# Patient Record
Sex: MALE | Race: OTHER | NOT HISPANIC OR LATINO | URBAN - METROPOLITAN AREA
[De-identification: names, ages, dates, MRNs, and addresses within clinical notes are randomized per-mention and may not be internally consistent; named-entity substitution may affect disease eponyms.]

---

## 2018-01-20 ENCOUNTER — INPATIENT (INPATIENT)
Facility: HOSPITAL | Age: 83
LOS: 7 days | Discharge: HOME HEALTH | End: 2018-01-28
Attending: STUDENT IN AN ORGANIZED HEALTH CARE EDUCATION/TRAINING PROGRAM | Admitting: STUDENT IN AN ORGANIZED HEALTH CARE EDUCATION/TRAINING PROGRAM
Payer: MEDICAID

## 2018-01-20 VITALS
TEMPERATURE: 98 F | SYSTOLIC BLOOD PRESSURE: 129 MMHG | RESPIRATION RATE: 16 BRPM | HEART RATE: 84 BPM | DIASTOLIC BLOOD PRESSURE: 65 MMHG | OXYGEN SATURATION: 99 %

## 2018-01-20 NOTE — ED ADULT TRIAGE NOTE - CHIEF COMPLAINT QUOTE
Pt walk in on a wheelchair. daughter reports Pt arrived from [facility],and sent to [facility] on 1/1/2018 and D/C 1/9/2018 for AMS and Weakness and Pain on Right shoulder. per daughter claimed no improvement. prior departure from [place] Pt is AOX3, and able to do his ADL. Daughter wanted further eval. daughter reports constipation Blood in the Urine  since last night on aspirin with Dysuria Pt walk in on a wheelchair. daughter reports Pt arrived from [facility],and sent to [facility] on 1/1/2018 and D/C 1/9/2018 for AMS and Weakness and Pain on Right shoulder. per daughter claimed no improvement. prior departure from [place] Pt is AOX3, and able to do his ADL. Daughter wanted further eval. daughter reports constipation Blood in the Urine  since last night on aspirin with Dysuria denies CP SOB palpitation Dizziness

## 2018-01-21 DIAGNOSIS — I62.00 NONTRAUMATIC SUBDURAL HEMORRHAGE, UNSPECIFIED: ICD-10-CM

## 2018-01-21 DIAGNOSIS — S06.5X0D TRAUMATIC SUBDURAL HEMORRHAGE WITHOUT LOSS OF CONSCIOUSNESS, SUBSEQUENT ENCOUNTER: ICD-10-CM

## 2018-01-21 LAB
ALBUMIN SERPL ELPH-MCNC: 3.7 G/DL — SIGNIFICANT CHANGE UP (ref 3.3–5)
ALP SERPL-CCNC: 54 U/L — SIGNIFICANT CHANGE UP (ref 40–120)
ALT FLD-CCNC: 29 U/L — SIGNIFICANT CHANGE UP (ref 4–41)
APPEARANCE UR: CLEAR — SIGNIFICANT CHANGE UP
APTT BLD: 26.5 SEC — LOW (ref 27.5–37.4)
AST SERPL-CCNC: 35 U/L — SIGNIFICANT CHANGE UP (ref 4–40)
BASE EXCESS BLDV CALC-SCNC: 3.2 MMOL/L — SIGNIFICANT CHANGE UP
BASOPHILS # BLD AUTO: 0.04 K/UL — SIGNIFICANT CHANGE UP (ref 0–0.2)
BASOPHILS NFR BLD AUTO: 0.7 % — SIGNIFICANT CHANGE UP (ref 0–2)
BILIRUB SERPL-MCNC: 0.4 MG/DL — SIGNIFICANT CHANGE UP (ref 0.2–1.2)
BILIRUB UR-MCNC: NEGATIVE — SIGNIFICANT CHANGE UP
BLD GP AB SCN SERPL QL: NEGATIVE — SIGNIFICANT CHANGE UP
BLOOD GAS VENOUS - CREATININE: 1.34 MG/DL — HIGH (ref 0.5–1.3)
BLOOD UR QL VISUAL: NEGATIVE — SIGNIFICANT CHANGE UP
BUN SERPL-MCNC: 13 MG/DL — SIGNIFICANT CHANGE UP (ref 7–23)
BUN SERPL-MCNC: 19 MG/DL — SIGNIFICANT CHANGE UP (ref 7–23)
CALCIUM SERPL-MCNC: 9.1 MG/DL — SIGNIFICANT CHANGE UP (ref 8.4–10.5)
CALCIUM SERPL-MCNC: 9.3 MG/DL — SIGNIFICANT CHANGE UP (ref 8.4–10.5)
CHLORIDE BLDV-SCNC: 106 MMOL/L — SIGNIFICANT CHANGE UP (ref 96–108)
CHLORIDE SERPL-SCNC: 101 MMOL/L — SIGNIFICANT CHANGE UP (ref 98–107)
CHLORIDE SERPL-SCNC: 103 MMOL/L — SIGNIFICANT CHANGE UP (ref 98–107)
CO2 SERPL-SCNC: 25 MMOL/L — SIGNIFICANT CHANGE UP (ref 22–31)
CO2 SERPL-SCNC: 25 MMOL/L — SIGNIFICANT CHANGE UP (ref 22–31)
COLOR SPEC: YELLOW — SIGNIFICANT CHANGE UP
CREAT SERPL-MCNC: 1.01 MG/DL — SIGNIFICANT CHANGE UP (ref 0.5–1.3)
CREAT SERPL-MCNC: 1.42 MG/DL — HIGH (ref 0.5–1.3)
EOSINOPHIL # BLD AUTO: 0.08 K/UL — SIGNIFICANT CHANGE UP (ref 0–0.5)
EOSINOPHIL NFR BLD AUTO: 1.4 % — SIGNIFICANT CHANGE UP (ref 0–6)
GAS PNL BLDV: 134 MMOL/L — LOW (ref 136–146)
GLUCOSE BLDV-MCNC: 97 — SIGNIFICANT CHANGE UP (ref 70–99)
GLUCOSE SERPL-MCNC: 100 MG/DL — HIGH (ref 70–99)
GLUCOSE SERPL-MCNC: 126 MG/DL — HIGH (ref 70–99)
GLUCOSE UR-MCNC: NEGATIVE — SIGNIFICANT CHANGE UP
HBA1C BLD-MCNC: 5.5 % — SIGNIFICANT CHANGE UP (ref 4–5.6)
HCO3 BLDV-SCNC: 26 MMOL/L — SIGNIFICANT CHANGE UP (ref 20–27)
HCT VFR BLD CALC: 35.2 % — LOW (ref 39–50)
HCT VFR BLDV CALC: 37 % — LOW (ref 39–51)
HGB BLD-MCNC: 11.5 G/DL — LOW (ref 13–17)
HGB BLDV-MCNC: 12 G/DL — LOW (ref 13–17)
HYALINE CASTS # UR AUTO: SIGNIFICANT CHANGE UP (ref 0–?)
IMM GRANULOCYTES # BLD AUTO: 0.02 # — SIGNIFICANT CHANGE UP
IMM GRANULOCYTES NFR BLD AUTO: 0.4 % — SIGNIFICANT CHANGE UP (ref 0–1.5)
INR BLD: 0.93 — SIGNIFICANT CHANGE UP (ref 0.88–1.17)
KETONES UR-MCNC: NEGATIVE — SIGNIFICANT CHANGE UP
LACTATE BLDV-MCNC: 2 MMOL/L — SIGNIFICANT CHANGE UP (ref 0.5–2)
LEUKOCYTE ESTERASE UR-ACNC: NEGATIVE — SIGNIFICANT CHANGE UP
LYMPHOCYTES # BLD AUTO: 2.2 K/UL — SIGNIFICANT CHANGE UP (ref 1–3.3)
LYMPHOCYTES # BLD AUTO: 39.5 % — SIGNIFICANT CHANGE UP (ref 13–44)
MAGNESIUM SERPL-MCNC: 2.1 MG/DL — SIGNIFICANT CHANGE UP (ref 1.6–2.6)
MCHC RBC-ENTMCNC: 28.5 PG — SIGNIFICANT CHANGE UP (ref 27–34)
MCHC RBC-ENTMCNC: 32.7 % — SIGNIFICANT CHANGE UP (ref 32–36)
MCV RBC AUTO: 87.3 FL — SIGNIFICANT CHANGE UP (ref 80–100)
MONOCYTES # BLD AUTO: 0.91 K/UL — HIGH (ref 0–0.9)
MONOCYTES NFR BLD AUTO: 16.3 % — HIGH (ref 2–14)
MUCOUS THREADS # UR AUTO: SIGNIFICANT CHANGE UP
NEUTROPHILS # BLD AUTO: 2.32 K/UL — SIGNIFICANT CHANGE UP (ref 1.8–7.4)
NEUTROPHILS NFR BLD AUTO: 41.7 % — LOW (ref 43–77)
NITRITE UR-MCNC: NEGATIVE — SIGNIFICANT CHANGE UP
NRBC # FLD: 0 — SIGNIFICANT CHANGE UP
PCO2 BLDV: 48 MMHG — SIGNIFICANT CHANGE UP (ref 41–51)
PH BLDV: 7.39 PH — SIGNIFICANT CHANGE UP (ref 7.32–7.43)
PH UR: 6 — SIGNIFICANT CHANGE UP (ref 4.6–8)
PHOSPHATE SERPL-MCNC: 3.1 MG/DL — SIGNIFICANT CHANGE UP (ref 2.5–4.5)
PLATELET # BLD AUTO: 179 K/UL — SIGNIFICANT CHANGE UP (ref 150–400)
PMV BLD: 10.4 FL — SIGNIFICANT CHANGE UP (ref 7–13)
PO2 BLDV: 30 MMHG — LOW (ref 35–40)
POTASSIUM BLDV-SCNC: 4.4 MMOL/L — SIGNIFICANT CHANGE UP (ref 3.4–4.5)
POTASSIUM SERPL-MCNC: 4.1 MMOL/L — SIGNIFICANT CHANGE UP (ref 3.5–5.3)
POTASSIUM SERPL-MCNC: 4.9 MMOL/L — SIGNIFICANT CHANGE UP (ref 3.5–5.3)
POTASSIUM SERPL-SCNC: 4.1 MMOL/L — SIGNIFICANT CHANGE UP (ref 3.5–5.3)
POTASSIUM SERPL-SCNC: 4.9 MMOL/L — SIGNIFICANT CHANGE UP (ref 3.5–5.3)
PROT SERPL-MCNC: 7.7 G/DL — SIGNIFICANT CHANGE UP (ref 6–8.3)
PROT UR-MCNC: 20 MG/DL — SIGNIFICANT CHANGE UP
PROTHROM AB SERPL-ACNC: 10.7 SEC — SIGNIFICANT CHANGE UP (ref 9.8–13.1)
RBC # BLD: 4.03 M/UL — LOW (ref 4.2–5.8)
RBC # FLD: 12.4 % — SIGNIFICANT CHANGE UP (ref 10.3–14.5)
RBC CASTS # UR COMP ASSIST: SIGNIFICANT CHANGE UP (ref 0–?)
RH IG SCN BLD-IMP: POSITIVE — SIGNIFICANT CHANGE UP
SAO2 % BLDV: 48.2 % — LOW (ref 60–85)
SODIUM SERPL-SCNC: 139 MMOL/L — SIGNIFICANT CHANGE UP (ref 135–145)
SODIUM SERPL-SCNC: 140 MMOL/L — SIGNIFICANT CHANGE UP (ref 135–145)
SP GR SPEC: 1.02 — SIGNIFICANT CHANGE UP (ref 1–1.04)
SQUAMOUS # UR AUTO: SIGNIFICANT CHANGE UP
UROBILINOGEN FLD QL: 1 MG/DL — SIGNIFICANT CHANGE UP
WBC # BLD: 5.57 K/UL — SIGNIFICANT CHANGE UP (ref 3.8–10.5)
WBC # FLD AUTO: 5.57 K/UL — SIGNIFICANT CHANGE UP (ref 3.8–10.5)
WBC UR QL: SIGNIFICANT CHANGE UP (ref 0–?)

## 2018-01-21 PROCEDURE — 70450 CT HEAD/BRAIN W/O DYE: CPT | Mod: 26

## 2018-01-21 PROCEDURE — 99222 1ST HOSP IP/OBS MODERATE 55: CPT

## 2018-01-21 PROCEDURE — 71045 X-RAY EXAM CHEST 1 VIEW: CPT | Mod: 26

## 2018-01-21 PROCEDURE — 93306 TTE W/DOPPLER COMPLETE: CPT | Mod: 26

## 2018-01-21 RX ORDER — ATORVASTATIN CALCIUM 80 MG/1
20 TABLET, FILM COATED ORAL AT BEDTIME
Qty: 0 | Refills: 0 | Status: DISCONTINUED | OUTPATIENT
Start: 2018-01-21 | End: 2018-01-27

## 2018-01-21 RX ORDER — TAMSULOSIN HYDROCHLORIDE 0.4 MG/1
0.4 CAPSULE ORAL AT BEDTIME
Qty: 0 | Refills: 0 | Status: DISCONTINUED | OUTPATIENT
Start: 2018-01-21 | End: 2018-01-27

## 2018-01-21 RX ORDER — TAMSULOSIN HYDROCHLORIDE 0.4 MG/1
0.4 CAPSULE ORAL AT BEDTIME
Qty: 0 | Refills: 0 | Status: DISCONTINUED | OUTPATIENT
Start: 2018-01-21 | End: 2018-01-21

## 2018-01-21 RX ORDER — SODIUM CHLORIDE 9 MG/ML
1000 INJECTION INTRAMUSCULAR; INTRAVENOUS; SUBCUTANEOUS
Qty: 0 | Refills: 0 | Status: DISCONTINUED | OUTPATIENT
Start: 2018-01-21 | End: 2018-01-21

## 2018-01-21 RX ORDER — ATORVASTATIN CALCIUM 80 MG/1
20 TABLET, FILM COATED ORAL AT BEDTIME
Qty: 0 | Refills: 0 | Status: DISCONTINUED | OUTPATIENT
Start: 2018-01-21 | End: 2018-01-21

## 2018-01-21 RX ADMIN — TAMSULOSIN HYDROCHLORIDE 0.4 MILLIGRAM(S): 0.4 CAPSULE ORAL at 22:15

## 2018-01-21 RX ADMIN — ATORVASTATIN CALCIUM 20 MILLIGRAM(S): 80 TABLET, FILM COATED ORAL at 22:15

## 2018-01-21 NOTE — ED PROVIDER NOTE - PROGRESS NOTE DETAILS
b/l acute on chronic subdurals on ct, neurosurgery consulted, family informed. neurosurg consulted sicu, patient comfortable, awaiting dispo

## 2018-01-21 NOTE — H&P ADULT - HISTORY OF PRESENT ILLNESS
84 y/o m presents with ams. Patient came from [facility] at end of december, had unwitnessed fall in airport while there, had ams and had hospital stay while in [facility]. Came to [place], family noticed he was more altered, confused, weak, went to [place] and was admitted there for few days-they are unsure of his w/u but was started on meds for htn/agitation/hld/prostate. Over last few days, has become even weaker, to the point where he used to be able to do all of his adls and now requires assistance to even go to the bathroom, confusion is increasing. No fevers, ha, cp, sob, abd pain, vomiting, diarrhea, dysuria. Patient himself has no complaints

## 2018-01-21 NOTE — ED PROVIDER NOTE - NEUROLOGICAL, MLM
ao3, cn2-12 intact, strenght 5/5 in all extremities, sensation intact, unable to ambulate 2/2 weakness.

## 2018-01-21 NOTE — H&P ADULT - NSHPPHYSICALEXAM_GEN_ALL_CORE
· CONSTITUTIONAL: - - -  · Appearance: well appearing  · Development: well developed  · Distress: no apparent  · Manner: appropriate for situation  · Mentation: awake, alert, oriented to name/place (never knows year per family)  · Mood: appropriate  · Nourishment: well  · ENMT: Airway patent, Nasal mucosa clear. Mouth with normal mucosa. Throat has no vesicles, no oropharyngeal exudates and uvula is midline.  · EYES: Clear bilaterally, pupils equal, round and reactive to light.  · CARDIAC: Normal rate, regular rhythm.  Heart sounds S1, S2.  No murmurs, rubs or gallops.  · RESPIRATORY: Breath sounds clear and equal bilaterally.  · GASTROINTESTINAL: Abdomen soft, non-tender, no guarding.  · MUSCULOSKELETAL: Spine appears normal, range of motion is not limited, no muscle or joint tenderness  · NEUROLOGICAL: ao3, cn2-12 intact, strenght 5/5 in all extremities, sensation intact, unable to ambulate 2/2 weakness.  · SKIN: Skin normal color for race, warm, dry and intact. No evidence of rash.  · PSYCHIATRIC: Alert and oriented to person, place, time/situation. normal mood and affect. no apparent risk to self or others.

## 2018-01-21 NOTE — ED ADULT NURSE NOTE - ED STAT RN HANDOFF DETAILS
endorsed to rn arya. pt remains awake, alert and active. remains confused. no s/s of pain. nad noted. fall precautions in place, moved closer to nurses station. safety maintained endorsed to rn arya. pt remains awake, alert, active and confused. ambulatory with steady gait. pupils equal and round. no s/s of pain. nad noted. fall precautions in place, moved closer to nurses station. safety maintained

## 2018-01-21 NOTE — ED ADULT NURSE NOTE - CHIEF COMPLAINT QUOTE
Pt walk in on a wheelchair. daughter reports Pt arrived from [facility],and sent to [facility] on 1/1/2018 and D/C 1/9/2018 for AMS and Weakness and Pain on Right shoulder. per daughter claimed no improvement. prior departure from [place] Pt is AOX3, and able to do his ADL. Daughter wanted further eval. daughter reports constipation Blood in the Urine  since last night on aspirin with Dysuria denies CP SOB palpitation Dizziness

## 2018-01-21 NOTE — PATIENT PROFILE ADULT. - HEALTHCARE QUESTIONS, PROFILE
[place]  [address]  [phone]               Thank you for choosing us for your health care visit with [facility], [facility].   We are glad to serve you and happy to provide you with this summary of Take 1 tablet by mouth every 6 (six) hours as needed for Pain.    Commonly known as:  NORCO           MetFORMIN HCl  MG Tb24   TAKE 2 TABLETS (1000 MG) BY MOUTH TWO TIMES A DAY   Commonly known as:  GLUCOPHAGE-XR           * NAPROXEN  MG Tbec none

## 2018-01-21 NOTE — H&P ADULT - PMH
Benign prostatic hyperplasia with urinary frequency    HTN (hypertension)    Hyperlipidemia, unspecified hyperlipidemia type

## 2018-01-21 NOTE — ED ADULT NURSE NOTE - OBJECTIVE STATEMENT
pt a&o x 1 brought in by family for AMS since 1/1/18. as per family pt is originally a&o x3 and able to complete ADL but since returning from [facility] 12/30 he has been a*o x1 and unable to complete adls. was admitted 1/1-1/9 at [facility] and [place] with dx of early dementia. family wants further eval and wants urine tested. states they noticed bloody urine yesterday, on aspirin. no reports of fever. family states pt periodically holds head as if in pain. pt does not appear in pain, nad noted. family also reports left shoulder pain. pt noted to be able to move extremity. right forearm 20g placed. awaiting md hernandez

## 2018-01-21 NOTE — H&P ADULT - NSHPREVIEWOFSYSTEMS_GEN_ALL_CORE
· CONSTITUTIONAL: - - -  · Constitutional [+]: generalized weakness.  · NEUROLOGICAL: - - -  · Neurological [+]: confusion  · ROS STATEMENT: all other ROS negative except as per HPI

## 2018-01-21 NOTE — ED PROVIDER NOTE - MEDICAL DECISION MAKING DETAILS
84 y/o m presents with progressive weakness and confusion, prior hospital stay for same but continues to worsen, h/o fall end of december, nontoxic appearing, vss, neuro nonfocal, will check labs, ekg, ua, cxr, ct head, monitor, reass.

## 2018-01-21 NOTE — ED PROVIDER NOTE - OBJECTIVE STATEMENT
86 y/o m presents with ams. Patient came from [facility] at end of december, had unwitnessed fall in airport while there, had ams and had hospital stay while in [facility]. Came to [place], family noticed he was more altered, confused, weak, went to [place] and was admitted there for few days-they are unsure of his w/u but was started on meds for htn/agitation/hld/prostate. Over last few days, has become even weaker, to the point where he used to be able to do all of his adls and now requires assistance to even go to the bathroom, confusion is increasing. No fevers, ha, cp, sob, abd pain, vomiting, diarrhea, dysuria. Patient himself has no acute complaints. manolo valiente- 86 y/o m presents with ams. Patient came from [facility] at end of december, had unwitnessed fall in airport while there, had ams and had hospital stay while in [facility]. Came to [place], family noticed he was more altered, confused, weak, went to [place] and was admitted there for few days-they are unsure of his w/u but was started on meds for htn/agitation/hld/prostate. Over last few days, has become even weaker, to the point where he used to be able to do all of his adls and now requires assistance to even go to the bathroom, confusion is increasing. No fevers, ha, cp, sob, abd pain, vomiting, diarrhea, dysuria. Patient himself has no acute complaints.

## 2018-01-21 NOTE — ED PROVIDER NOTE - CRITICAL CARE INDICATION, MLM
patient was critically ill... Patient was critically ill with a high probability of imminent or life threatening deterioration. ams with b/l subdural hemorrhages requiring neurosurgery eval and disposition to sicu.

## 2018-01-21 NOTE — H&P ADULT - NSHPLABSRESULTS_GEN_ALL_CORE
Labs                        11.5   5.57  )-----------( 179      ( 2018 00:33 )             35.2     2018 00:33    139    |  101    |  19     ----------------------------<  100    4.9     |  25     |  1.42     Ca    9.3        2018 00:33    TPro  7.7    /  Alb  3.7    /  TBili  0.4    /  DBili  x      /  AST  35     /  ALT  29     /  AlkPhos  54     2018 00:33    LIVER FUNCTIONS - ( 2018 00:33 )  Alb: 3.7 g/dL / Pro: 7.7 g/dL / ALK PHOS: 54 u/L / ALT: 29 u/L / AST: 35 u/L / GGT: x           CAPILLARY BLOOD GLUCOSE    POCT Blood Glucose.: 135 mg/dL (2018 22:59)    Urinalysis Basic - ( 2018 00:40 )    Color: YELLOW / Appearance: CLEAR / S.020 / pH: 6.0  Gluc: NEGATIVE / Ketone: NEGATIVE  / Bili: NEGATIVE / Urobili: 1 mg/dL   Blood: NEGATIVE / Protein: 20 mg/dL / Nitrite: NEGATIVE   Leuk Esterase: NEGATIVE / RBC: 0-2 / WBC 0-2   Sq Epi: OCC / Non Sq Epi: x / Bacteria: x    Radiology    < from: CT Head No Cont (18 @ 01:27) >    MPRESSION:    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 Mild prominence of the   temporal horns, may be related to generalized volume loss, however early   mild hydrocephalus cannot be entirely excluded. No significant midline   shift.    Findings were discussed with Dr. Broderick by Dr. Mccann at approximately   1:46 AM dated 2018 with read back.    < end of copied text >

## 2018-01-22 ENCOUNTER — TRANSCRIPTION ENCOUNTER (OUTPATIENT)
Age: 83
End: 2018-01-22

## 2018-01-22 DIAGNOSIS — I62.00 NONTRAUMATIC SUBDURAL HEMORRHAGE, UNSPECIFIED: ICD-10-CM

## 2018-01-22 LAB
ALBUMIN SERPL ELPH-MCNC: 3.8 G/DL — SIGNIFICANT CHANGE UP (ref 3.3–5)
ALP SERPL-CCNC: 56 U/L — SIGNIFICANT CHANGE UP (ref 40–120)
ALT FLD-CCNC: 30 U/L — SIGNIFICANT CHANGE UP (ref 4–41)
APTT BLD: 25.7 SEC — LOW (ref 27.5–37.4)
AST SERPL-CCNC: 37 U/L — SIGNIFICANT CHANGE UP (ref 4–40)
BACTERIA UR CULT: SIGNIFICANT CHANGE UP
BASE EXCESS BLDA CALC-SCNC: 1.7 MMOL/L — SIGNIFICANT CHANGE UP
BASE EXCESS BLDV CALC-SCNC: 5.2 MMOL/L — SIGNIFICANT CHANGE UP
BASOPHILS # BLD AUTO: 0.02 K/UL — SIGNIFICANT CHANGE UP (ref 0–0.2)
BASOPHILS NFR BLD AUTO: 0.4 % — SIGNIFICANT CHANGE UP (ref 0–2)
BILIRUB SERPL-MCNC: 0.7 MG/DL — SIGNIFICANT CHANGE UP (ref 0.2–1.2)
BLD GP AB SCN SERPL QL: NEGATIVE — SIGNIFICANT CHANGE UP
BLOOD GAS VENOUS - CREATININE: 1.09 MG/DL — SIGNIFICANT CHANGE UP (ref 0.5–1.3)
BUN SERPL-MCNC: 15 MG/DL — SIGNIFICANT CHANGE UP (ref 7–23)
CA-I BLDA-SCNC: 1.16 MMOL/L — SIGNIFICANT CHANGE UP (ref 1.15–1.29)
CALCIUM SERPL-MCNC: 9.3 MG/DL — SIGNIFICANT CHANGE UP (ref 8.4–10.5)
CHLORIDE BLDV-SCNC: 108 MMOL/L — SIGNIFICANT CHANGE UP (ref 96–108)
CHLORIDE SERPL-SCNC: 104 MMOL/L — SIGNIFICANT CHANGE UP (ref 98–107)
CO2 SERPL-SCNC: 23 MMOL/L — SIGNIFICANT CHANGE UP (ref 22–31)
CREAT SERPL-MCNC: 1.22 MG/DL — SIGNIFICANT CHANGE UP (ref 0.5–1.3)
EOSINOPHIL # BLD AUTO: 0.05 K/UL — SIGNIFICANT CHANGE UP (ref 0–0.5)
EOSINOPHIL NFR BLD AUTO: 0.9 % — SIGNIFICANT CHANGE UP (ref 0–6)
GAS PNL BLDV: 132 MMOL/L — LOW (ref 136–146)
GLUCOSE BLDA-MCNC: 155 MG/DL — HIGH (ref 70–99)
GLUCOSE BLDV-MCNC: 142 — HIGH (ref 70–99)
GLUCOSE SERPL-MCNC: 133 MG/DL — HIGH (ref 70–99)
HCO3 BLDA-SCNC: 26 MMOL/L — SIGNIFICANT CHANGE UP (ref 22–26)
HCO3 BLDV-SCNC: 27 MMOL/L — SIGNIFICANT CHANGE UP (ref 20–27)
HCT VFR BLD CALC: 37.6 % — LOW (ref 39–50)
HCT VFR BLDA CALC: 34.3 % — LOW (ref 39–51)
HCT VFR BLDV CALC: 39 % — SIGNIFICANT CHANGE UP (ref 39–51)
HGB BLD-MCNC: 12.4 G/DL — LOW (ref 13–17)
HGB BLDA-MCNC: 11.1 G/DL — LOW (ref 13–17)
HGB BLDV-MCNC: 12.7 G/DL — LOW (ref 13–17)
IMM GRANULOCYTES # BLD AUTO: 0.03 # — SIGNIFICANT CHANGE UP
IMM GRANULOCYTES NFR BLD AUTO: 0.5 % — SIGNIFICANT CHANGE UP (ref 0–1.5)
INR BLD: 0.98 — SIGNIFICANT CHANGE UP (ref 0.88–1.17)
LACTATE BLDA-SCNC: 1.2 MMOL/L — SIGNIFICANT CHANGE UP (ref 0.5–2)
LACTATE BLDV-MCNC: 1.6 MMOL/L — SIGNIFICANT CHANGE UP (ref 0.5–2)
LYMPHOCYTES # BLD AUTO: 1.49 K/UL — SIGNIFICANT CHANGE UP (ref 1–3.3)
LYMPHOCYTES # BLD AUTO: 26.8 % — SIGNIFICANT CHANGE UP (ref 13–44)
MAGNESIUM SERPL-MCNC: 2.1 MG/DL — SIGNIFICANT CHANGE UP (ref 1.6–2.6)
MCHC RBC-ENTMCNC: 28.8 PG — SIGNIFICANT CHANGE UP (ref 27–34)
MCHC RBC-ENTMCNC: 33 % — SIGNIFICANT CHANGE UP (ref 32–36)
MCV RBC AUTO: 87.2 FL — SIGNIFICANT CHANGE UP (ref 80–100)
MONOCYTES # BLD AUTO: 0.75 K/UL — SIGNIFICANT CHANGE UP (ref 0–0.9)
MONOCYTES NFR BLD AUTO: 13.5 % — SIGNIFICANT CHANGE UP (ref 2–14)
NEUTROPHILS # BLD AUTO: 3.23 K/UL — SIGNIFICANT CHANGE UP (ref 1.8–7.4)
NEUTROPHILS NFR BLD AUTO: 57.9 % — SIGNIFICANT CHANGE UP (ref 43–77)
NRBC # FLD: 0 — SIGNIFICANT CHANGE UP
PCO2 BLDA: 39 MMHG — SIGNIFICANT CHANGE UP (ref 35–48)
PCO2 BLDV: 49 MMHG — SIGNIFICANT CHANGE UP (ref 41–51)
PH BLDA: 7.43 PH — SIGNIFICANT CHANGE UP (ref 7.35–7.45)
PH BLDV: 7.4 PH — SIGNIFICANT CHANGE UP (ref 7.32–7.43)
PHOSPHATE SERPL-MCNC: 3.4 MG/DL — SIGNIFICANT CHANGE UP (ref 2.5–4.5)
PLATELET # BLD AUTO: 173 K/UL — SIGNIFICANT CHANGE UP (ref 150–400)
PMV BLD: 10.1 FL — SIGNIFICANT CHANGE UP (ref 7–13)
PO2 BLDA: 99 MMHG — SIGNIFICANT CHANGE UP (ref 83–108)
PO2 BLDV: < 24 MMHG — LOW (ref 35–40)
POTASSIUM BLDA-SCNC: 3.9 MMOL/L — SIGNIFICANT CHANGE UP (ref 3.4–4.5)
POTASSIUM BLDV-SCNC: 4.3 MMOL/L — SIGNIFICANT CHANGE UP (ref 3.4–4.5)
POTASSIUM SERPL-MCNC: 4.8 MMOL/L — SIGNIFICANT CHANGE UP (ref 3.5–5.3)
POTASSIUM SERPL-SCNC: 4.8 MMOL/L — SIGNIFICANT CHANGE UP (ref 3.5–5.3)
PROT SERPL-MCNC: 8 G/DL — SIGNIFICANT CHANGE UP (ref 6–8.3)
PROTHROM AB SERPL-ACNC: 11.3 SEC — SIGNIFICANT CHANGE UP (ref 9.8–13.1)
RBC # BLD: 4.31 M/UL — SIGNIFICANT CHANGE UP (ref 4.2–5.8)
RBC # FLD: 12.6 % — SIGNIFICANT CHANGE UP (ref 10.3–14.5)
RH IG SCN BLD-IMP: POSITIVE — SIGNIFICANT CHANGE UP
SAO2 % BLDA: 97.5 % — SIGNIFICANT CHANGE UP (ref 95–99)
SAO2 % BLDV: 22.3 % — LOW (ref 60–85)
SODIUM BLDA-SCNC: 132 MMOL/L — LOW (ref 136–146)
SODIUM SERPL-SCNC: 143 MMOL/L — SIGNIFICANT CHANGE UP (ref 135–145)
SPECIMEN SOURCE: SIGNIFICANT CHANGE UP
WBC # BLD: 5.57 K/UL — SIGNIFICANT CHANGE UP (ref 3.8–10.5)
WBC # FLD AUTO: 5.57 K/UL — SIGNIFICANT CHANGE UP (ref 3.8–10.5)

## 2018-01-22 PROCEDURE — 70450 CT HEAD/BRAIN W/O DYE: CPT | Mod: 26

## 2018-01-22 PROCEDURE — 99223 1ST HOSP IP/OBS HIGH 75: CPT | Mod: GC

## 2018-01-22 PROCEDURE — 61154 BURR HOLE W/EVAC&/DRG HMTMA: CPT | Mod: 50

## 2018-01-22 PROCEDURE — 99233 SBSQ HOSP IP/OBS HIGH 50: CPT | Mod: GC

## 2018-01-22 PROCEDURE — 71045 X-RAY EXAM CHEST 1 VIEW: CPT | Mod: 26

## 2018-01-22 RX ORDER — SODIUM CHLORIDE 9 MG/ML
1000 INJECTION INTRAMUSCULAR; INTRAVENOUS; SUBCUTANEOUS
Qty: 0 | Refills: 0 | Status: DISCONTINUED | OUTPATIENT
Start: 2018-01-22 | End: 2018-01-22

## 2018-01-22 RX ORDER — DEXMEDETOMIDINE HYDROCHLORIDE IN 0.9% SODIUM CHLORIDE 4 UG/ML
66 INJECTION INTRAVENOUS ONCE
Qty: 0 | Refills: 0 | Status: DISCONTINUED | OUTPATIENT
Start: 2018-01-22 | End: 2018-01-22

## 2018-01-22 RX ORDER — DESMOPRESSIN ACETATE 0.1 MG/1
20 TABLET ORAL ONCE
Qty: 0 | Refills: 0 | Status: DISCONTINUED | OUTPATIENT
Start: 2018-01-22 | End: 2018-01-22

## 2018-01-22 RX ORDER — LEVETIRACETAM 250 MG/1
500 TABLET, FILM COATED ORAL
Qty: 0 | Refills: 0 | Status: DISCONTINUED | OUTPATIENT
Start: 2018-01-22 | End: 2018-01-22

## 2018-01-22 RX ORDER — DEXMEDETOMIDINE HYDROCHLORIDE IN 0.9% SODIUM CHLORIDE 4 UG/ML
0.2 INJECTION INTRAVENOUS
Qty: 200 | Refills: 0 | Status: DISCONTINUED | OUTPATIENT
Start: 2018-01-22 | End: 2018-01-22

## 2018-01-22 RX ORDER — CEFAZOLIN SODIUM 1 G
2000 VIAL (EA) INJECTION EVERY 8 HOURS
Qty: 0 | Refills: 0 | Status: COMPLETED | OUTPATIENT
Start: 2018-01-23 | End: 2018-01-23

## 2018-01-22 RX ORDER — LEVETIRACETAM 250 MG/1
750 TABLET, FILM COATED ORAL EVERY 12 HOURS
Qty: 0 | Refills: 0 | Status: DISCONTINUED | OUTPATIENT
Start: 2018-01-23 | End: 2018-01-24

## 2018-01-22 RX ORDER — DESMOPRESSIN ACETATE 0.1 MG/1
20 TABLET ORAL ONCE
Qty: 0 | Refills: 0 | Status: COMPLETED | OUTPATIENT
Start: 2018-01-22 | End: 2018-01-22

## 2018-01-22 RX ORDER — LEVETIRACETAM 250 MG/1
500 TABLET, FILM COATED ORAL EVERY 12 HOURS
Qty: 0 | Refills: 0 | Status: DISCONTINUED | OUTPATIENT
Start: 2018-01-22 | End: 2018-01-22

## 2018-01-22 RX ORDER — PANTOPRAZOLE SODIUM 20 MG/1
40 TABLET, DELAYED RELEASE ORAL DAILY
Qty: 0 | Refills: 0 | Status: DISCONTINUED | OUTPATIENT
Start: 2018-01-22 | End: 2018-01-24

## 2018-01-22 RX ORDER — HALOPERIDOL DECANOATE 100 MG/ML
5 INJECTION INTRAMUSCULAR ONCE
Qty: 0 | Refills: 0 | Status: DISCONTINUED | OUTPATIENT
Start: 2018-01-22 | End: 2018-01-22

## 2018-01-22 RX ORDER — SODIUM CHLORIDE 9 MG/ML
1000 INJECTION, SOLUTION INTRAVENOUS
Qty: 0 | Refills: 0 | Status: DISCONTINUED | OUTPATIENT
Start: 2018-01-22 | End: 2018-01-25

## 2018-01-22 RX ADMIN — LEVETIRACETAM 400 MILLIGRAM(S): 250 TABLET, FILM COATED ORAL at 17:09

## 2018-01-22 RX ADMIN — LEVETIRACETAM 400 MILLIGRAM(S): 250 TABLET, FILM COATED ORAL at 04:03

## 2018-01-22 RX ADMIN — PANTOPRAZOLE SODIUM 40 MILLIGRAM(S): 20 TABLET, DELAYED RELEASE ORAL at 12:00

## 2018-01-22 RX ADMIN — Medication 0.5 MILLIGRAM(S): at 05:05

## 2018-01-22 RX ADMIN — DEXMEDETOMIDINE HYDROCHLORIDE IN 0.9% SODIUM CHLORIDE 3.29 MICROGRAM(S)/KG/HR: 4 INJECTION INTRAVENOUS at 05:47

## 2018-01-22 RX ADMIN — DESMOPRESSIN ACETATE 220 MICROGRAM(S): 0.1 TABLET ORAL at 20:49

## 2018-01-22 NOTE — CONSULT NOTE ADULT - ATTENDING COMMENTS
Seen and examined.  Chart and note reviewed.  Case discussed with SICU team    Subdural hematoma, acute on chronic  a. Admit to SICU  b.  NVS Q 1  c.  Diet as tolerated  d.  Start IV NSS  e.  For OR juan carlos
Patient seen and examined at bedside with neurology resident and I agree with assessment and plan as outlined. Patient found to have bilateral subdural hematomas after a fall and presented with confusion, disorientation, poor gait and a seizure. His mental state is very limited now due to medications and sedation. He withdraws to pain and sensory stimuli and reflexes intact. CT head reviewed in detail and we will increase the keppra to 750mg BID for seizure prevention and await neurosurgical intervention to drain the bilateral subdurals. Will pursue EEG after intervention and continue SICU and medical management. Daughter at bedside and she understood the plan and all questions answered.

## 2018-01-22 NOTE — PROGRESS NOTE ADULT - SUBJECTIVE AND OBJECTIVE BOX
Subjective: Patient seen and examined. No new events except as noted.     SUBJECTIVE/ROS:    confused  Due to altered mental status, subjective information were not able to be obtained from the patient. History was obtained, to the extent possible, from review of the chart and collateral sources of information.     MEDICATIONS:  MEDICATIONS  (STANDING):  atorvastatin 20 milliGRAM(s) Oral at bedtime  levETIRAcetam  IVPB 500 milliGRAM(s) IV Intermittent every 12 hours  sodium chloride 0.9%. 1000 milliLiter(s) (75 mL/Hr) IV Continuous <Continuous>  tamsulosin 0.4 milliGRAM(s) Oral at bedtime      PHYSICAL EXAM:  T(C): 36.5 (01-22-18 @ 08:00), Max: 37.1 (01-21-18 @ 20:00)  HR: 61 (01-22-18 @ 09:00) (54 - 128)  BP: 114/66 (01-22-18 @ 09:00) (80/41 - 200/84)  RR: 11 (01-22-18 @ 09:00) (11 - 29)  SpO2: 100% (01-22-18 @ 09:00) (78% - 100%)  Wt(kg): --  I&O's Summary    21 Jan 2018 07:01  -  22 Jan 2018 07:00  --------------------------------------------------------  IN: 2121.5 mL / OUT: 1625 mL / NET: 496.5 mL    22 Jan 2018 07:01  -  22 Jan 2018 09:03  --------------------------------------------------------  IN: 75 mL / OUT: 0 mL / NET: 75 mL        JVP: Normal  Neck: supple  Lung: clear   CV: S1 S2 , Murmur:  Abd: soft  Ext: No edema  neuro: Awake   Psych: flat affect  Skin: normal       LABS/DATA:    CARDIAC MARKERS:                                12.4   5.57  )-----------( 173      ( 22 Jan 2018 03:50 )             37.6     01-22    143  |  104  |  15  ----------------------------<  133<H>  4.8   |  23  |  1.22    Ca    9.3      22 Jan 2018 03:50  Phos  3.4     01-22  Mg     2.1     01-22    TPro  8.0  /  Alb  3.8  /  TBili  0.7  /  DBili  x   /  AST  37  /  ALT  30  /  AlkPhos  56  01-22    proBNP:   Lipid Profile:   HgA1c: Hemoglobin A1C, Whole Blood: 5.5 % (01-21 @ 11:15)    TSH:     TELE: sinus, sinus yoselin, pvc	  EKG:  < from: Transthoracic Echocardiogram (01.21.18 @ 08:57) >  CONCLUSIONS:  1. Mitral annular calcification, otherwise normal mitral  valve. Minimal mitral regurgitation.  2. Calcified trileaflet aortic valve with normal opening.  Mild aortic regurgitation.  3. Normal left ventricular internal dimensions and wall  thicknesses.  4. Endocardium not well visualized; grossly normal left  ventricular systolic function.  5. The right ventricle is not well visualized; grossly  normal right ventricular systolic function.  6. Estimated right ventricular systolic pressure equals 44  mm Hg, assuming right atrial pressure equals 10 mm Hg,  consistent with mild pulmonary hypertension.  --------------------------------------------------------------    < end of copied text >

## 2018-01-22 NOTE — PROGRESS NOTE ADULT - ASSESSMENT
SDH / Pre Op   plan for drain  Based on current ACC/AHA guidelines, patient history and physical exam, the patient is considered to have elevated risk but stable for this time sensitive surgery   Echo shows normal LV function     s/p fall , rule out syncope  monitor on tele  will consider ILR vs 30 day event     HTN  cont to monitor  check orthostatic post op   BP stable now off meds

## 2018-01-22 NOTE — PROGRESS NOTE ADULT - SUBJECTIVE AND OBJECTIVE BOX
Patient became markedly agitated, climbed out of bed and was telling the nurse he has to leave. Pt then became unresponsive and voided with gasping respirations, possibly a seizure episode. Pt taken for urgent head CT, now back in unit  ICU Vital Signs Last 24 Hrs  T(C): 37 (22 Jan 2018 00:00), Max: 37.1 (21 Jan 2018 20:00)  T(F): 98.6 (22 Jan 2018 00:00), Max: 98.7 (21 Jan 2018 20:00)  HR: 70 (22 Jan 2018 02:00) (69 - 94)  BP: 139/71 (22 Jan 2018 01:00) (104/70 - 165/68)  BP(mean): 89 (22 Jan 2018 01:00) (67 - 110)  ABP: --  ABP(mean): --  RR: 16 (22 Jan 2018 02:00) (12 - 22)  SpO2: 100% (22 Jan 2018 02:00) (89% - 100%)    Arousable to vigorous tactile stimuli, appears postictal  Minimally verbal  PERRL  [place] with good strength    Noncontrast brain CT: Grossly stable size of bilateral mixed density subdural   hematomas, which demonstrate more heterogeneity since prior examination,   which may be related to mixing of acute/subacute and chronic blood products.   Remainder the examination is grossly stable

## 2018-01-22 NOTE — PROGRESS NOTE ADULT - SUBJECTIVE AND OBJECTIVE BOX
SICU AM PROGRESS NOTE   =====================================================      Overnight / Interval Events:     Pt was OOB walking with assistance . On Q2 neurochecks . tolerating didte , no active issues .         HPI: 85y Niuean male presenting with altered mental status.  No family at bedside.  As per ED, Apparently pt had a fall in [facility]  with AMS and associated hospital stay.  Upon arrival in [place], was noted by family to be more confused, weak, unable to perform ADLs.  At baseline pt lives alone in [facility], with Daughter when visiting [place], drives a car.  At this time pt is unable to ambulate to the bathroom without assistance, further worsening mental status for 1-2 weeks.  Speaking less.  Confused.   Recent admission to [place] for altered mental status, started on medication for HLD, HTN, agitation, prostate.  Unclear if head CT was performed during that admission.  Unable to obtain further history from patient 2/2 AMS.  Pt has no complaints at the bedside.    Surgery Information: None  Case Duration: 	EBL: 	IV Fluids: 	Blood Products: 	Urine Output:   Complications:     HISTORY  85y Male  HPI:86 y/o m presents with ams. Patient came from [facility] at end of december, had unwitnessed fall in airport while there, had ams and had hospital stay while in [facility]. Came to [place], family noticed he was more altered, confused, weak, went to [place] and was admitted there for few days-they are unsure of his w/u but was started on meds for htn/agitation/hld/prostate. Over last few days, has become even weaker, to the point where he used to be able to do all of his adls and now requires assistance to even go to the bathroom, confusion is increasing. No fevers, ha, cp, sob, abd pain, vomiting, diarrhea, dysuria. Patient himself has no complaints    Allergies:   PAST MEDICAL & SURGICAL HISTORY:  Hyperlipidemia, unspecified hyperlipidemia type  Benign prostatic hyperplasia with urinary frequency  HTN (hypertension)  No significant past surgical history    FAMILY HISTORY:      SOCIAL HISTORY:  ***    ADVANCE DIRECTIVES: Presumed Full Code  ***    REVIEW OF SYSTEMS:  Unable to participate 2/2 altered mental status    HOME MEDICATIONS:      CURRENT MEDICATIONS:   --------------------------------------------------------------------------------------      Cardiovascular Medications:  tamsulosin 0.4 milliGRAM(s) Oral at bedtime      Endocrine/Metabolic Medications:  atorvastatin 20 milliGRAM(s) Oral at bedtime        --------------------------------------------------------------------------------------    VITAL SIGNS, INS/OUTS (last 24 hours):  --------------------------------------------------------------------------------------  T(C): 37 (18 @ 00:00), Max: 37.1 (18 @ 20:00)  HR: 82 (18 @ 00:00) (69 - 94)  BP: 125/106 (18 @ 00:00) (104/70 - 167/82)  BP(mean): 110 (18 @ 00:00) (67 - 110)  ABP: --  ABP(mean): --  RR: 14 (18 @ 00:00) (14 - 22)  SpO2: 99% (18 @ 00:00) (89% - 100%)  Wt(kg): --  CVP(mm Hg): --  CI: --  CAPILLARY BLOOD GLUCOSE       N/A       @ 07:01  -  01-21 @ 07:00  --------------------------------------------------------  IN:    sodium chloride 0.9%: 150 mL  Total IN: 150 mL    OUT:    Voided: 200 mL  Total OUT: 200 mL    Total NET: -50 mL       @ 07: @ 00:34  --------------------------------------------------------  IN:    Oral Fluid: 910 mL    sodium chloride 0.9%: 900 mL  Total IN: 1810 mL    OUT:    Voided: 1500 mL  Total OUT: 1500 mL    Total NET: 310 mL        --------------------------------------------------------------------------------------    EXAM  NEUROLOGY  RASS:   	GCS:  13  Exam: Normal, NAD, alert, oriented x 2, no focal deficits. Follows some basic commands.  No pronator drift, strong hand , Moving all extremities spontaneously.    HEENT  Exam: Normocephalic, atraumatic.  EOMI     RESPIRATORY  Exam: Lungs clear to auscultation, Normal expansion/effort.        CARDIOVASCULAR  Exam: S1, S2.  Regular rate and rhythm.  No Peripheral edema    GI/NUTRITION  Exam: Abdomen soft, Non-tender, Non-distended.    Current Diet: DASH diet     VASCULAR  Exam: Extremities warm, pink, well-perfused.      MUSCULOSKELETAL  Exam: All extremities moving spontaneously without limitations.      SKIN:  Exam: Good skin turgor, no skin breakdown.      METABOLIC/FLUIDS/ELECTROLYTES      HEMATOLOGIC  [x] DVT Prophylaxis:   Transfusions:	[] PRBC	[] Platelets		[] FFP	[] Cryoprecipitate    INFECTIOUS DISEASE  Antimicrobials/Immunologic Medications:    Day #  	of    ***    Tubes/Lines/Drains    [x] Peripheral IV  [] Central Venous Line     	[] R	[] L	[] IJ	[] Fem	[] SC	Date Placed:   [] Arterial Line		[] R	[] L	[] Fem	[] Rad	[] Ax	Date Placed:   [] PICC:         	[] Midline		[] Mediport  [] Urinary Catheter		Date Placed:     LABS  --------------------------------------------------------------------------------------                    CBC ( 00:33)                              11.5<L>                       5.57    )--------------(  179        41.7<L>% Neuts, 39.5  % Lymphs, ANC: 2.32                                35.2<L>    BMP ( 11:15)             140     |  103     |  13    		Ca++ --      Ca 9.1                ---------------------------------( 126<H>		Mg 2.1                4.1     |  25      |  1.01  			Ph 3.1     BMP ( 00:33)             139     |  101     |  19    		Ca++ --      Ca 9.3                ---------------------------------( 100<H>		Mg --                 4.9     |  25      |  1.42<H>			Ph --        LFTs ( 00:33)      TPro 7.7 / Alb 3.7 / TBili 0.4 / DBili -- / AST 35 / ALT 29 / AlkPhos 54    Coags ( 11:15)  aPTT 26.5<L> / INR 0.93 / PT 10.7      ABG (:33)      /  /  /  /  / %     Lactate:   2.0    VBG (:33)     7.39 / 48 / 30<L> / 26 / 3.2 / 48.2<L>%    Urinalysis ( @ 00:40):     Color: YELLOW / Appearance: CLEAR / S.020 / pH: 6.0 / Gluc: NEGATIVE / Ketones: NEGATIVE / Bili: NEGATIVE / Urobili: 1 / Protein :20 / Nitrites: NEGATIVE / Leuk.Est: NEGATIVE / RBC: 0-2 / WBC: 0-2 / Sq Epi: OCC / Non Sq Epi:  / Bacteria            --------------------------------------------------------------------------------------    OTHER LABS    IMAGING RESULTS  Echo:   CT: < from: CT Head No Cont (18 @ 01:27) >    EXAM:  CT BRAIN        PROCEDURE DATE:  2018         INTERPRETATION:  CLINICAL INDICATION:  Progressive altered mental status.   History of fall.    TECHNIQUE : Axial CT scanning of the brain was obtained from the skull   base to the vertex without the administration of intravenous contrast.   Coronal and sagittal reformatted images were subsequently obtained.     COMPARISON: No available comparisons.    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 Mass effect results is effacement of the   cerebral convexity sulci. Slitlike third ventricle. Minimal prominence of   the bilateral temporal horns. No significant midline shift.    Paranasal sinuses and mastoid air cells are clear. Visualized osseous   structures are intact.    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 Mild prominence of the   temporal horns, may be related to generalized volume loss, however early   mild hydrocephalus cannot be entirely excluded. No significant midline   shift.    Findings were discussed with Dr. Broderick by Dr. Mccann at approximately   1:46 AM dated 2018 with read back.                LIVAN MCCANN M.D., RADIOLOGY RESIDENT  This document has been electronically signed.  DEENA GALLEGOS M.D., ATTENDING RADIOLOGIST  This document has been electronically signed. 2018  2:03AM                  < end of copied text >    Xray:   < from: Xray Chest 1 View AP- PORTABLE-Urgent (18 @ 02:37) >    ******PRELIMINARY REPORT******    ******PRELIMINARY REPORT******            EXAM:  XR CHEST PORTABLE URGENT 1V        PROCEDURE DATE:  2018     ******PRELIMINARY REPORT******    ******PRELIMINARY REPORT******            INTERPRETATION:  Clear Lungs            ******PRELIMINARY REPORT******    ******PRELIMINARY REPORT******          LIVAN MCCANN M.D., RADIOLOGY RESIDENT                        < end of copied text > SICU AM PROGRESS NOTE   =====================================================      Overnight / Interval Events:     Pt was OOB walking with assistance . On Q2 neurochecks . diet was advanced yesterday . Pt was sleeping comfortably , around 2 : 30 am pt woke up agitated and confused. Pt attempted to climb out of bed and pulled on his lines. He then voided and sliding at bedside - possibly a seizure acticity. CT head non con was done immediately which showed: as below       Noncontrast brain CT: Grossly stable size of bilateral mixed density subdural   hematomas, which demonstrate more heterogeneity since prior examination,   which may be related to mixing of acute/subacute and chronic blood products.   Remainder the examination is grossly stable   ------------------------------------------------------------------------------------------------------------------------------------          HPI: 85y Gu[place]ian male presenting with altered mental status.  No family at bedside.  As per ED, Apparently pt had a fall in [facility]  with AMS and associated hospital stay.  Upon arrival in [place], was noted by family to be more confused, weak, unable to perform ADLs.  At baseline pt lives alone in [facility], with Daughter when visiting [place], drives a car.  At this time pt is unable to ambulate to the bathroom without assistance, further worsening mental status for 1-2 weeks.  Speaking less.  Confused.   Recent admission to [place] for altered mental status, started on medication for HLD, HTN, agitation, prostate.  Unclear if head CT was performed during that admission.  Unable to obtain further history from patient 2/2 AMS.  Pt has no complaints at the bedside.    Surgery Information: None  Case Duration: 	EBL: 	IV Fluids: 	Blood Products: 	Urine Output:   Complications:     HISTORY  85y Male  HPI:84 y/o m presents with ams. Patient came from [facility] at end of december, had unwitnessed fall in airport while there, had ams and had hospital stay while in [facility]. Came to [place], family noticed he was more altered, confused, weak, went to [place] and was admitted there for few days-they are unsure of his w/u but was started on meds for htn/agitation/hld/prostate. Over last few days, has become even weaker, to the point where he used to be able to do all of his adls and now requires assistance to even go to the bathroom, confusion is increasing. No fevers, ha, cp, sob, abd pain, vomiting, diarrhea, dysuria. Patient himself has no complaints    Allergies:   PAST MEDICAL & SURGICAL HISTORY:  Hyperlipidemia, unspecified hyperlipidemia type  Benign prostatic hyperplasia with urinary frequency  HTN (hypertension)  No significant past surgical history    FAMILY HISTORY:      SOCIAL HISTORY:  ***    ADVANCE DIRECTIVES: Presumed Full Code  ***    REVIEW OF SYSTEMS:  Unable to participate 2/2 altered mental status    HOME MEDICATIONS:      CURRENT MEDICATIONS:   --------------------------------------------------------------------------------------  Neurologic Medications:  levETIRAcetam  IVPB 500 milliGRAM(s) IV Intermittent every 12 hours  LORazepam   Injectable 0.5 milliGRAM(s) IV Push once      Cardiovascular Medications:  tamsulosin 0.4 milliGRAM(s) Oral at bedtime    Gastrointestinal Medications:  sodium chloride 0.9%. 1000 milliLiter(s) IV Continuous <Continuous>      Endocrine/Metabolic Medications:  atorvastatin 20 milliGRAM(s) Oral at bedtime    Topical/Other Medications:  --------------------------------------------------------------------------------------    VITAL SIGNS, INS/OUTS (last 24 hours):  --------------------------------------------------------------------------------------  T(C): 37 (18 @ 00:00), Max: 37.1 (18 @ 20:00)  HR: 82 (18 @ 00:00) (69 - 94)  BP: 125/106 (18 @ 00:00) (104/70 - 167/82)  BP(mean): 110 (18 @ 00:00) (67 - 110)  ABP: --  ABP(mean): --  RR: 14 (18 @ 00:00) (14 - 22)  SpO2: 99% (18 @ 00:00) (89% - 100%)  Wt(kg): --  CVP(mm Hg): --  CI: --  CAPILLARY BLOOD GLUCOSE       N/A       @ 07:  -   @ 07:00  --------------------------------------------------------  IN:    sodium chloride 0.9%: 150 mL  Total IN: 150 mL    OUT:    Voided: 200 mL  Total OUT: 200 mL    Total NET: -50 mL       @ 07:  -   @ 00:34  --------------------------------------------------------  IN:    Oral Fluid: 910 mL    sodium chloride 0.9%: 900 mL  Total IN: 1810 mL    OUT:    Voided: 1500 mL  Total OUT: 1500 mL    Total NET: 310 mL        --------------------------------------------------------------------------------------    EXAM  NEUROLOGY  RASS:   	 GCS: 13   Exam: Normal, NAD, alert, oriented x 1, no focal deficits. lethargic( post ictal)     HEENT  Exam: Normocephalic, atraumatic.  EOMI     RESPIRATORY  Exam: Lungs clear to auscultation, Normal expansion/effort.        CARDIOVASCULAR  Exam: S1, S2.  Regular rate and rhythm.  No Peripheral edema    GI/NUTRITION  Exam: Abdomen soft, Non-tender, Non-distended.    Current Diet: NPO     VASCULAR  Exam: Extremities warm, pink, well-perfused.      MUSCULOSKELETAL  Exam: All extremities moving spontaneously without limitations.      SKIN:  Exam: Good skin turgor, no skin breakdown.      METABOLIC/FLUIDS/ELECTROLYTES      HEMATOLOGIC  [x] DVT Prophylaxis:   Transfusions:	[] PRBC	[] Platelets		[] FFP	[] Cryoprecipitate    INFECTIOUS DISEASE  Antimicrobials/Immunologic Medications:    Day #  	of    ***    Tubes/Lines/Drains    [x] Peripheral IV  [] Central Venous Line     	[] R	[] L	[] IJ	[] Fem	[] SC	Date Placed:   [] Arterial Line		[] R	[] L	[] Fem	[] Rad	[] Ax	Date Placed:   [] PICC:         	[] Midline		[] Mediport  [] Urinary Catheter		Date Placed:     LABS  --------------------------------------------------------------------------------------                    CBC ( @ 00:33)                              11.5<L>                       5.57    )--------------(  179        41.7<L>% Neuts, 39.5  % Lymphs, ANC: 2.32                                35.2<L>    BMP ( 11:15)             140     |  103     |  13    		Ca++ --      Ca 9.1                ---------------------------------( 126<H>		Mg 2.1                4.1     |  25      |  1.01  			Ph 3.1     BMP ( 00:33)             139     |  101     |  19    		Ca++ --      Ca 9.3                ---------------------------------( 100<H>		Mg --                 4.9     |  25      |  1.42<H>			Ph --        LFTs (:33)      TPro 7.7 / Alb 3.7 / TBili 0.4 / DBili -- / AST 35 / ALT 29 / AlkPhos 54    Coags ( 11:15)  aPTT 26.5<L> / INR 0.93 / PT 10.7      ABG (:33)      /  /  /  /  / %     Lactate:   2.0    VBG (:33)     7.39 / 48 / 30<L> / 26 / 3.2 / 48.2<L>%    Urinalysis ( @ 00:40):     Color: YELLOW / Appearance: CLEAR / S.020 / pH: 6.0 / Gluc: NEGATIVE / Ketones: NEGATIVE / Bili: NEGATIVE / Urobili: 1 / Protein :20 / Nitrites: NEGATIVE / Leuk.Est: NEGATIVE / RBC: 0-2 / WBC: 0-2 / Sq Epi: OCC / Non Sq Epi:  / Bacteria            --------------------------------------------------------------------------------------    OTHER LABS    IMAGING RESULTS  Echo:   CT: < from: CT Head No Cont (18 @ 01:27) >    EXAM:  CT BRAIN        PROCEDURE DATE:  2018         INTERPRETATION:  CLINICAL INDICATION:  Progressive altered mental status.   History of fall.    TECHNIQUE : Axial CT scanning of the brain was obtained from the skull   base to the vertex without the administration of intravenous contrast.   Coronal and sagittal reformatted images were subsequently obtained.     COMPARISON: No available comparisons.    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 Mass effect results is effacement of the   cerebral convexity sulci. Slitlike third ventricle. Minimal prominence of   the bilateral temporal horns. No significant midline shift.    Paranasal sinuses and mastoid air cells are clear. Visualized osseous   structures are intact.    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 Mild prominence of the   temporal horns, may be related to generalized volume loss, however early   mild hydrocephalus cannot be entirely excluded. No significant midline   shift.    Findings were discussed with Dr. Broderick by Dr. Mccann at approximately   1:46 AM dated 2018 with read back.                < end of copied text >    Xray:   < from: Xray Chest 1 View AP- PORTABLE-Urgent (18 @ 02:37) >    ******PRELIMINARY REPORT******    ******PRELIMINARY REPORT******            EXAM:  XR CHEST PORTABLE URGENT 1V        PROCEDURE DATE:  2018     ******PRELIMINARY REPORT******    ******PRELIMINARY REPORT******            INTERPRETATION:  Clear Lungs            ******PRELIMINARY REPORT******    ******PRELIMINARY REPORT******          LIVAN MCCANN M.D., RADIOLOGY RESIDENT                        < end of copied text >

## 2018-01-22 NOTE — PROGRESS NOTE ADULT - ASSESSMENT
ASSESSMENT:  85y Male with a hx of HTN, HLD, BPH, recent fall, altered mental status x 1 month, presenting with worsening altered mental status for 1-2 weeks found with acute on chronic bilateral subdural hemorrhage.    PLAN:    Neurologic: AAO x 1.  q2 hour neurochecks.  OR tentatively for monday for burrhole as per neurosurgery.  Respiratory: no active issues.  continue to monitor.  Cardiovascular: Cardiology consult Sunday Dr. Hale.  Check TTE.  Hold ASA.  Gastrointestinal/Nutrition: Tolerating regular diet .  NPO sunday night for OR monday.  Renal/Genitourinary: Monitor I/Os. NICK vs CKD. Trend Creatinine. Replete lytes PRN.  Hematologic:  SCDs.  Holding chemical DVT ppx in setting of acute on chronic SDH.  Infectious Disease: No active issues.  Tubes/Lines/Drains: PIV  Endocrine:  No active issues.  Monitor glucose on BMP.  Disposition: SICU    --------------------------------------------------------------------------------------    Critical Care Diagnoses: Acute on chronic subdural hemorrhage, altered mental status, severe protein calorie malnutrition. ASSESSMENT:  85y Male with a hx of HTN, HLD, BPH, recent fall, altered mental status x 1 month, presenting with worsening altered mental status for 1-2 weeks found with acute on chronic bilateral subdural hemorrhage.    PLAN:    Neurologic: AAO x 1.  q2 hour neurochecks.  started on Keppra for Seizure ppx. 0.5 ativan for agitation ( avoid Haldol as it lowers the seizure threshold)   Respiratory: no active issues.  continue to monitor.  Cardiovascular: Need cards clearance.  Check TTE.  Hold ASA.  Gastrointestinal/Nutrition: NPO for tentaive plan to OR today - f/u primary team   Renal/Genitourinary: Monitor I/Os. NICK vs CKD. Trend Creatinine. Replete lytes PRN.  Hematologic:  SCDs.  Holding chemical DVT ppx in setting of acute on chronic SDH.  Infectious Disease: No active issues.  Tubes/Lines/Drains: PIV  Endocrine:  No active issues.  Monitor glucose on BMP.  Disposition: SICU, likely OR today     --------------------------------------------------------------------------------------    Critical Care Diagnoses: Acute on chronic subdural hemorrhage, altered mental status, severe protein calorie malnutrition.

## 2018-01-23 DIAGNOSIS — Z98.890 OTHER SPECIFIED POSTPROCEDURAL STATES: ICD-10-CM

## 2018-01-23 LAB
ALBUMIN SERPL ELPH-MCNC: 3.3 G/DL — SIGNIFICANT CHANGE UP (ref 3.3–5)
ALP SERPL-CCNC: 46 U/L — SIGNIFICANT CHANGE UP (ref 40–120)
ALT FLD-CCNC: 24 U/L — SIGNIFICANT CHANGE UP (ref 4–41)
APTT BLD: 28.2 SEC — SIGNIFICANT CHANGE UP (ref 27.5–37.4)
AST SERPL-CCNC: 35 U/L — SIGNIFICANT CHANGE UP (ref 4–40)
BILIRUB SERPL-MCNC: 0.4 MG/DL — SIGNIFICANT CHANGE UP (ref 0.2–1.2)
BUN SERPL-MCNC: 16 MG/DL — SIGNIFICANT CHANGE UP (ref 7–23)
CALCIUM SERPL-MCNC: 8.3 MG/DL — LOW (ref 8.4–10.5)
CHLORIDE SERPL-SCNC: 110 MMOL/L — HIGH (ref 98–107)
CO2 SERPL-SCNC: 21 MMOL/L — LOW (ref 22–31)
CREAT SERPL-MCNC: 1.14 MG/DL — SIGNIFICANT CHANGE UP (ref 0.5–1.3)
GLUCOSE SERPL-MCNC: 158 MG/DL — HIGH (ref 70–99)
HCT VFR BLD CALC: 30.7 % — LOW (ref 39–50)
HGB BLD-MCNC: 10.5 G/DL — LOW (ref 13–17)
INR BLD: 1.04 — SIGNIFICANT CHANGE UP (ref 0.88–1.17)
MAGNESIUM SERPL-MCNC: 2.1 MG/DL — SIGNIFICANT CHANGE UP (ref 1.6–2.6)
MCHC RBC-ENTMCNC: 30.3 PG — SIGNIFICANT CHANGE UP (ref 27–34)
MCHC RBC-ENTMCNC: 34.2 % — SIGNIFICANT CHANGE UP (ref 32–36)
MCV RBC AUTO: 88.5 FL — SIGNIFICANT CHANGE UP (ref 80–100)
NRBC # FLD: 0 — SIGNIFICANT CHANGE UP
PHOSPHATE SERPL-MCNC: 3.4 MG/DL — SIGNIFICANT CHANGE UP (ref 2.5–4.5)
PLATELET # BLD AUTO: 148 K/UL — LOW (ref 150–400)
PMV BLD: 10.5 FL — SIGNIFICANT CHANGE UP (ref 7–13)
POTASSIUM SERPL-MCNC: 5 MMOL/L — SIGNIFICANT CHANGE UP (ref 3.5–5.3)
POTASSIUM SERPL-SCNC: 5 MMOL/L — SIGNIFICANT CHANGE UP (ref 3.5–5.3)
PROT SERPL-MCNC: 6.9 G/DL — SIGNIFICANT CHANGE UP (ref 6–8.3)
PROTHROM AB SERPL-ACNC: 11.6 SEC — SIGNIFICANT CHANGE UP (ref 9.8–13.1)
RBC # BLD: 3.47 M/UL — LOW (ref 4.2–5.8)
RBC # FLD: 12.4 % — SIGNIFICANT CHANGE UP (ref 10.3–14.5)
SODIUM SERPL-SCNC: 145 MMOL/L — SIGNIFICANT CHANGE UP (ref 135–145)
WBC # BLD: 5.29 K/UL — SIGNIFICANT CHANGE UP (ref 3.8–10.5)
WBC # FLD AUTO: 5.29 K/UL — SIGNIFICANT CHANGE UP (ref 3.8–10.5)

## 2018-01-23 PROCEDURE — 95819 EEG AWAKE AND ASLEEP: CPT | Mod: 26

## 2018-01-23 PROCEDURE — 70450 CT HEAD/BRAIN W/O DYE: CPT | Mod: 26

## 2018-01-23 PROCEDURE — 93010 ELECTROCARDIOGRAM REPORT: CPT

## 2018-01-23 PROCEDURE — 71045 X-RAY EXAM CHEST 1 VIEW: CPT | Mod: 26

## 2018-01-23 PROCEDURE — 99233 SBSQ HOSP IP/OBS HIGH 50: CPT | Mod: GC

## 2018-01-23 RX ORDER — HALOPERIDOL DECANOATE 100 MG/ML
5 INJECTION INTRAMUSCULAR ONCE
Qty: 0 | Refills: 0 | Status: DISCONTINUED | OUTPATIENT
Start: 2018-01-23 | End: 2018-01-23

## 2018-01-23 RX ORDER — HALOPERIDOL DECANOATE 100 MG/ML
5 INJECTION INTRAMUSCULAR ONCE
Qty: 0 | Refills: 0 | Status: COMPLETED | OUTPATIENT
Start: 2018-01-23 | End: 2018-01-23

## 2018-01-23 RX ORDER — DEXMEDETOMIDINE HYDROCHLORIDE IN 0.9% SODIUM CHLORIDE 4 UG/ML
0.1 INJECTION INTRAVENOUS
Qty: 200 | Refills: 0 | Status: DISCONTINUED | OUTPATIENT
Start: 2018-01-23 | End: 2018-01-24

## 2018-01-23 RX ADMIN — LEVETIRACETAM 400 MILLIGRAM(S): 250 TABLET, FILM COATED ORAL at 05:50

## 2018-01-23 RX ADMIN — SODIUM CHLORIDE 75 MILLILITER(S): 9 INJECTION, SOLUTION INTRAVENOUS at 08:09

## 2018-01-23 RX ADMIN — PANTOPRAZOLE SODIUM 40 MILLIGRAM(S): 20 TABLET, DELAYED RELEASE ORAL at 11:56

## 2018-01-23 RX ADMIN — DEXMEDETOMIDINE HYDROCHLORIDE IN 0.9% SODIUM CHLORIDE 1.65 MICROGRAM(S)/KG/HR: 4 INJECTION INTRAVENOUS at 08:08

## 2018-01-23 RX ADMIN — DEXMEDETOMIDINE HYDROCHLORIDE IN 0.9% SODIUM CHLORIDE 1.65 MICROGRAM(S)/KG/HR: 4 INJECTION INTRAVENOUS at 08:24

## 2018-01-23 RX ADMIN — LEVETIRACETAM 400 MILLIGRAM(S): 250 TABLET, FILM COATED ORAL at 18:23

## 2018-01-23 RX ADMIN — HALOPERIDOL DECANOATE 5 MILLIGRAM(S): 100 INJECTION INTRAMUSCULAR at 20:42

## 2018-01-23 RX ADMIN — DEXMEDETOMIDINE HYDROCHLORIDE IN 0.9% SODIUM CHLORIDE 1.65 MICROGRAM(S)/KG/HR: 4 INJECTION INTRAVENOUS at 05:50

## 2018-01-23 RX ADMIN — Medication 100 MILLIGRAM(S): at 15:06

## 2018-01-23 RX ADMIN — SODIUM CHLORIDE 75 MILLILITER(S): 9 INJECTION, SOLUTION INTRAVENOUS at 15:10

## 2018-01-23 RX ADMIN — Medication 100 MILLIGRAM(S): at 05:49

## 2018-01-23 RX ADMIN — SODIUM CHLORIDE 75 MILLILITER(S): 9 INJECTION, SOLUTION INTRAVENOUS at 05:50

## 2018-01-23 RX ADMIN — Medication 1 MILLIGRAM(S): at 02:00

## 2018-01-23 NOTE — PROGRESS NOTE ADULT - ASSESSMENT
ASSESSMENT:  85y Male with a hx of HTN, HLD, BPH, recent fall, altered mental status x 1 month, presenting with worsening altered mental status for 1-2 weeks found with acute on chronic bilateral subdural hemorrhage.    PLAN:    Neurologic: AAO x 1.  q2 hour neurochecks.  started on Keppra for Seizure ppx. 0.5 ativan for agitation ( avoid Haldol as it lowers the seizure threshold)   Respiratory: no active issues.  continue to monitor.  Cardiovascular: Need cards clearance.  Check TTE.  Hold ASA.  Gastrointestinal/Nutrition: NPO for tentaive plan to OR today - f/u primary team   Renal/Genitourinary: Monitor I/Os. NICK vs CKD. Trend Creatinine. Replete lytes PRN.  Hematologic:  SCDs.  Holding chemical DVT ppx in setting of acute on chronic SDH.  Infectious Disease: No active issues.  Tubes/Lines/Drains: PIV  Endocrine:  No active issues.  Monitor glucose on BMP.  Disposition: SICU, likely OR today     --------------------------------------------------------------------------------------    Critical Care Diagnoses: Acute on chronic subdural hemorrhage, altered mental status, severe protein calorie malnutrition. ASSESSMENT:  85y Male with a hx of HTN, HLD, BPH, recent fall, altered mental status x 1 month, presenting with worsening altered mental status for 1-2 weeks found with acute on chronic bilateral subdural hemorrhage.    PLAN:    Neurologic: AAO x 1.  q2 hour neurochecks.  On 750 mg of  Keppra for Seizure ppx. Precedex gtt for agitation . f/u CT head today .   Respiratory: no active issues.  continue to monitor.  Cardiovascular: Vitals stable . no active issues   Gastrointestinal/Nutrition: NPO/IVF   Renal/Genitourinary: Monitor I/Os. NICK vs CKD. Trend Creatinine. Replete lytes PRN.  Hematologic:  SCDs.  Holding chemical DVT ppx in setting of acute on chronic SDH.  Infectious Disease: Ancef X 3 doses - drain prophylaxis   Tubes/Lines/Drains: PIV , YASMINE drains X 2 ( maxi hole sites)   Endocrine:  No active issues.  Monitor glucose on BMP.  Disposition: SICU    --------------------------------------------------------------------------------------    Critical Care Diagnoses: Acute on chronic subdural hemorrhage, altered mental status, severe protein calorie malnutrition. ASSESSMENT:  85y Male with a hx of HTN, HLD, BPH, recent fall, altered mental status x 1 month, presenting with worsening altered mental status for 1-2 weeks found with acute on chronic bilateral subdural hemorrhage.    PLAN:    Neurologic: AAO x 1.  q2 hour neurochecks.  On 750 mg of  Keppra for Seizure ppx. Precedex gtt for agitation ( avoid Benzos )  . f/u CT head today .   Respiratory: no active issues.  continue to monitor.  Cardiovascular: Vitals stable . no active issues   Gastrointestinal/Nutrition: NPO/IVF   Renal/Genitourinary: Monitor I/Os. NICK vs CKD. Trend Creatinine. Replete lytes PRN.  Hematologic:  SCDs.  Holding chemical DVT ppx in setting of acute on chronic SDH.  Infectious Disease: Ancef X 3 doses - drain prophylaxis   Tubes/Lines/Drains: PIV , YASMINE drains X 2 ( maxi hole sites)   Endocrine:  No active issues.  Monitor glucose on BMP.  Disposition: SICU    --------------------------------------------------------------------------------------    Critical Care Diagnoses: Acute on chronic subdural hemorrhage, altered mental status, severe protein calorie malnutrition.

## 2018-01-23 NOTE — PROGRESS NOTE ADULT - SUBJECTIVE AND OBJECTIVE BOX
SICU AM PROGRESS NOTE   =====================================================      Overnight / Interval Events:     Pt had DDAVP before going to OR yesterday . s/p B/l [place] hole with Drains . Pt was extubated and transferred to SICU post op .   ------------------------------------------------------------------------------------------------------------------------------------          HPI: 85y [place] male presenting with altered mental status.  No family at bedside.  As per ED, Apparently pt had a fall in [facility]  with AMS and associated hospital stay.  Upon arrival in [place], was noted by family to be more confused, weak, unable to perform ADLs.  At baseline pt lives alone in [facility], with Daughter when visiting [place], drives a car.  At this time pt is unable to ambulate to the bathroom without assistance, further worsening mental status for 1-2 weeks.  Speaking less.  Confused.   Recent admission to [place] for altered mental status, started on medication for HLD, HTN, agitation, prostate.  Unclear if head CT was performed during that admission.  Unable to obtain further history from patient 2/2 AMS.  Pt has no complaints at the bedside.    Surgery Information: None  Case Duration: 	EBL: 	IV Fluids: 	Blood Products: 	Urine Output:   Complications:     HISTORY  85y Male  HPI:86 y/o m presents with ams. Patient came from [facility] at end of december, had unwitnessed fall in airport while there, had ams and had hospital stay while in [facility]. Came to [place], family noticed he was more altered, confused, weak, went to [place] and was admitted there for few days-they are unsure of his w/u but was started on meds for htn/agitation/hld/prostate. Over last few days, has become even weaker, to the point where he used to be able to do all of his adls and now requires assistance to even go to the bathroom, confusion is increasing. No fevers, ha, cp, sob, abd pain, vomiting, diarrhea, dysuria. Patient himself has no complaints    Allergies:   PAST MEDICAL & SURGICAL HISTORY:  Hyperlipidemia, unspecified hyperlipidemia type  Benign prostatic hyperplasia with urinary frequency  HTN (hypertension)  No significant past surgical history    FAMILY HISTORY:      SOCIAL HISTORY:  ***    ADVANCE DIRECTIVES: Presumed Full Code  ***    REVIEW OF SYSTEMS:  Unable to participate 2/2 altered mental status    HOME MEDICATIONS:      CURRENT MEDICATIONS:   --------------------------------------------------------------------------------------  Neurologic Medications:  levETIRAcetam  IVPB 500 milliGRAM(s) IV Intermittent every 12 hours  LORazepam   Injectable 0.5 milliGRAM(s) IV Push once      Cardiovascular Medications:  tamsulosin 0.4 milliGRAM(s) Oral at bedtime    Gastrointestinal Medications:  sodium chloride 0.9%. 1000 milliLiter(s) IV Continuous <Continuous>      Endocrine/Metabolic Medications:  atorvastatin 20 milliGRAM(s) Oral at bedtime    Topical/Other Medications:  --------------------------------------------------------------------------------------    VITAL SIGNS, INS/OUTS (last 24 hours):  --------------------------------------------------------------------------------------  T(C): 37 (18 @ 00:00), Max: 37.1 (18 @ 20:00)  HR: 82 (18 @ 00:00) (69 - 94)  BP: 125/106 (18 @ 00:00) (104/70 - 167/82)  BP(mean): 110 (18 @ 00:00) (67 - 110)  ABP: --  ABP(mean): --  RR: 14 (18 @ 00:00) (14 - 22)  SpO2: 99% (18 @ 00:00) (89% - 100%)  Wt(kg): --  CVP(mm Hg): --  CI: --  CAPILLARY BLOOD GLUCOSE       N/A       @ 07:  -   @ 07:00  --------------------------------------------------------  IN:    sodium chloride 0.9%: 150 mL  Total IN: 150 mL    OUT:    Voided: 200 mL  Total OUT: 200 mL    Total NET: -50 mL       07: 00:34  --------------------------------------------------------  IN:    Oral Fluid: 910 mL    sodium chloride 0.9%: 900 mL  Total IN: 1810 mL    OUT:    Voided: 1500 mL  Total OUT: 1500 mL    Total NET: 310 mL        --------------------------------------------------------------------------------------    EXAM  NEUROLOGY  RASS:   	 GCS: 13   Exam: Normal, NAD, alert, oriented x 1, no focal deficits. lethargic( post ictal)     HEENT  Exam: Normocephalic, atraumatic.  EOMI     RESPIRATORY  Exam: Lungs clear to auscultation, Normal expansion/effort.        CARDIOVASCULAR  Exam: S1, S2.  Regular rate and rhythm.  No Peripheral edema    GI/NUTRITION  Exam: Abdomen soft, Non-tender, Non-distended.    Current Diet: NPO     VASCULAR  Exam: Extremities warm, pink, well-perfused.      MUSCULOSKELETAL  Exam: All extremities moving spontaneously without limitations.      SKIN:  Exam: Good skin turgor, no skin breakdown.      METABOLIC/FLUIDS/ELECTROLYTES      HEMATOLOGIC  [x] DVT Prophylaxis:   Transfusions:	[] PRBC	[] Platelets		[] FFP	[] Cryoprecipitate    INFECTIOUS DISEASE  Antimicrobials/Immunologic Medications:    Day #  	of    ***    Tubes/Lines/Drains    [x] Peripheral IV  [] Central Venous Line     	[] R	[] L	[] IJ	[] Fem	[] SC	Date Placed:   [] Arterial Line		[] R	[] L	[] Fem	[] Rad	[] Ax	Date Placed:   [] PICC:         	[] Midline		[] Mediport  [] Urinary Catheter		Date Placed:     LABS  --------------------------------------------------------------------------------------                    CBC ( @ 00:33)                              11.5<L>                       5.57    )--------------(  179        41.7<L>% Neuts, 39.5  % Lymphs, ANC: 2.32                                35.2<L>    BMP ( @ 11:15)             140     |  103     |  13    		Ca++ --      Ca 9.1                ---------------------------------( 126<H>		Mg 2.1                4.1     |  25      |  1.01  			Ph 3.1     BMP ( @ 00:33)             139     |  101     |  19    		Ca++ --      Ca 9.3                ---------------------------------( 100<H>		Mg --                 4.9     |  25      |  1.42<H>			Ph --        LFTs ( 00:33)      TPro 7.7 / Alb 3.7 / TBili 0.4 / DBili -- / AST 35 / ALT 29 / AlkPhos 54    Coags ( @ 11:15)  aPTT 26.5<L> / INR 0.93 / PT 10.7      ABG (:33)      /  /  /  /  / %     Lactate:   2.0    VBG (:33)     7.39 / 48 / 30<L> / 26 / 3.2 / 48.2<L>%    Urinalysis ( @ 00:40):     Color: YELLOW / Appearance: CLEAR / S.020 / pH: 6.0 / Gluc: NEGATIVE / Ketones: NEGATIVE / Bili: NEGATIVE / Urobili: 1 / Protein :20 / Nitrites: NEGATIVE / Leuk.Est: NEGATIVE / RBC: 0-2 / WBC: 0-2 / Sq Epi: OCC / Non Sq Epi:  / Bacteria            --------------------------------------------------------------------------------------    OTHER LABS    IMAGING RESULTS  Echo:   CT: < from: CT Head No Cont (18 @ 01:27) >    EXAM:  CT BRAIN        PROCEDURE DATE:  2018         INTERPRETATION:  CLINICAL INDICATION:  Progressive altered mental status.   History of fall.    TECHNIQUE : Axial CT scanning of the brain was obtained from the skull   base to the vertex without the administration of intravenous contrast.   Coronal and sagittal reformatted images were subsequently obtained.     COMPARISON: No available comparisons.    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 Mass effect results is effacement of the   cerebral convexity sulci. Slitlike third ventricle. Minimal prominence of   the bilateral temporal horns. No significant midline shift.    Paranasal sinuses and mastoid air cells are clear. Visualized osseous   structures are intact.    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 Mild prominence of the   temporal horns, may be related to generalized volume loss, however early   mild hydrocephalus cannot be entirely excluded. No significant midline   shift.    Findings were discussed with Dr. Broderick by Dr. Mccann at approximately   1:46 AM dated 2018 with read back.    17    Noncontrast brain CT: Grossly stable size of bilateral mixed density subdural   hematomas, which demonstrate more heterogeneity since prior examination,   which may be related to mixing of acute/subacute and chronic blood products.   Remainder the examination is grossly stable       Xray:   < from: Xray Chest 1 View AP- PORTABLE-Urgent (18 @ 02:37) >    ******PRELIMINARY REPORT******    ******PRELIMINARY REPORT******            EXAM:  XR CHEST PORTABLE URGENT 1V        PROCEDURE DATE:  2018     ******PRELIMINARY REPORT******    ******PRELIMINARY REPORT******            INTERPRETATION:  Clear Lungs            ******PRELIMINARY REPORT******    ******PRELIMINARY REPORT******          LIVAN MCCANN M.D., RADIOLOGY RESIDENT                        < end of copied text > SICU AM PROGRESS NOTE   =====================================================      Overnight / Interval Events:     Pt had DDAVP before going to OR yesterday s/p B/l [place] hole with Drains . Pt was extubated and transferred to SICU post op . Pt had keppra dose increased to 750 BID.  pt was on constant observation . Overnight  Pt was acutely agitated , trying to hit the staff. Pt started on precedex and a push of 1 mg  ativan was given . YASMINE drains were intact. Pt currently on Enhanced supervision.    ------------------------------------------------------------------------------------------------------------------------------------          HPI: 85y Mont[place] male presenting with altered mental status.  No family at bedside.  As per ED, Apparently pt had a fall in [facility]  with AMS and associated hospital stay.  Upon [place] in [place], was noted by family to be more confused, weak, unable to perform ADLs.  At baseline pt lives alone in [facility], with Daughter when visiting [place], drives a car.  At this time pt is unable to ambulate to the bathroom without assistance, further worsening mental status for 1-2 weeks.  Speaking less.  Confused.   Recent admission to [place] for altered mental status, started on medication for HLD, HTN, agitation, prostate.  Unclear if head CT was performed during that admission.  Unable to obtain further history from patient 2/2 AMS.  Pt has no complaints at the bedside.    Surgery Information: None  Case Duration: 	EBL: 	IV Fluids: 	Blood Products: 	Urine Output:   Complications:     HISTORY  85y Male  HPI:86 y/o m presents with ams. Patient came from [facility] at end of december, had unwitnessed fall in airport while there, had ams and had hospital stay while in [facility]. Came to [place], family noticed he was more altered, confused, weak, went to [place] and was admitted there for few days-they are unsure of his w/u but was started on meds for htn/agitation/hld/prostate. Over last few days, has become even weaker, to the point where he used to be able to do all of his adls and now requires assistance to even go to the bathroom, confusion is increasing. No fevers, ha, cp, sob, abd pain, vomiting, diarrhea, dysuria. Patient himself has no complaints    Allergies:   PAST MEDICAL & SURGICAL HISTORY:  Hyperlipidemia, unspecified hyperlipidemia type  Benign prostatic hyperplasia with urinary frequency  HTN (hypertension)  No significant past surgical history      CURRENT MEDICATIONS:   --------------------------------------------------------------------------------------  Neurologic Medications:  dexmedetomidine Infusion 0.1 MICROgram(s)/kG/Hr IV Continuous <Continuous>  levETIRAcetam  IVPB 750 milliGRAM(s) IV Intermittent every 12 hours    Respiratory Medications:    Cardiovascular Medications:  tamsulosin 0.4 milliGRAM(s) Oral at bedtime    Gastrointestinal Medications:  dextrose 5% + sodium chloride 0.9%. 1000 milliLiter(s) IV Continuous <Continuous>  pantoprazole  Injectable 40 milliGRAM(s) IV Push daily    Genitourinary Medications:    Hematologic/Oncologic Medications:    Antimicrobials/Immunologic Medications:  ceFAZolin   IVPB 2000 milliGRAM(s) IV Intermittent every 8 hours    Endocrine/Metabolic Medications:  atorvastatin 20 milliGRAM(s) Oral at bedtime    Topical/Other Medications:    --------------------------------------------------------------------------------------    VITAL SIGNS, INS/OUTS (last 24 hours):  --------------------------------------------------------------------------------------  T(C): 36.4 (18 @ 00:00), Max: 36.6 (18 @ 20:00)  HR: 63 (18 @ 04:00) (54 - 130)  BP: 107/57 (18 @ 04:00) (80/41 - 174/67)  BP(mean): 66 (18 @ 04:00) (49 - 103)  ABP: --  ABP(mean): --  RR: 13 (18 @ 04:00) (10 - 24)  SpO2: 100% (18 @ 04:00) (83% - 100%)  Wt(kg): --  CVP(mm Hg): --  CI: --  CAPILLARY BLOOD GLUCOSE       N/A       @ 07:  -   @ 07:00  --------------------------------------------------------  IN:    dexmedetomidine Infusion: 11.5 mL    Oral Fluid: 910 mL    sodium chloride 0.9%: 900 mL    sodium chloride 0.9%: 300 mL  Total IN: 2121.5 mL    OUT:    Indwelling Catheter - Urethral: 125 mL    Voided: 1500 mL  Total OUT: 1625 mL    Total NET: 496.5 mL       @ 07:  -   @ 04:41  --------------------------------------------------------  IN:    dexmedetomidine Infusion: 4.8 mL    dextrose 5% + sodium chloride 0.9%.: 1200 mL    sodium chloride 0.9%: 225 mL  Total IN: 1429.8 mL    OUT:    Drain: 185 mL    Drain: 265 mL    Indwelling Catheter - Urethral: 1145 mL  Total OUT: 1595 mL    Total NET: -165.2 mL          --------------------------------------------------------------------------------------    EXAM  NEUROLOGY  RASS:   	 GCS: 13   Exam: Normal, NAD, alert, oriented x 1, no focal deficits.     HEENT  Exam: Normocephalic, atraumatic.  EOMI . YASMINE X 2 - in place - serosanguinous drainage     RESPIRATORY  Exam: Lungs clear to auscultation, Normal expansion/effort.        CARDIOVASCULAR  Exam: S1, S2.  Regular rate and rhythm.  No Peripheral edema    GI/NUTRITION  Exam: Abdomen soft, Non-tender, Non-distended.    Current Diet: NPO     VASCULAR  Exam: Extremities warm, pink, well-perfused.      MUSCULOSKELETAL  Exam: All extremities moving spontaneously without limitations.      SKIN:  Exam: Good skin turgor, no skin breakdown.      METABOLIC/FLUIDS/ELECTROLYTES      HEMATOLOGIC  [x] DVT Prophylaxis:   Transfusions:	[] PRBC	[] Platelets		[] FFP	[] Cryoprecipitate    INFECTIOUS DISEASE  Antimicrobials/Immunologic Medications:    Day #  	of    ***    Tubes/Lines/Drains    [x] Peripheral IV  [] Central Venous Line     	[] R	[] L	[] IJ	[] Fem	[] SC	Date Placed:   [] Arterial Line		[] R	[] L	[] Fem	[] Rad	[] Ax	Date Placed:   [] PICC:         	[] Midline		[] Mediport  [] Urinary Catheter		Date Placed:     LABS  --------------------------------------------------------------------------------------                    CBC ( 00:33)                              11.5<L>                       5.57    )--------------(  179        41.7<L>% Neuts, 39.5  % Lymphs, ANC: 2.32                                35.2<L>    BMP ( 11:15)             140     |  103     |  13    		Ca++ --      Ca 9.1                ---------------------------------( 126<H>		Mg 2.1                4.1     |  25      |  1.01  			Ph 3.1     BMP ( 00:33)             139     |  101     |  19    		Ca++ --      Ca 9.3                ---------------------------------( 100<H>		Mg --                 4.9     |  25      |  1.42<H>			Ph --        LFTs ( 00:33)      TPro 7.7 / Alb 3.7 / TBili 0.4 / DBili -- / AST 35 / ALT 29 / AlkPhos 54    Coags ( 11:15)  aPTT 26.5<L> / INR 0.93 / PT 10.7      ABG ( 00:33)      /  /  /  /  / %     Lactate:   2.0    VBG ( 00:33)     7.39 / 48 / 30<L> / 26 / 3.2 / 48.2<L>%    Urinalysis ( @ 00:40):     Color: YELLOW / Appearance: CLEAR / S.020 / pH: 6.0 / Gluc: NEGATIVE / Ketones: NEGATIVE / Bili: NEGATIVE / Urobili: 1 / Protein :20 / Nitrites: NEGATIVE / Leuk.Est: NEGATIVE / RBC: 0-2 / WBC: 0-2 / Sq Epi: OCC / Non Sq Epi:  / Bacteria            --------------------------------------------------------------------------------------    OTHER LABS    IMAGING RESULTS  Echo:   CT: < from: CT Head No Cont (18 @ 01:27) >    EXAM:  CT BRAIN        PROCEDURE DATE:  2018         INTERPRETATION:  CLINICAL INDICATION:  Progressive altered mental status.   History of fall.    TECHNIQUE : Axial CT scanning of the brain was obtained from the skull   base to the vertex without the administration of intravenous contrast.   Coronal and sagittal reformatted images were subsequently obtained.     COMPARISON: No available comparisons.    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 Mass effect results is effacement of the   cerebral convexity sulci. Slitlike third ventricle. Minimal prominence of   the bilateral temporal horns. No significant midline shift.    Paranasal sinuses and mastoid air cells are clear. Visualized osseous   structures are intact.    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 Mild prominence of the   temporal horns, may be related to generalized volume loss, however early   mild hydrocephalus cannot be entirely excluded. No significant midline   shift.    Findings were discussed with Dr. Broderick by Dr. Mccann at approximately   1:46 AM dated 2018 with read back.    17    Noncontrast brain CT: Grossly stable size of bilateral mixed density subdural   hematomas, which demonstrate more heterogeneity since prior examination,   which may be related to mixing of acute/subacute and chronic blood products.   Remainder the examination is grossly stable       Xray:   < from: Xray Chest 1 View AP- PORTABLE-Urgent (18 @ 02:37) >    ******PRELIMINARY REPORT******    ******PRELIMINARY REPORT******            EXAM:  XR CHEST PORTABLE URGENT 1V        PROCEDURE DATE:  2018     ******PRELIMINARY REPORT******    ******PRELIMINARY REPORT******            INTERPRETATION:  Clear Lungs            ******PRELIMINARY REPORT******    ******PRELIMINARY REPORT******          LIVAN MCCANN M.D., RADIOLOGY RESIDENT                        < end of copied text > SICU AM PROGRESS NOTE   =====================================================      Overnight / Interval Events:     Pt had DDAVP before going to OR yesterday s/p B/l [place] hole with Drains . Pt was extubated and transferred to SICU post op . Pt had keppra dose increased to 750 BID.  pt was on constant observation . Overnight  Pt was acutely agitated , trying to hit  and bite the staff. Pt started on precedex and a push of 1 mg  ativan was given . YASMINE drains were intact. Pt currently on Enhanced supervision.    ------------------------------------------------------------------------------------------------------------------------------------          HPI: 85y [place] male presenting with altered mental status.  No family at bedside.  As per ED, Apparently pt had a fall in [facility]  with AMS and associated hospital stay.  Upon arrival in [place], was noted by family to be more confused, weak, unable to perform ADLs.  At baseline pt lives alone in [facility], with Daughter when visiting [place], drives a car.  At this time pt is unable to ambulate to the bathroom without assistance, further worsening mental status for 1-2 weeks.  Speaking less.  Confused.   Recent admission to [place] for altered mental status, started on medication for HLD, HTN, agitation, prostate.  Unclear if head CT was performed during that admission.  Unable to obtain further history from patient 2/2 AMS.  Pt has no complaints at the bedside.    Surgery Information: None  Case Duration: 	EBL: 	IV Fluids: 	Blood Products: 	Urine Output:   Complications:     HISTORY  85y Male  HPI:84 y/o m presents with ams. Patient came from [facility] at end of december, had unw[place]ed fall [place] airport while there, had ams and had hospital stay while in [facility]. Came to [place], family noticed he was more altered, confused, weak, went to [place] and was admitted there for few days-they are unsure of his w/u but was started on meds for htn/agitation/hld/prostate. Over last few days, has become even weaker, to the point where he used to be able to do all of his adls and now requires assistance to even go to the bathroom, confusion is increasing. No fevers, ha, cp, sob, abd pain, vomiting, diarrhea, dysuria. Patient himself has no complaints    Allergies:   PAST MEDICAL & SURGICAL HISTORY:  Hyperlipidemia, unspecified hyperlipidemia type  Benign prostatic hyperplasia with urinary frequency  HTN (hypertension)  No significant past surgical history      CURRENT MEDICATIONS:   --------------------------------------------------------------------------------------  Neurologic Medications:  dexmedetomidine Infusion 0.1 MICROgram(s)/kG/Hr IV Continuous <Continuous>  levETIRAcetam  IVPB 750 milliGRAM(s) IV Intermittent every 12 hours    Respiratory Medications:    Cardiovascular Medications:  tamsulosin 0.4 milliGRAM(s) Oral at bedtime    Gastrointestinal Medications:  dextrose 5% + sodium chloride 0.9%. 1000 milliLiter(s) IV Continuous <Continuous>  pantoprazole  Injectable 40 milliGRAM(s) IV Push daily    Genitourinary Medications:    Hematologic/Oncologic Medications:    Antimicrobials/Immunologic Medications:  ceFAZolin   IVPB 2000 milliGRAM(s) IV Intermittent every 8 hours    Endocrine/Metabolic Medications:  atorvastatin 20 milliGRAM(s) Oral at bedtime    Topical/Other Medications:    --------------------------------------------------------------------------------------    VITAL SIGNS, INS/OUTS (last 24 hours):  --------------------------------------------------------------------------------------  T(C): 36.4 (18 @ 00:00), Max: 36.6 (18 @ 20:00)  HR: 63 (18 04:00) (54 - 130)  BP: 107/57 (18 @ 04:00) (80/41 - 174/67)  BP(mean): 66 (18 @ 04:00) (49 - 103)  ABP: --  ABP(mean): --  RR: 13 (18 @ 04:00) (10 - 24)  SpO2: 100% (18 @ 04:00) (83% - 100%)  Wt(kg): --  CVP(mm Hg): --  CI: --  CAPILLARY BLOOD GLUCOSE       N/A       @ 07:  -   @ 07:00  --------------------------------------------------------  IN:    dexmedetomidine Infusion: 11.5 mL    Oral Fluid: 910 mL    sodium chloride 0.9%: 900 mL    sodium chloride 0.9%: 300 mL  Total IN: 2121.5 mL    OUT:    Indwelling Catheter - Urethral: 125 mL    Voided: 1500 mL  Total OUT: 1625 mL    Total NET: 496.5 mL       @ 07: @ 04:41  --------------------------------------------------------  IN:    dexmedetomidine Infusion: 4.8 mL    dextrose 5% + sodium chloride 0.9%.: 1200 mL    sodium chloride 0.9%: 225 mL  Total IN: 1429.8 mL    OUT:    Drain: 185 mL    Drain: 265 mL    Indwelling Catheter - Urethral: 1145 mL  Total OUT: 1595 mL    Total NET: -165.2 mL          --------------------------------------------------------------------------------------    EXAM  NEUROLOGY  RASS:   	 GCS: 13   Exam: Normal, NAD, alert, oriented x 1, no focal deficits.     HEENT  Exam: Normocephalic, atraumatic.  EOMI . YASMINE X 2 - in place - serosanguinous drainage     RESPIRATORY  Exam: Lungs clear to auscultation, Normal expansion/effort.        CARDIOVASCULAR  Exam: S1, S2.  Regular rate and rhythm.  No Peripheral edema    GI/NUTRITION  Exam: Abdomen soft, Non-tender, Non-distended.    Current Diet: NPO     VASCULAR  Exam: Extremities warm, pink, well-perfused.      MUSCULOSKELETAL  Exam: All extremities moving spontaneously without limitations.      SKIN:  Exam: Good skin turgor, no skin breakdown.      METABOLIC/FLUIDS/ELECTROLYTES      HEMATOLOGIC  [x] DVT Prophylaxis:   Transfusions:	[] PRBC	[] Platelets		[] FFP	[] Cryoprecipitate    INFECTIOUS DISEASE  Antimicrobials/Immunologic Medications:    Day #  	of    ***    Tubes/Lines/Drains    [x] Peripheral IV  [] Central Venous Line     	[] R	[] L	[] IJ	[] Fem	[] SC	Date Placed:   [] Arterial Line		[] R	[] L	[] Fem	[] Rad	[] Ax	Date Placed:   [] PICC:         	[] Midline		[] Mediport  [] Urinary Catheter		Date Placed:     LABS  --------------------------------------------------------------------------------------                    CBC ( 00:33)                              11.5<L>                       5.57    )--------------(  179        41.7<L>% Neuts, 39.5  % Lymphs, ANC: 2.32                                35.2<L>    BMP ( 11:15)             140     |  103     |  13    		Ca++ --      Ca 9.1                ---------------------------------( 126<H>		Mg 2.1                4.1     |  25      |  1.01  			Ph 3.1     BMP ( 00:33)             139     |  101     |  19    		Ca++ --      Ca 9.3                ---------------------------------( 100<H>		Mg --                 4.9     |  25      |  1.42<H>			Ph --        LFTs ( 00:33)      TPro 7.7 / Alb 3.7 / TBili 0.4 / DBili -- / AST 35 / ALT 29 / AlkPhos 54    Coags ( 11:15)  aPTT 26.5<L> / INR 0.93 / PT 10.7      ABG ( 00:33)      /  /  /  /  / %     Lactate:   2.0    VBG ( 00:33)     7.39 / 48 / 30<L> / 26 / 3.2 / 48.2<L>%    Urinalysis ( @ 00:40):     Color: YELLOW / Appearance: CLEAR / S.020 / pH: 6.0 / Gluc: NEGATIVE / Ketones: NEGATIVE / Bili: NEGATIVE / Urobili: 1 / Protein :20 / Nitrites: NEGATIVE / Leuk.Est: NEGATIVE / RBC: 0-2 / WBC: 0-2 / Sq Epi: OCC / Non Sq Epi:  / Bacteria            --------------------------------------------------------------------------------------    OTHER LABS    IMAGING RESULTS  Echo:   CT: < from: CT Head No Cont (18 @ 01:27) >    EXAM:  CT BRAIN        PROCEDURE DATE:  2018         INTERPRETATION:  CLINICAL INDICATION:  Progressive altered mental status.   History of fall.    TECHNIQUE : Axial CT scanning of the brain was obtained from the skull   base to the vertex without the administration of intravenous contrast.   Coronal and sagittal reformatted images were subsequently obtained.     COMPARISON: No available comparisons.    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 Mass effect results is effacement of the   cerebral convexity sulci. Slitlike third ventricle. Minimal prominence of   the bilateral temporal horns. No significant midline shift.    Paranasal sinuses and mastoid air cells are clear. Visualized osseous   structures are intact.    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 Mild prominence of the   temporal horns, may be related to generalized volume loss, however early   mild hydrocephalus cannot be entirely excluded. No significant midline   shift.    Findings were discussed with Dr. Broderick by Dr. Mccann at approximately   1:46 AM dated 2018 with read back.    17    Noncontrast brain CT: Grossly stable size of bilateral mixed density subdural   hematomas, which demonstrate more heterogeneity since prior examination,   which may be related to mixing of acute/subacute and chronic blood products.   Remainder the examination is grossly stable       Xray:   < from: Xray Chest 1 View AP- PORTABLE-Urgent (18 @ 02:37) >    ******PRELIMINARY REPORT******    ******PRELIMINARY REPORT******            EXAM:  XR CHEST PORTABLE URGENT 1V        PROCEDURE DATE:  2018     ******PRELIMINARY REPORT******    ******PRELIMINARY REPORT******            INTERPRETATION:  Clear Lungs            ******PRELIMINARY REPORT******    ******PRELIMINARY REPORT******          LIVAN MCCANN M.D., RADIOLOGY RESIDENT                        < end of copied text >

## 2018-01-23 NOTE — PROGRESS NOTE ADULT - ASSESSMENT
85 year old male with bilateral subacute SDH s/p fall with recent decline in ambulatory ability admitted on Saturday with seizure last night with non-focal, although limited, neurologic examination.   Patient is POD 1 s/p evacuation of bilateral SDH. Is currently stable and exam is relatively consistent with yesterday's evaluation. No futher seizures reported.

## 2018-01-23 NOTE — PROGRESS NOTE ADULT - SUBJECTIVE AND OBJECTIVE BOX
Neurosurgery postop  Resting comfortably  Vital Signs Last 24 Hrs  T(C): 36.4 (23 Jan 2018 00:00), Max: 36.6 (22 Jan 2018 20:00)  T(F): 97.6 (23 Jan 2018 00:00), Max: 97.8 (22 Jan 2018 20:00)  HR: 73 (23 Jan 2018 00:00) (54 - 128)  BP: 129/65 (23 Jan 2018 00:00) (80/41 - 200/84)  BP(mean): 79 (23 Jan 2018 00:00) (49 - 113)  RR: 15 (23 Jan 2018 00:00) (10 - 29)  SpO2: 100% (23 Jan 2018 00:00) (78% - 100%)    AAO to self only  PEGGY RAMÍREZ strength 5/5    Dressings C/D/I    Drains: Left YASMINE 100cc             Right YASMINE 185cc    MEDICATIONS  (STANDING):  atorvastatin 20 milliGRAM(s) Oral at bedtime  ceFAZolin   IVPB 2000 milliGRAM(s) IV Intermittent every 8 hours  dextrose 5% + sodium chloride 0.9%. 1000 milliLiter(s) (75 mL/Hr) IV Continuous <Continuous>  levETIRAcetam  IVPB 750 milliGRAM(s) IV Intermittent every 12 hours  pantoprazole  Injectable 40 milliGRAM(s) IV Push daily  tamsulosin 0.4 milliGRAM(s) Oral at bedtime                          12.4   5.57  )-----------( 173      ( 22 Jan 2018 03:50 )             37.6   01-22    143  |  104  |  15  ----------------------------<  133<H>  4.8   |  23  |  1.22    Ca    9.3      22 Jan 2018 03:50  Phos  3.4     01-22  Mg     2.1     01-22    TPro  8.0  /  Alb  3.8  /  TBili  0.7  /  DBili  x   /  AST  37  /  ALT  30  /  AlkPhos  56  01-22

## 2018-01-23 NOTE — PROGRESS NOTE ADULT - SUBJECTIVE AND OBJECTIVE BOX
Neurology Progress Note  1/23/2018  Patient seen and examined. PCA at bedside states that patient has been sleeping most of the morning.   Is currently on precedex for agitation.     MEDICATIONS  (STANDING):  atorvastatin 20 milliGRAM(s) Oral at bedtime  ceFAZolin   IVPB 2000 milliGRAM(s) IV Intermittent every 8 hours  dexmedetomidine Infusion 0.1 MICROgram(s)/kG/Hr (1.645 mL/Hr) IV Continuous <Continuous>  dextrose 5% + sodium chloride 0.9%. 1000 milliLiter(s) (75 mL/Hr) IV Continuous <Continuous>  levETIRAcetam  IVPB 750 milliGRAM(s) IV Intermittent every 12 hours  pantoprazole  Injectable 40 milliGRAM(s) IV Push daily  tamsulosin 0.4 milliGRAM(s) Oral at bedtime    MEDICATIONS  (PRN):                                                    Allergies    No Known Allergies    Intolerances        Objective  Vital Signs Last 24 Hrs  T(C): 35.8 (23 Jan 2018 08:00), Max: 36.6 (22 Jan 2018 20:00)  T(F): 96.4 (23 Jan 2018 08:00), Max: 97.8 (22 Jan 2018 20:00)  HR: 59 (23 Jan 2018 10:00) (57 - 130)  BP: 130/67 (23 Jan 2018 10:00) (104/67 - 174/67)  BP(mean): 82 (23 Jan 2018 10:00) (61 - 93)  RR: 11 (23 Jan 2018 10:00) (10 - 23)  SpO2: 100% (23 Jan 2018 10:00) (98% - 100%)    General Exam   General appearance: No acute distress, well-nourished  Respiratory:    non-labored respirations               Neurological Exam  Mental Status:  eyes closed, minimally responds to sternal rub, grunts in response to questions    Cranial Nerves: fights eye-opening, no gaze-deviation no facial droop    Motor:   Tone:  increased tone in legs, also increased in left UE as compared to right UE              Strength: withdraws to painful stimuli less on left UE     Tremor:  none appreciated at rest or in action    Sensation: grimaces to painful stimuli    Deep Tendon Reflexes: 2+  Toes flexor bilaterally downgoing        Other Studies    01-23    145  |  110<H>  |  16  ----------------------------<  158<H>  5.0   |  21<L>  |  1.14    Ca    8.3<L>      23 Jan 2018 02:25  Phos  3.4     01-23  Mg     2.1     01-23    TPro  6.9  /  Alb  3.3  /  TBili  0.4  /  DBili  x   /  AST  35  /  ALT  24  /  AlkPhos  46  01-23                          10.5   5.29  )-----------( 148      ( 23 Jan 2018 02:25 )             30.7            Radiology    CTh:  < from: CT Head No Cont (01.23.18 @ 08:47) >  FINDINGS:  There has been interval bilateral parietal maxi holes. There are   bilateral subdural drains in place. There has been interval evacuation of   bilateral cerebral convexity subdural hematomas. Subdural fluid and air   are now seen with relief of bilateral cerebral sulcal effacement.  Cerebral sulci are now visible. The ventricles as slightly reexpanded.   Basilar cisterns are now patent with visualization of the suprasellar and   quadrigeminal plate cisterns. The third ventricle is reexpanded.    < end of copied text >      	  < from: CT Head No Cont (01.22.18 @ 02:55) >    IMPRESSION:    Large bilateral frontal, parietal, and temporal mixed density 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 The large subdural   collections are slightly larger in size, as more effacement of the   quadrigeminal plate cistern and suprasellar cistern is noted compared to   the prior study.    < end of copied text >

## 2018-01-23 NOTE — PROGRESS NOTE ADULT - SUBJECTIVE AND OBJECTIVE BOX
Subjective: Patient seen and examined. No new events except as noted.     SUBJECTIVE/ROS:  sedated due to agitation  Due to altered mental status, subjective information were not able to be obtained from the patient. History was obtained, to the extent possible, from review of the chart and collateral sources of information.        MEDICATIONS:  MEDICATIONS  (STANDING):  atorvastatin 20 milliGRAM(s) Oral at bedtime  ceFAZolin   IVPB 2000 milliGRAM(s) IV Intermittent every 8 hours  dexmedetomidine Infusion 0.1 MICROgram(s)/kG/Hr (1.645 mL/Hr) IV Continuous <Continuous>  dextrose 5% + sodium chloride 0.9%. 1000 milliLiter(s) (75 mL/Hr) IV Continuous <Continuous>  levETIRAcetam  IVPB 750 milliGRAM(s) IV Intermittent every 12 hours  pantoprazole  Injectable 40 milliGRAM(s) IV Push daily  tamsulosin 0.4 milliGRAM(s) Oral at bedtime      PHYSICAL EXAM:  T(C): 35.8 (01-23-18 @ 08:00), Max: 36.6 (01-22-18 @ 20:00)  HR: 59 (01-23-18 @ 10:00) (57 - 130)  BP: 130/67 (01-23-18 @ 10:00) (104/67 - 174/67)  RR: 11 (01-23-18 @ 10:00) (10 - 23)  SpO2: 100% (01-23-18 @ 10:00) (98% - 100%)  Wt(kg): --  I&O's Summary    22 Jan 2018 07:01  -  23 Jan 2018 07:00  --------------------------------------------------------  IN: 1591.2 mL / OUT: 1790 mL / NET: -198.8 mL    23 Jan 2018 07:01  -  23 Jan 2018 10:20  --------------------------------------------------------  IN: 79.9 mL / OUT: 90 mL / NET: -10.1 mL          JVP: Normal  Neck: supple  Lung: clear   CV: S1 S2 , Murmur:  Abd: soft  Ext: No edema  neuro: sedated   Psych: flat affect  Skin: normal        LABS/DATA:    CARDIAC MARKERS:                                10.5   5.29  )-----------( 148      ( 23 Jan 2018 02:25 )             30.7     01-23    145  |  110<H>  |  16  ----------------------------<  158<H>  5.0   |  21<L>  |  1.14    Ca    8.3<L>      23 Jan 2018 02:25  Phos  3.4     01-23  Mg     2.1     01-23    TPro  6.9  /  Alb  3.3  /  TBili  0.4  /  DBili  x   /  AST  35  /  ALT  24  /  AlkPhos  46  01-23    proBNP:   Lipid Profile:   HgA1c:   TSH:     TELE:  EKG:

## 2018-01-23 NOTE — PROGRESS NOTE ADULT - ASSESSMENT
SDH   s/p drain    s/p fall , rule out syncope  monitor on tele  will consider ILR vs 30 day event     HTN  cont to monitor  check orthostatic post op   BP stable now off meds

## 2018-01-24 LAB
ALBUMIN SERPL ELPH-MCNC: 3.5 G/DL — SIGNIFICANT CHANGE UP (ref 3.3–5)
ALP SERPL-CCNC: 54 U/L — SIGNIFICANT CHANGE UP (ref 40–120)
ALT FLD-CCNC: 20 U/L — SIGNIFICANT CHANGE UP (ref 4–41)
APTT BLD: 29 SEC — SIGNIFICANT CHANGE UP (ref 27.5–37.4)
AST SERPL-CCNC: 24 U/L — SIGNIFICANT CHANGE UP (ref 4–40)
BASE EXCESS BLDA CALC-SCNC: 0.3 MMOL/L — SIGNIFICANT CHANGE UP
BILIRUB SERPL-MCNC: 0.6 MG/DL — SIGNIFICANT CHANGE UP (ref 0.2–1.2)
BUN SERPL-MCNC: 11 MG/DL — SIGNIFICANT CHANGE UP (ref 7–23)
CALCIUM SERPL-MCNC: 8.7 MG/DL — SIGNIFICANT CHANGE UP (ref 8.4–10.5)
CHLORIDE BLDA-SCNC: 110 MMOL/L — HIGH (ref 96–108)
CHLORIDE SERPL-SCNC: 104 MMOL/L — SIGNIFICANT CHANGE UP (ref 98–107)
CO2 SERPL-SCNC: 24 MMOL/L — SIGNIFICANT CHANGE UP (ref 22–31)
CREAT SERPL-MCNC: 1 MG/DL — SIGNIFICANT CHANGE UP (ref 0.5–1.3)
GLUCOSE BLDA-MCNC: 131 MG/DL — HIGH (ref 70–99)
GLUCOSE SERPL-MCNC: 134 MG/DL — HIGH (ref 70–99)
HCO3 BLDA-SCNC: 25 MMOL/L — SIGNIFICANT CHANGE UP (ref 22–26)
HCT VFR BLD CALC: 35.6 % — LOW (ref 39–50)
HCT VFR BLDA CALC: 34.8 % — LOW (ref 39–51)
HGB BLD-MCNC: 11.5 G/DL — LOW (ref 13–17)
HGB BLDA-MCNC: 11.3 G/DL — LOW (ref 13–17)
INR BLD: 0.98 — SIGNIFICANT CHANGE UP (ref 0.88–1.17)
LACTATE BLDA-SCNC: 1.2 MMOL/L — SIGNIFICANT CHANGE UP (ref 0.5–2)
MAGNESIUM SERPL-MCNC: 2.1 MG/DL — SIGNIFICANT CHANGE UP (ref 1.6–2.6)
MCHC RBC-ENTMCNC: 28.3 PG — SIGNIFICANT CHANGE UP (ref 27–34)
MCHC RBC-ENTMCNC: 32.3 % — SIGNIFICANT CHANGE UP (ref 32–36)
MCV RBC AUTO: 87.5 FL — SIGNIFICANT CHANGE UP (ref 80–100)
NRBC # FLD: 0 — SIGNIFICANT CHANGE UP
PCO2 BLDA: 36 MMHG — SIGNIFICANT CHANGE UP (ref 35–48)
PH BLDA: 7.44 PH — SIGNIFICANT CHANGE UP (ref 7.35–7.45)
PHOSPHATE SERPL-MCNC: 3.2 MG/DL — SIGNIFICANT CHANGE UP (ref 2.5–4.5)
PLATELET # BLD AUTO: 139 K/UL — LOW (ref 150–400)
PMV BLD: 10.7 FL — SIGNIFICANT CHANGE UP (ref 7–13)
PO2 BLDA: 118 MMHG — HIGH (ref 83–108)
POTASSIUM BLDA-SCNC: 3.7 MMOL/L — SIGNIFICANT CHANGE UP (ref 3.4–4.5)
POTASSIUM SERPL-MCNC: 3.9 MMOL/L — SIGNIFICANT CHANGE UP (ref 3.5–5.3)
POTASSIUM SERPL-SCNC: 3.9 MMOL/L — SIGNIFICANT CHANGE UP (ref 3.5–5.3)
PROT SERPL-MCNC: 7.5 G/DL — SIGNIFICANT CHANGE UP (ref 6–8.3)
PROTHROM AB SERPL-ACNC: 10.9 SEC — SIGNIFICANT CHANGE UP (ref 9.8–13.1)
RBC # BLD: 4.07 M/UL — LOW (ref 4.2–5.8)
RBC # FLD: 12.2 % — SIGNIFICANT CHANGE UP (ref 10.3–14.5)
SAO2 % BLDA: 99.1 % — HIGH (ref 95–99)
SODIUM BLDA-SCNC: 138 MMOL/L — SIGNIFICANT CHANGE UP (ref 136–146)
SODIUM SERPL-SCNC: 140 MMOL/L — SIGNIFICANT CHANGE UP (ref 135–145)
WBC # BLD: 6.37 K/UL — SIGNIFICANT CHANGE UP (ref 3.8–10.5)
WBC # FLD AUTO: 6.37 K/UL — SIGNIFICANT CHANGE UP (ref 3.8–10.5)

## 2018-01-24 PROCEDURE — 99233 SBSQ HOSP IP/OBS HIGH 50: CPT | Mod: GC

## 2018-01-24 PROCEDURE — 71045 X-RAY EXAM CHEST 1 VIEW: CPT | Mod: 26

## 2018-01-24 PROCEDURE — 95951: CPT | Mod: 26

## 2018-01-24 RX ORDER — LEVETIRACETAM 250 MG/1
750 TABLET, FILM COATED ORAL
Qty: 0 | Refills: 0 | Status: DISCONTINUED | OUTPATIENT
Start: 2018-01-24 | End: 2018-01-28

## 2018-01-24 RX ORDER — PANTOPRAZOLE SODIUM 20 MG/1
40 TABLET, DELAYED RELEASE ORAL
Qty: 0 | Refills: 0 | Status: DISCONTINUED | OUTPATIENT
Start: 2018-01-24 | End: 2018-01-28

## 2018-01-24 RX ORDER — HALOPERIDOL DECANOATE 100 MG/ML
5 INJECTION INTRAMUSCULAR DAILY
Qty: 0 | Refills: 0 | Status: DISCONTINUED | OUTPATIENT
Start: 2018-01-24 | End: 2018-01-24

## 2018-01-24 RX ORDER — QUETIAPINE FUMARATE 200 MG/1
25 TABLET, FILM COATED ORAL DAILY
Qty: 0 | Refills: 0 | Status: DISCONTINUED | OUTPATIENT
Start: 2018-01-24 | End: 2018-01-26

## 2018-01-24 RX ADMIN — SODIUM CHLORIDE 75 MILLILITER(S): 9 INJECTION, SOLUTION INTRAVENOUS at 21:04

## 2018-01-24 RX ADMIN — LEVETIRACETAM 400 MILLIGRAM(S): 250 TABLET, FILM COATED ORAL at 05:04

## 2018-01-24 RX ADMIN — ATORVASTATIN CALCIUM 20 MILLIGRAM(S): 80 TABLET, FILM COATED ORAL at 21:08

## 2018-01-24 RX ADMIN — PANTOPRAZOLE SODIUM 40 MILLIGRAM(S): 20 TABLET, DELAYED RELEASE ORAL at 13:05

## 2018-01-24 RX ADMIN — TAMSULOSIN HYDROCHLORIDE 0.4 MILLIGRAM(S): 0.4 CAPSULE ORAL at 21:08

## 2018-01-24 RX ADMIN — QUETIAPINE FUMARATE 25 MILLIGRAM(S): 200 TABLET, FILM COATED ORAL at 21:03

## 2018-01-24 RX ADMIN — LEVETIRACETAM 750 MILLIGRAM(S): 250 TABLET, FILM COATED ORAL at 17:48

## 2018-01-24 RX ADMIN — PANTOPRAZOLE SODIUM 40 MILLIGRAM(S): 20 TABLET, DELAYED RELEASE ORAL at 21:03

## 2018-01-24 NOTE — SWALLOW BEDSIDE ASSESSMENT ADULT - PHARYNGEAL PHASE
Delayed pharyngeal swallow/Decreased laryngeal elevation Wet vocal quality post oral intake/Multiple swallows/Delayed pharyngeal swallow/Decreased laryngeal elevation

## 2018-01-24 NOTE — PROGRESS NOTE ADULT - SUBJECTIVE AND OBJECTIVE BOX
NEUROSURGERY    Patient is a 85y old  Male who presents with a chief complaint of altered mental status (21 Jan 2018 05:47)found to have acute on chronic subdurals.    HPI:  84 y/o m presents with ams. Patient came from [facility] at end of december, had unwitnessed fall in airport while there, had ams and had hospital stay while in [facility]. [place]ame to [place], family noticed he was more altered, confused, weak, went to [place] and was admitted there for few days-they are unsure of his w/u but was started on meds for htn/agitation/hld/prostate. Over last few days, has become even weaker, to the point where he used to be able to do all of his adls and now requires assistance to even go to the bathroom, confusion is increasing. No fevers, ha, cp, sob, abd pain, vomiting, diarrhea, dysuria. Patient himself has no complaints (21 Jan 2018 02:32)    ICU Vital Signs Last 24 Hrs  T(C): 36.2 (24 Jan 2018 00:00), Max: 36.4 (23 Jan 2018 04:00)  T(F): 97.2 (24 Jan 2018 00:00), Max: 97.6 (23 Jan 2018 04:00)  HR: 67 (24 Jan 2018 00:00) (57 - 130)  BP: 144/82 (24 Jan 2018 00:00) (104/67 - 174/67)  BP(mean): 93 (24 Jan 2018 00:00) (61 - 100)  ABP: --  ABP(mean): --  RR: 15 (24 Jan 2018 00:00) (11 - 23)  SpO2: 100% (24 Jan 2018 00:00) (89% - 100%)    Exam    Pt on 1:1 supervision and 4 point restraint  Currently being sedated for combative behavior  Lethargic but arousable  Struggling against restraints  Pupils = R, EOM intact  [place] with good strength 5/5 throughout.  Wound dressing dry and intact --JPs out put 110/80cc    Radiology    < from: CT Head No Cont (01.23.18 @ 08:47) >  IMPRESSION:  Interval bilateral subdural hematoma evacuation with subdural drains in   place and subdural air. Reexpansion of hemispheric sulci, the ventricular   system as described with visualization of basilar cisterns and decreased   central herniation.          < end of copied text >

## 2018-01-24 NOTE — PROGRESS NOTE ADULT - SUBJECTIVE AND OBJECTIVE BOX
Subjective: Patient seen and examined. No new events except as noted.     SUBJECTIVE/ROS:  Due to altered mental status, subjective information were not able to be obtained from the patient. History was obtained, to the extent possible, from review of the chart and collateral sources of information.        MEDICATIONS:  MEDICATIONS  (STANDING):  atorvastatin 20 milliGRAM(s) Oral at bedtime  dexmedetomidine Infusion 0.1 MICROgram(s)/kG/Hr (1.645 mL/Hr) IV Continuous <Continuous>  dextrose 5% + sodium chloride 0.9%. 1000 milliLiter(s) (75 mL/Hr) IV Continuous <Continuous>  levETIRAcetam  IVPB 750 milliGRAM(s) IV Intermittent every 12 hours  pantoprazole  Injectable 40 milliGRAM(s) IV Push daily  tamsulosin 0.4 milliGRAM(s) Oral at bedtime      PHYSICAL EXAM:  T(C): 36.6 (01-24-18 @ 08:00), Max: 36.6 (01-24-18 @ 08:00)  HR: 75 (01-24-18 @ 10:00) (59 - 116)  BP: 165/80 (01-24-18 @ 09:00) (107/45 - 165/80)  RR: 20 (01-24-18 @ 10:00) (12 - 20)  SpO2: 100% (01-24-18 @ 10:00) (89% - 100%)  Wt(kg): --  I&O's Summary    23 Jan 2018 07:01  -  24 Jan 2018 07:00  --------------------------------------------------------  IN: 2032.9 mL / OUT: 1830 mL / NET: 202.9 mL    24 Jan 2018 07:01  -  24 Jan 2018 11:01  --------------------------------------------------------  IN: 243.1 mL / OUT: 310 mL / NET: -66.9 mL          JVP: Normal  Neck: supple  Lung: clear   CV: S1 S2 , Murmur:  Abd: soft  Ext: No edema  neuro: Awake   Psych: flat affect  Skin: normal       LABS/DATA:    CARDIAC MARKERS:                                11.5   6.37  )-----------( 139      ( 24 Jan 2018 02:50 )             35.6     01-24    140  |  104  |  11  ----------------------------<  134<H>  3.9   |  24  |  1.00    Ca    8.7      24 Jan 2018 02:50  Phos  3.2     01-24  Mg     2.1     01-24    TPro  7.5  /  Alb  3.5  /  TBili  0.6  /  DBili  x   /  AST  24  /  ALT  20  /  AlkPhos  54  01-24    proBNP:   Lipid Profile:   HgA1c:   TSH:     TELE:  EKG:

## 2018-01-24 NOTE — SWALLOW BEDSIDE ASSESSMENT ADULT - COMMENTS
Pt was awake and cooperative for a clinical assessment of swallow function this PM with surgical drains in place in the parietal region. Family and PCA present at bedside. Per charting, pt is an 86 y/o male with PMHx significant for hyperlipidemia, BPH and HTN who presented with AMS x 1 month. Imaging completed upon admission revealed bilateral, acute/chronic SDH. Pt is currently s/p craniotomy with SDH evacuation with placement of bilateral maxi holes. CXR completed on 1/24/2018 revealed "clear lungs".

## 2018-01-24 NOTE — PROGRESS NOTE ADULT - ASSESSMENT
ASSESSMENT:  85y Male with a hx of HTN, HLD, BPH, recent fall, altered mental status x 1 month, presenting with worsening altered mental status for 1-2 weeks found with acute on chronic bilateral subdural hemorrhage.    PLAN:    Neurologic: AAO x 1.  q2 hour neurochecks.  On 750 mg of  Keppra for Seizure ppx. Precedex gtt for agitation ( avoid Benzos )  . f/u EEG study   Respiratory: no active issues.  continue to monitor.  Cardiovascular: Vitals stable . no active issues   Gastrointestinal/Nutrition: NPO/IVF   Renal/Genitourinary: Monitor I/Os. NICK vs CKD. Trend Creatinine. Replete lytes PRN.  Hematologic:  SCDs.  Holding chemical DVT ppx in setting of acute on chronic SDH.  Infectious Disease: Ancef X 3 doses - drain prophylaxis   Tubes/Lines/Drains: PIV , YASMINE drains X 2 ( maxi hole sites)   Endocrine:  No active issues.  Monitor glucose on BMP.  Disposition: SICU    --------------------------------------------------------------------------------------    Critical Care Diagnoses: Acute on chronic subdural hemorrhage, altered mental status, severe protein calorie malnutrition.

## 2018-01-24 NOTE — PROGRESS NOTE ADULT - ASSESSMENT
SDH   s/p drain    s/p fall , rule out syncope  monitor on tele  will consider ILR vs 30 day event post discharge     HTN  cont to monitor

## 2018-01-24 NOTE — SWALLOW BEDSIDE ASSESSMENT ADULT - SWALLOW EVAL: DIAGNOSIS
1-moderate oral dysphagia given puree and honey thick liquid marked by reduced oral grading from utensil with intermittent bite reflex upon spoon/cup noted resulting in reduced stripping ability and delayed bolus collection, transfer and transport. 2- moderate-severe oral dysphagia given nectar thick and thin liquid textures marked by the above stated profile with suspected posterior loss over the base of tongue noted. 3- moderate pharyngeal dysphagia given puree and honey thick liquid textures marked by delayed swallow trigger and reduced hyolaryngeal excursion without any overt s/s of penetration/aspiration noted. 4- moderate-severe pharyngeal dysphagia given nectar thick and thin liquid textures marked by delayed swallow trigger and reduced hyolaryngeal excursion resulting in observed utilization of multiple swallows as well as inconsistent changes to a 'wet' vocal quality post swallow, both suggestive of possible penetration/aspiration and/or pharyngeal stasis.

## 2018-01-24 NOTE — PROGRESS NOTE ADULT - SUBJECTIVE AND OBJECTIVE BOX
SICU AM PROGRESS NOTE   =====================================================      Overnight / Interval Events:     Pt had his follow up CT non con of head - which was stable . Pt was on precedex gtt around 5 : 30 pm - pt becomes acutely agitated - being combative - 5 of Haldol was pushed . four   point restarints ordered . Pt also having 24 hr EEG study .       ------------------------------------------------------------------------------------------------------------------------------------    HPI: 85y [place] male presenting with altered mental status.  No family at bedside.  As per ED, Apparently pt had a fall in [facility] 12/21 with AMS and associated hospital stay.  Upon arrival in [place], was noted by family to be more confused, weak, unable to perform ADLs.  At baseline pt lives alone in [facility], with Daughter when visiting [place], drives a car.  At this time pt is unable to ambulate to the bathroom without assistance, further worsening mental status for 1-2 weeks.  Speaking less.  Confused.   Recent admission to [place] for altered mental status, started on medication for HLD, HTN, agitation, prostate.  Unclear if head CT was performed during that admission.  Unable to obtain further history from patient 2/2 AMS.  Pt has no complaints at the bedside.    Surgery Information: None  Case Duration: 	EBL: 	IV Fluids: 	Blood Products: 	Urine Output:   Complications:     HISTORY  85y Male  HPI:84 y/o m presents with ams. Patient came from [facility] at end of december, had unwitnessed fall in airport while there, had ams and had hospital stay while in [facility]. Came to [place], family noticed he was more altered, confused, weak, went to [place] and was admitted there for few days-they are unsure of his w/u but was started on meds for htn/agitation/hld/prostate. Over last few days, has become even weaker, to the point where he used to be able to do all of his adls and now requires assistance to even go to the bathroom, confusion is increasing. No fevers, ha, cp, sob, abd pain, vomiting, diarrhea, dysuria. Patient himself has no complaints    Allergies:   PAST MEDICAL & SURGICAL HISTORY:  Hyperlipidemia, unspecified hyperlipidemia type  Benign prostatic hyperplasia with urinary frequency  HTN (hypertension)  No significant past surgical history      CURRENT MEDICATIONS:   --------------------------------------------------------------------------------------  Neurologic Medications:  dexmedetomidine Infusion 0.1 MICROgram(s)/kG/Hr IV Continuous <Continuous>  levETIRAcetam  IVPB 750 milliGRAM(s) IV Intermittent every 12 hours    Respiratory Medications:    Cardiovascular Medications:  tamsulosin 0.4 milliGRAM(s) Oral at bedtime    Gastrointestinal Medications:  dextrose 5% + sodium chloride 0.9%. 1000 milliLiter(s) IV Continuous <Continuous>  pantoprazole  Injectable 40 milliGRAM(s) IV Push daily    Genitourinary Medications:    Hematologic/Oncologic Medications:    Antimicrobials/Immunologic Medications:  ceFAZolin   IVPB 2000 milliGRAM(s) IV Intermittent every 8 hours    Endocrine/Metabolic Medications:  atorvastatin 20 milliGRAM(s) Oral at bedtime    Topical/Other Medications:    --------------------------------------------------------------------------------------    VITAL SIGNS, INS/OUTS (last 24 hours):  --------------------------------------------------------------------------------------    T(C): 36.2 (01-24-18 @ 00:00), Max: 36.4 (01-23-18 @ 04:00)  HR: 67 (01-24-18 @ 00:00) (57 - 116)  BP: 144/82 (01-24-18 @ 00:00) (104/67 - 150/67)  BP(mean): 93 (01-24-18 @ 00:00) (61 - 100)  ABP: --  ABP(mean): --  RR: 15 (01-24-18 @ 00:00) (11 - 21)  SpO2: 100% (01-24-18 @ 00:00) (89% - 100%)  Wt(kg): --  CVP(mm Hg): --  CI: --  CAPILLARY BLOOD GLUCOSE       N/A      01-22 @ 07:01 - 01-23 @ 07:00  --------------------------------------------------------  IN:    dexmedetomidine Infusion: 16.2 mL    dextrose 5% + sodium chloride 0.9%.: 1350 mL    sodium chloride 0.9%: 225 mL  Total IN: 1591.2 mL    OUT:    Drain: 290 mL    Drain: 225 mL    Indwelling Catheter - Urethral: 1275 mL  Total OUT: 1790 mL    Total NET: -198.8 mL      01-23 @ 07:01 - 01-24 @ 01:48  --------------------------------------------------------  IN:    dexmedetomidine Infusion: 39.2 mL    dextrose 5% + sodium chloride 0.9%.: 1275 mL    IV PiggyBack: 100 mL  Total IN: 1414.2 mL    OUT:    Drain: 110 mL    Drain: 80 mL    Indwelling Catheter - Urethral: 1145 mL  Total OUT: 1335 mL    Total NET: 79.2 mL              --------------------------------------------------------------------------------------    EXAM  NEUROLOGY  RASS:   	 GCS: 13   Exam: Normal, NAD, alert, oriented x 1, no focal deficits.     HEENT  Exam: Normocephalic, atraumatic.  EOMI . YASMINE X 2 - in place - serosanguinous drainage     RESPIRATORY  Exam: Lungs clear to auscultation, Normal expansion/effort.        CARDIOVASCULAR  Exam: S1, S2.  Regular rate and rhythm.  No Peripheral edema    GI/NUTRITION  Exam: Abdomen soft, Non-tender, Non-distended.    Current Diet: NPO     VASCULAR  Exam: Extremities warm, pink, well-perfused.      MUSCULOSKELETAL  Exam: All extremities moving spontaneously without limitations.      SKIN:  Exam: Good skin turgor, no skin breakdown.      METABOLIC/FLUIDS/ELECTROLYTES      HEMATOLOGIC  [x] DVT Prophylaxis:   Transfusions:	[] PRBC	[] Platelets		[] FFP	[] Cryoprecipitate    INFECTIOUS DISEASE  Antimicrobials/Immunologic Medications:    Day #  	of    ***    Tubes/Lines/Drains    [x] Peripheral IV  [] Central Venous Line     	[] R	[] L	[] IJ	[] Fem	[] SC	Date Placed:   [] Arterial Line		[] R	[] L	[] Fem	[] Rad	[] Ax	Date Placed:   [] PICC:         	[] Midline		[] Mediport  [] Urinary Catheter		Date Placed:     LABS  --------------------------------------------------------------------------------------      CBC (01-23 @ 02:25)                              10.5<L>                       5.29    )--------------(  148<L>     --    % Neuts, --    % Lymphs, ANC: --                                  30.7<L>    BMP (01-23 @ 02:25)             145     |  110<H>  |  16    		Ca++ --      Ca 8.3<L>             ---------------------------------( 158<H>		Mg 2.1                5.0     |  21<L>   |  1.14  			Ph 3.4       LFTs (01-23 @ 02:25)      TPro 6.9 / Alb 3.3 / TBili 0.4 / DBili -- / AST 35 / ALT 24 / AlkPhos 46    Coags (01-23 @ 02:25)  aPTT 28.2 / INR 1.04 / PT 11.6            -> URINE MIDSTREAM Culture (01-21 @ 01:00)     NG    NG  NG                    --------------------------------------------------------------------------------------    OTHER LABS    IMAGING RESULTS  Echo:   CT: < from: CT Head No Cont (01.21.18 @ 01:27) >    EXAM:  CT BRAIN        PROCEDURE DATE:  Jan 21 2018         INTERPRETATION:  CLINICAL INDICATION:  Progressive altered mental status.   History of fall.    TECHNIQUE : Axial CT scanning of the brain was obtained from the skull   base to the vertex without the administration of intravenous contrast.   Coronal and sagittal reformatted images were subsequently obtained.     COMPARISON: No available comparisons.    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 Mass effect results is effacement of the   cerebral convexity sulci. Slitlike third ventricle. Minimal prominence of   the bilateral temporal horns. No significant midline shift.    Paranasal sinuses and mastoid air cells are clear. Visualized osseous   structures are intact.    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 Mild prominence of the   temporal horns, may be related to generalized volume loss, however early   mild hydrocephalus cannot be entirely excluded. No significant midline   shift.    Findings were discussed with Dr. Broderick by Dr. Mccann at approximately   1:46 AM dated 1/21/2018 with read back.    01/23/17    Noncontrast brain CT: Grossly stable size of bilateral mixed density subdural   hematomas, which demonstrate more heterogeneity since prior examination,   which may be related to mixing of acute/subacute and chronic blood products.   Remainder the examination is grossly stable       Xray:   < from: Xray Chest 1 View AP- PORTABLE-Urgent (01.21.18 @ 02:37) >    ******PRELIMINARY REPORT******    ******PRELIMINARY REPORT******            EXAM:  XR CHEST PORTABLE URGENT 1V        PROCEDURE DATE:  Jan 21 2018     ******PRELIMINARY REPORT******    ******PRELIMINARY REPORT******            INTERPRETATION:  Clear Lungs            ******PRELIMINARY REPORT******    ******PRELIMINARY REPORT******          LIVAN MCCANN M.D., RADIOLOGY RESIDENT                        < end of copied text > SICU AM PROGRESS NOTE   =====================================================      Overnight / Interval Events:     Pt had his follow up CT non con of head - which was stable . Pt was on precedex gtt around 5 : 30 pm - pt becomes acutely agitated - was combative - 5 of Haldol was given . Four point restraints ordered . Pt also having 24 hr EEG study .       ------------------------------------------------------------------------------------------------------------------------------------    HPI: 85y [place] male presenting with altered mental status.  No family at bedside.  As per ED, Apparently pt had a fall in [facility] 12/21 with AMS and associated hospital stay.  Upon arrival in [place], was noted by family to be more confused, weak, unable to perform ADLs.  At baseline pt lives alone in [facility], with Daughter when visiting [place], drives a car.  At this time pt is unable to ambulate to the bathroom without assistance, further worsening mental status for 1-2 weeks.  Speaking less.  Confused.   Recent admission to [place] for altered mental status, started on medication for HLD, HTN, agitation, prostate.  Unclear if head CT was performed during that admission.  Unable to obtain further history from patient 2/2 AMS.  Pt has no complaints at the bedside.    Surgery Information: None  Case Duration: 	EBL: 	IV Fluids: 	Blood Products: 	Urine Output:   Complications:     HISTORY  85y Male  HPI:86 y/o m presents with ams. Patient came from [facility] at end of december, had unwitnessed fall in airport while there, had ams and had hospital stay while in [facility]. Came to [place], family noticed he was more altered, confused, weak, went to [place] and was admitted there for few days-they are unsure of his w/u but was started on meds for htn/agitation/hld/prostate. Over last few days, has become even weaker, to the point where he used to be able to do all of his adls and now requires assistance to even go to the bathroom, confusion is increasing. No fevers, ha, cp, sob, abd pain, vomiting, diarrhea, dysuria. Patient himself has no complaints    Allergies:   PAST MEDICAL & SURGICAL HISTORY:  Hyperlipidemia, unspecified hyperlipidemia type  Benign prostatic hyperplasia with urinary frequency  HTN (hypertension)  No significant past surgical history      CURRENT MEDICATIONS:   --------------------------------------------------------------------------------------  Neurologic Medications:  dexmedetomidine Infusion 0.1 MICROgram(s)/kG/Hr IV Continuous <Continuous>  levETIRAcetam  IVPB 750 milliGRAM(s) IV Intermittent every 12 hours    Respiratory Medications:    Cardiovascular Medications:  tamsulosin 0.4 milliGRAM(s) Oral at bedtime    Gastrointestinal Medications:  dextrose 5% + sodium chloride 0.9%. 1000 milliLiter(s) IV Continuous <Continuous>  pantoprazole  Injectable 40 milliGRAM(s) IV Push daily    Genitourinary Medications:    Hematologic/Oncologic Medications:    Antimicrobials/Immunologic Medications:  ceFAZolin   IVPB 2000 milliGRAM(s) IV Intermittent every 8 hours    Endocrine/Metabolic Medications:  atorvastatin 20 milliGRAM(s) Oral at bedtime    Topical/Other Medications:    --------------------------------------------------------------------------------------    VITAL SIGNS, INS/OUTS (last 24 hours):  --------------------------------------------------------------------------------------    T(C): 36.2 (01-24-18 @ 00:00), Max: 36.4 (01-23-18 @ 04:00)  HR: 67 (01-24-18 @ 00:00) (57 - 116)  BP: 144/82 (01-24-18 @ 00:00) (104/67 - 150/67)  BP(mean): 93 (01-24-18 @ 00:00) (61 - 100)  ABP: --  ABP(mean): --  RR: 15 (01-24-18 @ 00:00) (11 - 21)  SpO2: 100% (01-24-18 @ 00:00) (89% - 100%)  Wt(kg): --  CVP(mm Hg): --  CI: --  CAPILLARY BLOOD GLUCOSE       N/A      01-22 @ 07:01  -  01-23 @ 07:00  --------------------------------------------------------  IN:    dexmedetomidine Infusion: 16.2 mL    dextrose 5% + sodium chloride 0.9%.: 1350 mL    sodium chloride 0.9%: 225 mL  Total IN: 1591.2 mL    OUT:    Drain: 290 mL    Drain: 225 mL    Indwelling Catheter - Urethral: 1275 mL  Total OUT: 1790 mL    Total NET: -198.8 mL      01-23 @ 07:01 - 01-24 @ 01:48  --------------------------------------------------------  IN:    dexmedetomidine Infusion: 39.2 mL    dextrose 5% + sodium chloride 0.9%.: 1275 mL    IV PiggyBack: 100 mL  Total IN: 1414.2 mL    OUT:    Drain: 110 mL    Drain: 80 mL    Indwelling Catheter - Urethral: 1145 mL  Total OUT: 1335 mL    Total NET: 79.2 mL              --------------------------------------------------------------------------------------    EXAM  NEUROLOGY  RASS:   	 GCS: 13   Exam: Normal, NAD, alert, oriented x 1, no focal deficits.     HEENT  Exam: Normocephalic, atraumatic.  EOMI . YASMINE X 2 - in place - serosanguinous drainage     RESPIRATORY  Exam: Lungs clear to auscultation, Normal expansion/effort.        CARDIOVASCULAR  Exam: S1, S2.  Regular rate and rhythm.  No Peripheral edema    GI/NUTRITION  Exam: Abdomen soft, Non-tender, Non-distended.    Current Diet: NPO     VASCULAR  Exam: Extremities warm, pink, well-perfused.      MUSCULOSKELETAL  Exam: All extremities moving spontaneously without limitations.      SKIN:  Exam: Good skin turgor, no skin breakdown.      METABOLIC/FLUIDS/ELECTROLYTES      HEMATOLOGIC  [x] DVT Prophylaxis:   Transfusions:	[] PRBC	[] Platelets		[] FFP	[] Cryoprecipitate    INFECTIOUS DISEASE  Antimicrobials/Immunologic Medications:    Day #  	of    ***    Tubes/Lines/Drains    [x] Peripheral IV  [] Central Venous Line     	[] R	[] L	[] IJ	[] Fem	[] SC	Date Placed:   [] Arterial Line		[] R	[] L	[] Fem	[] Rad	[] Ax	Date Placed:   [] PICC:         	[] Midline		[] Mediport  [] Urinary Catheter		Date Placed:     LABS  --------------------------------------------------------------------------------------      CBC (01-23 @ 02:25)                              10.5<L>                       5.29    )--------------(  148<L>     --    % Neuts, --    % Lymphs, ANC: --                                  30.7<L>    BMP (01-23 @ 02:25)             145     |  110<H>  |  16    		Ca++ --      Ca 8.3<L>             ---------------------------------( 158<H>		Mg 2.1                5.0     |  21<L>   |  1.14  			Ph 3.4       LFTs (01-23 @ 02:25)      TPro 6.9 / Alb 3.3 / TBili 0.4 / DBili -- / AST 35 / ALT 24 / AlkPhos 46    Coags (01-23 @ 02:25)  aPTT 28.2 / INR 1.04 / PT 11.6            -> URINE MIDSTREAM Culture (01-21 @ 01:00)     NG    NG  NG                    --------------------------------------------------------------------------------------    OTHER LABS    IMAGING RESULTS  Echo:   CT: < from: CT Head No Cont (01.21.18 @ 01:27) >    EXAM:  CT BRAIN        PROCEDURE DATE:  Jan 21 2018         INTERPRETATION:  CLINICAL INDICATION:  Progressive altered mental status.   History of fall.    TECHNIQUE : Axial CT scanning of the brain was obtained from the skull   base to the vertex without the administration of intravenous contrast.   Coronal and sagittal reformatted images were subsequently obtained.     COMPARISON: No available comparisons.    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 Mass effect results is effacement of the   cerebral convexity sulci. Slitlike third ventricle. Minimal prominence of   the bilateral temporal horns. No significant midline shift.    Paranasal sinuses and mastoid air cells are clear. Visualized osseous   structures are intact.    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 Mild prominence of the   temporal horns, may be related to generalized volume loss, however early   mild hydrocephalus cannot be entirely excluded. No significant midline   shift.    Findings were discussed with Dr. Broderick by Dr. Mccann at approximately   1:46 AM dated 1/21/2018 with read back.    01/23/17    Noncontrast brain CT: Grossly stable size of bilateral mixed density subdural   hematomas, which demonstrate more heterogeneity since prior examination,   which may be related to mixing of acute/subacute and chronic blood products.   Remainder the examination is grossly stable       Xray:   < from: Xray Chest 1 View AP- PORTABLE-Urgent (01.21.18 @ 02:37) >    ******PRELIMINARY REPORT******    ******PRELIMINARY REPORT******            EXAM:  XR CHEST PORTABLE URGENT 1V        PROCEDURE DATE:  Jan 21 2018     ******PRELIMINARY REPORT******    ******PRELIMINARY REPORT******            INTERPRETATION:  Clear Lungs            ******PRELIMINARY REPORT******    ******PRELIMINARY REPORT******          LIVAN MCCANN M.D., RADIOLOGY RESIDENT                        < end of copied text >

## 2018-01-24 NOTE — SWALLOW BEDSIDE ASSESSMENT ADULT - ASR SWALLOW ASPIRATION MONITOR
change of breathing pattern/position upright (90Y)/gurgly voice/cough/fever/throat clearing/upper respiratory infection/oral hygiene/pneumonia

## 2018-01-24 NOTE — EEG REPORT - NS EEG TEXT BOX
[facility]  Report of Routine EEG with Video    [facility]: [address], [address] [place] [address], Phone: [phone]  [facility]: [address], [place] [address], Phone: [phone]  Office: [address], [place] [address], Phone: [phone]    Patient Name: WES ZAMORANO  Age: 85 y  : 1/10/1933  Patient ID: -, MRN #: -, Location: [facility]-  Referring Physician: PEARL BULL    EEG #: [id number]  Study Date: 2018		    Technical Information:					  On Instrument: [id number]  Placement and Labeling of Electrodes:  The EEG was performed utilizing 20 channels referential EEG connections (coronal over temporal over parasagittal montage) using all standard 10-20 electrode placements with EKG.  Recording was at a sampling rate of 256 samples per second per channel.  Time synchronized digital video recording was done simultaneously with EEG recording.  A low light infrared camera was used for low light recording.  Gonzales and seizure detection algorithms were utilized.    History:  THIS IS A PORTABLE EEG  86 YO MALE  SICU-7  NON-TRAUMATIC SUBDURAL HEMORRHAGE  HX-HTN, BENIGN PROSTATIC HYPERPLASMA  PT ON IV/PUMP  PT ON CARDIAC MONITOR  PT HAS DRAIN TUBE F4,C4,C3,P3  PT HAS TWO DRE HOLES  CONCERN FOR SEIZURES    -    Medication	  Keppra	  FLOMAX	  LIPITOR	  PROTONIX	  PRECEDEX	    Study Interpretation:    FINDINGS:  The background was continuous, delta and theta activity.  The PDR was seen sporadically, and  consisted of 6-7 Hz activity, with reduced amplitude over the right hemisphere.       Generalized slowing:   -Continuous Slowing, Generalized  -Background Slowing    Focal slowing: none was present.    Sleep Background:  Stage II sleep transients were not recorded.    Other Paroxysmal Activity:  -Asymmetry, Reduced amplitude, Right hemisphere vs Increased amplitude, Left hemisphere    Interictal Epileptiform Activity:   No epileptiform discharges were present.    Ictal Epileptiform Activity or Events:  No clinical events were recorded.  No seizures were recorded.    Activation Procedures:   Hyperventilation was not performed.    Photic stimulation was not performed.    Artifacts:  Intermittent myogenic and movement artifacts were noted.    ECG:  The heart rate on single channel ECG was predominantly between 60-80 BPM.    EEG Classification / Summary:  Abnormal EEG in the awake state.  -Continuous Slowing, Generalized  -Background Slowing  -Asymmetry, Reduced amplitude, Right hemisphere vs Increased amplitude, Left hemisphere    Clinical Impression:  This EEG provides evidence of a moderate diffuse encephalopathy.  The lower amplitude activity in the right hemisphere could be due to a fluid collection over the right hemisphere (or may be due to a breach artifact over the left hemisphere creating a relatively higher amplitude on the left).  There were no epileptiform abnormalities recorded.          Jose Lyman MD  Attending Physician, [facility] [place]
Study Date: 1/23/2018-1/24/2018		      History:  THIS IS A PORTABLE EEG  84 YO MALE  SICU-7  NON-TRAUMATIC SUBDURAL HEMORRHAGE  HX-HTN, BENIGN PROSTATIC HYPERPLASMA  PT ON IV/PUMP  PT ON CARDIAC MONITOR  PT HAS DRAIN TUBE F4,C4,C3,P3  PT HAS TWO DRE HOLES  CONCERN FOR SEIZURES    -    Medication	  Keppra	  FLOMAX	  LIPITOR	  PROTONIX	  PRECEDEX	    Study Interpretation:    FINDINGS:  The background was continuous, delta and theta activity.  The PDR was seen sporadically, and  consisted of 6-7 Hz activity.     Generalized slowing:   -Continuous Slowing, Generalized  -Background Slowing    Focal slowing: none was present.    Sleep Background:  Stage II sleep transients were not recorded.    Other Paroxysmal Activity:  None    Interictal Epileptiform Activity:   No epileptiform discharges were present.    Ictal Epileptiform Activity or Events:  No clinical events were recorded.  No seizures were recorded.    Activation Procedures:   Hyperventilation was not performed.    Photic stimulation was not performed.    Artifacts:  Intermittent myogenic and movement artifacts were noted.    ECG:  The heart rate on single channel ECG was predominantly between 60-80 BPM.    EEG Classification / Summary:  Abnormal EEG in the awake state.  -Continuous Slowing, Generalized  -Background Slowing    Clinical Impression:  This EEG provides evidence of a moderate diffuse encephalopathy.  There were no epileptiform abnormalities recorded.          Jose Lyman MD  Attending Physician, [facility]

## 2018-01-24 NOTE — SWALLOW BEDSIDE ASSESSMENT ADULT - SWALLOW EVAL: RECOMMENDED FEEDING/EATING TECHNIQUES
alternate food with liquid/maintain upright posture during/after eating for 30 mins/no straws/oral hygiene/allow for swallow between intakes/check mouth frequently for oral residue/pocketing/crush medication (when feasible)/position upright (90 degrees)/small sips/bites

## 2018-01-25 LAB
ALBUMIN SERPL ELPH-MCNC: 3.2 G/DL — LOW (ref 3.3–5)
ALP SERPL-CCNC: 51 U/L — SIGNIFICANT CHANGE UP (ref 40–120)
ALT FLD-CCNC: 18 U/L — SIGNIFICANT CHANGE UP (ref 4–41)
APTT BLD: 27.7 SEC — SIGNIFICANT CHANGE UP (ref 27.5–37.4)
AST SERPL-CCNC: 20 U/L — SIGNIFICANT CHANGE UP (ref 4–40)
BASE EXCESS BLDA CALC-SCNC: 1.9 MMOL/L — SIGNIFICANT CHANGE UP
BILIRUB SERPL-MCNC: 0.7 MG/DL — SIGNIFICANT CHANGE UP (ref 0.2–1.2)
BUN SERPL-MCNC: 11 MG/DL — SIGNIFICANT CHANGE UP (ref 7–23)
CA-I BLD-SCNC: 1.11 MMOL/L — SIGNIFICANT CHANGE UP (ref 1.03–1.23)
CALCIUM SERPL-MCNC: 8 MG/DL — LOW (ref 8.4–10.5)
CHLORIDE BLDA-SCNC: 109 MMOL/L — HIGH (ref 96–108)
CHLORIDE SERPL-SCNC: 104 MMOL/L — SIGNIFICANT CHANGE UP (ref 98–107)
CO2 SERPL-SCNC: 22 MMOL/L — SIGNIFICANT CHANGE UP (ref 22–31)
CREAT SERPL-MCNC: 1.09 MG/DL — SIGNIFICANT CHANGE UP (ref 0.5–1.3)
GLUCOSE BLDA-MCNC: 124 MG/DL — HIGH (ref 70–99)
GLUCOSE SERPL-MCNC: 116 MG/DL — HIGH (ref 70–99)
HCO3 BLDA-SCNC: 27 MMOL/L — HIGH (ref 22–26)
HCT VFR BLD CALC: 32 % — LOW (ref 39–50)
HCT VFR BLDA CALC: 31.9 % — LOW (ref 39–51)
HGB BLD-MCNC: 10.7 G/DL — LOW (ref 13–17)
HGB BLDA-MCNC: 10.3 G/DL — LOW (ref 13–17)
INR BLD: 1.09 — SIGNIFICANT CHANGE UP (ref 0.88–1.17)
LACTATE BLDA-SCNC: 1 MMOL/L — SIGNIFICANT CHANGE UP (ref 0.5–2)
MAGNESIUM SERPL-MCNC: 2 MG/DL — SIGNIFICANT CHANGE UP (ref 1.6–2.6)
MCHC RBC-ENTMCNC: 28.8 PG — SIGNIFICANT CHANGE UP (ref 27–34)
MCHC RBC-ENTMCNC: 33.4 % — SIGNIFICANT CHANGE UP (ref 32–36)
MCV RBC AUTO: 86.3 FL — SIGNIFICANT CHANGE UP (ref 80–100)
NRBC # FLD: 0 — SIGNIFICANT CHANGE UP
PCO2 BLDA: 32 MMHG — LOW (ref 35–48)
PH BLDA: 7.5 PH — HIGH (ref 7.35–7.45)
PHOSPHATE SERPL-MCNC: 2.6 MG/DL — SIGNIFICANT CHANGE UP (ref 2.5–4.5)
PLATELET # BLD AUTO: 138 K/UL — LOW (ref 150–400)
PMV BLD: 10.3 FL — SIGNIFICANT CHANGE UP (ref 7–13)
PO2 BLDA: 82 MMHG — LOW (ref 83–108)
POTASSIUM BLDA-SCNC: 3.4 MMOL/L — SIGNIFICANT CHANGE UP (ref 3.4–4.5)
POTASSIUM SERPL-MCNC: 3.5 MMOL/L — SIGNIFICANT CHANGE UP (ref 3.5–5.3)
POTASSIUM SERPL-SCNC: 3.5 MMOL/L — SIGNIFICANT CHANGE UP (ref 3.5–5.3)
PROT SERPL-MCNC: 6.8 G/DL — SIGNIFICANT CHANGE UP (ref 6–8.3)
PROTHROM AB SERPL-ACNC: 12.5 SEC — SIGNIFICANT CHANGE UP (ref 9.8–13.1)
RBC # BLD: 3.71 M/UL — LOW (ref 4.2–5.8)
RBC # FLD: 12 % — SIGNIFICANT CHANGE UP (ref 10.3–14.5)
SAO2 % BLDA: 97.2 % — SIGNIFICANT CHANGE UP (ref 95–99)
SODIUM BLDA-SCNC: 138 MMOL/L — SIGNIFICANT CHANGE UP (ref 136–146)
SODIUM SERPL-SCNC: 139 MMOL/L — SIGNIFICANT CHANGE UP (ref 135–145)
WBC # BLD: 7.66 K/UL — SIGNIFICANT CHANGE UP (ref 3.8–10.5)
WBC # FLD AUTO: 7.66 K/UL — SIGNIFICANT CHANGE UP (ref 3.8–10.5)

## 2018-01-25 PROCEDURE — 99232 SBSQ HOSP IP/OBS MODERATE 35: CPT | Mod: GC

## 2018-01-25 PROCEDURE — 74230 X-RAY XM SWLNG FUNCJ C+: CPT | Mod: 26

## 2018-01-25 PROCEDURE — 71045 X-RAY EXAM CHEST 1 VIEW: CPT | Mod: 26

## 2018-01-25 PROCEDURE — 70450 CT HEAD/BRAIN W/O DYE: CPT | Mod: 26

## 2018-01-25 RX ORDER — POTASSIUM PHOSPHATE, MONOBASIC POTASSIUM PHOSPHATE, DIBASIC 236; 224 MG/ML; MG/ML
15 INJECTION, SOLUTION INTRAVENOUS ONCE
Qty: 0 | Refills: 0 | Status: COMPLETED | OUTPATIENT
Start: 2018-01-25 | End: 2018-01-25

## 2018-01-25 RX ADMIN — PANTOPRAZOLE SODIUM 40 MILLIGRAM(S): 20 TABLET, DELAYED RELEASE ORAL at 06:07

## 2018-01-25 RX ADMIN — LEVETIRACETAM 750 MILLIGRAM(S): 250 TABLET, FILM COATED ORAL at 18:40

## 2018-01-25 RX ADMIN — TAMSULOSIN HYDROCHLORIDE 0.4 MILLIGRAM(S): 0.4 CAPSULE ORAL at 21:36

## 2018-01-25 RX ADMIN — POTASSIUM PHOSPHATE, MONOBASIC POTASSIUM PHOSPHATE, DIBASIC 62.5 MILLIMOLE(S): 236; 224 INJECTION, SOLUTION INTRAVENOUS at 06:07

## 2018-01-25 RX ADMIN — QUETIAPINE FUMARATE 25 MILLIGRAM(S): 200 TABLET, FILM COATED ORAL at 13:58

## 2018-01-25 RX ADMIN — LEVETIRACETAM 750 MILLIGRAM(S): 250 TABLET, FILM COATED ORAL at 06:07

## 2018-01-25 RX ADMIN — ATORVASTATIN CALCIUM 20 MILLIGRAM(S): 80 TABLET, FILM COATED ORAL at 21:36

## 2018-01-25 NOTE — SWALLOW VFSS/MBS ASSESSMENT ADULT - ORAL PHASE
Delayed oral transit time/Reduced anterior - posterior transport within functional limits Within functional limits

## 2018-01-25 NOTE — PROGRESS NOTE ADULT - ATTENDING COMMENTS
I examined this patient on AM SICU rounds with the multidisciplinary team.  All relevant studies reviewed.   Agree with data above.  I spoke with Neuro surgery this morning.  Subdural drains removed and follow up brain CT is unchanged.  Issues:    Delirium is now essentially resolved - seroquel good choice.  Altered level of consciousness much improved - answering complex questions.    Chronic kidney disease - stage 2-3, no significant deterioration  Essential hypertension - adequately controlled.   All in context of recent drainage of bilateral acute on  chronic SDH.  Ok for floor.
I examined this patient on AM SICU rounds with the multidisciplinary team.  All relevant studies reviewed.  Mental status is still the central issue.  On evaluation this morning (off Precedex for ~ 30 minutes) the patient appears calm, and although he clearly has a depressed level of consciousness, he does open eyes to command (sluggishly) and attempts to communicate though mumbles words.  This is a significant improvement from yesterday.  That said, he is volatile and overnight needed to be restrained when he developed aggressive delirium.      Delirium - particularly at night.  combination of recent SDH and drainage, elderly status, unfamiliar surroundings etc. (sundowning).  CKD stage 2 - creatinine actually decreased to 1.0.    Subdural hematomas (acute on chronic) s/p craniotomy and drainage.  waxing and waning mental status.    Hypertension (essential) being controlled.      We are evaluating his swallowing mechanism for adequate function.  Will likely advance diet.  Once his behavior is amenable we will transfer him to the floor.
I examined this patient on AM SICU rounds with the multidisciplinary team.  All relevant studies reviewed.   Main issue last 24 hours is a change in mental status with resultant significant depression of consciousness.  Agree with data above.  Issues:    GCS is 5 (motor) 3 (verbal) 1-2 (eye opening) :  9-10 total.  This is a change from overnight despite no apparent change in CT which continues to reveal bilateral large acute/chronic subdural hematomas.  Will reach out to NSG and determine the plan for SDH    Chronic kidney disease - stage 2 (GFR ~ 62).  Monitor electrolytes.  Possible seizure (doubt) in overnight.  Now on Kepra - will ask NSG if they want EEG.  Baseline essential hypertension - under reasonable control.      Needs SDH drained as per NSG.  Continue SICU monitoring.
Obtunded following Ativan administration  Needs CT STAT
I examined this patient on AM SICU rounds with the multidisciplinary team.  All relevant studies reviewed. Spoke with the Neurosurgery PA's.  Patient is POD #1  s/p bilateral "maxi hole" drainage of acute on chronic subdural hematomas.  Issues:    Altered level of consciousness:  although the patient became "combative" following decompressive surgery last night, he was lethargic on rounds.  His GCS was approximately 11 (at least 1 hour off of Precedex).  He is non focal but has not "woken up" on my exam as expected.  Repeat CT performed and will be commented on by NSG.  Acute post operative anemia - likely secondary to the evacuation of hematoma and dilution.  Will follow.    Chronic kidney disease stage 2 - Creatinine essentially stable overnight.  Electrolytes ok - mildly hypernatremic (145) but this is intended in the neurosurgery patient.    Essential hypertension - mostly diastolic.      Will continue SICU care and await improvement in sensorium.    Continue ancef with sub dural drains.  continue neuro checks.  check f/u CT.

## 2018-01-25 NOTE — SWALLOW VFSS/MBS ASSESSMENT ADULT - DIAGNOSTIC IMPRESSIONS
Patient presents with Mild OroPharyngeal Stage Dysphagia. The Oral Stage is characterized by adequate oral containment, slow/prolonged chewing for solid, slow bolus manipulation, slow tongue motion with slow anterior to posterior transfer of the bolus for solid consistency with piecemeal deglutition for solids, adequate to mildly delayed for anterior to posterior transfer of the bolus for puree consistency. There is adequate oral clearance post swallow.    The Pharyngeal Stage is characterized by mildly delayed initiation of the pharyngeal swallow (bolus head is at the laryngeal surface of epiglottis for thin liquids), adequate laryngeal elevation, mildly reduced tongue base retraction resulting in trace vallecular residue post primary swallow for puree/solids; and adequate pharyngeal constriction.   There is No Aspiration observed before, during or after the swallow for puree/solids/nectar thick liquids/thin liquids.

## 2018-01-25 NOTE — PROGRESS NOTE ADULT - SUBJECTIVE AND OBJECTIVE BOX
Subjective: Patient seen and examined. No new events except as noted.     SUBJECTIVE/ROS:  more awake and alert today       MEDICATIONS:  MEDICATIONS  (STANDING):  atorvastatin 20 milliGRAM(s) Oral at bedtime  dextrose 5% + sodium chloride 0.9%. 1000 milliLiter(s) (75 mL/Hr) IV Continuous <Continuous>  levETIRAcetam 750 milliGRAM(s) Oral two times a day  pantoprazole    Tablet 40 milliGRAM(s) Oral before breakfast  QUEtiapine 25 milliGRAM(s) Oral daily  tamsulosin 0.4 milliGRAM(s) Oral at bedtime      PHYSICAL EXAM:  T(C): 37.2 (01-25-18 @ 04:00), Max: 38.1 (01-24-18 @ 20:00)  HR: 82 (01-25-18 @ 06:00) (59 - 91)  BP: 121/51 (01-25-18 @ 06:00) (107/46 - 165/80)  RR: 23 (01-25-18 @ 06:00) (13 - 23)  SpO2: 100% (01-25-18 @ 06:00) (96% - 100%)  Wt(kg): --  I&O's Summary    24 Jan 2018 07:01  -  25 Jan 2018 07:00  --------------------------------------------------------  IN: 1993.1 mL / OUT: 1285 mL / NET: 708.1 mL        JVP: Normal  Neck: supple  Lung: clear   CV: S1 S2 , Murmur:  Abd: soft  Ext: No edema  neuro: Awake / alert  Psych: flat affect  Skin: normal       LABS/DATA:    CARDIAC MARKERS:                                10.7   7.66  )-----------( 138      ( 25 Jan 2018 03:45 )             32.0     01-25    139  |  104  |  11  ----------------------------<  116<H>  3.5   |  22  |  1.09    Ca    8.0<L>      25 Jan 2018 03:45  Phos  2.6     01-25  Mg     2.0     01-25    TPro  6.8  /  Alb  3.2<L>  /  TBili  0.7  /  DBili  x   /  AST  20  /  ALT  18  /  AlkPhos  51  01-25    proBNP:   Lipid Profile:   HgA1c:   TSH:     TELE:  EKG:

## 2018-01-25 NOTE — DIETITIAN INITIAL EVALUATION ADULT. - OTHER INFO
Pt seen for critical care LOS.  At this time, pt is s/p surgery for craniotomy, maxi holes, evac SDH.  Pt is currently disoriented.  Nutrition hx obtained from daughter.  Pt reported w/o food allergies, no difficulties chewing/swallowing PTA, denies modified diet PTA.  Pt w/recent [facility] admission - wt at that time was 147lbs, which is current wt.  Daughter states pt UBW  approximately 152-157 lbs.  As per daughter and PCA, pt ate 100% of breakfast this morning.  S/P bedside dysphagia evaluation yesterday -recommendation for MBS and in the interim, Dysphagia 1 diet w/honey consistency fluids.

## 2018-01-25 NOTE — SWALLOW VFSS/MBS ASSESSMENT ADULT - PHARYNGEAL PHASE COMMENTS
delayed initiation of the pharyngeal swallow, adequate laryngeal elevation, adequate tongue base retraction, adequate pharyngeal constriction adequate initiation of the pharyngeal swallow, adequate laryngeal elevation, mildly reduced tongue base retraction, adequate pharyngeal constriction adequate initiation of the pharyngeal swallow, adequate laryngeal elevation, reduced tongue base retraction, adequate pharyngeal constriction adequate initiation of the pharyngeal swallow, adequate laryngeal elevation, adequate tongue base retraction, adequate pharyngeal constriction

## 2018-01-25 NOTE — PROGRESS NOTE ADULT - SUBJECTIVE AND OBJECTIVE BOX
NEUROSURGERY    no acute events overnight    ICU Vital Signs Last 24 Hrs  T(C): 37.7 (25 Jan 2018 00:00), Max: 38.1 (24 Jan 2018 20:00)  T(F): 99.9 (25 Jan 2018 00:00), Max: 100.5 (24 Jan 2018 20:00)  HR: 88 (25 Jan 2018 01:00) (59 - 91)  BP: 110/72 (25 Jan 2018 01:00) (107/46 - 165/80)  BP(mean): 82 (25 Jan 2018 01:00) (57 - 103)  ABP: --  ABP(mean): --  RR: 18 (25 Jan 2018 01:00) (14 - 20)  SpO2: 99% (25 Jan 2018 01:00) (96% - 100%)      Exam    Pt on 1:1 supervision and 4 point restraint  awake, alert, oriented to name, birthday, and country  PERRL, EOMI, face symmetric  JOHNSTON strong, symmetric  Wound dressing dry and intact    Drains:  JPs put out 10 and 45.

## 2018-01-25 NOTE — SWALLOW VFSS/MBS ASSESSMENT ADULT - RECOMMENDED CONSISTENCY
1.) Mechanical Soft with Thin Liquids  2.) Aspiration Precautions  3.) Feeding/Swallowing Guidelines: Sit upright, small bites, chew mech soft solids well, single cup sips of thin liquids.   4.) Maintain Good Oral Hygiene Care

## 2018-01-25 NOTE — PROGRESS NOTE ADULT - SUBJECTIVE AND OBJECTIVE BOX
SICU AM PROGRESS NOTE   =====================================================  Overnight / Interval Events: last head CT stable from previous, will receive one more this AM. Received seroquel overnight for sundowning/agitation with good effect. Continues on keppra.   ------------------------------------------------------------------------------------------------------------------------------------    HPI: 85y [place] male presenting with altered mental status.  No family at bedside.  As per ED, Apparently pt had a fall in [facility] 12/21 with AMS and associated hospital stay.  Upon arrival in [place], was noted by family to be more confused, weak, unable to perform ADLs.  At baseline pt lives alone in [facility], with Daughter when visiting [place], drives a car.  At this time pt is unable to ambulate to the bathroom without assistance, further worsening mental status for 1-2 weeks.  Speaking less.  Confused.   Recent admission to [place] for altered mental status, started on medication for HLD, HTN, agitation, prostate.  Unclear if head CT was performed during that admission.  Unable to obtain further history from patient 2/2 AMS.  Pt has no complaints at the bedside.    Surgery Information: None  Case Duration: 	EBL: 	IV Fluids: 	Blood Products: 	Urine Output:   Complications:     HISTORY  85y Male  HPI:86 y/o m presents with ams. Patient came from [facility] at end of december, had unwitnessed fall in airport while there, had ams and had hospital stay while in [facility]. Came to [place], family noticed he was more altered, confused, weak, went to [place] and was admitted there for few days-they are unsure of his w/u but was started on meds for htn/agitation/hld/prostate. Over last few days, has become even weaker, to the point where he used to be able to do all of his adls and now requires assistance to even go to the bathroom, confusion is increasing. No fevers, ha, cp, sob, abd pain, vomiting, diarrhea, dysuria. Patient himself has no complaints    Allergies:   PAST MEDICAL & SURGICAL HISTORY:  Hyperlipidemia, unspecified hyperlipidemia type  Benign prostatic hyperplasia with urinary frequency  HTN (hypertension)  No significant past surgical history      CURRENT MEDICATIONS:   --------------------------------------------------------------------------------------  Neurologic Medications  levETIRAcetam 750 milliGRAM(s) Oral two times a day  QUEtiapine 25 milliGRAM(s) Oral daily    Respiratory Medications    Cardiovascular Medications  tamsulosin 0.4 milliGRAM(s) Oral at bedtime    Gastrointestinal Medications  dextrose 5% + sodium chloride 0.9%. 1000 milliLiter(s) IV Continuous <Continuous>  pantoprazole    Tablet 40 milliGRAM(s) Oral before breakfast    Genitourinary Medications    Hematologic/Oncologic Medications    Antimicrobial/Immunologic Medications    Endocrine/Metabolic Medications  atorvastatin 20 milliGRAM(s) Oral at bedtime    Topical/Other Medications  --------------------------------------------------------------------------------------    VITAL SIGNS, INS/OUTS (last 24 hours):  --------------------------------------------------------------------------------------  T(C): 37.7 (01-25-18 @ 00:00), Max: 38.1 (01-24-18 @ 20:00)  HR: 71 (01-25-18 @ 02:00) (59 - 91)  BP: 127/49 (01-25-18 @ 02:00) (107/46 - 165/80)  BP(mean): 69 (01-25-18 @ 02:00) (57 - 103)  ABP: --  ABP(mean): --  RR: 20 (01-25-18 @ 02:00) (14 - 20)  SpO2: 97% (01-25-18 @ 02:00) (96% - 100%)  Wt(kg): --  CVP(mm Hg): --  CI: --  CAPILLARY BLOOD GLUCOSE       N/A      01-23 @ 07:01  -  01-24 @ 07:00  --------------------------------------------------------  IN:    dexmedetomidine Infusion: 107.9 mL    dextrose 5% + sodium chloride 0.9%.: 1725 mL    IV PiggyBack: 200 mL  Total IN: 2032.9 mL    OUT:    Drain: 100 mL    Drain: 135 mL    Indwelling Catheter - Urethral: 1595 mL  Total OUT: 1830 mL    Total NET: 202.9 mL      01-24 @ 07:01 - 01-25 @ 03:14  --------------------------------------------------------  IN:    dexmedetomidine Infusion: 18.1 mL    dextrose 5% + sodium chloride 0.9%.: 1425 mL  Total IN: 1443.1 mL    OUT:    Drain: 10 mL    Drain: 45 mL    Indwelling Catheter - Urethral: 750 mL  Total OUT: 805 mL    Total NET: 638.1 mL  --------------------------------------------------------------------------------------    EXAM  NEUROLOGY  RASS:   	 GCS:    Exam: Normal, NAD, alert, oriented x 1, no focal deficits.     HEENT  Exam: Normocephalic, atraumatic.  EOMI . YASMINE X 2 - in place - serosanguinous drainage     RESPIRATORY  Exam: Lungs clear to auscultation, Normal expansion/effort.        CARDIOVASCULAR  Exam: S1, S2.  Regular rate and rhythm.  No Peripheral edema    GI/NUTRITION  Exam: Abdomen soft, Non-tender, Non-distended.    Current Diet: dysphagia diet    VASCULAR  Exam: Extremities warm, pink, well-perfused.      MUSCULOSKELETAL  Exam: All extremities moving spontaneously without limitations.      SKIN:  Exam: Good skin turgor, no skin breakdown.      METABOLIC/FLUIDS/ELECTROLYTES      HEMATOLOGIC  [x] DVT Prophylaxis:   Transfusions:	[] PRBC	[] Platelets		[] FFP	[] Cryoprecipitate    INFECTIOUS DISEASE  Antimicrobials/Immunologic Medications:    Tubes/Lines/Drains    [x] Peripheral IV  [] Central Venous Line     	[] R	[] L	[] IJ	[] Fem	[] SC	Date Placed:   CBC (01-24 @ 02:50)                              11.5<L>                         6.37    )----------------(  139<L>     --    % Neutrophils, --    % Lymphocytes, ANC: --                                  35.6<L>    BMP (01-24 @ 02:50)             140     |  104     |  11    		Ca++ --      Ca 8.7                ---------------------------------( 134<H>		Mg 2.1                3.9     |  24      |  1.00  			Ph 3.2       LFTs (01-24 @ 02:50)      TPro 7.5 / Alb 3.5 / TBili 0.6 / DBili -- / AST 24 / ALT 20 / AlkPhos 54    Coags (01-24 @ 02:50)  aPTT 29.0 / INR 0.98 / PT 10.9      ABG (01-24 @ 07:24)     7.44 / 36 / 118<H> / 25 / 0.3 / 99.1<H>%     Lactate: 1.2     [] Arterial Line		[] R	[] L	[] Fem	[] Rad	[] Ax	Date Placed:   [] PICC:         	[] Midline		[] Mediport  [] Urinary Catheter		Date Placed:     LABS  --------------------------------------------------------------------------------------             --------------------------------------------------------------------------------------    OTHER LABS    IMAGING RESULTS  Echo:   CT: < from: CT Head No Cont (01.21.18 @ 01:27) >    EXAM:  CT BRAIN        PROCEDURE DATE:  Jan 21 2018         INTERPRETATION:  CLINICAL INDICATION:  Progressive altered mental status.   History of fall.    TECHNIQUE : Axial CT scanning of the brain was obtained from the skull   base to the vertex without the administration of intravenous contrast.   Coronal and sagittal reformatted images were subsequently obtained.     COMPARISON: No available comparisons.    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 Mass effect results is effacement of the   cerebral convexity sulci. Slitlike third ventricle. Minimal prominence of   the bilateral temporal horns. No significant midline shift.    Paranasal sinuses and mastoid air cells are clear. Visualized osseous   structures are intact.    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 Mild prominence of the   temporal horns, may be related to generalized volume loss, however early   mild hydrocephalus cannot be entirely excluded. No significant midline   shift.    Findings were discussed with Dr. Broderick by Dr. Mccann at approximately   1:46 AM dated 1/21/2018 with read back.    01/23/17    Noncontrast brain CT: Grossly stable size of bilateral mixed density subdural   hematomas, which demonstrate more heterogeneity since prior examination,   which may be related to mixing of acute/subacute and chronic blood products.   Remainder the examination is grossly stable       Xray:   < from: Xray Chest 1 View AP- PORTABLE-Urgent (01.21.18 @ 02:37) >    ******PRELIMINARY REPORT******    ******PRELIMINARY REPORT******            EXAM:  XR CHEST PORTABLE URGENT 1V        PROCEDURE DATE:  Jan 21 2018     ******PRELIMINARY REPORT******    ******PRELIMINARY REPORT******            INTERPRETATION:  Clear Lungs            ******PRELIMINARY REPORT******    ******PRELIMINARY REPORT******          LIVAN MCCANN M.D., RADIOLOGY RESIDENT                        < end of copied text >

## 2018-01-25 NOTE — PROGRESS NOTE ADULT - ASSESSMENT
SDH   s/p drain    s/p fall , rule out syncope  monitor on tele  no evetns so far    HTN  cont to monitor

## 2018-01-25 NOTE — SWALLOW VFSS/MBS ASSESSMENT ADULT - COMMENTS
Patient is an 85y Male with a hx of HTN, HLD, BPH, recent fall, altered mental status x 1 month, presenting with worsening altered mental status for 1-2 weeks found with acute on chronic bilateral subdural hemorrhage.

## 2018-01-25 NOTE — PROGRESS NOTE ADULT - ASSESSMENT
ASSESSMENT:  85y Male with a hx of HTN, HLD, BPH, recent fall, altered mental status x 1 month, presenting with worsening altered mental status for 1-2 weeks found with acute on chronic bilateral subdural hemorrhage.    PLAN:    Neurologic: q2 hour neurochecks, on 750 mg of  Keppra for seizure ppx. Received seroquel overnight for agitation with good effect. No prn haldol necessary. Follow up EEG study.  Respiratory: no active issues.  continue to monitor.  Cardiovascular: no acute issues, continue to monitor.  Gastrointestinal/Nutrition: NPO/IVF   Renal/Genitourinary: monitor I/Os. NICK vs CKD. Trend Creatinine. Replete lytes PRN.  Hematologic:  SCDs.  Holding chemical DVT ppx in setting of acute on chronic SDH.  Infectious Disease: Ancef X 3 doses - drain prophylaxis   Tubes/Lines/Drains: PIV , YASMINE drains X 2 ( maxi hole sites)   Endocrine:  No active issues.  Monitor glucose on BMP.  Disposition: SICU    --------------------------------------------------------------------------------------    Critical Care Diagnoses: Acute on chronic subdural hemorrhage, altered mental status, severe protein calorie malnutrition.

## 2018-01-26 DIAGNOSIS — I10 ESSENTIAL (PRIMARY) HYPERTENSION: ICD-10-CM

## 2018-01-26 DIAGNOSIS — D64.9 ANEMIA, UNSPECIFIED: ICD-10-CM

## 2018-01-26 DIAGNOSIS — N40.1 BENIGN PROSTATIC HYPERPLASIA WITH LOWER URINARY TRACT SYMPTOMS: ICD-10-CM

## 2018-01-26 PROCEDURE — 99223 1ST HOSP IP/OBS HIGH 75: CPT

## 2018-01-26 RX ORDER — QUETIAPINE FUMARATE 200 MG/1
25 TABLET, FILM COATED ORAL ONCE
Qty: 0 | Refills: 0 | Status: COMPLETED | OUTPATIENT
Start: 2018-01-26 | End: 2018-01-26

## 2018-01-26 RX ORDER — QUETIAPINE FUMARATE 200 MG/1
50 TABLET, FILM COATED ORAL
Qty: 0 | Refills: 0 | Status: DISCONTINUED | OUTPATIENT
Start: 2018-01-26 | End: 2018-01-27

## 2018-01-26 RX ORDER — ENOXAPARIN SODIUM 100 MG/ML
40 INJECTION SUBCUTANEOUS AT BEDTIME
Qty: 0 | Refills: 0 | Status: DISCONTINUED | OUTPATIENT
Start: 2018-01-26 | End: 2018-01-28

## 2018-01-26 RX ADMIN — QUETIAPINE FUMARATE 50 MILLIGRAM(S): 200 TABLET, FILM COATED ORAL at 22:19

## 2018-01-26 RX ADMIN — ENOXAPARIN SODIUM 40 MILLIGRAM(S): 100 INJECTION SUBCUTANEOUS at 22:18

## 2018-01-26 RX ADMIN — TAMSULOSIN HYDROCHLORIDE 0.4 MILLIGRAM(S): 0.4 CAPSULE ORAL at 22:18

## 2018-01-26 RX ADMIN — LEVETIRACETAM 750 MILLIGRAM(S): 250 TABLET, FILM COATED ORAL at 18:32

## 2018-01-26 RX ADMIN — PANTOPRAZOLE SODIUM 40 MILLIGRAM(S): 20 TABLET, DELAYED RELEASE ORAL at 07:06

## 2018-01-26 RX ADMIN — LEVETIRACETAM 750 MILLIGRAM(S): 250 TABLET, FILM COATED ORAL at 07:06

## 2018-01-26 RX ADMIN — ATORVASTATIN CALCIUM 20 MILLIGRAM(S): 80 TABLET, FILM COATED ORAL at 22:19

## 2018-01-26 RX ADMIN — QUETIAPINE FUMARATE 25 MILLIGRAM(S): 200 TABLET, FILM COATED ORAL at 19:13

## 2018-01-26 RX ADMIN — QUETIAPINE FUMARATE 25 MILLIGRAM(S): 200 TABLET, FILM COATED ORAL at 11:45

## 2018-01-26 NOTE — CONSULT NOTE ADULT - PROBLEM SELECTOR RECOMMENDATION 2
Can hook up EEG after evacuation.   Increase keppra to 750 BID.  Ativan 1mg for seizure lasting longer than 2 minutes.
Not on any meds at this time.  Continue to monitor.

## 2018-01-26 NOTE — PROGRESS NOTE ADULT - SUBJECTIVE AND OBJECTIVE BOX
NEUROSURGERY    Patient is a 85y old  Male who presents with a chief complaint of altered mental status (21 Jan 2018 05:47)  S/P Bilateral Rainelle Holes for drainage acute/chronic SDHs    HPI:  84 y/o m presents with ams. Patient came from [facility] at end of december, had unwitnessed fall in airport while there, had ams and had hospital stay while in [facility]. Came to [place], family noticed he was more altered, confused, weak, went to [place] and was admitted there for few days-they are unsure of his w/u but was started on meds for htn/agitation/hld/prostate. Over last few days, has become even weaker, to the point where he used to be able to do all of his adls and now requires assistance to even go to the bathroom, confusion is increasing. No fevers, ha, cp, sob, abd pain, vomiting, diarrhea, dysuria. Patient himself has no complaints (21 Jan 2018 02:32)    ICU Vital Signs Last 24 Hrs  T(C): 37.6 (26 Jan 2018 00:55), Max: 38.2 (25 Jan 2018 08:00)  T(F): 99.7 (26 Jan 2018 00:55), Max: 100.7 (25 Jan 2018 08:00)  HR: 63 (26 Jan 2018 00:55) (63 - 107)  BP: 149/84 (26 Jan 2018 00:55) (116/55 - 149/84)  BP(mean): 75 (25 Jan 2018 10:00) (63 - 75)  ABP: --  ABP(mean): --  RR: 18 (26 Jan 2018 00:55) (13 - 99)  SpO2: 100% (26 Jan 2018 00:55) (92% - 100%)    Exam    Awake, alert, responsive, conversant  Less confused and combative  Pupils = R, EOM intact  FC+ on all 4 ext , JOHNSTON with good strength  surgical wounds dry and intact  Tolerating PO Diet, Voiding without difficulty

## 2018-01-26 NOTE — PROGRESS NOTE ADULT - SUBJECTIVE AND OBJECTIVE BOX
Subjective: Patient seen and examined. No new events except as noted.     SUBJECTIVE/ROS:  no new events       MEDICATIONS:  MEDICATIONS  (STANDING):  atorvastatin 20 milliGRAM(s) Oral at bedtime  enoxaparin Injectable 40 milliGRAM(s) SubCutaneous at bedtime  levETIRAcetam 750 milliGRAM(s) Oral two times a day  pantoprazole    Tablet 40 milliGRAM(s) Oral before breakfast  QUEtiapine 25 milliGRAM(s) Oral daily  tamsulosin 0.4 milliGRAM(s) Oral at bedtime      PHYSICAL EXAM:  T(C): 37.5 (01-26-18 @ 07:04), Max: 37.6 (01-26-18 @ 00:55)  HR: 83 (01-26-18 @ 07:04) (63 - 89)  BP: 151/81 (01-26-18 @ 07:04) (131/57 - 151/81)  RR: 16 (01-26-18 @ 07:04) (16 - 99)  SpO2: 100% (01-26-18 @ 07:04) (97% - 100%)  Wt(kg): --  I&O's Summary    25 Jan 2018 07:01  -  26 Jan 2018 07:00  --------------------------------------------------------  IN: 125 mL / OUT: 950 mL / NET: -825 mL    26 Jan 2018 07:01  -  26 Jan 2018 09:34  --------------------------------------------------------  IN: 0 mL / OUT: 250 mL / NET: -250 mL        JVP: Normal  Neck: supple  Lung: clear   CV: S1 S2 , Murmur:  Abd: soft  Ext: No edema  neuro: Awake / alert  Psych: flat affect  Skin: normal       LABS/DATA:    CARDIAC MARKERS:                                10.7   7.66  )-----------( 138      ( 25 Jan 2018 03:45 )             32.0     01-25    139  |  104  |  11  ----------------------------<  116<H>  3.5   |  22  |  1.09    Ca    8.0<L>      25 Jan 2018 03:45  Phos  2.6     01-25  Mg     2.0     01-25    TPro  6.8  /  Alb  3.2<L>  /  TBili  0.7  /  DBili  x   /  AST  20  /  ALT  18  /  AlkPhos  51  01-25    proBNP:   Lipid Profile:   HgA1c:   TSH:     TELE:  EKG:

## 2018-01-26 NOTE — CONSULT NOTE ADULT - SUBJECTIVE AND OBJECTIVE BOX
Neurology Consult    Name  WES ZAMORANO    85 year old male with HTN admitted with SDH with seizure episode last night. He originally came from [facility] where he suffered a fall at the airport. [place] [employer] [place] in [facility] and then released and came here. While he was here (starting around January 1st) he began acting oddly with confusion and agitation. There was one instance where a  witnessed him get out of the cab and began banging his head on the pavement. He was previously in an excellent state of health, independent and traveling on his own. Over the past week, his family noticed that he was having increasing trouble walking and decreasing independence with ADLs. Family also noticed a bloody bowel movement.   Last night, patient had an episode of GTC followed by post-ictal confusion. He was given keppra and precedex was maintained for agitation.   Daughter states that he is currently moving on his own and responds to being moved but does not readily open his eyes.                                                     PAST MEDICAL & SURGICAL HISTORY:  Hyperlipidemia, unspecified hyperlipidemia type  Benign prostatic hyperplasia with urinary frequency  HTN (hypertension)          MEDICATIONS  (STANDING):  atorvastatin 20 milliGRAM(s) Oral at bedtime  dextrose 5% + sodium chloride 0.9%. 1000 milliLiter(s) (75 mL/Hr) IV Continuous <Continuous>  levETIRAcetam  IVPB 500 milliGRAM(s) IV Intermittent every 12 hours  pantoprazole  Injectable 40 milliGRAM(s) IV Push daily  tamsulosin 0.4 milliGRAM(s) Oral at bedtime    MEDICATIONS  (PRN):      Allergies    No Known Allergies    Intolerances        Objective  Vital Signs Last 24 Hrs  T(C): 36.5 (22 Jan 2018 08:00), Max: 37.1 (21 Jan 2018 20:00)  T(F): 97.7 (22 Jan 2018 08:00), Max: 98.7 (21 Jan 2018 20:00)  HR: 73 (22 Jan 2018 11:00) (54 - 128)  BP: 129/71 (22 Jan 2018 11:00) (80/41 - 200/84)  BP(mean): 83 (22 Jan 2018 11:00) (49 - 113)  RR: 14 (22 Jan 2018 11:00) (11 - 29)  SpO2: 100% (22 Jan 2018 11:00) (78% - 100%)    General Exam   General appearance: No acute distress, well-nourished  Respiratory:    non-labored respirations               Neurological Exam  Mental Status:  eyes closed, minimally responds to sternal rub, grunts in response to questions    Cranial Nerves: fights eye-opening, no gaze-deviation no facial droop    Motor:   Tone:  increased tone in legs              Strength: moving all 4 extremities spontaneously     Tremor:  none appreciated at rest or in action    Sensation: grimaces to painful stimuli    Deep Tendon Reflexes: 2+  Toes flexor bilaterally downgoing        Other Studies                          12.4   5.57  )-----------( 173      ( 22 Jan 2018 03:50 )             37.6     01-22    143  |  104  |  15  ----------------------------<  133<H>  4.8   |  23  |  1.22    Ca    9.3      22 Jan 2018 03:50  Phos  3.4     01-22  Mg     2.1     01-22    TPro  8.0  /  Alb  3.8  /  TBili  0.7  /  DBili  x   /  AST  37  /  ALT  30  /  AlkPhos  56  01-22    LIVER FUNCTIONS - ( 22 Jan 2018 03:50 )  Alb: 3.8 g/dL / Pro: 8.0 g/dL / ALK PHOS: 56 u/L / ALT: 30 u/L / AST: 37 u/L / GGT: x             Radiology    CTh:  	  < from: CT Head No Cont (01.22.18 @ 02:55) >    IMPRESSION:    Large bilateral frontal, parietal, and temporal mixed density 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 The large subdural   collections are slightly larger in size, as more effacement of the   quadrigeminal plate cistern and suprasellar cistern is noted compared to   the prior study.    < end of copied text >
CHIEF COMPLAINT:Patient is a 85y old  Male who presents with a chief complaint of altered mental status (21 Jan 2018 05:47)      HISTORY OF PRESENT ILLNESS:  this is a 85 f with history as below admitted with ams found to have acute on chronic SDH planned for drainge  Due to altered mental status, subjective information were not able to be obtained from the patient. History was obtained, to the extent possible, from review of the chart and collateral sources of information. but he denies any chest pain or sob   of note pt had unwitnessed fall in airport last [place]       PAST MEDICAL & SURGICAL HISTORY:  Hyperlipidemia, unspecified hyperlipidemia type  Benign prostatic hyperplasia with urinary frequency  HTN (hypertension)  No significant past surgical history          MEDICATIONS:  tamsulosin 0.4 milliGRAM(s) Oral at bedtime            atorvastatin 20 milliGRAM(s) Oral at bedtime    sodium chloride 0.9%. 1000 milliLiter(s) IV Continuous <Continuous>      FAMILY HISTORY:      Non-contributory    SOCIAL HISTORY:    No tobacco, drugs or etoh    Allergies    No Known Allergies    Intolerances    	    REVIEW OF SYSTEMS:  as above  The rest of the 14 points ROS reviewed and except above they are unremarkable.        PHYSICAL EXAM:  T(C): 36.6 (01-21-18 @ 08:00), Max: 36.8 (01-20-18 @ 22:48)  HR: 86 (01-21-18 @ 08:00) (78 - 88)  BP: 153/77 (01-21-18 @ 06:00) (127/85 - 167/82)  RR: 17 (01-21-18 @ 08:00) (15 - 22)  SpO2: 99% (01-21-18 @ 08:00) (92% - 100%)  Wt(kg): --  I&O's Summary    20 Jan 2018 07:01  -  21 Jan 2018 07:00  --------------------------------------------------------  IN: 150 mL / OUT: 200 mL / NET: -50 mL    21 Jan 2018 07:01  -  21 Jan 2018 09:30  --------------------------------------------------------  IN: 150 mL / OUT: 350 mL / NET: -200 mL        JVP: Normal  Neck: supple  Lung: clear   CV: S1 S2 , Murmur:  Abd: soft  Ext: No edema  neuro: Awake / alert  Psych: flat affect  Skin: normal     LABS/DATA:    TELEMETRY: 	  sinus, fequent PVC  ECG:  	   	  CARDIAC MARKERS:                                  11.5   5.57  )-----------( 179      ( 21 Jan 2018 00:33 )             35.2     01-21    139  |  101  |  19  ----------------------------<  100<H>  4.9   |  25  |  1.42<H>    Ca    9.3      21 Jan 2018 00:33    TPro  7.7  /  Alb  3.7  /  TBili  0.4  /  DBili  x   /  AST  35  /  ALT  29  /  AlkPhos  54  01-21    proBNP:   Lipid Profile:   HgA1c:   TSH:
Patient is a 85y old  Male who presents with a chief complaint of altered mental status (21 Jan 2018 05:47)      HPI:  86 y/o m presents with ams. Patient came from [facility] at end of december, had unwitnessed fall in airport while there, had ams and had hospital stay while in [facility]. [place]ame to [place], family noticed he was more altered, confused, weak, went to [place] and was admitted there for few days-they are unsure of his w/u but was started on meds for htn/agitation/hld/prostate. Over last few days, has become even weaker, to the point where he used to be able to do all of his adls and now requires assistance to even go to the bathroom, confusion is increasing. No fevers, ha, cp, sob, abd pain, vomiting, diarrhea, dysuria. Patient himself has no complaints (21 Jan 2018 02:32) Patient had SDH drainage on 1/22, had prolonged stay in SICU for drain management.  Transferred to floor.  Currently patient offers no new complaints.  Dysphagia improved.  No F/C, N/V, CP, SOB, Cough, lightheadedness, dizziness, abdominal pain, diarrhea, dysuria.    Pain Symptoms if applicable:             	                         none	   mild         moderate         severe  Pain:	            0                0	    1-3	     4-6	         7-10  Location:	Surgical site  Modifying factors:	Worse with movement  Associated symptoms:	    Allergies    No Known Allergies    Intolerances        HOME MEDICATIONS: Reviewed    MEDICATIONS  (STANDING):  atorvastatin 20 milliGRAM(s) Oral at bedtime  enoxaparin Injectable 40 milliGRAM(s) SubCutaneous at bedtime  levETIRAcetam 750 milliGRAM(s) Oral two times a day  pantoprazole    Tablet 40 milliGRAM(s) Oral before breakfast  QUEtiapine 25 milliGRAM(s) Oral daily  tamsulosin 0.4 milliGRAM(s) Oral at bedtime    MEDICATIONS  (PRN):      PAST MEDICAL & SURGICAL HISTORY:  Hyperlipidemia, unspecified hyperlipidemia type  Benign prostatic hyperplasia with urinary frequency  HTN (hypertension)  No significant past surgical history      SOCIAL HISTORY:  No tobacco/alcohol use/abuse.    FAMILY HISTORY:      REVIEW OF SYSTEMS:    CONSTITUTIONAL: No fever, weight loss, or fatigue  EYES: No eye pain, visual disturbances, or discharge  ENMT:  No difficulty hearing, tinnitus, vertigo; No sinus or throat pain  NECK: No pain or stiffness  BREASTS: No pain, masses, or nipple discharge  RESPIRATORY: No cough, wheezing, chills or hemoptysis; No shortness of breath  CARDIOVASCULAR: No chest pain, palpitations, dizziness, or leg swelling  GASTROINTESTINAL: No abdominal or epigastric pain. No nausea, vomiting, or hematemesis; No diarrhea or constipation. No melena or hematochezia.  GENITOURINARY: No dysuria, frequency, hematuria, or incontinence  NEUROLOGICAL: No headaches, memory loss, loss of strength, numbness, or tremors. Mild dysphagia.  SKIN: No itching, burning, rashes, or lesions   LYMPH NODES: No enlarged glands  ENDOCRINE: No heat or cold intolerance; No hair loss  MUSCULOSKELETAL: No muscle or back pain  PSYCHIATRIC: No depression, anxiety, mood swings, or difficulty sleeping  HEME/LYMPH: No easy bruising, or bleeding gums  ALLERGY AND IMMUNOLOGIC: No hives or eczema      Vital Signs Last 24 Hrs  T(C): 36.4 (26 Jan 2018 10:21), Max: 37.6 (26 Jan 2018 00:55)  T(F): 97.6 (26 Jan 2018 10:21), Max: 99.7 (26 Jan 2018 00:55)  HR: 89 (26 Jan 2018 10:21) (63 - 89)  BP: 114/58 (26 Jan 2018 10:21) (114/58 - 151/81)  BP(mean): --  RR: 16 (26 Jan 2018 10:21) (16 - 99)  SpO2: 100% (26 Jan 2018 10:21) (100% - 100%)  CAPILLARY BLOOD GLUCOSE          PHYSICAL EXAM:    GENERAL: NAD  HEAD:  s/p [place] hole site C/D/I, Normocephalic  EYES: EOMI, PERRLA, conjunctiva and sclera clear  ENMT: Moist mucous membranes  NECK: Supple, No JVD  RESPIRATORY: Clear to auscultation bilaterally; No rales, rhonchi, wheezing, or rubs  CARDIOVASCULAR: Regular rate and rhythm; No murmurs, rubs, or gallops  GASTROINTESTINAL: Soft, Nontender, Nondistended; Bowel sounds present  BACK: No tenderness  GENITOURINARY: Not examined  EXTREMITIES:  2+ Peripheral Pulses, No clubbing, cyanosis, or edema  NERVOUS SYSTEM:  Alert & Oriented X3; Moving all 4 extremities; No gross sensory deficits  PSYCH: Calm  HEME/LYMPH: No lymphadenopathy noted  SKIN: No rashes or lesions; Incisions C/D/I    LABS:                        10.7   7.66  )-----------( 138      ( 25 Jan 2018 03:45 )             32.0     01-25    139  |  104  |  11  ----------------------------<  116<H>  3.5   |  22  |  1.09    Ca    8.0<L>      25 Jan 2018 03:45  Phos  2.6     01-25  Mg     2.0     01-25    TPro  6.8  /  Alb  3.2<L>  /  TBili  0.7  /  DBili  x   /  AST  20  /  ALT  18  /  AlkPhos  51  01-25    PT/INR - ( 25 Jan 2018 03:45 )   PT: 12.5 SEC;   INR: 1.09          PTT - ( 25 Jan 2018 03:45 )  PTT:27.7 SEC    CAPILLARY BLOOD GLUCOSE          RADIOLOGY & ADDITIONAL STUDIES:    Imaging:   < from: CT Head No Cont (01.25.18 @ 09:05) >  EXAM:  CT BRAIN        PROCEDURE DATE:  Jan 25 2018         INTERPRETATION:  CLINICAL INDICATION: Maxi holes for bilateral subdural   hematomas, follow-up.    TECHNIQUE: Axial brain CT was acquired without IV contrast. Sagittal   coronal reformats were obtained.    COMPARISON: Brain CT from 1/23/2018.    FINDINGS:     Limited by motion.    Redemonstration of bilateral subdural collections containing mixed age   blood products and air. There has been interval removal of drains   bilaterally. Thecollection on the right measures up to 1.1 cm, with   redistribution of air in the interim. The left-sided collection measures   up to 0.6 cm, with decreased air content. The degree of frontal sulcal   effacement is decreased compared to the prior exam.    There are bilateral frontal maxi holes and skin staples.    There is age-appropriate cerebral atrophy. No obvious acute intracranial   hemorrhage is appreciated. No evidence of acute hydrocephalus. The basal   cisterns are patent. No significant midline shift is noted. The   gray-white differentiation is preserved.    IMPRESSION:    Bilateral subdural collections with decrease/redistributed air content.   No evidence for increasing collection.              CARLENE LAN M.D., RADIOLOGY RESIDENT  This document has been electronically signed.  MAURICE STYLES M.D., ATTENDING RADIOLOGIST  This document has been electronically signed. Jan 25 2018  9:31AM    < end of copied text >    Personally Reviewed:  [ ] YES               EKG:   Personally Reviewed:  [ ] YES     < from: Xray Cinesophagram (01.25.18 @ 11:45) >  EXAM:  RAD CINESOPHAGRAM        PROCEDURE DATE:  Jan 25 2018         INTERPRETATION:  CLINICAL INFORMATION: Dysphagia.    TECHNIQUE: A cinesophagram was performed under fluoroscopy in conjunction   with speech pathology.    FLUOROSCOPY TIME: 1.9 minutes.      IMPRESSION:       There is no evidence of aspiration or penetration for any of the tested   consistencies.    For further information and recommendations, please refer to the speech   pathologist's final report, which is available for review in the   electronic medical record.              TERESA PASCUAL M.D., RADIOLOGY RESIDENT  This document has been electronically signed.  WES TAVARES M.D.; ATTENDING RADIOLOGIST  This document has been electronically signed. Jan 25 2018  2:52PM    < end of copied text >    Care Discussed with Consultant(s)/Other Providers:  Care Discussed with Primary Team.      [] Increased delirium risk  [] Delirium and other risks can be reduced by:          -early ambulation          -minimizing "tethers" - IV, oxygen, catheters, etc          -avoiding hypnotics and sedatives          -maintaining hydration/nutrition          -avoid anticholinergics - diphenhydramine, etc          -pain control          -supportive environment
SICU Consultation Note  =====================================================  HPI: 85y Chinese male presenting with altered mental status.  No family at bedside.  As per ED, Apparently pt had a fall in [facility] 12/21 with AMS and associated hospital stay.  Upon arrival in [place], was noted by family to be more confused, weak, unable to perform ADLs.  At baseline pt lives alone in [facility], with Daughter when visiting [place], drives a car.  At this time pt is unable to ambulate to the bathroom without assistance, further worsening mental status for 1-2 weeks.  Speaking less.  Confused.   Recent admission to [place] for altered mental status, started on medication for HLD, HTN, agitation, prostate.  Unclear if head CT was performed during that admission.  Unable to obtain further history from patient 2/2 AMS.  Pt has no complaints at the bedside.    Surgery Information: None  Case Duration: 	EBL: 	IV Fluids: 	Blood Products: 	Urine Output:   Complications:     HISTORY  85y Male  HPI:86 y/o m presents with ams. Patient came from [facility] at end of december, had unwitnessed fall in airport while there, had ams and had hospital stay while in [facility]. Came to [place], family noticed he was more altered, confused, weak, went to [place] and was admitted there for few days-they are unsure of his w/u but was started on meds for htn/agitation/hld/prostate. Over last few days, has become even weaker, to the point where he used to be able to do all of his adls and now requires assistance to even go to the bathroom, confusion is increasing. No fevers, ha, cp, sob, abd pain, vomiting, diarrhea, dysuria. Patient himself has no complaints    Allergies:   PAST MEDICAL & SURGICAL HISTORY:  Hyperlipidemia, unspecified hyperlipidemia type  Benign prostatic hyperplasia with urinary frequency  HTN (hypertension)  No significant past surgical history    FAMILY HISTORY:      SOCIAL HISTORY:  ***    ADVANCE DIRECTIVES: Presumed Full Code  ***    REVIEW OF SYSTEMS:  Unable to participate 2/2 altered mental status    HOME MEDICATIONS:      CURRENT MEDICATIONS:   --------------------------------------------------------------------------------------  Neurologic Medications    Respiratory Medications    Cardiovascular Medications    Gastrointestinal Medications    Genitourinary Medications    Hematologic/Oncologic Medications    Antimicrobial/Immunologic Medications    Endocrine/Metabolic Medications    Topical/Other Medications    --------------------------------------------------------------------------------------    VITAL SIGNS, INS/OUTS (last 24 hours):  --------------------------------------------------------------------------------------  T(C): 36.5 (01-21-18 @ 00:08), Max: 36.8 (01-20-18 @ 22:48)  HR: 78 (01-21-18 @ 00:44) (78 - 88)  BP: 162/80 (01-21-18 @ 00:44) (129/65 - 162/80)  BP(mean): --  ABP: --  ABP(mean): --  RR: 17 (01-21-18 @ 00:44) (16 - 19)  SpO2: 95% (01-21-18 @ 00:44) (95% - 100%)  Wt(kg): --  CVP(mm Hg): --  CI: --  CAPILLARY BLOOD GLUCOSE      POCT Blood Glucose.: 135 mg/dL (20 Jan 2018 22:59)   N/A      --------------------------------------------------------------------------------------    EXAM  NEUROLOGY  RASS:   	GCS:  14  Exam: Normal, NAD, alert, oriented x 3, no focal deficits. Follows some basic commands.  No pronator drift, strong hand , Moving all extremities spontaneously.    HEENT  Exam: Normocephalic, atraumatic.  EOMI     RESPIRATORY  Exam: Lungs clear to auscultation, Normal expansion/effort.    Mechanical Ventilation:     CARDIOVASCULAR  Exam: S1, S2.  Regular rate and rhythm.  No Peripheral edema    GI/NUTRITION  Exam: Abdomen soft, Non-tender, Non-distended.    Current Diet:  NPO ***    VASCULAR  Exam: Extremities warm, pink, well-perfused.      MUSCULOSKELETAL  Exam: All extremities moving spontaneously without limitations.      SKIN:  Exam: Good skin turgor, no skin breakdown.      METABOLIC/FLUIDS/ELECTROLYTES      HEMATOLOGIC  [x] DVT Prophylaxis:   Transfusions:	[] PRBC	[] Platelets		[] FFP	[] Cryoprecipitate    INFECTIOUS DISEASE  Antimicrobials/Immunologic Medications:    Day #  	of    ***    Tubes/Lines/Drains  ***  [x] Peripheral IV  [] Central Venous Line     	[] R	[] L	[] IJ	[] Fem	[] SC	Date Placed:   [] Arterial Line		[] R	[] L	[] Fem	[] Rad	[] Ax	Date Placed:   [] PICC:         	[] Midline		[] Mediport  [] Urinary Catheter		Date Placed:     LABS  --------------------------------------------------------------------------------------                                            11.5                  Neurophils% (auto):   41.7   (01-21 @ 00:33):    5.57 )-----------(179          Lymphocytes% (auto):  39.5                                          35.2                   Eosinphils% (auto):   1.4      Manual%: Neutrophils x    ; Lymphocytes x    ; Eosinophils x    ; Bands%: x    ; Blasts x          01-21    139  |  101  |  19  ----------------------------<  100<H>  4.9   |  25  |  1.42<H>    Ca    9.3      21 Jan 2018 00:33    TPro  7.7  /  Alb  3.7  /  TBili  0.4  /  DBili  x   /  AST  35  /  ALT  29  /  AlkPhos  54  01-21        VBG - ( 21 Jan 2018 00:33 )  pH: 7.39  /  pCO2: 48    /  pO2: 30    / HCO3: 26    / Base Excess: 3.2   /  SvO2: 48.2  / Lactate: 2.0      RECENT CULTURES:      --------------------------------------------------------------------------------------    OTHER LABS    IMAGING RESULTS  Echo:   CT: < from: CT Head No Cont (01.21.18 @ 01:27) >    EXAM:  CT BRAIN        PROCEDURE DATE:  Jan 21 2018         INTERPRETATION:  CLINICAL INDICATION:  Progressive altered mental status.   History of fall.    TECHNIQUE : Axial CT scanning of the brain was obtained from the skull   base to the vertex without the administration of intravenous contrast.   Coronal and sagittal reformatted images were subsequently obtained.     COMPARISON: No available comparisons.    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 Mass effect results is effacement of the   cerebral convexity sulci. Slitlike third ventricle. Minimal prominence of   the bilateral temporal horns. No significant midline shift.    Paranasal sinuses and mastoid air cells are clear. Visualized osseous   structures are intact.    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 Mild prominence of the   temporal horns, may be related to generalized volume loss, however early   mild hydrocephalus cannot be entirely excluded. No significant midline   shift.    Findings were discussed with Dr. Broderick by Dr. Mccann at approximately   1:46 AM dated 1/21/2018 with read back.                LIVAN MCCANN M.D., RADIOLOGY RESIDENT  This document has been electronically signed.  DEENA GALLEGOS M.D., ATTENDING RADIOLOGIST  This document has been electronically signed. Jan 21 2018  2:03AM                  < end of copied text >    Xray:   < from: Xray Chest 1 View AP- PORTABLE-Urgent (01.21.18 @ 02:37) >    ******PRELIMINARY REPORT******    ******PRELIMINARY REPORT******            EXAM:  XR CHEST PORTABLE URGENT 1V        PROCEDURE DATE:  Jan 21 2018     ******PRELIMINARY REPORT******    ******PRELIMINARY REPORT******            INTERPRETATION:  Clear Lungs            ******PRELIMINARY REPORT******    ******PRELIMINARY REPORT******          LIVAN MCCANN M.D., RADIOLOGY RESIDENT                        < end of copied text >      ASSESSMENT:  85y Male with a hx of HTN, HLD, BPH, recent fall, altered mental status x 1 month, presenting with worsening altered mental status for 1-2 weeks found with acute on chronic bilateral subdural hemorrhage.    PLAN:    Neurologic: AAO x 1.  q2 hour neurochecks.  OR tentatively for monday for burrhole as per neurosurgery.  Respiratory: no active issues.  continue to monitor.  Cardiovascular: Cardiology consult Sunday Dr. Hale.  Check TTE.  Hold ASA.  Gastrointestinal/Nutrition: Bedside swallow eval.  Diet if tolerated.  NPO sunday night for OR monday.  Renal/Genitourinary: Monitor I/Os. NICK vs CKD. Trend Creatinine. Replete lytes PRN.  Hematologic:  SCDs.  Holding chemical DVT ppx in setting of acute on chronic SDH.  Infectious Disease: No active issues.  Tubes/Lines/Drains: PIV  Endocrine:  No active issues.  Monitor glucose on BMP.  Disposition: SICU    --------------------------------------------------------------------------------------    Critical Care Diagnoses: Acute on chronic subdural hemorrhage, altered mental status, severe protein calorie malnutrition.

## 2018-01-26 NOTE — PHYSICAL THERAPY INITIAL EVALUATION ADULT - RANGE OF MOTION EXAMINATION, REHAB EVAL
except right shoulder flexion to ~70 degrees/bilateral upper extremity ROM was WFL (within functional limits)/bilateral lower extremity ROM was WFL (within functional limits)

## 2018-01-26 NOTE — PHYSICAL THERAPY INITIAL EVALUATION ADULT - ADDITIONAL COMMENTS
Pt. A+O x 2, social hx unclear, reports was previously independent without use of assistive devices.     Pt. was left supine in bed, NAD, call bell within reach, +bed alarm. BETH Laura made aware of pt. status and participation in PT.

## 2018-01-26 NOTE — PHYSICAL THERAPY INITIAL EVALUATION ADULT - PERTINENT HX OF CURRENT PROBLEM, REHAB EVAL
Pt. admitted for AMS and unsteady gait. Pt. had unwitnessed fall Pt. admitted for AMS and unsteady gait. [place] PMH of BPH, HTN, HLD. CT head revealed Bilateral Acute/chronic SDH. s/p bilateral maxi holes, evacuation of SDH on 1/22/18.

## 2018-01-26 NOTE — PHYSICAL THERAPY INITIAL EVALUATION ADULT - CRITERIA FOR SKILLED THERAPEUTIC INTERVENTIONS
rehab potential/functional limitations in following categories/predicted duration of therapy intervention/anticipated equipment needs at discharge/risk reduction/prevention/therapy frequency/anticipated discharge recommendation/impairments found

## 2018-01-26 NOTE — CONSULT NOTE ADULT - ASSESSMENT
85 year old male with bilateral subacute SDH s/p fall with recent decline in ambulatory ability admitted on Saturday with seizure last night with non-focal, although limited, neurologic examination.   Per primary team, patient is to go to OR later this week to address SDH.
85M with SDH s/p evacuation on 1/22 complicated by anemia, HTN, BPH.
SDH / Pre Op   plan for drain  Based on current ACC/AHA guidelines, patient history and physical exam, the patient is considered to have elevated risk but stable for this time sensitive surgery     s/p fall , rule out syncope  monitor on tele  ekg  echo  will consider ILR vs 30 day event     HTN  cont to monitor  check orthostatic post op   will consider treatment if otho vitals neg

## 2018-01-26 NOTE — PHYSICAL THERAPY INITIAL EVALUATION ADULT - GENERAL OBSERVATIONS, REHAB EVAL
Patient received supine in bed, NAD, staples bilateral skull c/d/i. Patient agreed to EVALUATION from Physical Therapist. Patient received supine in bed, NAD, staples bilateral head c/d/i. Patient agreed to EVALUATION from Physical Therapist.

## 2018-01-26 NOTE — PHYSICAL THERAPY INITIAL EVALUATION ADULT - MANUAL MUSCLE TESTING RESULTS, REHAB EVAL
grossly assessed due to/bilateral UE and LE at least 3+/5 as observed through performance of functional activities.

## 2018-01-26 NOTE — CONSULT NOTE ADULT - PROBLEM SELECTOR RECOMMENDATION 9
Per primary team and neurosurgery.   Minimize sedating medications unless necessary.
s/p evacuation on 1/22.  Pain control.  Dressing care as per Neuro Sx.  Lovenox for DVT prophylaxis.  Keppra for seizure prophylaxis.

## 2018-01-27 ENCOUNTER — TRANSCRIPTION ENCOUNTER (OUTPATIENT)
Age: 83
End: 2018-01-27

## 2018-01-27 DIAGNOSIS — R55 SYNCOPE AND COLLAPSE: ICD-10-CM

## 2018-01-27 LAB
APTT BLD: 28 SEC — SIGNIFICANT CHANGE UP (ref 27.5–37.4)
BASE EXCESS BLDV CALC-SCNC: 4.5 MMOL/L — SIGNIFICANT CHANGE UP
BLD GP AB SCN SERPL QL: NEGATIVE — SIGNIFICANT CHANGE UP
BUN SERPL-MCNC: 11 MG/DL — SIGNIFICANT CHANGE UP (ref 7–23)
CA-I SERPL-SCNC: 1.23 MMOL/L — SIGNIFICANT CHANGE UP (ref 1.15–1.29)
CALCIUM SERPL-MCNC: 8.8 MG/DL — SIGNIFICANT CHANGE UP (ref 8.4–10.5)
CHLORIDE SERPL-SCNC: 105 MMOL/L — SIGNIFICANT CHANGE UP (ref 98–107)
CK MB BLD-MCNC: 1 NG/ML — SIGNIFICANT CHANGE UP (ref 1–6.6)
CK SERPL-CCNC: 61 U/L — SIGNIFICANT CHANGE UP (ref 30–200)
CO2 SERPL-SCNC: 26 MMOL/L — SIGNIFICANT CHANGE UP (ref 22–31)
CREAT SERPL-MCNC: 1.28 MG/DL — SIGNIFICANT CHANGE UP (ref 0.5–1.3)
GAS PNL BLDV: 141 MMOL/L — SIGNIFICANT CHANGE UP (ref 136–146)
GLUCOSE BLDV-MCNC: 140 — HIGH (ref 70–99)
GLUCOSE SERPL-MCNC: 134 MG/DL — HIGH (ref 70–99)
HCO3 BLDV-SCNC: 27 MMOL/L — SIGNIFICANT CHANGE UP (ref 20–27)
HCT VFR BLD CALC: 31.7 % — LOW (ref 39–50)
HCT VFR BLDV CALC: 33.1 % — LOW (ref 39–51)
HGB BLD-MCNC: 10.5 G/DL — LOW (ref 13–17)
HGB BLDV-MCNC: 10.7 G/DL — LOW (ref 13–17)
INR BLD: 1.05 — SIGNIFICANT CHANGE UP (ref 0.88–1.17)
LACTATE BLDV-MCNC: 1.7 MMOL/L — SIGNIFICANT CHANGE UP (ref 0.5–2)
MAGNESIUM SERPL-MCNC: 1.9 MG/DL — SIGNIFICANT CHANGE UP (ref 1.6–2.6)
MCHC RBC-ENTMCNC: 28.7 PG — SIGNIFICANT CHANGE UP (ref 27–34)
MCHC RBC-ENTMCNC: 33.1 % — SIGNIFICANT CHANGE UP (ref 32–36)
MCV RBC AUTO: 86.6 FL — SIGNIFICANT CHANGE UP (ref 80–100)
NRBC # FLD: 0 — SIGNIFICANT CHANGE UP
PCO2 BLDV: 48 MMHG — SIGNIFICANT CHANGE UP (ref 41–51)
PH BLDV: 7.4 PH — SIGNIFICANT CHANGE UP (ref 7.32–7.43)
PHOSPHATE SERPL-MCNC: 2.5 MG/DL — SIGNIFICANT CHANGE UP (ref 2.5–4.5)
PLATELET # BLD AUTO: 160 K/UL — SIGNIFICANT CHANGE UP (ref 150–400)
PMV BLD: 10.3 FL — SIGNIFICANT CHANGE UP (ref 7–13)
PO2 BLDV: < 24 MMHG — LOW (ref 35–40)
POTASSIUM BLDV-SCNC: 3.4 MMOL/L — SIGNIFICANT CHANGE UP (ref 3.4–4.5)
POTASSIUM SERPL-MCNC: 3.7 MMOL/L — SIGNIFICANT CHANGE UP (ref 3.5–5.3)
POTASSIUM SERPL-SCNC: 3.7 MMOL/L — SIGNIFICANT CHANGE UP (ref 3.5–5.3)
PROTHROM AB SERPL-ACNC: 11.7 SEC — SIGNIFICANT CHANGE UP (ref 9.8–13.1)
RBC # BLD: 3.66 M/UL — LOW (ref 4.2–5.8)
RBC # FLD: 12.3 % — SIGNIFICANT CHANGE UP (ref 10.3–14.5)
RH IG SCN BLD-IMP: POSITIVE — SIGNIFICANT CHANGE UP
SAO2 % BLDV: 30.6 % — LOW (ref 60–85)
SODIUM SERPL-SCNC: 143 MMOL/L — SIGNIFICANT CHANGE UP (ref 135–145)
TROPONIN T SERPL-MCNC: < 0.06 NG/ML — SIGNIFICANT CHANGE UP (ref 0–0.06)
WBC # BLD: 7.24 K/UL — SIGNIFICANT CHANGE UP (ref 3.8–10.5)
WBC # FLD AUTO: 7.24 K/UL — SIGNIFICANT CHANGE UP (ref 3.8–10.5)

## 2018-01-27 PROCEDURE — 93010 ELECTROCARDIOGRAM REPORT: CPT

## 2018-01-27 PROCEDURE — 99233 SBSQ HOSP IP/OBS HIGH 50: CPT

## 2018-01-27 PROCEDURE — 70450 CT HEAD/BRAIN W/O DYE: CPT | Mod: 26

## 2018-01-27 RX ORDER — ATORVASTATIN CALCIUM 80 MG/1
1 TABLET, FILM COATED ORAL
Qty: 0 | Refills: 0 | COMMUNITY
Start: 2018-01-27

## 2018-01-27 RX ORDER — TAMSULOSIN HYDROCHLORIDE 0.4 MG/1
0.4 CAPSULE ORAL AT BEDTIME
Qty: 0 | Refills: 0 | Status: DISCONTINUED | OUTPATIENT
Start: 2018-01-27 | End: 2018-01-28

## 2018-01-27 RX ORDER — QUETIAPINE FUMARATE 200 MG/1
1 TABLET, FILM COATED ORAL
Qty: 0 | Refills: 0 | COMMUNITY
Start: 2018-01-27

## 2018-01-27 RX ORDER — LEVETIRACETAM 250 MG/1
1 TABLET, FILM COATED ORAL
Qty: 0 | Refills: 0 | COMMUNITY
Start: 2018-01-27

## 2018-01-27 RX ORDER — ATORVASTATIN CALCIUM 80 MG/1
20 TABLET, FILM COATED ORAL AT BEDTIME
Qty: 0 | Refills: 0 | Status: DISCONTINUED | OUTPATIENT
Start: 2018-01-27 | End: 2018-01-28

## 2018-01-27 RX ORDER — TAMSULOSIN HYDROCHLORIDE 0.4 MG/1
1 CAPSULE ORAL
Qty: 0 | Refills: 0 | COMMUNITY
Start: 2018-01-27

## 2018-01-27 RX ORDER — QUETIAPINE FUMARATE 200 MG/1
25 TABLET, FILM COATED ORAL
Qty: 0 | Refills: 0 | Status: DISCONTINUED | OUTPATIENT
Start: 2018-01-27 | End: 2018-01-28

## 2018-01-27 RX ADMIN — QUETIAPINE FUMARATE 50 MILLIGRAM(S): 200 TABLET, FILM COATED ORAL at 06:42

## 2018-01-27 RX ADMIN — ATORVASTATIN CALCIUM 20 MILLIGRAM(S): 80 TABLET, FILM COATED ORAL at 21:36

## 2018-01-27 RX ADMIN — LEVETIRACETAM 750 MILLIGRAM(S): 250 TABLET, FILM COATED ORAL at 18:43

## 2018-01-27 RX ADMIN — ENOXAPARIN SODIUM 40 MILLIGRAM(S): 100 INJECTION SUBCUTANEOUS at 21:36

## 2018-01-27 RX ADMIN — TAMSULOSIN HYDROCHLORIDE 0.4 MILLIGRAM(S): 0.4 CAPSULE ORAL at 21:36

## 2018-01-27 RX ADMIN — LEVETIRACETAM 750 MILLIGRAM(S): 250 TABLET, FILM COATED ORAL at 06:42

## 2018-01-27 RX ADMIN — QUETIAPINE FUMARATE 25 MILLIGRAM(S): 200 TABLET, FILM COATED ORAL at 21:36

## 2018-01-27 RX ADMIN — PANTOPRAZOLE SODIUM 40 MILLIGRAM(S): 20 TABLET, DELAYED RELEASE ORAL at 06:42

## 2018-01-27 NOTE — PROGRESS NOTE ADULT - SUBJECTIVE AND OBJECTIVE BOX
Patient had an acute change in mental status ( was lethargic requiring sternal rub) on 1/27/17, lasting approx. 1 minute,  no seizure activity, likely related to increase in seroquel dose.  Patient was evaluated by SICU team who agreed.  Labs were drawn, and  upon my arrival patient had returned to his baseline mental status CT head showed:  stable SD collecitions, no acute hemorrhage or stroke,  will f/u with offical read from neurorads and labs.

## 2018-01-27 NOTE — DISCHARGE NOTE ADULT - PLAN OF CARE
evacuation of SDH s/p b/l maxi holes, for evacuation of SDH control BP continue meds control urinary frequency arlin ppx continue keppra

## 2018-01-27 NOTE — PROGRESS NOTE ADULT - SUBJECTIVE AND OBJECTIVE BOX
Subjective: Patient seen and examined. No new events except as noted.     SUBJECTIVE/ROS:  Due to altered mental status, subjective information were not able to be obtained from the patient. History was obtained, to the extent possible, from review of the chart and collateral sources of information.        MEDICATIONS:  MEDICATIONS  (STANDING):  enoxaparin Injectable 40 milliGRAM(s) SubCutaneous at bedtime  levETIRAcetam 750 milliGRAM(s) Oral two times a day  pantoprazole    Tablet 40 milliGRAM(s) Oral before breakfast  QUEtiapine 50 milliGRAM(s) Oral two times a day      PHYSICAL EXAM:  T(C): 37.5 (01-27-18 @ 06:38), Max: 37.8 (01-26-18 @ 21:11)  HR: 76 (01-27-18 @ 06:38) (64 - 89)  BP: 131/71 (01-27-18 @ 06:38) (114/58 - 150/72)  RR: 16 (01-27-18 @ 06:38) (16 - 18)  SpO2: 100% (01-27-18 @ 06:38) (99% - 100%)  Wt(kg): --  I&O's Summary    26 Jan 2018 07:01  -  27 Jan 2018 07:00  --------------------------------------------------------  IN: 0 mL / OUT: 2020 mL / NET: -2020 mL        JVP: Normal  Neck: supple  Lung: clear   CV: S1 S2 , Murmur:  Abd: soft  Ext: No edema  neuro: Awake / alert  Psych: flat affect  Skin: normal       LABS/DATA:    CARDIAC MARKERS:                  proBNP:   Lipid Profile:   HgA1c:   TSH:     TELE:  EKG:

## 2018-01-27 NOTE — CHART NOTE - NSCHARTNOTEFT_GEN_A_CORE
SRR Note:    84 y/o M who presented with AMS. Pt had unwitnessed fall in airport in [facility] at end December for which he was hospitalized.  [place] came to [place] his family noticed he was increasingly altered, confused, weak. Pt went to [facility] and was admitted for few days, family unsure of pt's w/u but was started on meds for HTN, agitation, HLD, and prostate. Over last few days, pt became weaker, pt used to be able to do all of his ADLS but now requires assistance, and his confusion is increasing.  Patient diagnosed with SDH on CT and underwent maxi hole on 1/22. Pt had prolonged stay in SICU for drain management and was transferred to floor when drain removed and hemodynamically stable.     SRR called because patient was found unresponsive in chair, tried to wake up w/verbal and painful stimuli but took pt approx 1 minute to respond. Pt hypotensive on initial BP read but when immediately repeated [id number]. EKG performed and showed no significant changes, labs collected, and CT scan performed and showed stable bilateral subdural collections. Improved pneumocephalus. No new intracranial hemorrhage. Per primary team pt back to his baseline mental status and pt's VSS. Of note, team increased pt's dose Seroquel from 25 to 50 bid. Recommend decreasing dose back to 25mg bid and follow up Medicine/Cards re: syncope work up. Consider repeat EEG if concern seizure. Discussed with SICU Attending, pt will be followed by [facility], reconsult SICU as needed.

## 2018-01-27 NOTE — DISCHARGE NOTE ADULT - CARE PLAN
Principal Discharge DX:	Subdural hematoma  Goal:	evacuation of SDH  Assessment and plan of treatment:	s/p b/l maxi holes, for evacuation of SDH  Secondary Diagnosis:	HTN (hypertension)  Goal:	control BP  Assessment and plan of treatment:	continue meds  Secondary Diagnosis:	Hyperlipidemia, unspecified hyperlipidemia type  Assessment and plan of treatment:	continue meds  Secondary Diagnosis:	Benign prostatic hyperplasia with urinary frequency  Goal:	control urinary frequency  Assessment and plan of treatment:	continue meds  Secondary Diagnosis:	Seizure  Goal:	siezure ppx  Assessment and plan of treatment:	continue keppra

## 2018-01-27 NOTE — DISCHARGE NOTE ADULT - PROVIDER TOKENS
TOKEN:'[id number]:'[id number]:'[id number]' TOKEN:'[id number]:'[id number]:'[id number]'

## 2018-01-27 NOTE — PROGRESS NOTE ADULT - SUBJECTIVE AND OBJECTIVE BOX
Patient is a 85y old  Male who presents with a chief complaint of altered mental status (27 Jan 2018 10:19)      SUBJECTIVE / OVERNIGHT EVENTS:pt seenand examined by me,Daughter at bedside   reviewed events from this AM   SRR called because patient was found unresponsive in chair, tried to wake up w/verbal and painful stimuli but took pt approx 1 minute to respond. Pt hypotensive on initial BP read but when immediately repeated [id number]. EKG performed and showed no significant changes, labs collected, and CT scan performed and showed stable bilateral subdural collections. Improved pneumocephalus. No new intracranial hemorrhage. Per primary team pt back to his baseline mental status and pt's VSS. Of note, team increased pt's dose Seroquel from 25 to 50 bid.   At present, pt at [facility] as per daughter he had his lunch       MEDICATIONS  (STANDING):  atorvastatin 20 milliGRAM(s) Oral at bedtime  enoxaparin Injectable 40 milliGRAM(s) SubCutaneous at bedtime  levETIRAcetam 750 milliGRAM(s) Oral two times a day  pantoprazole    Tablet 40 milliGRAM(s) Oral before breakfast  QUEtiapine 25 milliGRAM(s) Oral two times a day  tamsulosin 0.4 milliGRAM(s) Oral at bedtime    MEDICATIONS  (PRN):      CAPILLARY BLOOD GLUCOSE      POCT Blood Glucose.: 112 mg/dL (27 Jan 2018 11:13)    I&O's Summary    26 Jan 2018 07:01  -  27 Jan 2018 07:00  --------------------------------------------------------  IN: 0 mL / OUT: 2020 mL / NET: -2020 mL    27 Jan 2018 07:01  -  27 Jan 2018 15:20  --------------------------------------------------------  IN: 0 mL / OUT: 600 mL / NET: -600 mL        PHYSICAL EXAM:  GENERAL: NAD, well-developed  HEAD:  Atraumatic, Normocephalic  EYES: EOMI, PERRLA, conjunctiva and sclera clear  NECK: Supple, No JVD  CHEST/LUNG: Clear to auscultation bilaterally; No wheeze  HEART: Regular rate and rhythm; No murmurs, rubs, or gallops  ABDOMEN: Soft, Nontender, Nondistended; Bowel sounds present  EXTREMITIES:  2+ Peripheral Pulses, No clubbing, cyanosis, or edema  PSYCH: AAOx1   NEUROLOGY: non-focal, moving all extremities , no focal deficits noted   SKIN: No rashes or lesions,staples present on scalp     LABS:                        10.5   7.24  )-----------( 160      ( 27 Jan 2018 11:35 )             31.7     01-27    143  |  105  |  11  ----------------------------<  134<H>  3.7   |  26  |  1.28    Ca    8.8      27 Jan 2018 11:35  Phos  2.5     01-27  Mg     1.9     01-27      PT/INR - ( 27 Jan 2018 11:35 )   PT: 11.7 SEC;   INR: 1.05          PTT - ( 27 Jan 2018 11:35 )  PTT:28.0 SEC  CARDIAC MARKERS ( 27 Jan 2018 11:35 )  x     / < 0.06 ng/mL / 61 u/L / 1.00 ng/mL / x              RADIOLOGY & ADDITIONAL TESTS:    Imaging Personally Reviewed:    Consultant(s) Notes Reviewed:      Care Discussed with Consultants/Other Providers:

## 2018-01-27 NOTE — PROGRESS NOTE ADULT - SUBJECTIVE AND OBJECTIVE BOX
Intermittently agitated  Patient's family has expressed concerns regarding the patient's safety upon discharge due to lack of available supervision at home  Vital Signs Last 24 Hrs  T(C): 37.8 (26 Jan 2018 21:11), Max: 37.8 (26 Jan 2018 21:11)  T(F): 100 (26 Jan 2018 21:11), Max: 100 (26 Jan 2018 21:11)  HR: 64 (26 Jan 2018 21:11) (64 - 89)  BP: 150/72 (26 Jan 2018 21:11) (114/58 - 151/81)  BP(mean): --  RR: 17 (26 Jan 2018 21:11) (16 - 18)  SpO2: 100% (26 Jan 2018 21:11) (99% - 100%)    AAO to self  PERRLA, EOMI  JOHNSTON strength 5/5  Gait unsteady    MEDICATIONS  (STANDING):  atorvastatin 20 milliGRAM(s) Oral at bedtime  enoxaparin Injectable 40 milliGRAM(s) SubCutaneous at bedtime  levETIRAcetam 750 milliGRAM(s) Oral two times a day  pantoprazole    Tablet 40 milliGRAM(s) Oral before breakfast  QUEtiapine 50 milliGRAM(s) Oral two times a day  tamsulosin 0.4 milliGRAM(s) Oral at bedtime

## 2018-01-27 NOTE — DISCHARGE NOTE ADULT - CARE PROVIDER_API CALL
Juancarlos Quarles), Neurology  [address] [place] [address]  Phone: [phone]  Fax: [phone]    Harshad Hale), Cardiovascular Disease; Internal Medicine  [address]  [place] [address]  Phone: [phone]  Fax: [phone]    Andrea Ewing (DO), Neurology  [address]  [place] [address]  Phone: [phone]  Fax: [phone] Harshad Hale), Cardiovascular Disease; Internal Medicine  [address]  [place] [address]  Phone: [phone]  Fax: [phone]    Andrea Ewing (), Neurology  [address]  [place] [address]  Phone: [phone]  Fax: [phone]    Alfredo Quarles), Neurosurgery  General  [address]  [place] [address]  Phone: [phone]  Fax: [phone]

## 2018-01-27 NOTE — DISCHARGE NOTE ADULT - PATIENT PORTAL LINK FT
“You can access the Follow[employer] Patient Portal, offered by [facility], by registering with the following website: [email or web address]”

## 2018-01-27 NOTE — DISCHARGE NOTE ADULT - HOSPITAL COURSE
86 y/o m presents with ams. Patient came from [facility] at end of december, had unwitnessed fall in airport while there, had ams and had hospital stay while in [facility]. [place]ame to [place], family noticed he was more altered, confused, weak, went to [place] and was admitted there for few days-they are unsure of his w/u but was started on meds for htn/agitation/hld/prostate. Over last few days, has become even weaker, to the point where he used to be able to do all of his adls and now requires assistance to even go to the bathroom, confusion is increasing. No fevers, ha, cp, sob, abd pain, vomiting, diarrhea, dysuria.  CT head on 1/21/18 shows a Bilateral frontoparietotemporal acute on subacute/chronic subdural hematomas, measuring up to 17 mm, resulting in effacement of the cerebral convexity sulci and a slitlike third ventricle. Mild prominence of the temporal horns, may be related to generalized volume loss, however early mild hydrocephalus cannot be entirely excluded. No significant midline shift.  Patient was brought to the OR on 1/23/18 for bilateral maxi holes and evacuation of SA/chronic SDH.  He tolerated procedure well, post op CT head showed Interval bilateral subdural hematoma evacuation with subdural drains in place and subdural air. Reexpansion of hemispheric sulci, the ventricular system as described with visualization of basilar cisterns and decreased central herniation.  Patient was placed on seroquel for post op agitation and was seen by neurology for possible seizure activity,  EEG was performed and showed no seizure activity,  He was kept on keppra for prophylaxis.  YASMINE drain was dc'd on 1/25/18 and post pull CT head showed Bilateral subdural collections with decrease/redistributed air content. No evidence for increasing collection.  Patient was transferred to floor for further observation and physical therapy evaluation in which PT recommended home PT, SW to arrange VNS services for PT.  Patient had an acute change in mental status on 1/27/17, no seizure activity, likely related to increase in seroquel dose.  Patient was evaluated by SICU team who agreed.  CT head showed: 86 y/o m presents with ams. Patient came from [facility] at end of december, had unwitnessed fall in airport while there, had ams and had hospital stay while in [facility]. [place]ame to [place], family noticed he was more altered, confused, weak, went to [place] and was admitted there for few days-they are unsure of his w/u but was started on meds for htn/agitation/hld/prostate. Over last few days, has become even weaker, to the point where he used to be able to do all of his adls and now requires assistance to even go to the bathroom, confusion is increasing. No fevers, ha, cp, sob, abd pain, vomiting, diarrhea, dysuria.  CT head on 1/21/18 shows a Bilateral frontoparietotemporal acute on subacute/chronic subdural hematomas, measuring up to 17 mm, resulting in effacement of the cerebral convexity sulci and a slitlike third ventricle. Mild prominence of the temporal horns, may be related to generalized volume loss, however early mild hydrocephalus cannot be entirely excluded. No significant midline shift.  Patient was brought to the OR on 1/23/18 for bilateral maxi holes and evacuation of SA/chronic SDH.  He tolerated procedure well, post op CT head showed Interval bilateral subdural hematoma evacuation with subdural drains in place and subdural air. Reexpansion of hemispheric sulci, the ventricular system as described with visualization of basilar cisterns and decreased central herniation.  Patient was placed on seroquel for post op agitation and was seen by neurology for possible seizure activity,  EEG was performed and showed no seizure activity,  He was kept on keppra for prophylaxis.  YASMINE drain was dc'd on 1/25/18 and post pull CT head showed Bilateral subdural collections with decrease/redistributed air content. No evidence for increasing collection.  Patient was transferred to floor for further observation and physical therapy evaluation in which PT recommended home PT, SW to arrange VNS services for PT.  Patient had an acute change in mental status on 1/27/17, no seizure activity, likely related to increase in seroquel dose.  Patient was evaluated by SICU team who agreed.  CT head showed stable SDH, no new hemorrhage or infarct.  Labs were stable, and patient was stable throughout the night.   Patient is stable for discharge home today.

## 2018-01-27 NOTE — DISCHARGE NOTE ADULT - CARE PROVIDERS DIRECT ADDRESSES
,DirectAddress_Unknown,DirectAddress_Unknown,[email or web address] ,DirectAddress_Unknown,[email or web address]

## 2018-01-27 NOTE — DISCHARGE NOTE ADULT - MEDICATION SUMMARY - MEDICATIONS TO TAKE
I will START or STAY ON the medications listed below when I get home from the hospital:    tamsulosin 0.4 mg oral capsule  -- 1 cap(s) by mouth once a day (at bedtime) MDD:1 tab  -- Indication: For BPH    levETIRAcetam 750 mg oral tablet  -- 1 tab(s) by mouth 2 times a day MDD:2 tabs  -- Indication: For Seizure ppx    atorvastatin 20 mg oral tablet  -- 1 tab(s) by mouth once a day (at bedtime) MDD:1 tab  -- Indication: For HLD    QUEtiapine 25 mg oral tablet  -- 1 tab(s) by mouth 2 times a day MDD:2 tabs  -- Indication: For Agitataion

## 2018-01-27 NOTE — DISCHARGE NOTE ADULT - SECONDARY DIAGNOSIS.
HTN (hypertension) Hyperlipidemia, unspecified hyperlipidemia type Benign prostatic hyperplasia with urinary frequency Seizure

## 2018-01-27 NOTE — DISCHARGE NOTE ADULT - ADDITIONAL INSTRUCTIONS
call Monday to schedule outpatient f/u with neurosurgery, urology, neurology and cardiology call Monday to schedule outpatient f/u with neurosurgery, urology clinic [phone], neurology and cardiology

## 2018-01-27 NOTE — PROGRESS NOTE ADULT - PROBLEM SELECTOR PLAN 2
:s/p evacuation on 1/22.  Pain control.  Dressing care as per Neuro Sx.  Lovenox for DVT prophylaxis.  Keppra for seizure prophylaxis.

## 2018-01-27 NOTE — DISCHARGE NOTE ADULT - NS AS ACTIVITY OBS
Bathing allowed/Walking-Indoors allowed/No Heavy lifting/straining/Do not make important decisions/Do not drive or operate machinery/Walking-Outdoors allowed/Showering allowed

## 2018-01-28 VITALS
TEMPERATURE: 99 F | OXYGEN SATURATION: 100 % | HEART RATE: 81 BPM | SYSTOLIC BLOOD PRESSURE: 141 MMHG | RESPIRATION RATE: 17 BRPM | DIASTOLIC BLOOD PRESSURE: 78 MMHG

## 2018-01-28 PROCEDURE — 99232 SBSQ HOSP IP/OBS MODERATE 35: CPT

## 2018-01-28 RX ORDER — QUETIAPINE FUMARATE 200 MG/1
1 TABLET, FILM COATED ORAL
Qty: 20 | Refills: 0 | OUTPATIENT
Start: 2018-01-28 | End: 2018-02-06

## 2018-01-28 RX ORDER — TAMSULOSIN HYDROCHLORIDE 0.4 MG/1
1 CAPSULE ORAL
Qty: 30 | Refills: 0 | OUTPATIENT
Start: 2018-01-28 | End: 2018-02-26

## 2018-01-28 RX ORDER — LEVETIRACETAM 250 MG/1
1 TABLET, FILM COATED ORAL
Qty: 60 | Refills: 0 | OUTPATIENT
Start: 2018-01-28 | End: 2018-02-26

## 2018-01-28 RX ORDER — ATORVASTATIN CALCIUM 80 MG/1
1 TABLET, FILM COATED ORAL
Qty: 30 | Refills: 0 | OUTPATIENT
Start: 2018-01-28 | End: 2018-02-26

## 2018-01-28 RX ADMIN — QUETIAPINE FUMARATE 25 MILLIGRAM(S): 200 TABLET, FILM COATED ORAL at 09:57

## 2018-01-28 RX ADMIN — PANTOPRAZOLE SODIUM 40 MILLIGRAM(S): 20 TABLET, DELAYED RELEASE ORAL at 06:18

## 2018-01-28 RX ADMIN — LEVETIRACETAM 750 MILLIGRAM(S): 250 TABLET, FILM COATED ORAL at 06:18

## 2018-01-28 NOTE — PROGRESS NOTE ADULT - ASSESSMENT
SDH   s/p drain    HTN  cont to monitor    Syncope  likely due to orthostatic hypotension in setting of seroquel  suggest to transfer to tele  check orthostatic vitals, if neg, then would consider ILR

## 2018-01-28 NOTE — PROGRESS NOTE ADULT - SUBJECTIVE AND OBJECTIVE BOX
Patient is a 85y old  Male who presents with a chief complaint of altered mental status (27 Jan 2018 10:19)      SUBJECTIVE / OVERNIGHT EVENTS:pt seenand examined by me,  Reviewed events from yesterdaySRR called because patient was found unresponsive in chair, tried to wake up w/verbal and painful stimuli but took pt approx 1 minute to respond. Pt hypotensive on initial BP read but when immediately repeated [id number]. EKG performed and showed no significant changes, labs collected, and CT scan performed and showed stable bilateral subdural collections. Improved pneumocephalus. No new intracranial hemorrhage. Per primary team pt back to his baseline mental status and pt's VSS. Of note, team increased pt's dose Seroquel from 25 to 50 bid.   At present, pt at [facility] as per daughter he had his lunch       MEDICATIONS  (STANDING):  atorvastatin 20 milliGRAM(s) Oral at bedtime  enoxaparin Injectable 40 milliGRAM(s) SubCutaneous at bedtime  levETIRAcetam 750 milliGRAM(s) Oral two times a day  pantoprazole    Tablet 40 milliGRAM(s) Oral before breakfast  QUEtiapine 25 milliGRAM(s) Oral two times a day  tamsulosin 0.4 milliGRAM(s) Oral at bedtime    MEDICATIONS  (PRN):      CAPILLARY BLOOD GLUCOSE      POCT Blood Glucose.: 112 mg/dL (27 Jan 2018 11:13)    I&O's Summary    26 Jan 2018 07:01  -  27 Jan 2018 07:00  --------------------------------------------------------  IN: 0 mL / OUT: 2020 mL / NET: -2020 mL    27 Jan 2018 07:01  -  27 Jan 2018 15:20  --------------------------------------------------------  IN: 0 mL / OUT: 600 mL / NET: -600 mL        PHYSICAL EXAM:  GENERAL: NAD, well-developed  HEAD:  Atraumatic, Normocephalic  EYES: EOMI, PERRLA, conjunctiva and sclera clear  NECK: Supple, No JVD  CHEST/LUNG: Clear to auscultation bilaterally; No wheeze  HEART: Regular rate and rhythm; No murmurs, rubs, or gallops  ABDOMEN: Soft, Nontender, Nondistended; Bowel sounds present  EXTREMITIES:  2+ Peripheral Pulses, No clubbing, cyanosis, or edema  PSYCH: AAOx1   NEUROLOGY: non-focal, moving all extremities , no focal deficits noted   SKIN: No rashes or lesions,staples present on scalp     LABS:                        10.5   7.24  )-----------( 160      ( 27 Jan 2018 11:35 )             31.7     01-27    143  |  105  |  11  ----------------------------<  134<H>  3.7   |  26  |  1.28    Ca    8.8      27 Jan 2018 11:35  Phos  2.5     01-27  Mg     1.9     01-27      PT/INR - ( 27 Jan 2018 11:35 )   PT: 11.7 SEC;   INR: 1.05          PTT - ( 27 Jan 2018 11:35 )  PTT:28.0 SEC  CARDIAC MARKERS ( 27 Jan 2018 11:35 )  x     / < 0.06 ng/mL / 61 u/L / 1.00 ng/mL / x              RADIOLOGY & ADDITIONAL TESTS:    Imaging Personally Reviewed:    Consultant(s) Notes Reviewed:      Care Discussed with Consultants/Other Providers:

## 2018-01-28 NOTE — PROGRESS NOTE ADULT - SUBJECTIVE AND OBJECTIVE BOX
Brief episode of unresponsiveness yesterday afternoon, patient rapidly returned to baseline, no further episodes  Vital Signs Last 24 Hrs  T(C): 36.8 (27 Jan 2018 21:45), Max: 37.5 (27 Jan 2018 06:38)  T(F): 98.2 (27 Jan 2018 21:45), Max: 99.5 (27 Jan 2018 06:38)  HR: 96 (27 Jan 2018 21:45) (69 - 96)  BP: 138/80 (27 Jan 2018 21:45) (93/49 - 143/76)  BP(mean): --  RR: 18 (27 Jan 2018 21:45) (16 - 18)  SpO2: 97% (27 Jan 2018 21:45) (97% - 100%)    AAO to self  PEGGY RAMÍREZ strength 5/5    MEDICATIONS  (STANDING):  atorvastatin 20 milliGRAM(s) Oral at bedtime  enoxaparin Injectable 40 milliGRAM(s) SubCutaneous at bedtime  levETIRAcetam 750 milliGRAM(s) Oral two times a day  pantoprazole    Tablet 40 milliGRAM(s) Oral before breakfast  QUEtiapine 25 milliGRAM(s) Oral two times a day  tamsulosin 0.4 milliGRAM(s) Oral at bedtime                          10.5   7.24  )-----------( 160      ( 27 Jan 2018 11:35 )             31.7   01-27    143  |  105  |  11  ----------------------------<  134<H>  3.7   |  26  |  1.28    Ca    8.8      27 Jan 2018 11:35  Phos  2.5     01-27  Mg     1.9     01-27    Brain CT: Stable subdural collections, decreased pneumocephalus, no new hemorrhage

## 2018-01-28 NOTE — PROGRESS NOTE ADULT - PROVIDER SPECIALTY LIST ADULT
Anesthesia
Cardiology
Hospitalist
Hospitalist
Neurology
Neurosurgery
SICU
Neurosurgery

## 2018-01-28 NOTE — PROGRESS NOTE ADULT - SUBJECTIVE AND OBJECTIVE BOX
Subjective: Patient seen and examined. No new events except as noted.     SUBJECTIVE/ROS:  had syncope yesterday while sitting on chair       MEDICATIONS:  MEDICATIONS  (STANDING):  atorvastatin 20 milliGRAM(s) Oral at bedtime  enoxaparin Injectable 40 milliGRAM(s) SubCutaneous at bedtime  levETIRAcetam 750 milliGRAM(s) Oral two times a day  pantoprazole    Tablet 40 milliGRAM(s) Oral before breakfast  QUEtiapine 25 milliGRAM(s) Oral two times a day  tamsulosin 0.4 milliGRAM(s) Oral at bedtime      PHYSICAL EXAM:  T(C): 37.2 (01-28-18 @ 06:00), Max: 37.2 (01-28-18 @ 06:00)  HR: 81 (01-28-18 @ 06:00) (81 - 96)  BP: 141/78 (01-28-18 @ 06:00) (113/52 - 141/78)  RR: 17 (01-28-18 @ 06:00) (17 - 18)  SpO2: 100% (01-28-18 @ 06:00) (97% - 100%)  Wt(kg): --  I&O's Summary    27 Jan 2018 07:01  -  28 Jan 2018 07:00  --------------------------------------------------------  IN: 0 mL / OUT: 825 mL / NET: -825 mL    28 Jan 2018 07:01  -  28 Jan 2018 12:16  --------------------------------------------------------  IN: 0 mL / OUT: 290 mL / NET: -290 mL        JVP: Normal  Neck: supple  Lung: clear   CV: S1 S2 , Murmur:  Abd: soft  Ext: No edema  neuro: Awake / alert  Psych: flat affect  Skin: normal       LABS/DATA:    CARDIAC MARKERS:  CARDIAC MARKERS ( 27 Jan 2018 11:35 )  x     / < 0.06 ng/mL / 61 u/L / 1.00 ng/mL / x                                    10.5   7.24  )-----------( 160      ( 27 Jan 2018 11:35 )             31.7     01-27    143  |  105  |  11  ----------------------------<  134<H>  3.7   |  26  |  1.28    Ca    8.8      27 Jan 2018 11:35  Phos  2.5     01-27  Mg     1.9     01-27      proBNP:   Lipid Profile:   HgA1c:   TSH:     TELE:  EKG:

## 2018-01-28 NOTE — PROGRESS NOTE ADULT - PROBLEM SELECTOR PROBLEM 1
S/P craniotomy
Subdural hematoma
Subdural hematoma
Subdural hematoma without coma, without loss of consciousness, subsequent encounter
Vasovagal syncope
Vasovagal syncope

## 2018-02-05 PROBLEM — Z00.00 ENCOUNTER FOR PREVENTIVE HEALTH EXAMINATION: Status: ACTIVE | Noted: 2018-02-05

## 2018-02-06 ENCOUNTER — APPOINTMENT (OUTPATIENT)
Dept: NEUROSURGERY | Facility: CLINIC | Age: 83
End: 2018-02-06
Payer: MEDICAID

## 2018-02-06 PROCEDURE — 99024 POSTOP FOLLOW-UP VISIT: CPT

## 2018-02-07 RX ORDER — TAMSULOSIN HYDROCHLORIDE 0.4 MG/1
0.4 CAPSULE ORAL
Refills: 0 | Status: ACTIVE | COMMUNITY

## 2018-02-15 ENCOUNTER — APPOINTMENT (OUTPATIENT)
Dept: NEUROLOGY | Facility: CLINIC | Age: 83
End: 2018-02-15
Payer: MEDICAID

## 2018-02-15 VITALS
SYSTOLIC BLOOD PRESSURE: 125 MMHG | WEIGHT: 146 LBS | HEIGHT: 71 IN | DIASTOLIC BLOOD PRESSURE: 75 MMHG | BODY MASS INDEX: 20.44 KG/M2 | HEART RATE: 84 BPM

## 2018-02-15 PROCEDURE — 99214 OFFICE O/P EST MOD 30 MIN: CPT

## 2018-03-12 ENCOUNTER — OTHER (OUTPATIENT)
Age: 83
End: 2018-03-12

## 2018-03-12 ENCOUNTER — FORM ENCOUNTER (OUTPATIENT)
Age: 83
End: 2018-03-12

## 2018-03-13 ENCOUNTER — OUTPATIENT (OUTPATIENT)
Dept: OUTPATIENT SERVICES | Facility: HOSPITAL | Age: 83
LOS: 1 days | End: 2018-03-13
Payer: MEDICAID

## 2018-03-13 ENCOUNTER — APPOINTMENT (OUTPATIENT)
Dept: CT IMAGING | Facility: CLINIC | Age: 83
End: 2018-03-13
Payer: MEDICAID

## 2018-03-13 ENCOUNTER — APPOINTMENT (OUTPATIENT)
Dept: NEUROSURGERY | Facility: CLINIC | Age: 83
End: 2018-03-13
Payer: MEDICAID

## 2018-03-13 VITALS
DIASTOLIC BLOOD PRESSURE: 70 MMHG | SYSTOLIC BLOOD PRESSURE: 119 MMHG | BODY MASS INDEX: 6.86 KG/M2 | WEIGHT: 49 LBS | HEIGHT: 71 IN | HEART RATE: 105 BPM

## 2018-03-13 DIAGNOSIS — I62.00 NONTRAUMATIC SUBDURAL HEMORRHAGE, UNSPECIFIED: ICD-10-CM

## 2018-03-13 DIAGNOSIS — Z00.8 ENCOUNTER FOR OTHER GENERAL EXAMINATION: ICD-10-CM

## 2018-03-13 PROCEDURE — 99024 POSTOP FOLLOW-UP VISIT: CPT

## 2018-03-13 PROCEDURE — 70450 CT HEAD/BRAIN W/O DYE: CPT | Mod: 26

## 2018-03-13 PROCEDURE — 70450 CT HEAD/BRAIN W/O DYE: CPT

## 2018-03-14 PROBLEM — I62.00 SUBDURAL HEMORRHAGE: Status: ACTIVE | Noted: 2018-02-06

## 2018-03-19 ENCOUNTER — APPOINTMENT (OUTPATIENT)
Dept: NEUROLOGY | Facility: CLINIC | Age: 83
End: 2018-03-19
Payer: MEDICAID

## 2018-03-19 VITALS
SYSTOLIC BLOOD PRESSURE: 170 MMHG | HEART RATE: 73 BPM | DIASTOLIC BLOOD PRESSURE: 90 MMHG | BODY MASS INDEX: 20.86 KG/M2 | HEIGHT: 71 IN | WEIGHT: 149 LBS

## 2018-03-19 PROCEDURE — 95819 EEG AWAKE AND ASLEEP: CPT

## 2018-03-19 PROCEDURE — 99214 OFFICE O/P EST MOD 30 MIN: CPT

## 2018-03-21 ENCOUNTER — OTHER (OUTPATIENT)
Age: 83
End: 2018-03-21

## 2018-03-30 ENCOUNTER — OTHER (OUTPATIENT)
Age: 83
End: 2018-03-30

## 2018-04-16 ENCOUNTER — FORM ENCOUNTER (OUTPATIENT)
Age: 83
End: 2018-04-16

## 2018-04-17 ENCOUNTER — APPOINTMENT (OUTPATIENT)
Dept: NEUROSURGERY | Facility: CLINIC | Age: 83
End: 2018-04-17
Payer: MEDICARE

## 2018-04-17 ENCOUNTER — APPOINTMENT (OUTPATIENT)
Dept: CT IMAGING | Facility: CLINIC | Age: 83
End: 2018-04-17
Payer: MEDICAID

## 2018-04-17 ENCOUNTER — OUTPATIENT (OUTPATIENT)
Dept: OUTPATIENT SERVICES | Facility: HOSPITAL | Age: 83
LOS: 1 days | End: 2018-04-17
Payer: MEDICARE

## 2018-04-17 VITALS
HEART RATE: 81 BPM | DIASTOLIC BLOOD PRESSURE: 76 MMHG | BODY MASS INDEX: 20.86 KG/M2 | HEIGHT: 71 IN | SYSTOLIC BLOOD PRESSURE: 138 MMHG | WEIGHT: 149 LBS

## 2018-04-17 DIAGNOSIS — Z98.890 OTHER SPECIFIED POSTPROCEDURAL STATES: ICD-10-CM

## 2018-04-17 DIAGNOSIS — I62.00 NONTRAUMATIC SUBDURAL HEMORRHAGE, UNSPECIFIED: ICD-10-CM

## 2018-04-17 PROCEDURE — 99024 POSTOP FOLLOW-UP VISIT: CPT

## 2018-04-17 PROCEDURE — 70450 CT HEAD/BRAIN W/O DYE: CPT

## 2018-04-17 PROCEDURE — 70450 CT HEAD/BRAIN W/O DYE: CPT | Mod: 26

## 2018-08-07 ENCOUNTER — APPOINTMENT (OUTPATIENT)
Dept: CT IMAGING | Facility: CLINIC | Age: 83
End: 2018-08-07

## 2018-08-20 ENCOUNTER — FORM ENCOUNTER (OUTPATIENT)
Age: 83
End: 2018-08-20

## 2018-08-21 ENCOUNTER — APPOINTMENT (OUTPATIENT)
Dept: CT IMAGING | Facility: CLINIC | Age: 83
End: 2018-08-21
Payer: MEDICARE

## 2018-08-21 ENCOUNTER — OTHER (OUTPATIENT)
Age: 83
End: 2018-08-21

## 2018-08-21 ENCOUNTER — OUTPATIENT (OUTPATIENT)
Dept: OUTPATIENT SERVICES | Facility: HOSPITAL | Age: 83
LOS: 1 days | End: 2018-08-21
Payer: MEDICARE

## 2018-08-21 ENCOUNTER — APPOINTMENT (OUTPATIENT)
Dept: NEUROSURGERY | Facility: CLINIC | Age: 83
End: 2018-08-21
Payer: MEDICARE

## 2018-08-21 VITALS
SYSTOLIC BLOOD PRESSURE: 152 MMHG | BODY MASS INDEX: 22.96 KG/M2 | WEIGHT: 164 LBS | HEIGHT: 71 IN | DIASTOLIC BLOOD PRESSURE: 78 MMHG | HEART RATE: 84 BPM

## 2018-08-21 DIAGNOSIS — Z00.8 ENCOUNTER FOR OTHER GENERAL EXAMINATION: ICD-10-CM

## 2018-08-21 PROCEDURE — 70450 CT HEAD/BRAIN W/O DYE: CPT | Mod: 26

## 2018-08-21 PROCEDURE — 70450 CT HEAD/BRAIN W/O DYE: CPT

## 2018-08-21 PROCEDURE — 99214 OFFICE O/P EST MOD 30 MIN: CPT

## 2018-08-28 ENCOUNTER — APPOINTMENT (OUTPATIENT)
Dept: NEUROLOGY | Facility: CLINIC | Age: 83
End: 2018-08-28
Payer: MEDICARE

## 2018-08-28 ENCOUNTER — OTHER (OUTPATIENT)
Age: 83
End: 2018-08-28

## 2018-08-28 VITALS — HEART RATE: 60 BPM | DIASTOLIC BLOOD PRESSURE: 76 MMHG | SYSTOLIC BLOOD PRESSURE: 145 MMHG

## 2018-08-28 DIAGNOSIS — I48.91 UNSPECIFIED ATRIAL FIBRILLATION: ICD-10-CM

## 2018-08-28 DIAGNOSIS — I10 ESSENTIAL (PRIMARY) HYPERTENSION: ICD-10-CM

## 2018-08-28 PROCEDURE — 99215 OFFICE O/P EST HI 40 MIN: CPT

## 2018-08-28 RX ORDER — LEVETIRACETAM 750 MG/1
750 TABLET, FILM COATED ORAL
Refills: 0 | Status: DISCONTINUED | COMMUNITY
End: 2018-08-28

## 2018-08-28 RX ORDER — DEXAMETHASONE 2 MG/1
2 TABLET ORAL
Qty: 14 | Refills: 0 | Status: DISCONTINUED | COMMUNITY
Start: 2018-03-14 | End: 2018-08-28

## 2018-08-28 RX ORDER — DEXAMETHASONE 2 MG/1
2 TABLET ORAL
Qty: 7 | Refills: 0 | Status: DISCONTINUED | COMMUNITY
Start: 2018-03-14 | End: 2018-08-28

## 2018-09-04 ENCOUNTER — APPOINTMENT (OUTPATIENT)
Dept: NEUROLOGY | Facility: CLINIC | Age: 83
End: 2018-09-04
Payer: COMMERCIAL

## 2018-09-04 PROCEDURE — 93880 EXTRACRANIAL BILAT STUDY: CPT

## 2018-09-04 PROCEDURE — 93892 TCD EMBOLI DETECT W/O INJ: CPT

## 2018-09-04 PROCEDURE — 93886 INTRACRANIAL COMPLETE STUDY: CPT

## 2018-10-26 ENCOUNTER — FORM ENCOUNTER (OUTPATIENT)
Age: 83
End: 2018-10-26

## 2018-10-27 ENCOUNTER — APPOINTMENT (OUTPATIENT)
Dept: MRI IMAGING | Facility: CLINIC | Age: 83
End: 2018-10-27
Payer: COMMERCIAL

## 2018-10-27 ENCOUNTER — OUTPATIENT (OUTPATIENT)
Dept: OUTPATIENT SERVICES | Facility: HOSPITAL | Age: 83
LOS: 1 days | End: 2018-10-27
Payer: MEDICARE

## 2018-10-27 DIAGNOSIS — R41.89 OTHER SYMPTOMS AND SIGNS INVOLVING COGNITIVE FUNCTIONS AND AWARENESS: ICD-10-CM

## 2018-10-27 PROCEDURE — 70551 MRI BRAIN STEM W/O DYE: CPT | Mod: 26

## 2018-10-27 PROCEDURE — 70551 MRI BRAIN STEM W/O DYE: CPT

## 2018-10-28 ENCOUNTER — INPATIENT (INPATIENT)
Facility: HOSPITAL | Age: 83
LOS: 3 days | Discharge: HOME HEALTH | End: 2018-11-01
Attending: INTERNAL MEDICINE | Admitting: INTERNAL MEDICINE
Payer: MEDICARE

## 2018-10-28 VITALS
DIASTOLIC BLOOD PRESSURE: 87 MMHG | OXYGEN SATURATION: 97 % | HEART RATE: 96 BPM | SYSTOLIC BLOOD PRESSURE: 149 MMHG | RESPIRATION RATE: 16 BRPM | TEMPERATURE: 98 F

## 2018-10-28 DIAGNOSIS — R41.82 ALTERED MENTAL STATUS, UNSPECIFIED: ICD-10-CM

## 2018-10-28 DIAGNOSIS — R41.0 DISORIENTATION, UNSPECIFIED: ICD-10-CM

## 2018-10-28 DIAGNOSIS — I95.1 ORTHOSTATIC HYPOTENSION: ICD-10-CM

## 2018-10-28 DIAGNOSIS — R41.89 OTHER SYMPTOMS AND SIGNS INVOLVING COGNITIVE FUNCTIONS AND AWARENESS: ICD-10-CM

## 2018-10-28 DIAGNOSIS — E78.5 HYPERLIPIDEMIA, UNSPECIFIED: ICD-10-CM

## 2018-10-28 DIAGNOSIS — Z29.9 ENCOUNTER FOR PROPHYLACTIC MEASURES, UNSPECIFIED: ICD-10-CM

## 2018-10-28 DIAGNOSIS — Z71.89 OTHER SPECIFIED COUNSELING: ICD-10-CM

## 2018-10-28 DIAGNOSIS — Z98.890 OTHER SPECIFIED POSTPROCEDURAL STATES: Chronic | ICD-10-CM

## 2018-10-28 DIAGNOSIS — N40.1 BENIGN PROSTATIC HYPERPLASIA WITH LOWER URINARY TRACT SYMPTOMS: ICD-10-CM

## 2018-10-28 LAB
ALBUMIN SERPL ELPH-MCNC: 4.2 G/DL — SIGNIFICANT CHANGE UP (ref 3.3–5)
ALP SERPL-CCNC: 47 U/L — SIGNIFICANT CHANGE UP (ref 40–120)
ALT FLD-CCNC: 30 U/L — SIGNIFICANT CHANGE UP (ref 4–41)
APPEARANCE UR: CLEAR — SIGNIFICANT CHANGE UP
AST SERPL-CCNC: 57 U/L — HIGH (ref 4–40)
BASE EXCESS BLDV CALC-SCNC: 2.5 MMOL/L — SIGNIFICANT CHANGE UP
BASOPHILS # BLD AUTO: 0.03 K/UL — SIGNIFICANT CHANGE UP (ref 0–0.2)
BASOPHILS NFR BLD AUTO: 0.5 % — SIGNIFICANT CHANGE UP (ref 0–2)
BILIRUB SERPL-MCNC: 0.9 MG/DL — SIGNIFICANT CHANGE UP (ref 0.2–1.2)
BILIRUB UR-MCNC: NEGATIVE — SIGNIFICANT CHANGE UP
BLOOD GAS VENOUS - CREATININE: 1.14 MG/DL — SIGNIFICANT CHANGE UP (ref 0.5–1.3)
BLOOD UR QL VISUAL: NEGATIVE — SIGNIFICANT CHANGE UP
BUN SERPL-MCNC: 12 MG/DL — SIGNIFICANT CHANGE UP (ref 7–23)
CALCIUM SERPL-MCNC: 9.3 MG/DL — SIGNIFICANT CHANGE UP (ref 8.4–10.5)
CHLORIDE BLDV-SCNC: 107 MMOL/L — SIGNIFICANT CHANGE UP (ref 96–108)
CHLORIDE SERPL-SCNC: 104 MMOL/L — SIGNIFICANT CHANGE UP (ref 98–107)
CO2 SERPL-SCNC: 21 MMOL/L — LOW (ref 22–31)
COLOR SPEC: SIGNIFICANT CHANGE UP
CREAT SERPL-MCNC: 1.14 MG/DL — SIGNIFICANT CHANGE UP (ref 0.5–1.3)
EOSINOPHIL # BLD AUTO: 0.1 K/UL — SIGNIFICANT CHANGE UP (ref 0–0.5)
EOSINOPHIL NFR BLD AUTO: 1.7 % — SIGNIFICANT CHANGE UP (ref 0–6)
GAS PNL BLDV: 142 MMOL/L — SIGNIFICANT CHANGE UP (ref 136–146)
GLUCOSE BLDV-MCNC: 86 — SIGNIFICANT CHANGE UP (ref 70–99)
GLUCOSE SERPL-MCNC: 87 MG/DL — SIGNIFICANT CHANGE UP (ref 70–99)
GLUCOSE UR-MCNC: NEGATIVE — SIGNIFICANT CHANGE UP
HCO3 BLDV-SCNC: 24 MMOL/L — SIGNIFICANT CHANGE UP (ref 20–27)
HCT VFR BLD CALC: 39.2 % — SIGNIFICANT CHANGE UP (ref 39–50)
HCT VFR BLDV CALC: 40.7 % — SIGNIFICANT CHANGE UP (ref 39–51)
HGB BLD-MCNC: 12.8 G/DL — LOW (ref 13–17)
HGB BLDV-MCNC: 13.2 G/DL — SIGNIFICANT CHANGE UP (ref 13–17)
IMM GRANULOCYTES # BLD AUTO: 0.01 # — SIGNIFICANT CHANGE UP
IMM GRANULOCYTES NFR BLD AUTO: 0.2 % — SIGNIFICANT CHANGE UP (ref 0–1.5)
KETONES UR-MCNC: NEGATIVE — SIGNIFICANT CHANGE UP
LACTATE BLDV-MCNC: 1.7 MMOL/L — SIGNIFICANT CHANGE UP (ref 0.5–2)
LEUKOCYTE ESTERASE UR-ACNC: NEGATIVE — SIGNIFICANT CHANGE UP
LYMPHOCYTES # BLD AUTO: 2.9 K/UL — SIGNIFICANT CHANGE UP (ref 1–3.3)
LYMPHOCYTES # BLD AUTO: 48.8 % — HIGH (ref 13–44)
MCHC RBC-ENTMCNC: 27.8 PG — SIGNIFICANT CHANGE UP (ref 27–34)
MCHC RBC-ENTMCNC: 32.7 % — SIGNIFICANT CHANGE UP (ref 32–36)
MCV RBC AUTO: 85 FL — SIGNIFICANT CHANGE UP (ref 80–100)
MONOCYTES # BLD AUTO: 0.77 K/UL — SIGNIFICANT CHANGE UP (ref 0–0.9)
MONOCYTES NFR BLD AUTO: 13 % — SIGNIFICANT CHANGE UP (ref 2–14)
NEUTROPHILS # BLD AUTO: 2.13 K/UL — SIGNIFICANT CHANGE UP (ref 1.8–7.4)
NEUTROPHILS NFR BLD AUTO: 35.8 % — LOW (ref 43–77)
NITRITE UR-MCNC: NEGATIVE — SIGNIFICANT CHANGE UP
NRBC # FLD: 0 — SIGNIFICANT CHANGE UP
PCO2 BLDV: 55 MMHG — HIGH (ref 41–51)
PH BLDV: 7.33 PH — SIGNIFICANT CHANGE UP (ref 7.32–7.43)
PH UR: 6 — SIGNIFICANT CHANGE UP (ref 5–8)
PLATELET # BLD AUTO: 164 K/UL — SIGNIFICANT CHANGE UP (ref 150–400)
PMV BLD: 11.4 FL — SIGNIFICANT CHANGE UP (ref 7–13)
PO2 BLDV: 26 MMHG — LOW (ref 35–40)
POTASSIUM BLDV-SCNC: 4.1 MMOL/L — SIGNIFICANT CHANGE UP (ref 3.4–4.5)
POTASSIUM SERPL-MCNC: 5.2 MMOL/L — SIGNIFICANT CHANGE UP (ref 3.5–5.3)
POTASSIUM SERPL-SCNC: 5.2 MMOL/L — SIGNIFICANT CHANGE UP (ref 3.5–5.3)
PROT SERPL-MCNC: 8.4 G/DL — HIGH (ref 6–8.3)
PROT UR-MCNC: NEGATIVE — SIGNIFICANT CHANGE UP
RBC # BLD: 4.61 M/UL — SIGNIFICANT CHANGE UP (ref 4.2–5.8)
RBC # FLD: 13.7 % — SIGNIFICANT CHANGE UP (ref 10.3–14.5)
SAO2 % BLDV: 34.1 % — LOW (ref 60–85)
SODIUM SERPL-SCNC: 139 MMOL/L — SIGNIFICANT CHANGE UP (ref 135–145)
SP GR SPEC: 1.01 — SIGNIFICANT CHANGE UP (ref 1–1.04)
UROBILINOGEN FLD QL: SIGNIFICANT CHANGE UP
WBC # BLD: 5.94 K/UL — SIGNIFICANT CHANGE UP (ref 3.8–10.5)
WBC # FLD AUTO: 5.94 K/UL — SIGNIFICANT CHANGE UP (ref 3.8–10.5)

## 2018-10-28 PROCEDURE — 99223 1ST HOSP IP/OBS HIGH 75: CPT

## 2018-10-28 PROCEDURE — 99497 ADVNCD CARE PLAN 30 MIN: CPT | Mod: 25

## 2018-10-28 PROCEDURE — 71046 X-RAY EXAM CHEST 2 VIEWS: CPT | Mod: 26

## 2018-10-28 RX ORDER — HEPARIN SODIUM 5000 [USP'U]/ML
5000 INJECTION INTRAVENOUS; SUBCUTANEOUS EVERY 12 HOURS
Qty: 0 | Refills: 0 | Status: DISCONTINUED | OUTPATIENT
Start: 2018-10-28 | End: 2018-11-01

## 2018-10-28 RX ORDER — HALOPERIDOL DECANOATE 100 MG/ML
0.5 INJECTION INTRAMUSCULAR ONCE
Qty: 0 | Refills: 0 | Status: COMPLETED | OUTPATIENT
Start: 2018-10-28 | End: 2018-10-28

## 2018-10-28 RX ORDER — LEVETIRACETAM 250 MG/1
750 TABLET, FILM COATED ORAL
Qty: 0 | Refills: 0 | Status: DISCONTINUED | OUTPATIENT
Start: 2018-10-28 | End: 2018-11-01

## 2018-10-28 RX ORDER — ATORVASTATIN CALCIUM 80 MG/1
20 TABLET, FILM COATED ORAL AT BEDTIME
Qty: 0 | Refills: 0 | Status: DISCONTINUED | OUTPATIENT
Start: 2018-10-28 | End: 2018-10-31

## 2018-10-28 RX ORDER — LANOLIN ALCOHOL/MO/W.PET/CERES
1 CREAM (GRAM) TOPICAL AT BEDTIME
Qty: 0 | Refills: 0 | Status: DISCONTINUED | OUTPATIENT
Start: 2018-10-28 | End: 2018-10-28

## 2018-10-28 RX ORDER — LANOLIN ALCOHOL/MO/W.PET/CERES
3 CREAM (GRAM) TOPICAL AT BEDTIME
Qty: 0 | Refills: 0 | Status: DISCONTINUED | OUTPATIENT
Start: 2018-10-28 | End: 2018-11-01

## 2018-10-28 RX ORDER — MIDODRINE HYDROCHLORIDE 2.5 MG/1
5 TABLET ORAL
Qty: 0 | Refills: 0 | Status: DISCONTINUED | OUTPATIENT
Start: 2018-10-28 | End: 2018-10-30

## 2018-10-28 RX ORDER — TAMSULOSIN HYDROCHLORIDE 0.4 MG/1
0.4 CAPSULE ORAL AT BEDTIME
Qty: 0 | Refills: 0 | Status: DISCONTINUED | OUTPATIENT
Start: 2018-10-28 | End: 2018-11-01

## 2018-10-28 RX ORDER — QUETIAPINE FUMARATE 200 MG/1
25 TABLET, FILM COATED ORAL
Qty: 0 | Refills: 0 | Status: DISCONTINUED | OUTPATIENT
Start: 2018-10-28 | End: 2018-10-29

## 2018-10-28 RX ADMIN — LEVETIRACETAM 750 MILLIGRAM(S): 250 TABLET, FILM COATED ORAL at 19:19

## 2018-10-28 RX ADMIN — TAMSULOSIN HYDROCHLORIDE 0.4 MILLIGRAM(S): 0.4 CAPSULE ORAL at 21:16

## 2018-10-28 RX ADMIN — ATORVASTATIN CALCIUM 20 MILLIGRAM(S): 80 TABLET, FILM COATED ORAL at 21:16

## 2018-10-28 RX ADMIN — HALOPERIDOL DECANOATE 0.5 MILLIGRAM(S): 100 INJECTION INTRAMUSCULAR at 22:41

## 2018-10-28 RX ADMIN — QUETIAPINE FUMARATE 25 MILLIGRAM(S): 200 TABLET, FILM COATED ORAL at 19:19

## 2018-10-28 RX ADMIN — Medication 3 MILLIGRAM(S): at 21:16

## 2018-10-28 NOTE — H&P ADULT - PROBLEM SELECTOR PLAN 1
- MRI preliminary reviewed by radiology without acute abnormality, f/u official report  - blood work, CXR no significant infection or electrolytes abnormality contributes to metabolic encephalopathy  - suspect side effect with ativan in the setting of cognitive impairment.   - Seroquel 25 mg BID, frequent redirection, consider increase if still agitated.   - consider psych consult in AM

## 2018-10-28 NOTE — H&P ADULT - PROBLEM SELECTOR PLAN 2
- ordered Vit B1, B12, folate, TSH, free T3/T4, syphilis screen to complete dementia work up, f/u official report MRI  - to call neurology consult in AM

## 2018-10-28 NOTE — ED PROVIDER NOTE - MEDICAL DECISION MAKING DETAILS
85M pmhx dementia, hld, htn, traumatic subdurals, p/w AMS in setting of ativan administration yesterday for MRI. On exam pt has calm/cooperative. AOx1. Pt's speaking incoherently. Moving all 4 extremities, sensory intact. Pt came with MRI disc, will have uploaded for our radiologists' perview. Will also check labs to rule out infectious/metabolic etiology.

## 2018-10-28 NOTE — ED PROVIDER NOTE - OBJECTIVE STATEMENT
85M pmhx dementia, traumatic subdural hematomas requiring surgical intervention in January, p/w AMS x 24hrs, insomnia, agitation per family. Yesterday pt went for an outpatient MRI brain during which he required 4mg ativan to relax him for the test. Ever since then the pt has not been himself, has appeared dizzy / had difficulty ambulating, has been more confused than usual. Family chose to bring pt in today on those grounds. No recent illnesses, fevers, or other findings. Pt follows w/ neurosurgeon dr. Beavers.

## 2018-10-28 NOTE — ED ADULT NURSE REASSESSMENT NOTE - NS ED NURSE REASSESS COMMENT FT1
Break Coverage - Pt lying on stretcher in nad family at bedside pain presently safety &  comfort measures maintained eval on going. Break Coverage - Pt lying on stretcher in nad family at bedside safety &  comfort measures maintained eval on going.

## 2018-10-28 NOTE — H&P ADULT - NSHPLABSRESULTS_GEN_ALL_CORE
LABS:                        12.8   5.94  )-----------( 164      ( 28 Oct 2018 13:00 )             39.2     10-28    139  |  104  |  12  ----------------------------<  87  5.2   |  21<L>  |  1.14    Ca    9.3      28 Oct 2018 13:00    TPro  8.4<H>  /  Alb  4.2  /  TBili  0.9  /  DBili  x   /  AST  57<H>  /  ALT  30  /  AlkPhos  47  10-28          Urinalysis Basic - ( 28 Oct 2018 13:00 )    Color: LIGHT YELLOW / Appearance: CLEAR / S.011 / pH: 6.0  Gluc: NEGATIVE / Ketone: NEGATIVE  / Bili: NEGATIVE / Urobili: TRACE   Blood: NEGATIVE / Protein: NEGATIVE / Nitrite: NEGATIVE   Leuk Esterase: NEGATIVE / RBC: x / WBC x   Sq Epi: x / Non Sq Epi: x / Bacteria: x          RADIOLOGY & ADDITIONAL TESTS:    Imaging Personally Reviewed:  < from: Xray Chest 2 Views PA/Lat (10.28.18 @ 12:44) >    IMPRESSION:  There are mild bibasilar areas of linear or subsegmental atelectasis.   There is no pneumothorax. No pleural effusions or pneumothorax. Heart   size may be enlarged. Tortuous aorta. No acute osseous abnormality.   Degenerative changes of the spine.    < end of copied text >    EKG sinus rhythm at 92 BPM, QTC 455ms, no significant ST-T changes.     Consultant(s) Notes Reviewed:      Care Discussed with Consultants/Other Providers:

## 2018-10-28 NOTE — ED PROVIDER NOTE - PROGRESS NOTE DETAILS
Spoke w/ attending radiologist who reviewed the outside MRI imaging and commented that she did not appreciate any acute changes that would explain pt's AMS.

## 2018-10-28 NOTE — H&P ADULT - NSHPPHYSICALEXAM_GEN_ALL_CORE
Vital Signs Last 24 Hrs  T(C): 36.6 (28 Oct 2018 11:13), Max: 36.6 (28 Oct 2018 11:13)  T(F): 97.9 (28 Oct 2018 11:13), Max: 97.9 (28 Oct 2018 11:13)  HR: 96 (28 Oct 2018 11:13) (96 - 96)  BP: 149/87 (28 Oct 2018 11:13) (149/87 - 149/87)  BP(mean): --  RR: 16 (28 Oct 2018 11:13) (16 - 16)  SpO2: 97% (28 Oct 2018 11:13) (97% - 97%)  CAPILLARY BLOOD GLUCOSE            PHYSICAL EXAM:  GENERAL: NAD, well-developed  HEAD:  Atraumatic, Normocephalic, surgical scar post craniectomy.   EYES: EOMI, PERRLA, conjunctiva and sclera clear  NECK: Supple, No JVD  CHEST/LUNG: Clear to auscultation bilaterally; No wheeze  HEART: Regular rate and rhythm; No murmurs, rubs, or gallops  ABDOMEN: Soft, Nontender, Nondistended; Bowel sounds present  EXTREMITIES:  2+ Peripheral Pulses, No clubbing, cyanosis, or edema  PSYCH: calm  NEUROLOGY: grossly non-focal, limited neurology exam as Pt unable to coordinate. no facial asymmetry, moving all extremities.   SKIN: No rashes or lesions

## 2018-10-28 NOTE — H&P ADULT - NSHPREVIEWOFSYSTEMS_GEN_ALL_CORE
Review of Systems:   CONSTITUTIONAL: No fever, weight loss, or fatigue  EYES: No eye pain, visual disturbances, or discharge  ENMT:  No difficulty hearing, tinnitus, vertigo; No sinus or throat pain  NECK: No pain or stiffness  BREASTS: No pain, masses, or nipple discharge  RESPIRATORY: No cough, wheezing, chills or hemoptysis; No shortness of breath  CARDIOVASCULAR: No chest pain, palpitations, dizziness, or leg swelling  GASTROINTESTINAL: No abdominal or epigastric pain. No nausea, vomiting, or hematemesis; No diarrhea or constipation. No melena or hematochezia.  GENITOURINARY: No dysuria, frequency, hematuria, or incontinence  NEUROLOGICAL: AMS  SKIN: No itching, burning, rashes, or lesions   LYMPH NODES: No enlarged glands  ENDOCRINE: No heat or cold intolerance; No hair loss  MUSCULOSKELETAL: No joint pain or swelling; No muscle, back, or extremity pain  PSYCHIATRIC: Agitation, insomnia, No depression, anxiety, mood swings.   HEME/LYMPH: No easy bruising, or bleeding gums  ALLERY AND IMMUNOLOGIC: No hives or eczema

## 2018-10-28 NOTE — ED ADULT TRIAGE NOTE - CHIEF COMPLAINT QUOTE
Pt. brought in by family for AMS and increased weakness since yesterday. States he had a brain bleed in Jan 2018 with residual weakness and sundowning. Denies any cp, headache, dizziness or fevers.

## 2018-10-28 NOTE — H&P ADULT - HISTORY OF PRESENT ILLNESS
HPI: 86 yo M PMH traumatic subdural hematoma s/p Craniotomy with drain in Jan 2018, HTN, HLD, orthostatic hypotension on midodrine, cognitive impairment post neurosurgery, was admitted for AMS for 1 day after taking Ativan for MRI. Pt is unable to provide information due to confusion. The history was collected from daughters and charts. Pt became agitated and insomnia after MRI, went back home, yelling around, and daughter was unable to calm him down. Per family. He has not been himself, appeared dizzy, difficulty ambulating, more confused compared with his baseline.   Pt currently is following with neurology Dr. Joantan Rubio, for worsening cognitive function ever since Craniotomy. He need to have blood work for dementia work up. Carotid Doppler in Sept 2018, b/l mild stenosis 40%. MRI as house radiology reviewed image (documented in ED provider note), no acute change which could contribute to AMS.   Daughters reported he had frequent near-syncope event, has been following with cardiology Dr. Hale outpatient. Also recent ED outside hospital. Echo in Jan 2018 unremarkable. EEG no seizure activity. Pt is taking keppra for seizure prophylaxis.   Daughters denied fever, no cough, no SOB/CP, no N/V/D, no dysuria.   Pt was found in ED with stable vitals, CT head no acute change.

## 2018-10-28 NOTE — ED PROVIDER NOTE - ATTENDING CONTRIBUTION TO CARE
Prince  pt here with family due to increased confusion  Had prior SDH requiring surgery  Since then has not returned to baseline function  but worse last night and today  more confused  reportedly hallucinating last night   Had follow up MRI yesterday and received po ativan $mg 4-4:30 pm    exam pt awake  alert good strength b/l  mild end point dysmetria both hands     clear lungs  card RRR S1S2  occas ectopy  benign abd Prince  pt here with family due to increased confusion  Had prior SDH requiring surgery  Since then has not returned to baseline function  but worse last night and today  more confused  reportedly hallucinating last night   Had follow up MRI yesterday and received po ativan $mg 4-4:30 pm    exam pt awake  alert good strength b/l  mild end point dysmetria both hands     clear lungs  card RRR S1S2  occas ectopy  benign abd  MRI sent to radiology  unofficial read  no acute changes   labs nondiagnostic  CXR clear  Feel sxs due to effects of ativan   Family  not comfortable taking patient home  though appears less agitated than described overnight  will keep for observation

## 2018-10-28 NOTE — H&P ADULT - PROBLEM SELECTOR PLAN 3
- Echo in Jan 2018 normal LVEF  - continue midodrine as needed  - Dr. Hale was informed to follow up Pt in hospital.   - encourage oral intake

## 2018-10-28 NOTE — H&P ADULT - ASSESSMENT
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

## 2018-10-28 NOTE — H&P ADULT - PROBLEM SELECTOR PLAN 7
discussed with daughters at length about work up of dementia/cognitive impairment, daughters are concerned Pt significant decline of function status, expressed worries about difficulty taking care of him at home when he is agitated.   Due to culture/[employer], daughters still want to take care of Pt at home. Currently want get entire work up with hope that Pt will go back to his baseline.   Time spent 25 min face to face.

## 2018-10-29 DIAGNOSIS — I10 ESSENTIAL (PRIMARY) HYPERTENSION: ICD-10-CM

## 2018-10-29 LAB
BUN SERPL-MCNC: 11 MG/DL — SIGNIFICANT CHANGE UP (ref 7–23)
CALCIUM SERPL-MCNC: 10.1 MG/DL — SIGNIFICANT CHANGE UP (ref 8.4–10.5)
CHLORIDE SERPL-SCNC: 106 MMOL/L — SIGNIFICANT CHANGE UP (ref 98–107)
CHOLEST SERPL-MCNC: 109 MG/DL — LOW (ref 120–199)
CO2 SERPL-SCNC: 25 MMOL/L — SIGNIFICANT CHANGE UP (ref 22–31)
CREAT SERPL-MCNC: 1.17 MG/DL — SIGNIFICANT CHANGE UP (ref 0.5–1.3)
FOLATE SERPL-MCNC: 20 NG/ML — SIGNIFICANT CHANGE UP (ref 4.7–20)
GLUCOSE SERPL-MCNC: 94 MG/DL — SIGNIFICANT CHANGE UP (ref 70–99)
HCT VFR BLD CALC: 41.8 % — SIGNIFICANT CHANGE UP (ref 39–50)
HDLC SERPL-MCNC: 34 MG/DL — LOW (ref 35–55)
HGB BLD-MCNC: 13.8 G/DL — SIGNIFICANT CHANGE UP (ref 13–17)
LIPID PNL WITH DIRECT LDL SERPL: 72 MG/DL — SIGNIFICANT CHANGE UP
MCHC RBC-ENTMCNC: 28 PG — SIGNIFICANT CHANGE UP (ref 27–34)
MCHC RBC-ENTMCNC: 33 % — SIGNIFICANT CHANGE UP (ref 32–36)
MCV RBC AUTO: 85 FL — SIGNIFICANT CHANGE UP (ref 80–100)
NRBC # FLD: 0 — SIGNIFICANT CHANGE UP
PLATELET # BLD AUTO: 162 K/UL — SIGNIFICANT CHANGE UP (ref 150–400)
PMV BLD: 11 FL — SIGNIFICANT CHANGE UP (ref 7–13)
POTASSIUM SERPL-MCNC: 4 MMOL/L — SIGNIFICANT CHANGE UP (ref 3.5–5.3)
POTASSIUM SERPL-SCNC: 4 MMOL/L — SIGNIFICANT CHANGE UP (ref 3.5–5.3)
RBC # BLD: 4.92 M/UL — SIGNIFICANT CHANGE UP (ref 4.2–5.8)
RBC # FLD: 13.5 % — SIGNIFICANT CHANGE UP (ref 10.3–14.5)
SODIUM SERPL-SCNC: 145 MMOL/L — SIGNIFICANT CHANGE UP (ref 135–145)
T PALLIDUM AB TITR SER: NEGATIVE — SIGNIFICANT CHANGE UP
T3FREE SERPL-MCNC: 3.91 PG/ML — SIGNIFICANT CHANGE UP (ref 1.8–4.6)
T4 FREE SERPL-MCNC: 1.42 NG/DL — SIGNIFICANT CHANGE UP (ref 0.9–1.8)
TRIGL SERPL-MCNC: 51 MG/DL — SIGNIFICANT CHANGE UP (ref 10–149)
TSH SERPL-MCNC: 1.76 UIU/ML — SIGNIFICANT CHANGE UP (ref 0.27–4.2)
VIT B12 SERPL-MCNC: 363 PG/ML — SIGNIFICANT CHANGE UP (ref 200–900)
WBC # BLD: 5.31 K/UL — SIGNIFICANT CHANGE UP (ref 3.8–10.5)
WBC # FLD AUTO: 5.31 K/UL — SIGNIFICANT CHANGE UP (ref 3.8–10.5)

## 2018-10-29 PROCEDURE — 99223 1ST HOSP IP/OBS HIGH 75: CPT

## 2018-10-29 PROCEDURE — 99233 SBSQ HOSP IP/OBS HIGH 50: CPT

## 2018-10-29 PROCEDURE — 93010 ELECTROCARDIOGRAM REPORT: CPT

## 2018-10-29 RX ORDER — HALOPERIDOL DECANOATE 100 MG/ML
0.5 INJECTION INTRAMUSCULAR EVERY 6 HOURS
Qty: 0 | Refills: 0 | Status: DISCONTINUED | OUTPATIENT
Start: 2018-10-29 | End: 2018-11-01

## 2018-10-29 RX ORDER — QUETIAPINE FUMARATE 200 MG/1
25 TABLET, FILM COATED ORAL EVERY 6 HOURS
Qty: 0 | Refills: 0 | Status: DISCONTINUED | OUTPATIENT
Start: 2018-10-29 | End: 2018-10-31

## 2018-10-29 RX ORDER — QUETIAPINE FUMARATE 200 MG/1
25 TABLET, FILM COATED ORAL AT BEDTIME
Qty: 0 | Refills: 0 | Status: DISCONTINUED | OUTPATIENT
Start: 2018-10-29 | End: 2018-11-01

## 2018-10-29 RX ORDER — PREGABALIN 225 MG/1
1000 CAPSULE ORAL DAILY
Qty: 0 | Refills: 0 | Status: DISCONTINUED | OUTPATIENT
Start: 2018-10-29 | End: 2018-11-01

## 2018-10-29 RX ORDER — POLYETHYLENE GLYCOL 3350 17 G/17G
17 POWDER, FOR SOLUTION ORAL AT BEDTIME
Qty: 0 | Refills: 0 | Status: DISCONTINUED | OUTPATIENT
Start: 2018-10-29 | End: 2018-11-01

## 2018-10-29 RX ORDER — DOCUSATE SODIUM 100 MG
100 CAPSULE ORAL
Qty: 0 | Refills: 0 | Status: DISCONTINUED | OUTPATIENT
Start: 2018-10-29 | End: 2018-11-01

## 2018-10-29 RX ADMIN — LEVETIRACETAM 750 MILLIGRAM(S): 250 TABLET, FILM COATED ORAL at 17:41

## 2018-10-29 RX ADMIN — TAMSULOSIN HYDROCHLORIDE 0.4 MILLIGRAM(S): 0.4 CAPSULE ORAL at 22:11

## 2018-10-29 RX ADMIN — PREGABALIN 1000 MICROGRAM(S): 225 CAPSULE ORAL at 12:27

## 2018-10-29 RX ADMIN — MIDODRINE HYDROCHLORIDE 5 MILLIGRAM(S): 2.5 TABLET ORAL at 06:17

## 2018-10-29 RX ADMIN — LEVETIRACETAM 750 MILLIGRAM(S): 250 TABLET, FILM COATED ORAL at 06:17

## 2018-10-29 RX ADMIN — HEPARIN SODIUM 5000 UNIT(S): 5000 INJECTION INTRAVENOUS; SUBCUTANEOUS at 17:41

## 2018-10-29 RX ADMIN — Medication 3 MILLIGRAM(S): at 22:11

## 2018-10-29 RX ADMIN — Medication 100 MILLIGRAM(S): at 17:41

## 2018-10-29 RX ADMIN — HEPARIN SODIUM 5000 UNIT(S): 5000 INJECTION INTRAVENOUS; SUBCUTANEOUS at 06:17

## 2018-10-29 RX ADMIN — ATORVASTATIN CALCIUM 20 MILLIGRAM(S): 80 TABLET, FILM COATED ORAL at 22:11

## 2018-10-29 RX ADMIN — QUETIAPINE FUMARATE 25 MILLIGRAM(S): 200 TABLET, FILM COATED ORAL at 06:17

## 2018-10-29 RX ADMIN — QUETIAPINE FUMARATE 25 MILLIGRAM(S): 200 TABLET, FILM COATED ORAL at 22:11

## 2018-10-29 NOTE — BEHAVIORAL HEALTH ASSESSMENT NOTE - CASE SUMMARY
Pt seen, agree with above. 86 y/o male with no known PPHx, PMH traumatic subdural hematoma s/p Craniotomy with drain in Jan 2018, w/reportedly mild cognitive impairment post-op, a/w AMS/agitation x 1 day after taking Ativan for outpatient MRI. Pt obtunded, unable to participate in interview. Impression: Delirium 2/2 ativan administration likely. R/o underlying dementia process. Plan: as above- will continue to follow.

## 2018-10-29 NOTE — CONSULT NOTE ADULT - ASSESSMENT
Orthostatic hypotension  now with elevated BP   add clonidine 0.1 BID  check orthostatic vitals , if he is not orthostatic then may DC midodrine     AMS  fu with primary team for work up

## 2018-10-29 NOTE — PROGRESS NOTE ADULT - PROBLEM SELECTOR PLAN 3
- Echo in Jan 2018 normal LVEF  - continue midodrine as needed hold for SBP>120  - check orthostatics   - cardiology consult-reviewed

## 2018-10-29 NOTE — PHYSICAL THERAPY INITIAL EVALUATION ADULT - LIVES WITH, PROFILE
as per chart review patient lives with  daughter at home,2nd floor ,unable to gather information from the patient.

## 2018-10-29 NOTE — PHYSICAL THERAPY INITIAL EVALUATION ADULT - GAIT DISTANCE, PT EVAL
6 side steps with both eyes closed ,deferred further ambulation due to patient being sleepy while walking

## 2018-10-29 NOTE — PHYSICAL THERAPY INITIAL EVALUATION ADULT - ADDITIONAL COMMENTS
Patient is unable to provide any information about prior functional status, appears to be ambulatory without AD during previous hospitalization on 1/26/2018.

## 2018-10-29 NOTE — PROGRESS NOTE ADULT - PROBLEM SELECTOR PLAN 2
-hold BP meds due to orthostatic hypotension if BP continually elevated and not orthostatic consider clonidine.

## 2018-10-29 NOTE — PROGRESS NOTE ADULT - SUBJECTIVE AND OBJECTIVE BOX
Patient is a 85y old  Male who presents with a chief complaint of AMS    SUBJECTIVE / OVERNIGHT EVENTS: agitated ON had to be given haldol. Now sleeping willfully closing eyes. awoke to urinate unable to find bathroom urinated on floor as per daughter if he cant find bathroom urinates on floor. /110 currently 160/98.     MEDICATIONS  (STANDING):  atorvastatin 20 milliGRAM(s) Oral at bedtime  cyanocobalamin 1000 MICROGram(s) Oral daily  docusate sodium 100 milliGRAM(s) Oral two times a day  heparin  Injectable 5000 Unit(s) SubCutaneous every 12 hours  levETIRAcetam 750 milliGRAM(s) Oral two times a day  melatonin 3 milliGRAM(s) Oral at bedtime  midodrine 5 milliGRAM(s) Oral <User Schedule>  QUEtiapine 25 milliGRAM(s) Oral two times a day  tamsulosin 0.4 milliGRAM(s) Oral at bedtime    MEDICATIONS  (PRN):  polyethylene glycol 3350 17 Gram(s) Oral at bedtime PRN Constipation        CAPILLARY BLOOD GLUCOSE        I&O's Summary      T(C): 36.4 (10-29-18 @ 11:59), Max: 37.1 (10-28-18 @ 20:42)  HR: 74 (10-29-18 @ 11:59) (61 - 100)  BP: 160/98 (10-29-18 @ 11:59) (150/51 - 213/103)  RR: 17 (10-29-18 @ 11:59) (16 - 18)  SpO2: 99% (10-29-18 @ 11:59) (97% - 99%)    PHYSICAL EXAM:  GENERAL: NAD, well-developed  HEAD:  +s/p prior craniotomy scar  EYES: conjunctiva and sclera clear  NECK: Supple,   CHEST/LUNG: Clear to auscultation bilaterally;  HEART: Regular rate and rhythm;  ABDOMEN: Soft, Nontender, Nondistended; Bowel sounds present  EXTREMITIES:  , No clubbing, cyanosis, or edema  PSYCH: AAOx1 himself  NEUROLOGY: moving all ext; get up to urinate unable to find bathroom so urinated on floor      LABS:                        13.8   5.31  )-----------( 162      ( 29 Oct 2018 05:00 )             41.8     10-29    145  |  106  |  11  ----------------------------<  94  4.0   |  25  |  1.17    Ca    10.1      29 Oct 2018 05:00    TPro  8.4<H>  /  Alb  4.2  /  TBili  0.9  /  DBili  x   /  AST  57<H>  /  ALT  30  /  AlkPhos  47  10-28          Urinalysis Basic - ( 28 Oct 2018 13:00 )    Color: LIGHT YELLOW / Appearance: CLEAR / S.011 / pH: 6.0  Gluc: NEGATIVE / Ketone: NEGATIVE  / Bili: NEGATIVE / Urobili: TRACE   Blood: NEGATIVE / Protein: NEGATIVE / Nitrite: NEGATIVE   Leuk Esterase: NEGATIVE / RBC: x / WBC x   Sq Epi: x / Non Sq Epi: x / Bacteria: x          RADIOLOGY & ADDITIONAL TESTS:    Imaging Personally Reviewed:    Consultant(s) Notes Reviewed:      Care Discussed with Consultants/Other Providers: Patient is a 85y old  Male who presents with a chief complaint of AMS    SUBJECTIVE / OVERNIGHT EVENTS: agitated ON had to be given haldol. Now sleeping willfully closing eyes. awoke to urinate unable to find bathroom urinated on floor as per daughter if he cant find bathroom urinates on floor. /110 currently 160/98.     MEDICATIONS  (STANDING):  atorvastatin 20 milliGRAM(s) Oral at bedtime  cyanocobalamin 1000 MICROGram(s) Oral daily  docusate sodium 100 milliGRAM(s) Oral two times a day  heparin  Injectable 5000 Unit(s) SubCutaneous every 12 hours  levETIRAcetam 750 milliGRAM(s) Oral two times a day  melatonin 3 milliGRAM(s) Oral at bedtime  midodrine 5 milliGRAM(s) Oral <User Schedule>  QUEtiapine 25 milliGRAM(s) Oral two times a day  tamsulosin 0.4 milliGRAM(s) Oral at bedtime    MEDICATIONS  (PRN):  polyethylene glycol 3350 17 Gram(s) Oral at bedtime PRN Constipation        CAPILLARY BLOOD GLUCOSE        I&O's Summary      T(C): 36.4 (10-29-18 @ 11:59), Max: 37.1 (10-28-18 @ 20:42)  HR: 74 (10-29-18 @ 11:59) (61 - 100)  BP: 160/98 (10-29-18 @ 11:59) (150/51 - 213/103)  RR: 17 (10-29-18 @ 11:59) (16 - 18)  SpO2: 99% (10-29-18 @ 11:59) (97% - 99%)    PHYSICAL EXAM:  GENERAL: NAD, well-developed  HEAD:  +s/p prior craniotomy scar  EYES: conjunctiva and sclera clear  NECK: Supple,   CHEST/LUNG: Clear to auscultation bilaterally;  HEART: Regular rate and rhythm;  ABDOMEN: Soft, Nontender, Nondistended; Bowel sounds present  EXTREMITIES:  , No clubbing, cyanosis, or edema  PSYCH: AAOx1 himself  NEUROLOGY: moving all ext; get up to urinate unable to find bathroom so urinated on floor      LABS:                        13.8   5.31  )-----------( 162      ( 29 Oct 2018 05:00 )             41.8     10-29    145  |  106  |  11  ----------------------------<  94  4.0   |  25  |  1.17    Ca    10.1      29 Oct 2018 05:00    TPro  8.4<H>  /  Alb  4.2  /  TBili  0.9  /  DBili  x   /  AST  57<H>  /  ALT  30  /  AlkPhos  47  10-28          Urinalysis Basic - ( 28 Oct 2018 13:00 )    Color: LIGHT YELLOW / Appearance: CLEAR / S.011 / pH: 6.0  Gluc: NEGATIVE / Ketone: NEGATIVE  / Bili: NEGATIVE / Urobili: TRACE   Blood: NEGATIVE / Protein: NEGATIVE / Nitrite: NEGATIVE   Leuk Esterase: NEGATIVE / RBC: x / WBC x   Sq Epi: x / Non Sq Epi: x / Bacteria: x          RADIOLOGY & ADDITIONAL TESTS:    Imaging Personally Reviewed:< from: Xray Chest 2 Views PA/Lat (10.28.18 @ 12:44) >  IMPRESSION:  There are mild bibasilar areas of linear or subsegmental atelectasis.   There is no pneumothorax. No pleural effusions or pneumothorax. Heart   size may be enlarged. Tortuous aorta. No acute osseous abnormality.   Degenerative changes of the spine.      < end of copied text >      Consultant(s) Notes Reviewed:      Care Discussed with Consultants/Other Providers:

## 2018-10-29 NOTE — BEHAVIORAL HEALTH ASSESSMENT NOTE - NSBHCHARTREVIEWLAB_PSY_A_CORE FT
13.8   5.31  )-----------( 162      ( 29 Oct 2018 05:00 )             41.8     10-    145  |  106  |  11  ----------------------------<  94  4.0   |  25  |  1.17    Ca    10.1      29 Oct 2018 05:00    TPro  8.4<H>  /  Alb  4.2  /  TBili  0.9  /  DBili  x   /  AST  57<H>  /  ALT  30  /  AlkPhos  47  10-28    Urinalysis Basic - ( 28 Oct 2018 13:00 )    Color: LIGHT YELLOW / Appearance: CLEAR / S.011 / pH: 6.0  Gluc: NEGATIVE / Ketone: NEGATIVE  / Bili: NEGATIVE / Urobili: TRACE   Blood: NEGATIVE / Protein: NEGATIVE / Nitrite: NEGATIVE   Leuk Esterase: NEGATIVE / RBC: x / WBC x   Sq Epi: x / Non Sq Epi: x / Bacteria: x

## 2018-10-29 NOTE — PROGRESS NOTE ADULT - PROBLEM SELECTOR PLAN 1
- MRI preliminary reviewed by radiology without acute abnormality, f/u official report still pending   - blood work, CXR no significant infection or electrolytes abnormality contributes to metabolic encephalopathy  - suspect side effect with ativan in the setting of cognitive impairment.   - as per daughter previously on seroquel no longer on it due to seizure on seroquel   - psych consult for med optimization for behavioral disturbance likely to insomnia for past few dasys

## 2018-10-29 NOTE — BEHAVIORAL HEALTH ASSESSMENT NOTE - SUMMARY
84 y/o male, domiciled in private residence w/his daughter, no known PPHx, PMH traumatic subdural hematoma s/p Craniotomy with drain in Jan 2018, w/reportedly mild cognitive impairment post-op, HTN, HLD, orthostatic hypotension on midodrine, admitted for AMS/agitation x 1 day after taking Ativan for outpatient MRI. Pt unable to be interviewed, obtunded, occasionally will open eyes with tactile stimuli and go back to sleep. Per collateral, patient is not at baseline. Delirium 2/2 ativan administration likely. Pt's family amenable to antipsychotic treatment, and patient receives Seroquel PRNs at home occasionally. See recs below:     Plan    1. No 1:1 needed from psychiatric perspective  2. Seroquel 25mg qhs PO   3. PRN anxiety/agitation: Seroquel 25mg q6h PO  4. PRN severe agitation: Haldol 0.5mg q6h PO/IM/IV  5. The patient would also benefit from maintenance of regular sleep/wake cycles, frequent re-orientation, family member at bedside, ensuring personal eyeglasses or hearing aids available if used, avoidance of benzodiazepines and anticholinergic medications, and judicious use of opiates for pain control if necessary. 84 y/o male, domiciled in private residence w/his daughter, no known PPHx, PMH traumatic subdural hematoma s/p Craniotomy with drain in Jan 2018, w/reportedly mild cognitive impairment post-op, HTN, HLD, orthostatic hypotension on midodrine, admitted for AMS/agitation x 1 day after taking Ativan for outpatient MRI. Pt unable to be interviewed, obtunded, occasionally will open eyes with tactile stimuli and go back to sleep. Per collateral, patient is not at baseline. Delirium 2/2 ativan administration likely. Pt's family amenable to antipsychotic treatment, and patient receives Seroquel PRNs at home occasionally. See recs below:     Plan    1. No 1:1 needed from psychiatric perspective  2. Seroquel 25mg qhs PO (if QTc < 500ms)   3. PRN anxiety/agitation: Seroquel 25mg q6h PO (if QTc < 500ms)   4. PRN severe agitation: Haldol 0.5mg q6h PO/IM/IV (if QTc < 500ms)   5. The patient would also benefit from maintenance of regular sleep/wake cycles, frequent re-orientation, family member at bedside, ensuring personal eyeglasses or hearing aids available if used, avoidance of benzodiazepines and anticholinergic medications, and judicious use of opiates for pain control if necessary.

## 2018-10-29 NOTE — PROGRESS NOTE ADULT - PROBLEM SELECTOR PLAN 8
discussed with daughters at length about work up of dementia/cognitive impairment, daughters are concerned Pt significant decline of function status, expressed worries about difficulty taking care of him at home when he is agitated.   Due to culture/[employer], daughters still want to take care of Pt at home. Currently want get entire work up with hope that Pt will go back to his baseline.   -HUGH

## 2018-10-29 NOTE — PROGRESS NOTE ADULT - ASSESSMENT
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 +insomnia

## 2018-10-29 NOTE — BEHAVIORAL HEALTH ASSESSMENT NOTE - NSBHCHARTREVIEWVS_PSY_A_CORE FT
T(C): 36.4 (10-29-18 @ 11:59), Max: 37.1 (10-28-18 @ 20:42)  HR: 74 (10-29-18 @ 11:59) (61 - 100)  BP: 160/98 (10-29-18 @ 11:59) (150/51 - 213/103)  RR: 17 (10-29-18 @ 11:59) (16 - 18)  SpO2: 99% (10-29-18 @ 11:59) (97% - 99%)  Wt(kg): --

## 2018-10-29 NOTE — PHYSICAL THERAPY INITIAL EVALUATION ADULT - PERTINENT HX OF CURRENT PROBLEM, REHAB EVAL
This is an 84 yo M PMH traumatic subdural hematoma s/p Craniotomy with drain in Jan 2018, orthostatic hypotension, cognitive impairment post neurosurgery, was admitted for AMS for 1 day after taking Ativan for MRI.

## 2018-10-29 NOTE — PROGRESS NOTE ADULT - PROBLEM SELECTOR PLAN 4
- ordered Vit B1-P, B12, folate-WNL , TSH, free T3/T4-WNL,   - syphilis screen to complete dementia work up, f/u official report MRI  - to call neurology consult in AM

## 2018-10-29 NOTE — BEHAVIORAL HEALTH ASSESSMENT NOTE - AXIS III
traumatic subdural hematoma s/p Craniotomy with drain in Jan 2018, HTN, HLD, orthostatic hypotension on midodrine

## 2018-10-29 NOTE — CONSULT NOTE ADULT - SUBJECTIVE AND OBJECTIVE BOX
CHIEF COMPLAINT:Patient is a 85y old  Male who presents with a chief complaint of     HISTORY OF PRESENT ILLNESS:  Due to altered mental status, subjective information were not able to be obtained from the patient. History was obtained, to the extent possible, from review of the chart and collateral sources of information.   85 male with history as below, admitted with AMS        PAST MEDICAL & SURGICAL HISTORY:  Hyperlipidemia, unspecified hyperlipidemia type  Benign prostatic hyperplasia with urinary frequency  HTN (hypertension)  Status post craniectomy          MEDICATIONS:  heparin  Injectable 5000 Unit(s) SubCutaneous every 12 hours  midodrine 5 milliGRAM(s) Oral <User Schedule>  tamsulosin 0.4 milliGRAM(s) Oral at bedtime        levETIRAcetam 750 milliGRAM(s) Oral two times a day  melatonin 3 milliGRAM(s) Oral at bedtime  QUEtiapine 25 milliGRAM(s) Oral two times a day    docusate sodium 100 milliGRAM(s) Oral two times a day  polyethylene glycol 3350 17 Gram(s) Oral at bedtime PRN    atorvastatin 20 milliGRAM(s) Oral at bedtime    cyanocobalamin 1000 MICROGram(s) Oral daily      FAMILY HISTORY:  No pertinent family history in first degree relatives      Non-contributory    SOCIAL HISTORY:    No tobacco, drugs or etoh    Allergies    No Known Allergies    Intolerances    	    REVIEW OF SYSTEMS:  as above  The rest of the 14 points ROS reviewed and except above they are unremarkable.        PHYSICAL EXAM:  T(C): 37 (10-29-18 @ 06:16), Max: 37.1 (10-28-18 @ 20:42)  HR: 71 (10-29-18 @ 07:33) (61 - 100)  BP: 180/78 (10-29-18 @ 07:33) (149/87 - 213/103)  RR: 18 (10-29-18 @ 06:16) (16 - 18)  SpO2: 98% (10-29-18 @ 06:16) (97% - 99%)  Wt(kg): --  I&O's Summary    JVP: Normal  Neck: supple  Lung: clear   CV: S1 S2 , Murmur:  Abd: soft  Ext: No edema  neuro: Awake   Psych: flat affect  Skin: normal      LABS/DATA:    TELEMETRY: 	    ECG:  	   	  CARDIAC MARKERS:                                      13.8   5.31  )-----------( 162      ( 29 Oct 2018 05:00 )             41.8     10-29    145  |  106  |  11  ----------------------------<  94  4.0   |  25  |  1.17    Ca    10.1      29 Oct 2018 05:00    TPro  8.4<H>  /  Alb  4.2  /  TBili  0.9  /  DBili  x   /  AST  57<H>  /  ALT  30  /  AlkPhos  47  10-28    proBNP:   Lipid Profile:   HgA1c:   TSH: Thyroid Stimulating Hormone, Serum: 1.76 uIU/mL (10-29 @ 05:00)

## 2018-10-29 NOTE — BEHAVIORAL HEALTH ASSESSMENT NOTE - HPI (INCLUDE ILLNESS QUALITY, SEVERITY, DURATION, TIMING, CONTEXT, MODIFYING FACTORS, ASSOCIATED SIGNS AND SYMPTOMS)
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 Pt unable to be interviewed, obtunded, occasionally will open eyes with tactile stimuli and go back to sleep.     Pt's daughter provided the following collateral: the patient received Ativan prior to an outpatient MRI yesterday, and when he returned home, appeared unsteady on his feet, was dizzy, agitated, and aggressive towards various family members. At baseline, she reports pt has some residual cognitive deficits following traumatic subdural hematoma noted above, mainly where is occasionally forgetful and not "quite as alert" as he was prior to that. Otherwise, at baseline, he is alert/fully oriented, attends to most ADLs independently (help w/dressing given resulting physical limitations 2/2 subdural). She states he keeps up with current events/news and can hold conversations about these topics. He is occasionally anxious at home and will receive Seroquel 25mg PRN which tends to have good effect. Otherwise pt has no hx of psychiatric medication trials, follows w/neurologist, Dr. Jonatan Rubio, for mild cognitive dysfunction after craniotomy. Per family, pending blood work for dementia work up, unclear what that is. She denies any hx of SIIP/HIIP.

## 2018-10-29 NOTE — PHYSICAL THERAPY INITIAL EVALUATION ADULT - GENERAL OBSERVATIONS, REHAB EVAL
Patient received semi supine in bed , alert ,able to follow vc's inconsistently, patient fell asleep during PT evaluation and was aroused several times,(+) bed alarm.

## 2018-10-30 DIAGNOSIS — I63.9 CEREBRAL INFARCTION, UNSPECIFIED: ICD-10-CM

## 2018-10-30 PROCEDURE — 99233 SBSQ HOSP IP/OBS HIGH 50: CPT

## 2018-10-30 PROCEDURE — 93010 ELECTROCARDIOGRAM REPORT: CPT

## 2018-10-30 PROCEDURE — 99222 1ST HOSP IP/OBS MODERATE 55: CPT | Mod: GC

## 2018-10-30 RX ORDER — ASPIRIN/CALCIUM CARB/MAGNESIUM 324 MG
81 TABLET ORAL DAILY
Qty: 0 | Refills: 0 | Status: DISCONTINUED | OUTPATIENT
Start: 2018-10-30 | End: 2018-11-01

## 2018-10-30 RX ORDER — MIDODRINE HYDROCHLORIDE 2.5 MG/1
2.5 TABLET ORAL THREE TIMES A DAY
Qty: 0 | Refills: 0 | Status: DISCONTINUED | OUTPATIENT
Start: 2018-10-30 | End: 2018-11-01

## 2018-10-30 RX ADMIN — Medication 100 MILLIGRAM(S): at 18:33

## 2018-10-30 RX ADMIN — MIDODRINE HYDROCHLORIDE 2.5 MILLIGRAM(S): 2.5 TABLET ORAL at 21:10

## 2018-10-30 RX ADMIN — QUETIAPINE FUMARATE 25 MILLIGRAM(S): 200 TABLET, FILM COATED ORAL at 09:39

## 2018-10-30 RX ADMIN — LEVETIRACETAM 750 MILLIGRAM(S): 250 TABLET, FILM COATED ORAL at 05:49

## 2018-10-30 RX ADMIN — LEVETIRACETAM 750 MILLIGRAM(S): 250 TABLET, FILM COATED ORAL at 18:33

## 2018-10-30 RX ADMIN — Medication 3 MILLIGRAM(S): at 21:10

## 2018-10-30 RX ADMIN — Medication 100 MILLIGRAM(S): at 05:49

## 2018-10-30 RX ADMIN — PREGABALIN 1000 MICROGRAM(S): 225 CAPSULE ORAL at 11:50

## 2018-10-30 RX ADMIN — HEPARIN SODIUM 5000 UNIT(S): 5000 INJECTION INTRAVENOUS; SUBCUTANEOUS at 18:32

## 2018-10-30 RX ADMIN — HEPARIN SODIUM 5000 UNIT(S): 5000 INJECTION INTRAVENOUS; SUBCUTANEOUS at 05:49

## 2018-10-30 RX ADMIN — ATORVASTATIN CALCIUM 20 MILLIGRAM(S): 80 TABLET, FILM COATED ORAL at 21:10

## 2018-10-30 RX ADMIN — Medication 81 MILLIGRAM(S): at 18:33

## 2018-10-30 RX ADMIN — QUETIAPINE FUMARATE 25 MILLIGRAM(S): 200 TABLET, FILM COATED ORAL at 21:10

## 2018-10-30 RX ADMIN — TAMSULOSIN HYDROCHLORIDE 0.4 MILLIGRAM(S): 0.4 CAPSULE ORAL at 21:10

## 2018-10-30 NOTE — CONSULT NOTE ADULT - ASSESSMENT
84 y/o AA M w/ h/o cognitive decline for the past 10 months after a traumatic SDH, being worked up as an outpatient by Dr Rubio.     Diagnosis likely vascular dementia given extensive white matter changes on MRI with worsening of cognition after traumatic brain injury.     Recommendations:   - B12, TSH, RPR   - follow up with Dr Rubio at his established appt on 11/26     incomplete note 84 y/o AA M w/ h/o cognitive decline for the past 10 months after a traumatic SDH, being worked up as an outpatient by Dr Rubio.     Diagnosis likely vascular dementia given extensive white matter changes on MRI with worsening of cognition after traumatic brain injury.     Recommendations:   -CT head without contrast to rule out any acute findings like blood etc  gien hx  - B12, TSH, RPR   -Continue medical mgt and psych management of mood and behavior/affect and supportive care  - follow up with Dr Rubio at his established appt on 11/26 86 y/o AA M w/ h/o cognitive decline for the past 10 months after a traumatic SDH, being worked up as an outpatient by Dr Rubio.     Diagnosis likely vascular dementia given extensive white matter changes on MRI with worsening of cognition after traumatic brain injury.     Recommendations:   -CT head without contrast to rule out any acute findings like blood etc  gien hx  - B12, TSH, RPR   -Continue medical mgt and psych management of mood and behavior/affect and supportive care  - ASA 81 and Lipitor   - follow up with Dr Rubio at his established appt on 11/26

## 2018-10-30 NOTE — PROGRESS NOTE BEHAVIORAL HEALTH - NSBHFUPINTERVALHXFT_PSY_A_CORE
Pt seen for f/u of delirium, no acute overnight events, no PRNs received. Pt arousable to verbal/tactile stimuli today, oriented to self, otherwise disoriented to time/place/situation. He nods when told he is in the hospital, says "okay thank you," and says he will go back to sleep. Has remained calm/in good behavior control overnight, otherwise assessment continues to be somewhat limited.

## 2018-10-30 NOTE — CONSULT NOTE ADULT - ATTENDING COMMENTS
Patient seen and examined and with neurology team and above note reviewed and I agree with assessment and plan as outlined. Hx as noted and patient with agitation and confusion with hx of recent cognitive decline following his traumatic SDH in January. He now requires assistance with activities of daily living. He follows with a neurologist Dr. Rubio who is on staff with us here. Mental status, concentration and attention is poor and he has bilateral asterixis on examination.   Plan: rule out any underlying infection as could be delirium or metabolic encephalopathy superimposed on underlying vascular dementia.        Please send Folate, vitamin B12, ammonia levels given asterixis on examination and continue psych managt for behavioral /affective symptoms  and medical mgt and supportive care. Patient seen and examined and with neurology team and above note reviewed and I agree with assessment and plan as outlined. Hx as noted and patient with agitation and confusion with hx of recent cognitive decline following his traumatic SDH in January. He now requires assistance with activities of daily living. He follows with a neurologist Dr. Rubio who is on staff with us here. Mental status, concentration and attention is poor and he has bilateral asterixis on examination. MRI from 10/27 showed a subacute infarct of the right external capsule and basal ganglia.  Plan: rule out any underlying infection as could be delirium or metabolic encephalopathy superimposed on underlying vascular dementia.        Please send Folate, vitamin B12, ammonia levels given asterixis on examination and continue psych managt for behavioral /affective symptoms  and medical mgt and supportive care.  Will have Stroke team comment on subacute infarct found and will complete workup with 2 D echo, holter and morning stroke labs including fasting lipids, HBA1c and ESR and CRP, TSH.

## 2018-10-30 NOTE — PROGRESS NOTE ADULT - SUBJECTIVE AND OBJECTIVE BOX
Subjective: Patient seen and examined. No new events except as noted.     SUBJECTIVE/ROS:  resting       MEDICATIONS:  MEDICATIONS  (STANDING):  atorvastatin 20 milliGRAM(s) Oral at bedtime  cyanocobalamin 1000 MICROGram(s) Oral daily  docusate sodium 100 milliGRAM(s) Oral two times a day  heparin  Injectable 5000 Unit(s) SubCutaneous every 12 hours  levETIRAcetam 750 milliGRAM(s) Oral two times a day  melatonin 3 milliGRAM(s) Oral at bedtime  midodrine 5 milliGRAM(s) Oral <User Schedule>  QUEtiapine 25 milliGRAM(s) Oral at bedtime  tamsulosin 0.4 milliGRAM(s) Oral at bedtime      PHYSICAL EXAM:  T(C): 36.7 (10-30-18 @ 05:41), Max: 36.8 (10-29-18 @ 21:31)  HR: 98 (10-30-18 @ 05:41) (74 - 98)  BP: 140/86 (10-30-18 @ 05:41) (140/86 - 175/83)  RR: 18 (10-30-18 @ 05:41) (17 - 18)  SpO2: 98% (10-30-18 @ 05:41) (98% - 99%)  Wt(kg): --  I&O's Summary    Height (cm): 177.8 (10-29 @ 07:57)  Weight (kg): 70.4 (10-29 @ 07:57)  BMI (kg/m2): 22.3 (10-29 @ 07:57)  BSA (m2): 1.87 (10-29 @ 07:57)    JVP: Normal  Neck: supple  Lung: clear   CV: S1 S2 , Murmur:  Abd: soft  Ext: No edema  neuro: Awake / alert  Psych: flat affect  Skin: normal        LABS/DATA:    CARDIAC MARKERS:                                13.8   5.31  )-----------( 162      ( 29 Oct 2018 05:00 )             41.8     10-29    145  |  106  |  11  ----------------------------<  94  4.0   |  25  |  1.17    Ca    10.1      29 Oct 2018 05:00    TPro  8.4<H>  /  Alb  4.2  /  TBili  0.9  /  DBili  x   /  AST  57<H>  /  ALT  30  /  AlkPhos  47  10-28    proBNP:   Lipid Profile:   HgA1c:   TSH:     TELE:  EKG:

## 2018-10-30 NOTE — PROGRESS NOTE ADULT - ASSESSMENT
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 +insomnia +MRI -subacute lacunar infarct Rt posterior external capsule/basal ganglia junction.

## 2018-10-30 NOTE — PROGRESS NOTE ADULT - SUBJECTIVE AND OBJECTIVE BOX
Patient is a 85y old  Male who presents with a chief complaint of AMS (29 Oct 2018 13:35)      SUBJECTIVE / OVERNIGHT EVENTS: +orthostasis. more awake this AM daughter at bedside seems to be at baseline.     MEDICATIONS  (STANDING):  atorvastatin 20 milliGRAM(s) Oral at bedtime  cyanocobalamin 1000 MICROGram(s) Oral daily  docusate sodium 100 milliGRAM(s) Oral two times a day  heparin  Injectable 5000 Unit(s) SubCutaneous every 12 hours  levETIRAcetam 750 milliGRAM(s) Oral two times a day  melatonin 3 milliGRAM(s) Oral at bedtime  midodrine 5 milliGRAM(s) Oral <User Schedule>  QUEtiapine 25 milliGRAM(s) Oral at bedtime  tamsulosin 0.4 milliGRAM(s) Oral at bedtime    MEDICATIONS  (PRN):  haloperidol     Tablet 0.5 milliGRAM(s) Oral every 6 hours PRN severe agitation  polyethylene glycol 3350 17 Gram(s) Oral at bedtime PRN Constipation  QUEtiapine 25 milliGRAM(s) Oral every 6 hours PRN agitation/anxiety        CAPILLARY BLOOD GLUCOSE        I&O's Summary      T(C): 36.7 (10-30-18 @ 12:36), Max: 36.8 (10-29-18 @ 21:31)  HR: 86 (10-30-18 @ 12:36) (82 - 98)  BP: 114/88 (10-30-18 @ 12:36) (114/88 - 175/83)  RR: 17 (10-30-18 @ 12:36) (17 - 18)  SpO2: 99% (10-30-18 @ 12:36) (98% - 99%)    PHYSICAL EXAM:    LABS:                        13.8   5.31  )-----------( 162      ( 29 Oct 2018 05:00 )             41.8     10    145  |  106  |  11  ----------------------------<  94  4.0   |  25  |  1.17    Ca    10.1      29 Oct 2018 05:00    TPro  8.4<H>  /  Alb  4.2  /  TBili  0.9  /  DBili  x   /  AST  57<H>  /  ALT  30  /  AlkPhos  47  10-          Urinalysis Basic - ( 28 Oct 2018 13:00 )    Color: LIGHT YELLOW / Appearance: CLEAR / S.011 / pH: 6.0  Gluc: NEGATIVE / Ketone: NEGATIVE  / Bili: NEGATIVE / Urobili: TRACE   Blood: NEGATIVE / Protein: NEGATIVE / Nitrite: NEGATIVE   Leuk Esterase: NEGATIVE / RBC: x / WBC x   Sq Epi: x / Non Sq Epi: x / Bacteria: x          RADIOLOGY & ADDITIONAL TESTS:    Imaging Personally Reviewed:< from: MR Head No Cont (10.27.18 @ 17:08) >  IMPRESSION:    Redemonstrated bilateral anterior parietal maxi holes. No subdural   hematomas visualized.    Punctate subacute lacunar infarction involving the right posterior   external capsule/basal ganglia junction.    Multiple extensive patchy confluent nonspecific abnormal white matter   foci of T2/FLAIR prolongation statistically favoring microvascular   disease.       < end of copied text >      Consultant(s) Notes Reviewed:      Care Discussed with Consultants/Other Providers:

## 2018-10-30 NOTE — CONSULT NOTE ADULT - SUBJECTIVE AND OBJECTIVE BOX
NEUROLOGY CONSULT     Patient is a 85y old  Male who presents with a chief complaint of AMS (29 Oct 2018 13:35)      HPI:  HPI: 86 yo M AAM presenting with agitation and cognitive decline after traumatic SDH in 1/18 requiring maxi holes. Patient was previously independent, now requires help w/ ADLs. He is being followed as an outpatient by Dr Rubio, last visit 8/18.      PAST MEDICAL & SURGICAL HISTORY:  Hyperlipidemia, unspecified hyperlipidemia type  Benign prostatic hyperplasia with urinary frequency  HTN (hypertension)  Status post craniectomy      Allergies    No Known Allergies    Intolerances        MEDICATIONS  (STANDING):  atorvastatin 20 milliGRAM(s) Oral at bedtime  cyanocobalamin 1000 MICROGram(s) Oral daily  docusate sodium 100 milliGRAM(s) Oral two times a day  heparin  Injectable 5000 Unit(s) SubCutaneous every 12 hours  levETIRAcetam 750 milliGRAM(s) Oral two times a day  melatonin 3 milliGRAM(s) Oral at bedtime  midodrine 5 milliGRAM(s) Oral <User Schedule>  QUEtiapine 25 milliGRAM(s) Oral at bedtime  tamsulosin 0.4 milliGRAM(s) Oral at bedtime    MEDICATIONS  (PRN):  haloperidol     Tablet 0.5 milliGRAM(s) Oral every 6 hours PRN severe agitation  polyethylene glycol 3350 17 Gram(s) Oral at bedtime PRN Constipation  QUEtiapine 25 milliGRAM(s) Oral every 6 hours PRN agitation/anxiety      SOCIAL HISTORY: Denies tobacco/EtOH/drug use     FAMILY HISTORY:  No pertinent family history in first degree relatives        Vital Signs Last 24 Hrs  T(C): 36.7 (30 Oct 2018 05:41), Max: 36.8 (29 Oct 2018 21:31)  T(F): 98.1 (30 Oct 2018 05:41), Max: 98.3 (29 Oct 2018 21:31)  HR: 98 (30 Oct 2018 05:41) (74 - 98)  BP: 140/86 (30 Oct 2018 05:41) (140/86 - 175/83)  BP(mean): --  RR: 18 (30 Oct 2018 05:41) (17 - 18)  SpO2: 98% (30 Oct 2018 05:41) (98% - 99%)    PHYSICAL EXAM:       LABS:                          13.8   5.31  )-----------( 162      ( 29 Oct 2018 05:00 )             41.8     10-29    145  |  106  |  11  ----------------------------<  94  4.0   |  25  |  1.17    Ca    10.1      29 Oct 2018 05:00    TPro  8.4<H>  /  Alb  4.2  /  TBili  0.9  /  DBili  x   /  AST  57<H>  /  ALT  30  /  AlkPhos  47  10-28      IMAGING: NEUROLOGY CONSULT     Patient is a 85y old  Male who presents with a chief complaint of AMS (29 Oct 2018 13:35)      HPI:  HPI: 86 yo M AAM presenting with agitation and cognitive decline after traumatic SDH in 1/18 requiring maxi holes. Patient was previously independent, now requires help w/ ADLs. He is being followed as an outpatient by Dr Rubio, last visit 8/18.      PAST MEDICAL & SURGICAL HISTORY:  Hyperlipidemia, unspecified hyperlipidemia type  Benign prostatic hyperplasia with urinary frequency  HTN (hypertension)  Status post craniectomy      Allergies    No Known Allergies    Intolerances        MEDICATIONS  (STANDING):  atorvastatin 20 milliGRAM(s) Oral at bedtime  cyanocobalamin 1000 MICROGram(s) Oral daily  docusate sodium 100 milliGRAM(s) Oral two times a day  heparin  Injectable 5000 Unit(s) SubCutaneous every 12 hours  levETIRAcetam 750 milliGRAM(s) Oral two times a day  melatonin 3 milliGRAM(s) Oral at bedtime  midodrine 5 milliGRAM(s) Oral <User Schedule>  QUEtiapine 25 milliGRAM(s) Oral at bedtime  tamsulosin 0.4 milliGRAM(s) Oral at bedtime    MEDICATIONS  (PRN):  haloperidol     Tablet 0.5 milliGRAM(s) Oral every 6 hours PRN severe agitation  polyethylene glycol 3350 17 Gram(s) Oral at bedtime PRN Constipation  QUEtiapine 25 milliGRAM(s) Oral every 6 hours PRN agitation/anxiety      SOCIAL HISTORY: Denies tobacco/EtOH/drug use     FAMILY HISTORY:  No pertinent family history in first degree relatives        Vital Signs Last 24 Hrs  T(C): 36.7 (30 Oct 2018 05:41), Max: 36.8 (29 Oct 2018 21:31)  T(F): 98.1 (30 Oct 2018 05:41), Max: 98.3 (29 Oct 2018 21:31)  HR: 98 (30 Oct 2018 05:41) (74 - 98)  BP: 140/86 (30 Oct 2018 05:41) (140/86 - 175/83)  BP(mean): --  RR: 18 (30 Oct 2018 05:41) (17 - 18)  SpO2: 98% (30 Oct 2018 05:41) (98% - 99%)    PHYSICAL EXAM:   MS: alert, awake, nonsensical speech, minimal verbal, follows commands intermittently   CN: PERRL, EOMI, blinks to threat b/l, face symmetric   Motor: moving all extremities spontaneously to antigravity   Sens/Cerebellar: not cooperating on exam  NIHSS 2 for not following commands, not knows age or month       LABS:                          13.8   5.31  )-----------( 162      ( 29 Oct 2018 05:00 )             41.8     10-29    145  |  106  |  11  ----------------------------<  94  4.0   |  25  |  1.17    Ca    10.1      29 Oct 2018 05:00    TPro  8.4<H>  /  Alb  4.2  /  TBili  0.9  /  DBili  x   /  AST  57<H>  /  ALT  30  /  AlkPhos  47  10-28      IMAGING: NEUROLOGY CONSULT     Patient is a 85y old  Male who presents with a chief complaint of AMS (29 Oct 2018 13:35)      HPI:  HPI: 86 yo M AAM presenting with agitation and cognitive decline after traumatic SDH in 1/18 requiring maxi holes. Patient was previously independent, now requires help w/ ADLs. He is being followed as an outpatient by Dr Rubio, last visit 8/18.      PAST MEDICAL & SURGICAL HISTORY:  Hyperlipidemia, unspecified hyperlipidemia type  Benign prostatic hyperplasia with urinary frequency  HTN (hypertension)  Status post craniectomy      Allergies    No Known Allergies    Intolerances        MEDICATIONS  (STANDING):  atorvastatin 20 milliGRAM(s) Oral at bedtime  cyanocobalamin 1000 MICROGram(s) Oral daily  docusate sodium 100 milliGRAM(s) Oral two times a day  heparin  Injectable 5000 Unit(s) SubCutaneous every 12 hours  levETIRAcetam 750 milliGRAM(s) Oral two times a day  melatonin 3 milliGRAM(s) Oral at bedtime  midodrine 5 milliGRAM(s) Oral <User Schedule>  QUEtiapine 25 milliGRAM(s) Oral at bedtime  tamsulosin 0.4 milliGRAM(s) Oral at bedtime    MEDICATIONS  (PRN):  haloperidol     Tablet 0.5 milliGRAM(s) Oral every 6 hours PRN severe agitation  polyethylene glycol 3350 17 Gram(s) Oral at bedtime PRN Constipation  QUEtiapine 25 milliGRAM(s) Oral every 6 hours PRN agitation/anxiety      SOCIAL HISTORY: Denies tobacco/EtOH/drug use     FAMILY HISTORY:  No pertinent family history in first degree relatives        Vital Signs Last 24 Hrs  T(C): 36.7 (30 Oct 2018 05:41), Max: 36.8 (29 Oct 2018 21:31)  T(F): 98.1 (30 Oct 2018 05:41), Max: 98.3 (29 Oct 2018 21:31)  HR: 98 (30 Oct 2018 05:41) (74 - 98)  BP: 140/86 (30 Oct 2018 05:41) (140/86 - 175/83)  BP(mean): --  RR: 18 (30 Oct 2018 05:41) (17 - 18)  SpO2: 98% (30 Oct 2018 05:41) (98% - 99%)    PHYSICAL EXAM:   MS: alert, awake, nonsensical speech, minimal verbal, follows commands intermittently   CN: PERRL, EOMI, blinks to threat b/l, face symmetric   Motor: moving all extremities spontaneously to antigravity   Sens/Cerebellar: not cooperating on exam  NIHSS 4 for not following commands, not knows age or month       LABS:                          13.8   5.31  )-----------( 162      ( 29 Oct 2018 05:00 )             41.8     10-29    145  |  106  |  11  ----------------------------<  94  4.0   |  25  |  1.17    Ca    10.1      29 Oct 2018 05:00    TPro  8.4<H>  /  Alb  4.2  /  TBili  0.9  /  DBili  x   /  AST  57<H>  /  ALT  30  /  AlkPhos  47  10-28      IMAGING:

## 2018-10-30 NOTE — PROGRESS NOTE ADULT - PROBLEM SELECTOR PLAN 2
- blood work, CXR no significant infection or electrolytes abnormality contributes to metabolic encephalopathy  - suspect side effect with ativan in the setting of cognitive impairment.   - as per daughter previously on Seroquel no longer on it due to seizure on Seroquel   - case d/w Dr. Pemberton in regards to possible seizure will verify with family if currently on seroquel at home if concern for seizure on seroquel. can consider haldol.

## 2018-10-30 NOTE — PROGRESS NOTE ADULT - ASSESSMENT
Orthostatic hypotension  supine BP is better  change midodrine to 2.5 tid with holding parameters above 160 systolic     AMS  fu with primary team for work up

## 2018-10-30 NOTE — PROGRESS NOTE BEHAVIORAL HEALTH - SUMMARY
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 Pt unable to be interviewed, obtunded, occasionally will open eyes with tactile stimuli and go back to sleep. Per collateral, patient is not at baseline. Delirium 2/2 ativan administration likely. Pt's family amenable to antipsychotic treatment, and patient receives Seroquel PRNs at home occasionally.     10/30 - Pt arousable to verbal/tactile stimuli today, oriented to self, otherwise disoriented.     Plan    1. No 1:1 needed from psychiatric perspective  2. Seroquel 25mg qhs PO (if QTc < 500ms)   3. PRN anxiety/agitation: Seroquel 25mg q6h PO (if QTc < 500ms)   4. PRN severe agitation: Haldol 0.5mg q6h PO/IM/IV (if QTc < 500ms)   5. The patient would also benefit from maintenance of regular sleep/wake cycles, frequent re-orientation, family member at bedside, ensuring personal eyeglasses or hearing aids available if used, avoidance of benzodiazepines and anticholinergic medications, and judicious use of opiates for pain control if necessary.    Differential: Delirium 2/2 benzodiazapine use.

## 2018-10-30 NOTE — CHART NOTE - NSCHARTNOTEFT_GEN_A_CORE
Discussed case with medical attending as well as Dr. Pemberton. As per patient's daughter patient did not have any seizures at home when taking Seroquel. As per Dr. Pemberton, patient can continue on low dose Seroquel. Discussed w/ both of patient's daughters who say okay to continue pt on Seroquel. Will monitor for side effects.

## 2018-10-30 NOTE — PROGRESS NOTE BEHAVIORAL HEALTH - NSBHCONSULTMEDS_PSY_A_CORE FT
Seroquel 25mg qhs Per family, pt had seizure on Seroquel. If family is amenable, would instead start Haldol 0.5mg po qhs

## 2018-10-30 NOTE — PROGRESS NOTE ADULT - PROBLEM SELECTOR PLAN 1
-MRI +subacute CVA  -ASA/statin  -check carotid dopplers   -check LDL/A1c  -Pt currently at baseline  -Neurology consult P

## 2018-10-30 NOTE — PROGRESS NOTE ADULT - PROBLEM SELECTOR PLAN 5
- ordered Vit B1-P, B12-WNL, folate-WNL , TSH, free T3/T4-WNL,   - syphilis screen- neg  - f/u full neuro consult

## 2018-10-30 NOTE — CHART NOTE - NSCHARTNOTEFT_GEN_A_CORE
MRI reviewed, patient has a small subacute R basal ganglia stroke which does not explain his agitation and appears to be asymptomatic.     Will be evaluated by stroke team, but please obtain/start the following:   - vessel imaging with CTA head and neck   - HgA1c   - start ASA 81 mg qd if no contraindications from medical perspective   - TTE w/ bubble

## 2018-10-31 DIAGNOSIS — N17.9 ACUTE KIDNEY FAILURE, UNSPECIFIED: ICD-10-CM

## 2018-10-31 LAB
BUN SERPL-MCNC: 23 MG/DL — SIGNIFICANT CHANGE UP (ref 7–23)
CALCIUM SERPL-MCNC: 8.6 MG/DL — SIGNIFICANT CHANGE UP (ref 8.4–10.5)
CHLORIDE SERPL-SCNC: 105 MMOL/L — SIGNIFICANT CHANGE UP (ref 98–107)
CHOLEST SERPL-MCNC: 96 MG/DL — LOW (ref 120–199)
CO2 SERPL-SCNC: 25 MMOL/L — SIGNIFICANT CHANGE UP (ref 22–31)
CREAT SERPL-MCNC: 1.6 MG/DL — HIGH (ref 0.5–1.3)
GLUCOSE SERPL-MCNC: 108 MG/DL — HIGH (ref 70–99)
HBA1C BLD-MCNC: 5.4 % — SIGNIFICANT CHANGE UP (ref 4–5.6)
HCT VFR BLD CALC: 39.9 % — SIGNIFICANT CHANGE UP (ref 39–50)
HDLC SERPL-MCNC: 27 MG/DL — LOW (ref 35–55)
HGB BLD-MCNC: 12.9 G/DL — LOW (ref 13–17)
LIPID PNL WITH DIRECT LDL SERPL: 62 MG/DL — SIGNIFICANT CHANGE UP
MAGNESIUM SERPL-MCNC: 1.9 MG/DL — SIGNIFICANT CHANGE UP (ref 1.6–2.6)
MCHC RBC-ENTMCNC: 27.2 PG — SIGNIFICANT CHANGE UP (ref 27–34)
MCHC RBC-ENTMCNC: 32.3 % — SIGNIFICANT CHANGE UP (ref 32–36)
MCV RBC AUTO: 84 FL — SIGNIFICANT CHANGE UP (ref 80–100)
NRBC # FLD: 0 — SIGNIFICANT CHANGE UP
PHOSPHATE SERPL-MCNC: 3.5 MG/DL — SIGNIFICANT CHANGE UP (ref 2.5–4.5)
PLATELET # BLD AUTO: 151 K/UL — SIGNIFICANT CHANGE UP (ref 150–400)
PMV BLD: 11.2 FL — SIGNIFICANT CHANGE UP (ref 7–13)
POTASSIUM SERPL-MCNC: 4.4 MMOL/L — SIGNIFICANT CHANGE UP (ref 3.5–5.3)
POTASSIUM SERPL-SCNC: 4.4 MMOL/L — SIGNIFICANT CHANGE UP (ref 3.5–5.3)
RBC # BLD: 4.75 M/UL — SIGNIFICANT CHANGE UP (ref 4.2–5.8)
RBC # FLD: 13.2 % — SIGNIFICANT CHANGE UP (ref 10.3–14.5)
SODIUM SERPL-SCNC: 144 MMOL/L — SIGNIFICANT CHANGE UP (ref 135–145)
TRIGL SERPL-MCNC: 56 MG/DL — SIGNIFICANT CHANGE UP (ref 10–149)
WBC # BLD: 5.18 K/UL — SIGNIFICANT CHANGE UP (ref 3.8–10.5)
WBC # FLD AUTO: 5.18 K/UL — SIGNIFICANT CHANGE UP (ref 3.8–10.5)

## 2018-10-31 PROCEDURE — 76770 US EXAM ABDO BACK WALL COMP: CPT | Mod: 26

## 2018-10-31 PROCEDURE — 99233 SBSQ HOSP IP/OBS HIGH 50: CPT

## 2018-10-31 PROCEDURE — 93880 EXTRACRANIAL BILAT STUDY: CPT | Mod: 26

## 2018-10-31 RX ORDER — ATORVASTATIN CALCIUM 80 MG/1
80 TABLET, FILM COATED ORAL AT BEDTIME
Qty: 0 | Refills: 0 | Status: DISCONTINUED | OUTPATIENT
Start: 2018-10-31 | End: 2018-11-01

## 2018-10-31 RX ORDER — HALOPERIDOL DECANOATE 100 MG/ML
0.5 INJECTION INTRAMUSCULAR EVERY 6 HOURS
Qty: 0 | Refills: 0 | Status: DISCONTINUED | OUTPATIENT
Start: 2018-10-31 | End: 2018-11-01

## 2018-10-31 RX ORDER — SODIUM CHLORIDE 9 MG/ML
1000 INJECTION INTRAMUSCULAR; INTRAVENOUS; SUBCUTANEOUS
Qty: 0 | Refills: 0 | Status: DISCONTINUED | OUTPATIENT
Start: 2018-10-31 | End: 2018-11-01

## 2018-10-31 RX ADMIN — LEVETIRACETAM 750 MILLIGRAM(S): 250 TABLET, FILM COATED ORAL at 18:06

## 2018-10-31 RX ADMIN — ATORVASTATIN CALCIUM 80 MILLIGRAM(S): 80 TABLET, FILM COATED ORAL at 21:17

## 2018-10-31 RX ADMIN — Medication 100 MILLIGRAM(S): at 05:30

## 2018-10-31 RX ADMIN — LEVETIRACETAM 750 MILLIGRAM(S): 250 TABLET, FILM COATED ORAL at 05:30

## 2018-10-31 RX ADMIN — HEPARIN SODIUM 5000 UNIT(S): 5000 INJECTION INTRAVENOUS; SUBCUTANEOUS at 18:06

## 2018-10-31 RX ADMIN — TAMSULOSIN HYDROCHLORIDE 0.4 MILLIGRAM(S): 0.4 CAPSULE ORAL at 21:14

## 2018-10-31 RX ADMIN — Medication 3 MILLIGRAM(S): at 21:14

## 2018-10-31 RX ADMIN — MIDODRINE HYDROCHLORIDE 2.5 MILLIGRAM(S): 2.5 TABLET ORAL at 21:14

## 2018-10-31 RX ADMIN — SODIUM CHLORIDE 75 MILLILITER(S): 9 INJECTION INTRAMUSCULAR; INTRAVENOUS; SUBCUTANEOUS at 11:01

## 2018-10-31 RX ADMIN — PREGABALIN 1000 MICROGRAM(S): 225 CAPSULE ORAL at 14:05

## 2018-10-31 RX ADMIN — Medication 100 MILLIGRAM(S): at 18:06

## 2018-10-31 RX ADMIN — HEPARIN SODIUM 5000 UNIT(S): 5000 INJECTION INTRAVENOUS; SUBCUTANEOUS at 05:30

## 2018-10-31 RX ADMIN — Medication 81 MILLIGRAM(S): at 14:04

## 2018-10-31 RX ADMIN — SODIUM CHLORIDE 75 MILLILITER(S): 9 INJECTION INTRAMUSCULAR; INTRAVENOUS; SUBCUTANEOUS at 21:14

## 2018-10-31 RX ADMIN — MIDODRINE HYDROCHLORIDE 2.5 MILLIGRAM(S): 2.5 TABLET ORAL at 05:30

## 2018-10-31 RX ADMIN — QUETIAPINE FUMARATE 25 MILLIGRAM(S): 200 TABLET, FILM COATED ORAL at 21:14

## 2018-10-31 RX ADMIN — HALOPERIDOL DECANOATE 0.5 MILLIGRAM(S): 100 INJECTION INTRAMUSCULAR at 22:02

## 2018-10-31 RX ADMIN — MIDODRINE HYDROCHLORIDE 2.5 MILLIGRAM(S): 2.5 TABLET ORAL at 14:05

## 2018-10-31 NOTE — PROGRESS NOTE ADULT - SUBJECTIVE AND OBJECTIVE BOX
Neurology Follow up note      Subjective:Interval History - Patient seen in the vascular lab after his carotid doppler. He has no acute compliants.     Objective:   Vital Signs Last 24 Hrs  T(C): 36.5 (31 Oct 2018 05:24), Max: 36.6 (30 Oct 2018 21:05)  T(F): 97.7 (31 Oct 2018 05:24), Max: 97.8 (30 Oct 2018 21:05)  HR: 99 (30 Oct 2018 21:05) (99 - 99)  BP: 148/74 (30 Oct 2018 21:05) (148/74 - 148/74)  BP(mean): --  RR: 18 (31 Oct 2018 05:24) (18 - 18)  SpO2: 95% (31 Oct 2018 05:24) (95% - 97%)    General Exam:   General appearance: No acute distress                   Neurological Exam:  Mental Status: AOx 1 (to self only); motor perseveration upon dysmetria testing;  no gross dysarthria or aphasia  Cranial Nerves:  EOMI, indeterminate blink to threat; No facial asymmetry.     Motor:   Tone: normal.                  Strength: 5/5 throughout  Pronator drift: none                 Dysmeria: None to finger-nose-finger    Tremor: No resting tremors    Sensation: intact to light touch; no extinction          10-31    144  |  105  |  23  ----------------------------<  108<H>  4.4   |  25  |  1.60<H>    Ca    8.6      31 Oct 2018 05:15  Phos  3.5     10-31  Mg     1.9     10-31      10-31    144  |  105  |  23  ----------------------------<  108<H>  4.4   |  25  |  1.60<H>    Ca    8.6      31 Oct 2018 05:15  Phos  3.5     10-31  Mg     1.9     10-31                              12.9   5.18  )-----------( 151      ( 31 Oct 2018 05:15 )             39.9     Radiology    < from: MR Head No Cont (10.27.18 @ 17:08) >    IMPRESSION:    Redemonstrated bilateral anterior parietal maxi holes. No subdural   hematomas visualized.    Punctate subacute lacunar infarction involving the right posterior   external capsule/basal ganglia junction.    Multiple extensive patchy confluent nonspecific abnormal white matter   foci of T2/FLAIR prolongation statistically favoring microvascular   disease.     < end of copied text >        MEDICATIONS  (STANDING):  aspirin enteric coated 81 milliGRAM(s) Oral daily  atorvastatin 20 milliGRAM(s) Oral at bedtime  cyanocobalamin 1000 MICROGram(s) Oral daily  docusate sodium 100 milliGRAM(s) Oral two times a day  heparin  Injectable 5000 Unit(s) SubCutaneous every 12 hours  levETIRAcetam 750 milliGRAM(s) Oral two times a day  melatonin 3 milliGRAM(s) Oral at bedtime  midodrine 2.5 milliGRAM(s) Oral three times a day  QUEtiapine 25 milliGRAM(s) Oral at bedtime  sodium chloride 0.9%. 1000 milliLiter(s) (75 mL/Hr) IV Continuous <Continuous>  tamsulosin 0.4 milliGRAM(s) Oral at bedtime    MEDICATIONS  (PRN):  haloperidol     Tablet 0.5 milliGRAM(s) Oral every 6 hours PRN severe agitation  haloperidol    Injectable 0.5 milliGRAM(s) IntraMuscular every 6 hours PRN severe agitation  haloperidol    Injectable 0.5 milliGRAM(s) IV Push every 6 hours PRN severe agitation  polyethylene glycol 3350 17 Gram(s) Oral at bedtime PRN Constipation Neurology Follow up note      Subjective:Interval History - Patient seen in the vascular lab after his carotid doppler. He has no acute complaints     Objective:   Vital Signs Last 24 Hrs  T(C): 36.5 (31 Oct 2018 05:24), Max: 36.6 (30 Oct 2018 21:05)  T(F): 97.7 (31 Oct 2018 05:24), Max: 97.8 (30 Oct 2018 21:05)  HR: 99 (30 Oct 2018 21:05) (99 - 99)  BP: 148/74 (30 Oct 2018 21:05) (148/74 - 148/74)  BP(mean): --  RR: 18 (31 Oct 2018 05:24) (18 - 18)  SpO2: 95% (31 Oct 2018 05:24) (95% - 97%)    General Exam:   General appearance: No acute distress                   Neurological Exam:  Mental Status: AOx 1 (to self only); motor perseveration (upon dysmetria testing);  no gross dysarthria or aphasia  Cranial Nerves:  EOMI, indeterminate blink to threat; No facial asymmetry.     Motor:   Tone: normal.                  Strength: 5/5 throughout  Pronator drift: none                 Dysmetria: None to finger-nose-finger    Tremor: No resting tremors    Sensation: intact to light touch; no extinction          10-31    144  |  105  |  23  ----------------------------<  108<H>  4.4   |  25  |  1.60<H>    Ca    8.6      31 Oct 2018 05:15  Phos  3.5     10-31  Mg     1.9     10-31      10-31    144  |  105  |  23  ----------------------------<  108<H>  4.4   |  25  |  1.60<H>    Ca    8.6      31 Oct 2018 05:15  Phos  3.5     10-31  Mg     1.9     10-31                              12.9   5.18  )-----------( 151      ( 31 Oct 2018 05:15 )             39.9     Radiology    < from: MR Head No Cont (10.27.18 @ 17:08) >    IMPRESSION:    Redemonstrated bilateral anterior parietal maxi holes. No subdural   hematomas visualized.    Punctate subacute lacunar infarction involving the right posterior   external capsule/basal ganglia junction.    Multiple extensive patchy confluent nonspecific abnormal white matter   foci of T2/FLAIR prolongation statistically favoring microvascular   disease.     < end of copied text >        MEDICATIONS  (STANDING):  aspirin enteric coated 81 milliGRAM(s) Oral daily  atorvastatin 20 milliGRAM(s) Oral at bedtime  cyanocobalamin 1000 MICROGram(s) Oral daily  docusate sodium 100 milliGRAM(s) Oral two times a day  heparin  Injectable 5000 Unit(s) SubCutaneous every 12 hours  levETIRAcetam 750 milliGRAM(s) Oral two times a day  melatonin 3 milliGRAM(s) Oral at bedtime  midodrine 2.5 milliGRAM(s) Oral three times a day  QUEtiapine 25 milliGRAM(s) Oral at bedtime  sodium chloride 0.9%. 1000 milliLiter(s) (75 mL/Hr) IV Continuous <Continuous>  tamsulosin 0.4 milliGRAM(s) Oral at bedtime    MEDICATIONS  (PRN):  haloperidol     Tablet 0.5 milliGRAM(s) Oral every 6 hours PRN severe agitation  haloperidol    Injectable 0.5 milliGRAM(s) IntraMuscular every 6 hours PRN severe agitation  haloperidol    Injectable 0.5 milliGRAM(s) IV Push every 6 hours PRN severe agitation  polyethylene glycol 3350 17 Gram(s) Oral at bedtime PRN Constipation

## 2018-10-31 NOTE — PROGRESS NOTE ADULT - PROBLEM SELECTOR PLAN 3
- blood work, CXR no significant infection or electrolytes abnormality contributes to metabolic encephalopathy  - suspect side effect with ativan in the setting of cognitive impairment.   - seizure in hospital as per family in setting of ICH no seizure noted at home okay for seroquel. - blood work, CXR no significant infection or electrolytes abnormality contributes to metabolic encephalopathy  - suspect side effect with ativan in the setting of cognitive impairment.   - seizure in hospital as per family in setting of ICH no seizure noted at home okay for seroquel case d/w psych resident to make final recs in regards to antipsychotics

## 2018-10-31 NOTE — PROGRESS NOTE ADULT - ASSESSMENT
84M with history of HTN, HLD, history of traumatic SDH in Jan 2018 requiring evacuation who initially presented with increased agitation and cognitive decline, for which the neurology service has been following. Patient had an MRI brain showing a small subacute lacunar infarct involving the right posterior external capsule/basal ganglia, so stroke service was consulted for further evaluation. Neurological examination is non-focal. Patient demonstrates some motor perseveration.     Impression: subacute small R posterior external capsule/BG infarct likely secondary to small vessel disease likely due to hypertension    Recommendations:  Aspirin 81 mg daily   Atorvastatin 80 mg qhs  Bilateral carotid doppler and MRA head without contrast for vessel imaging  TTE with bubble can be done on elective basis 84M with history of HTN, HLD, history of traumatic SDH in Jan 2018 requiring evacuation who initially presented with increased agitation and cognitive decline, for which the neurology service has been following. Patient had an MRI brain showing a small subacute lacunar infarct involving the right posterior external capsule/basal ganglia, so stroke service was consulted for further evaluation. Neurological examination is non-focal. Patient demonstrates some motor perseveration.     Impression: subacute small R posterior external capsule/BG infarct likely secondary to small vessel disease likely due to hypertension    Recommendations:  Aspirin 81 mg daily   Atorvastatin 80 mg qhs  Bilateral carotid doppler and MRA head without contrast for vessel imaging  TTE with bubble can be done on elective basis  Good blood pressure control/modification of vascular risk factors 84M with history of HTN, HLD, history of traumatic SDH in Jan 2018 requiring evacuation who initially presented with increased agitation and cognitive decline, for which the neurology service has been following. Patient had an MRI brain showing a small subacute lacunar infarct involving the right posterior external capsule/basal ganglia, so stroke service was consulted for further evaluation. Neurological examination is non-focal. Patient demonstrates some motor perseveration.     Impression: subacute small R posterior external capsule/BG infarct likely secondary to small vessel disease. Carotid doppler (prelim) showed mild plaque bilaterally with no significant stenosis.    Recommendations:  Aspirin 81 mg daily   Atorvastatin 80 mg qhs  F/U official carotid doppler report   TTE with bubble can be done on elective basis  Good blood pressure control/modification of vascular risk factors  From neurovascular standpoint, cleared for discharge

## 2018-10-31 NOTE — PROGRESS NOTE ADULT - PROBLEM SELECTOR PLAN 5
- Echo in Jan 2018 normal LVEF  - continue midodrine as per cards recs - ordered Vit B1-P, B12-WNL, folate-WNL , TSH, free T3/T4-WNL,   - syphilis screen- neg

## 2018-10-31 NOTE — PROGRESS NOTE BEHAVIORAL HEALTH - NSBHFUPINTERVALHXFT_PSY_A_CORE
Pt seen for f/u of delirium, no acute overnight events, no PRNs received. Pt arousable to verbal/tactile stimuli today and generally appears more alert this morning. He is more conversational, denies any physical concerns, denies VAH, oriented to self, otherwise disoriented to time/place/situation. He states we are in [facility], in a "housing scheme" and is unable to name the president, states that it is a female president. Of note, the president of [facility] is a male.

## 2018-10-31 NOTE — PROGRESS NOTE BEHAVIORAL HEALTH - SUMMARY
84 y/o male, domiciled in private residence w/his daughter, no known PPHx, PMH traumatic subdural hematoma s/p Craniotomy with drain in Jan 2018, w/reportedly mild cognitive impairment post-op, HTN, HLD, orthostatic hypotension on midodrine, admitted for AMS/agitation x 1 day after taking Ativan for outpatient MRI. Pt unable to be interviewed, obtunded, occasionally will open eyes with tactile stimuli and go back to sleep. Per collateral, patient is not at baseline. Delirium 2/2 ativan administration likely. Pt's family amenable to antipsychotic treatment, and patient receives Seroquel PRNs at home occasionally.     10/30 - Pt arousable to verbal/tactile stimuli today, oriented to self, otherwise disoriented.   10/31 - pt is more alert this morning/more conversational, oriented to self, otherwise disoriented.     Plan    1. No 1:1 needed from psychiatric perspective  2. Per family, pt had seizure on Seroquel (although continues to receive it at home occasionally). Would switch to haldol 0.5mg qHS PO   3. PRN anxiety/agitation/severe agitation: Haldol 0.5mg q6h PO/IM/IV (if QTc < 500ms)   5. The patient would also benefit from maintenance of regular sleep/wake cycles, frequent re-orientation, family member at bedside, ensuring personal eyeglasses or hearing aids available if used, avoidance of  benzodiazepines and anticholinergic medications, and judicious use of opiates for pain control if necessary.    Differential: Delirium 2/2 benzodiazapine use. 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 Pt unable to be interviewed, obtunded, occasionally will open eyes with tactile stimuli and go back to sleep. Per collateral, patient is not at baseline. Delirium 2/2 ativan administration likely. Pt's family amenable to antipsychotic treatment, and patient receives Seroquel PRNs at home occasionally.     10/30 - Pt arousable to verbal/tactile stimuli today, oriented to self, otherwise disoriented.   10/31 - pt is more alert this morning/more conversational, oriented to self, otherwise disoriented.     Plan    1. No 1:1 needed from psychiatric perspective  2. Seroquel 25mg qhs PO  3. PRN anxiety/agitation/severe agitation: Haldol 0.5mg q6h PO/IM/IV (if QTc < 500ms)   5. The patient would also benefit from maintenance of regular sleep/wake cycles, frequent re-orientation, family member at bedside, ensuring personal eyeglasses or hearing aids available if used, avoidance of  benzodiazepines and anticholinergic medications, and judicious use of opiates for pain control if necessary.    Differential: Delirium 2/2 benzodiazapine use.

## 2018-10-31 NOTE — PROGRESS NOTE ADULT - ASSESSMENT
Orthostatic hypotension  improving  cont midodrine as ordered     AMS  fu with primary team for work up

## 2018-10-31 NOTE — PROGRESS NOTE ADULT - SUBJECTIVE AND OBJECTIVE BOX
Patient is a 85y old  Male who presents with a chief complaint of AMS (30 Oct 2018 12:57)      SUBJECTIVE / OVERNIGHT EVENTS:    MEDICATIONS  (STANDING):  aspirin enteric coated 81 milliGRAM(s) Oral daily  atorvastatin 20 milliGRAM(s) Oral at bedtime  cyanocobalamin 1000 MICROGram(s) Oral daily  docusate sodium 100 milliGRAM(s) Oral two times a day  heparin  Injectable 5000 Unit(s) SubCutaneous every 12 hours  levETIRAcetam 750 milliGRAM(s) Oral two times a day  melatonin 3 milliGRAM(s) Oral at bedtime  midodrine 2.5 milliGRAM(s) Oral three times a day  QUEtiapine 25 milliGRAM(s) Oral at bedtime  tamsulosin 0.4 milliGRAM(s) Oral at bedtime    MEDICATIONS  (PRN):  haloperidol     Tablet 0.5 milliGRAM(s) Oral every 6 hours PRN severe agitation  polyethylene glycol 3350 17 Gram(s) Oral at bedtime PRN Constipation  QUEtiapine 25 milliGRAM(s) Oral every 6 hours PRN agitation/anxiety        CAPILLARY BLOOD GLUCOSE        I&O's Summary      T(C): 36.5 (10-31-18 @ 05:24), Max: 36.7 (10-30-18 @ 12:36)  HR: 99 (10-30-18 @ 21:05) (86 - 99)  BP: 148/74 (10-30-18 @ 21:05) (114/88 - 148/74)  RR: 18 (10-31-18 @ 05:24) (17 - 18)  SpO2: 95% (10-31-18 @ 05:24) (95% - 99%)    PHYSICAL EXAM:  GENERAL: NAD, well-developed  HEAD:  Atraumatic, Normocephalic  EYES: EOMI, PERRLA, conjunctiva and sclera clear  NECK: Supple, No JVD  CHEST/LUNG: Clear to auscultation bilaterally; No wheeze  HEART: Regular rate and rhythm; No murmurs, rubs, or gallops  ABDOMEN: Soft, Nontender, Nondistended; Bowel sounds present  EXTREMITIES:  2+ Peripheral Pulses, No clubbing, cyanosis, or edema  PSYCH: AAOx3  NEUROLOGY: non-focal  SKIN: No rashes or lesions    LABS:                        12.9   5.18  )-----------( 151      ( 31 Oct 2018 05:15 )             39.9     10-31    144  |  105  |  23  ----------------------------<  108<H>  4.4   |  25  |  1.60<H>    Ca    8.6      31 Oct 2018 05:15  Phos  3.5     10-31  Mg     1.9     10-31                  RADIOLOGY & ADDITIONAL TESTS:    Imaging Personally Reviewed:    Consultant(s) Notes Reviewed:      Care Discussed with Consultants/Other Providers: Patient is a 85y old  Male who presents with a chief complaint of AMS (30 Oct 2018 12:57)      SUBJECTIVE / OVERNIGHT EVENTS:    MEDICATIONS  (STANDING):  aspirin enteric coated 81 milliGRAM(s) Oral daily  atorvastatin 20 milliGRAM(s) Oral at bedtime  cyanocobalamin 1000 MICROGram(s) Oral daily  docusate sodium 100 milliGRAM(s) Oral two times a day  heparin  Injectable 5000 Unit(s) SubCutaneous every 12 hours  levETIRAcetam 750 milliGRAM(s) Oral two times a day  melatonin 3 milliGRAM(s) Oral at bedtime  midodrine 2.5 milliGRAM(s) Oral three times a day  QUEtiapine 25 milliGRAM(s) Oral at bedtime  tamsulosin 0.4 milliGRAM(s) Oral at bedtime    MEDICATIONS  (PRN):  haloperidol     Tablet 0.5 milliGRAM(s) Oral every 6 hours PRN severe agitation  polyethylene glycol 3350 17 Gram(s) Oral at bedtime PRN Constipation  QUEtiapine 25 milliGRAM(s) Oral every 6 hours PRN agitation/anxiety        CAPILLARY BLOOD GLUCOSE        I&O's Summary      T(C): 36.5 (10-31-18 @ 05:24), Max: 36.7 (10-30-18 @ 12:36)  HR: 99 (10-30-18 @ 21:05) (86 - 99)  BP: 148/74 (10-30-18 @ 21:05) (114/88 - 148/74)  RR: 18 (10-31-18 @ 05:24) (17 - 18)  SpO2: 95% (10-31-18 @ 05:24) (95% - 99%)    PHYSICAL EXAM:  GENERAL: NAD  HEAD:  Atraumatic  EYES: EOMI,  conjunctiva and sclera clear  NECK: Supple,   CHEST/LUNG: Clear to auscultation bilaterally; No wheeze  HEART: Regular rate and rhythm;  ABDOMEN: Soft, Nontender, Nondistended; Bowel sounds present  EXTREMITIES:  No clubbing, cyanosis, or edema  NEUROLOGY: non-focal      LABS:                        12.9   5.18  )-----------( 151      ( 31 Oct 2018 05:15 )             39.9     10-31    144  |  105  |  23  ----------------------------<  108<H>  4.4   |  25  |  1.60<H>    Ca    8.6      31 Oct 2018 05:15  Phos  3.5     10-31  Mg     1.9     10-31                  RADIOLOGY & ADDITIONAL TESTS:    Imaging Personally Reviewed:    Consultant(s) Notes Reviewed:      Care Discussed with Consultants/Other Providers: Patient is a 85y old  Male who presents with a chief complaint of AMS (30 Oct 2018 12:57)      SUBJECTIVE / OVERNIGHT EVENTS: Pt in NAD awake cooperative. no PRN given review of systems unobtainable due to baseline dementia     MEDICATIONS  (STANDING):  aspirin enteric coated 81 milliGRAM(s) Oral daily  atorvastatin 20 milliGRAM(s) Oral at bedtime  cyanocobalamin 1000 MICROGram(s) Oral daily  docusate sodium 100 milliGRAM(s) Oral two times a day  heparin  Injectable 5000 Unit(s) SubCutaneous every 12 hours  levETIRAcetam 750 milliGRAM(s) Oral two times a day  melatonin 3 milliGRAM(s) Oral at bedtime  midodrine 2.5 milliGRAM(s) Oral three times a day  QUEtiapine 25 milliGRAM(s) Oral at bedtime  tamsulosin 0.4 milliGRAM(s) Oral at bedtime    MEDICATIONS  (PRN):  haloperidol     Tablet 0.5 milliGRAM(s) Oral every 6 hours PRN severe agitation  polyethylene glycol 3350 17 Gram(s) Oral at bedtime PRN Constipation  QUEtiapine 25 milliGRAM(s) Oral every 6 hours PRN agitation/anxiety        CAPILLARY BLOOD GLUCOSE        I&O's Summary      T(C): 36.5 (10-31-18 @ 05:24), Max: 36.7 (10-30-18 @ 12:36)  HR: 99 (10-30-18 @ 21:05) (86 - 99)  BP: 148/74 (10-30-18 @ 21:05) (114/88 - 148/74)  RR: 18 (10-31-18 @ 05:24) (17 - 18)  SpO2: 95% (10-31-18 @ 05:24) (95% - 99%)    PHYSICAL EXAM:  GENERAL: NAD  HEAD:  Atraumatic  EYES: EOMI,  conjunctiva and sclera clear  NECK: Supple,   CHEST/LUNG: Clear to auscultation bilaterally; No wheeze  HEART: Regular rate and rhythm;  ABDOMEN: Soft, Nontender, Nondistended; Bowel sounds present  EXTREMITIES:  No clubbing, cyanosis, or edema  NEUROLOGY: non-focal; oreinted to self       LABS:                        12.9   5.18  )-----------( 151      ( 31 Oct 2018 05:15 )             39.9     10-31    144  |  105  |  23  ----------------------------<  108<H>  4.4   |  25  |  1.60<H>    Ca    8.6      31 Oct 2018 05:15  Phos  3.5     10-31  Mg     1.9     10-31                  RADIOLOGY & ADDITIONAL TESTS:    Imaging Personally Reviewed:    Consultant(s) Notes Reviewed:      Care Discussed with Consultants/Other Providers:

## 2018-10-31 NOTE — PROGRESS NOTE BEHAVIORAL HEALTH - NSBHCONSULTMEDS_PSY_A_CORE FT
Per family, pt had seizure on Seroquel. If family is amenable, would instead start Haldol 0.5mg po qhs Seroquel 25mg qhs PO

## 2018-10-31 NOTE — PROGRESS NOTE ADULT - PROBLEM SELECTOR PLAN 4
-monitor off BP meds  -+orthostasis midodrine as per cards 2.5 mg TID if BP SBP<160 - Echo in Jan 2018 normal LVEF  - continue midodrine as per cards recs +orthostasis midodrine as per cards 2.5 mg TID if BP SBP<160

## 2018-10-31 NOTE — PROGRESS NOTE ADULT - PROBLEM SELECTOR PLAN 2
-baseline Cr 1-1.17 now 1.6  -check post void bladder scan  -Renal US-r/o hydro  -gentle IVF  -monitor Cr -baseline Cr 1-1.17 now 1.6 in <48 hrs  -check post void bladder scan  -Renal US-r/o hydro  -gentle IVF  -monitor Cr

## 2018-10-31 NOTE — PROGRESS NOTE ADULT - SUBJECTIVE AND OBJECTIVE BOX
Subjective: Patient seen and examined. No new events except as noted.     SUBJECTIVE/ROS:  NAD      MEDICATIONS:  MEDICATIONS  (STANDING):  aspirin enteric coated 81 milliGRAM(s) Oral daily  atorvastatin 20 milliGRAM(s) Oral at bedtime  cyanocobalamin 1000 MICROGram(s) Oral daily  docusate sodium 100 milliGRAM(s) Oral two times a day  heparin  Injectable 5000 Unit(s) SubCutaneous every 12 hours  levETIRAcetam 750 milliGRAM(s) Oral two times a day  melatonin 3 milliGRAM(s) Oral at bedtime  midodrine 2.5 milliGRAM(s) Oral three times a day  QUEtiapine 25 milliGRAM(s) Oral at bedtime  tamsulosin 0.4 milliGRAM(s) Oral at bedtime      PHYSICAL EXAM:  T(C): 36.5 (10-31-18 @ 05:24), Max: 36.7 (10-30-18 @ 12:36)  HR: 99 (10-30-18 @ 21:05) (86 - 99)  BP: 148/74 (10-30-18 @ 21:05) (114/88 - 148/74)  RR: 18 (10-31-18 @ 05:24) (17 - 18)  SpO2: 95% (10-31-18 @ 05:24) (95% - 99%)  Wt(kg): --  I&O's Summary        Appearance: Normal	  HEENT:   no gross abnormality   Cardiovascular: Normal S1 S2,    Murmur:   Neck: JVP normal  Respiratory: Lungs clear   Gastrointestinal:  Soft, Non-tender  Skin: normal   Neuro: No gross deficits.   Psychiatry:  Mood & affect flat  Ext: No edema      LABS/DATA:    CARDIAC MARKERS:                                12.9   5.18  )-----------( 151      ( 31 Oct 2018 05:15 )             39.9     10-31    144  |  105  |  23  ----------------------------<  108<H>  4.4   |  25  |  1.60<H>    Ca    8.6      31 Oct 2018 05:15  Phos  3.5     10-31  Mg     1.9     10-31      proBNP:   Lipid Profile:   HgA1c: Hemoglobin A1C, Whole Blood: 5.4 % (10-31 @ 05:15)    TSH:     TELE:  EKG:

## 2018-10-31 NOTE — PROGRESS NOTE ADULT - PROBLEM SELECTOR PLAN 1
-MRI +subacute CVA  -ASA/statin  -check carotid dopplers/TTE  -would not check CTA due to elevated Cr  -check LDL/A1c  -Pt currently at baseline  -Neurology consult -await stroke recs for further w/u -MRI +subacute CVA  -ASA/statin  -check carotid dopplers/TTE  -would not check CTA due to elevated Cr  -check LDL-62/A1c-5.4  -Pt currently at baseline  -Neurology consult -await stroke recs for further w/u -MRI +subacute CVA ? CVA secondary to liable BP and orthostasis  -ASA/statin  -check carotid dopplers/TTE  -would not check CTA due to elevated Cr  -check LDL-62/A1c-5.4  -Pt currently at baseline  -Neurology consult -await stroke recs for further w/u

## 2018-11-01 ENCOUNTER — TRANSCRIPTION ENCOUNTER (OUTPATIENT)
Age: 83
End: 2018-11-01

## 2018-11-01 VITALS
RESPIRATION RATE: 18 BRPM | OXYGEN SATURATION: 98 % | SYSTOLIC BLOOD PRESSURE: 161 MMHG | TEMPERATURE: 98 F | DIASTOLIC BLOOD PRESSURE: 89 MMHG | HEART RATE: 72 BPM

## 2018-11-01 LAB
BUN SERPL-MCNC: 18 MG/DL — SIGNIFICANT CHANGE UP (ref 7–23)
CALCIUM SERPL-MCNC: 9.3 MG/DL — SIGNIFICANT CHANGE UP (ref 8.4–10.5)
CHLORIDE SERPL-SCNC: 106 MMOL/L — SIGNIFICANT CHANGE UP (ref 98–107)
CO2 SERPL-SCNC: 23 MMOL/L — SIGNIFICANT CHANGE UP (ref 22–31)
CREAT SERPL-MCNC: 1.25 MG/DL — SIGNIFICANT CHANGE UP (ref 0.5–1.3)
GLUCOSE SERPL-MCNC: 90 MG/DL — SIGNIFICANT CHANGE UP (ref 70–99)
HCT VFR BLD CALC: 35.4 % — LOW (ref 39–50)
HGB BLD-MCNC: 11.7 G/DL — LOW (ref 13–17)
MAGNESIUM SERPL-MCNC: 1.9 MG/DL — SIGNIFICANT CHANGE UP (ref 1.6–2.6)
MCHC RBC-ENTMCNC: 27.1 PG — SIGNIFICANT CHANGE UP (ref 27–34)
MCHC RBC-ENTMCNC: 33.1 % — SIGNIFICANT CHANGE UP (ref 32–36)
MCV RBC AUTO: 81.9 FL — SIGNIFICANT CHANGE UP (ref 80–100)
NRBC # FLD: 0 — SIGNIFICANT CHANGE UP
PHOSPHATE SERPL-MCNC: 3 MG/DL — SIGNIFICANT CHANGE UP (ref 2.5–4.5)
PLATELET # BLD AUTO: 153 K/UL — SIGNIFICANT CHANGE UP (ref 150–400)
PMV BLD: 11.3 FL — SIGNIFICANT CHANGE UP (ref 7–13)
POTASSIUM SERPL-MCNC: 4 MMOL/L — SIGNIFICANT CHANGE UP (ref 3.5–5.3)
POTASSIUM SERPL-SCNC: 4 MMOL/L — SIGNIFICANT CHANGE UP (ref 3.5–5.3)
RBC # BLD: 4.32 M/UL — SIGNIFICANT CHANGE UP (ref 4.2–5.8)
RBC # FLD: 13.2 % — SIGNIFICANT CHANGE UP (ref 10.3–14.5)
SODIUM SERPL-SCNC: 142 MMOL/L — SIGNIFICANT CHANGE UP (ref 135–145)
WBC # BLD: 5.68 K/UL — SIGNIFICANT CHANGE UP (ref 3.8–10.5)
WBC # FLD AUTO: 5.68 K/UL — SIGNIFICANT CHANGE UP (ref 3.8–10.5)

## 2018-11-01 PROCEDURE — 99233 SBSQ HOSP IP/OBS HIGH 50: CPT

## 2018-11-01 PROCEDURE — 99239 HOSP IP/OBS DSCHRG MGMT >30: CPT

## 2018-11-01 PROCEDURE — 93306 TTE W/DOPPLER COMPLETE: CPT | Mod: 26

## 2018-11-01 RX ORDER — ATORVASTATIN CALCIUM 80 MG/1
1 TABLET, FILM COATED ORAL
Qty: 30 | Refills: 0 | OUTPATIENT
Start: 2018-11-01 | End: 2018-11-30

## 2018-11-01 RX ORDER — MIDODRINE HYDROCHLORIDE 2.5 MG/1
1 TABLET ORAL
Qty: 90 | Refills: 0 | OUTPATIENT
Start: 2018-11-01 | End: 2018-11-30

## 2018-11-01 RX ORDER — DOCUSATE SODIUM 100 MG
1 CAPSULE ORAL
Qty: 60 | Refills: 0 | OUTPATIENT
Start: 2018-11-01 | End: 2018-11-30

## 2018-11-01 RX ORDER — QUETIAPINE FUMARATE 200 MG/1
1 TABLET, FILM COATED ORAL
Qty: 30 | Refills: 0 | OUTPATIENT
Start: 2018-11-01 | End: 2018-11-30

## 2018-11-01 RX ORDER — HALOPERIDOL DECANOATE 100 MG/ML
0.5 INJECTION INTRAMUSCULAR ONCE
Qty: 0 | Refills: 0 | Status: COMPLETED | OUTPATIENT
Start: 2018-11-01 | End: 2018-11-01

## 2018-11-01 RX ORDER — LEVETIRACETAM 250 MG/1
1 TABLET, FILM COATED ORAL
Qty: 60 | Refills: 0 | OUTPATIENT
Start: 2018-11-01 | End: 2018-11-30

## 2018-11-01 RX ORDER — ASPIRIN/CALCIUM CARB/MAGNESIUM 324 MG
1 TABLET ORAL
Qty: 30 | Refills: 0 | OUTPATIENT
Start: 2018-11-01 | End: 2018-11-30

## 2018-11-01 RX ORDER — MIDODRINE HYDROCHLORIDE 2.5 MG/1
1 TABLET ORAL
Qty: 0 | Refills: 0 | COMMUNITY

## 2018-11-01 RX ORDER — PREGABALIN 225 MG/1
1 CAPSULE ORAL
Qty: 30 | Refills: 0 | OUTPATIENT
Start: 2018-11-01 | End: 2018-11-30

## 2018-11-01 RX ADMIN — Medication 81 MILLIGRAM(S): at 12:43

## 2018-11-01 RX ADMIN — PREGABALIN 1000 MICROGRAM(S): 225 CAPSULE ORAL at 12:44

## 2018-11-01 RX ADMIN — HALOPERIDOL DECANOATE 0.5 MILLIGRAM(S): 100 INJECTION INTRAMUSCULAR at 01:01

## 2018-11-01 RX ADMIN — Medication 100 MILLIGRAM(S): at 05:36

## 2018-11-01 RX ADMIN — MIDODRINE HYDROCHLORIDE 2.5 MILLIGRAM(S): 2.5 TABLET ORAL at 05:36

## 2018-11-01 RX ADMIN — LEVETIRACETAM 750 MILLIGRAM(S): 250 TABLET, FILM COATED ORAL at 05:36

## 2018-11-01 RX ADMIN — HEPARIN SODIUM 5000 UNIT(S): 5000 INJECTION INTRAVENOUS; SUBCUTANEOUS at 05:35

## 2018-11-01 NOTE — PROGRESS NOTE BEHAVIORAL HEALTH - CASE SUMMARY
Pt seen, agree with above. C/w current psychotropic med regimen, will follow.
Pt seen, agree with above. Can switch from Seroquel to Haldol if family prefers that. Both agents have relatively low risk of inducing a seizure without other risk factors. Will continue to follow.

## 2018-11-01 NOTE — PROGRESS NOTE ADULT - PROBLEM SELECTOR PLAN 2
-baseline Cr 1-1.17 now 1.6 in <48 hrs likely pre-renal   - post void bladder scan-0  -Renal US-r/o hydro-no hydro  -gentle IVF-resolved with IVF

## 2018-11-01 NOTE — DISCHARGE NOTE ADULT - MEDICATION SUMMARY - MEDICATIONS TO TAKE
I will START or STAY ON the medications listed below when I get home from the hospital:    aspirin 81 mg oral delayed release tablet  -- 1 tab(s) by mouth once a day  -- Indication: For Cerebrovascular accident (CVA), unspecified mechanism    tamsulosin 0.4 mg oral capsule  -- 1 cap(s) by mouth once a day (at bedtime) MDD:1 tab  -- Indication: For Benign prostatic hyperplasia with urinary frequency    levETIRAcetam 750 mg oral tablet  -- 1 tab(s) by mouth 2 times a day MDD:2 tabs  -- Indication: For Status post craniectomy    atorvastatin 80 mg oral tablet  -- 1 tab(s) by mouth once a day (at bedtime)  -- Indication: For Cerebrovascular accident (CVA), unspecified mechanism    QUEtiapine 25 mg oral tablet  -- 1 tab(s) by mouth once a day (at bedtime)  -- Indication: For Altered mental status    docusate sodium 100 mg oral capsule  -- 1 cap(s) by mouth 2 times a day  -- Indication: For Prophylactic measure    midodrine 2.5 mg oral tablet  -- 1 tab(s) by mouth 3 times a day  Hold for SBP >160  -- Indication: For Hypotension    Melatonin 1 mg oral tablet  -- 1 tab(s) by mouth once a day (at bedtime)  -- Indication: For insomnia    cyanocobalamin 1000 mcg oral tablet  -- 1 tab(s) by mouth once a day  -- Indication: For vitamin B12 deficiency

## 2018-11-01 NOTE — PROGRESS NOTE BEHAVIORAL HEALTH - NSBHFUPINTERVALHXFT_PSY_A_CORE
Pt seen for f/u of delirium, per nursing, pt agitated overnight, received haldol 0.5mg IV x 1. Pt awake, pleasant, alert on approach, states he recognizes writer. Denies any physical concerns/VAH. He is AOx1, otherwise disoriented to location/time, although today able to state we are in the [place] (yesterday stated he is in [facility]). States the year is "25." Otherwise states he is "feeling pretty good."

## 2018-11-01 NOTE — PROGRESS NOTE BEHAVIORAL HEALTH - NSBHCHARTREVIEWVS_PSY_A_CORE FT
T(C): 36.7 (10-30-18 @ 05:41), Max: 36.8 (10-29-18 @ 21:31)  HR: 98 (10-30-18 @ 05:41) (74 - 98)  BP: 140/86 (10-30-18 @ 05:41) (140/86 - 175/83)  RR: 18 (10-30-18 @ 05:41) (17 - 18)  SpO2: 98% (10-30-18 @ 05:41) (98% - 99%)  Wt(kg): --
T(C): 36.8 (11-01-18 @ 05:33), Max: 36.8 (10-31-18 @ 21:09)  HR: 78 (11-01-18 @ 05:33) (78 - 86)  BP: 150/88 (11-01-18 @ 05:33) (131/84 - 156/80)  RR: 18 (11-01-18 @ 05:33) (18 - 18)  SpO2: 100% (11-01-18 @ 05:33) (97% - 100%)  Wt(kg): --
T(C): 36.5 (10-31-18 @ 05:24), Max: 36.7 (10-30-18 @ 12:36)  HR: 99 (10-30-18 @ 21:05) (86 - 99)  BP: 148/74 (10-30-18 @ 21:05) (114/88 - 148/74)  RR: 18 (10-31-18 @ 05:24) (17 - 18)  SpO2: 95% (10-31-18 @ 05:24) (95% - 99%)  Wt(kg): --

## 2018-11-01 NOTE — PROGRESS NOTE ADULT - PROBLEM SELECTOR PLAN 5
- ordered Vit B1-P, B12-WNL, folate-WNL , TSH, free T3/T4-WNL,   - syphilis screen- neg - ordered Vit B1-P, B12-WNL, folate-WNL , TSH, free T3/T4-WNL,   - syphilis screen- neg  -no overt reversible cause found

## 2018-11-01 NOTE — PROGRESS NOTE BEHAVIORAL HEALTH - NSBHCHARTREVIEWLAB_PSY_A_CORE FT
12.9   5.18  )-----------( 151      ( 31 Oct 2018 05:15 )             39.9     10-31    144  |  105  |  23  ----------------------------<  108<H>  4.4   |  25  |  1.60<H>    Ca    8.6      31 Oct 2018 05:15  Phos  3.5     10-31  Mg     1.9     10-31
11.7   5.68  )-----------( 153      ( 01 Nov 2018 05:45 )             35.4     11-01    142  |  106  |  18  ----------------------------<  90  4.0   |  23  |  1.25    Ca    9.3      01 Nov 2018 05:45  Phos  3.0     11-01  Mg     1.9     11-01
13.8   5.31  )-----------( 162      ( 29 Oct 2018 05:00 )             41.8     10-    145  |  106  |  11  ----------------------------<  94  4.0   |  25  |  1.17    Ca    10.1      29 Oct 2018 05:00    TPro  8.4<H>  /  Alb  4.2  /  TBili  0.9  /  DBili  x   /  AST  57<H>  /  ALT  30  /  AlkPhos  47  10-28    Urinalysis Basic - ( 28 Oct 2018 13:00 )    Color: LIGHT YELLOW / Appearance: CLEAR / S.011 / pH: 6.0  Gluc: NEGATIVE / Ketone: NEGATIVE  / Bili: NEGATIVE / Urobili: TRACE   Blood: NEGATIVE / Protein: NEGATIVE / Nitrite: NEGATIVE   Leuk Esterase: NEGATIVE / RBC: x / WBC x   Sq Epi: x / Non Sq Epi: x / Bacteria: x

## 2018-11-01 NOTE — DISCHARGE NOTE ADULT - CARE PLAN
Principal Discharge DX:	Altered mental status  Goal:	remain stable  Assessment and plan of treatment:	- MRI brain showing a small subacute lacunar infarct involving the right posterior external capsule/basal ganglia  - RPR- neg, B12 - 363 WNL, Folate -20.0, TSH- 1.76  - Carotid doppler- showed mild plaque bilaterally with no significant stenosis.  - TTE with bubble was done in the hospital on 11/1 awaiting for results  - Good blood pressure control/modification of vascular risk factors  - Aspirin 81 mg daily, Atorvastatin 80 mg qhs  - Follow Safety / fall precautions  - follow up with Neuro as outpatient for further management  Secondary Diagnosis:	NICK (acute kidney injury)  Goal:	Resolved  Assessment and plan of treatment:	- Bladder US- No renal abnormality.  - Bladder scan was done with post void 0cc, s/p IVF. Resolved   - Follow up with PCP as outpatient for further management and treatment  Secondary Diagnosis:	Cerebrovascular accident (CVA), unspecified mechanism  Assessment and plan of treatment:	- MRI brain showing a small subacute lacunar infarct involving the right posterior external capsule/basal ganglia  - RPR- neg, B12 - 363 WNL, Folate -20.0, TSH- 1.76  - Carotid doppler- showed mild plaque bilaterally with no significant stenosis.  - TTE with bubble was done in the hospital on 11/1 awaiting for results  - Good blood pressure control/modification of vascular risk factors  - Aspirin 81 mg daily, Atorvastatin 80 mg qhs  Secondary Diagnosis:	Delirium  Assessment and plan of treatment:	- Psych Dr Balbuena consulted recommended to continue Seroquel 25mg qhs oral daily at bedtime  - RPR- neg, B12 - 363 WNL, Folate -20.0, TSH- 1.76  - follow safety/ fall precautions  - Follow up with PCP as outpatient for further management and treatment  Secondary Diagnosis:	Orthostatic hypotension  Assessment and plan of treatment:	- Echo in Jan 2018 normal LVEF  - Dr Hale was consulted in the hospital and recommended to decrease the dose of Midodrine to 2.5mg oral every 8 hrs. Continue taking medication if systolic blood pressure less then 160  - Follow up with PCP and Card as outpatient for further management and treatment  Secondary Diagnosis:	Hyperlipidemia, unspecified hyperlipidemia type  Assessment and plan of treatment:	- continue taking Lipitor as prescribed  - Follow up with PCP as outpatient for further management and treatment Principal Discharge DX:	Altered mental status  Goal:	remain stable  Assessment and plan of treatment:	- MRI brain showing a small subacute lacunar infarct involving the right posterior external capsule/basal ganglia  - RPR- neg, B12 - 363 WNL, Folate -20.0, TSH- 1.76  - Carotid doppler- showed mild plaque bilaterally with no significant stenosis.  - Good blood pressure control/modification of vascular risk factors  - Aspirin 81 mg daily, Atorvastatin 80 mg qhs  - Follow Safety / fall precautions  - follow up with Neuro as outpatient for further management  Secondary Diagnosis:	NICK (acute kidney injury)  Goal:	Resolved  Assessment and plan of treatment:	- Bladder US- No renal abnormality.  - Bladder scan was done with post void 0cc, s/p IVF. Resolved   - Follow up with PCP as outpatient for further management and treatment  Secondary Diagnosis:	Cerebrovascular accident (CVA), unspecified mechanism  Assessment and plan of treatment:	- MRI brain showing a small subacute lacunar infarct involving the right posterior external capsule/basal ganglia  - RPR- neg, B12 - 363 WNL, Folate -20.0, TSH- 1.76  - Carotid doppler- showed mild plaque bilaterally with no significant stenosis.  - TTE with bubble was done in the hospital on 11/1 showed EF 62%. Mild MR. Mild AR. Normal left ventricular systolic function. Normal right ventricular systolic function. A bubble study was performed and was inconclusive regarding the presence of an interatrial shunt. No obvious cardiac source of embolus was identified on this transthoracic study.   - Good blood pressure control/modification of vascular risk factors  - Aspirin 81 mg daily, Atorvastatin 80 mg qhs  Secondary Diagnosis:	Delirium  Assessment and plan of treatment:	- Psych Dr Balbuena consulted recommended to continue Seroquel 25mg qhs oral daily at bedtime  - RPR- neg, B12 - 363 WNL, Folate -20.0, TSH- 1.76  - follow safety/ fall precautions  - Follow up with PCP as outpatient for further management and treatment  Secondary Diagnosis:	Orthostatic hypotension  Assessment and plan of treatment:	- Echo in Jan 2018 normal LVEF  - Dr Hale was consulted in the hospital and recommended to decrease the dose of Midodrine to 2.5mg oral every 8 hrs. Continue taking medication if systolic blood pressure less then 160  - Follow up with PCP and Card as outpatient for further management and treatment  Secondary Diagnosis:	Hyperlipidemia, unspecified hyperlipidemia type  Assessment and plan of treatment:	- continue taking Lipitor as prescribed  - Follow up with PCP as outpatient for further management and treatment

## 2018-11-01 NOTE — DISCHARGE NOTE ADULT - PLAN OF CARE
remain stable - MRI brain showing a small subacute lacunar infarct involving the right posterior external capsule/basal ganglia  - RPR- neg, B12 - 363 WNL, Folate -20.0, TSH- 1.76  - Carotid doppler- showed mild plaque bilaterally with no significant stenosis.  - TTE with bubble was done in the hospital on 11/1 awaiting for results  - Good blood pressure control/modification of vascular risk factors  - Aspirin 81 mg daily, Atorvastatin 80 mg qhs  - Follow Safety / fall precautions  - follow up with Neuro as outpatient for further management Resolved - Bladder US- No renal abnormality.  - Bladder scan was done with post void 0cc, s/p IVF. Resolved   - Follow up with PCP as outpatient for further management and treatment - MRI brain showing a small subacute lacunar infarct involving the right posterior external capsule/basal ganglia  - RPR- neg, B12 - 363 WNL, Folate -20.0, TSH- 1.76  - Carotid doppler- showed mild plaque bilaterally with no significant stenosis.  - TTE with bubble was done in the hospital on 11/1 awaiting for results  - Good blood pressure control/modification of vascular risk factors  - Aspirin 81 mg daily, Atorvastatin 80 mg qhs - Psych Dr Balbuena consulted recommended to continue Seroquel 25mg qhs oral daily at bedtime  - RPR- neg, B12 - 363 WNL, Folate -20.0, TSH- 1.76  - follow safety/ fall precautions  - Follow up with PCP as outpatient for further management and treatment - Echo in Jan 2018 normal LVEF  - Dr Hale was consulted in the hospital and recommended to decrease the dose of Midodrine to 2.5mg oral every 8 hrs. Continue taking medication if systolic blood pressure less then 160  - Follow up with PCP and Card as outpatient for further management and treatment - continue taking Lipitor as prescribed  - Follow up with PCP as outpatient for further management and treatment - MRI brain showing a small subacute lacunar infarct involving the right posterior external capsule/basal ganglia  - RPR- neg, B12 - 363 WNL, Folate -20.0, TSH- 1.76  - Carotid doppler- showed mild plaque bilaterally with no significant stenosis.  - Good blood pressure control/modification of vascular risk factors  - Aspirin 81 mg daily, Atorvastatin 80 mg qhs  - Follow Safety / fall precautions  - follow up with Neuro as outpatient for further management - MRI brain showing a small subacute lacunar infarct involving the right posterior external capsule/basal ganglia  - RPR- neg, B12 - 363 WNL, Folate -20.0, TSH- 1.76  - Carotid doppler- showed mild plaque bilaterally with no significant stenosis.  - TTE with bubble was done in the hospital on 11/1 showed EF 62%. Mild MR. Mild AR. Normal left ventricular systolic function. Normal right ventricular systolic function. A bubble study was performed and was inconclusive regarding the presence of an interatrial shunt. No obvious cardiac source of embolus was identified on this transthoracic study.   - Good blood pressure control/modification of vascular risk factors  - Aspirin 81 mg daily, Atorvastatin 80 mg qhs

## 2018-11-01 NOTE — DISCHARGE NOTE ADULT - SECONDARY DIAGNOSIS.
NICK (acute kidney injury) Cerebrovascular accident (CVA), unspecified mechanism Delirium Orthostatic hypotension Hyperlipidemia, unspecified hyperlipidemia type

## 2018-11-01 NOTE — PROGRESS NOTE ADULT - PROBLEM SELECTOR PLAN 3
- blood work, CXR no significant infection or electrolytes abnormality contributes to metabolic encephalopathy  - suspect side effect with ativan in the setting of cognitive impairment.   - seizure in hospital as per family in setting of ICH no seizure noted at home okay for seroquel.

## 2018-11-01 NOTE — PROGRESS NOTE ADULT - PROBLEM SELECTOR PLAN 1
-MRI +subacute CVA ? CVA secondary to liable BP and orthostasis  -ASA/statin  -carotid dopplers-no significant ICS  -TTE- as outpt   - LDL-62/A1c-5.4  -Pt currently at baseline  -Neurology consult -case d/w Neuro unclear if any intracranial stenosis given orthostatic hypotension keep SBP (130-150) not too tightly controlled recommend no further inpt work up outpt f/u with neuro for TTE and intracranial imaging.

## 2018-11-01 NOTE — PROGRESS NOTE ADULT - SUBJECTIVE AND OBJECTIVE BOX
Patient is a 85y old  Male who presents with a chief complaint of AMS (31 Oct 2018 08:55)      SUBJECTIVE / OVERNIGHT EVENTS: Haldol given ON 0.5 X1. now appears calm.    MEDICATIONS  (STANDING):  aspirin enteric coated 81 milliGRAM(s) Oral daily  atorvastatin 80 milliGRAM(s) Oral at bedtime  cyanocobalamin 1000 MICROGram(s) Oral daily  docusate sodium 100 milliGRAM(s) Oral two times a day  heparin  Injectable 5000 Unit(s) SubCutaneous every 12 hours  levETIRAcetam 750 milliGRAM(s) Oral two times a day  melatonin 3 milliGRAM(s) Oral at bedtime  midodrine 2.5 milliGRAM(s) Oral three times a day  QUEtiapine 25 milliGRAM(s) Oral at bedtime  sodium chloride 0.9%. 1000 milliLiter(s) (75 mL/Hr) IV Continuous <Continuous>  tamsulosin 0.4 milliGRAM(s) Oral at bedtime    MEDICATIONS  (PRN):  haloperidol     Tablet 0.5 milliGRAM(s) Oral every 6 hours PRN severe agitation  haloperidol    Injectable 0.5 milliGRAM(s) IntraMuscular every 6 hours PRN severe agitation  haloperidol    Injectable 0.5 milliGRAM(s) IV Push every 6 hours PRN severe agitation  polyethylene glycol 3350 17 Gram(s) Oral at bedtime PRN Constipation        CAPILLARY BLOOD GLUCOSE        I&O's Summary      T(C): 36.8 (11-01-18 @ 05:33), Max: 36.8 (10-31-18 @ 21:09)  HR: 78 (11-01-18 @ 05:33) (78 - 86)  BP: 150/88 (11-01-18 @ 05:33) (131/84 - 156/80)  RR: 18 (11-01-18 @ 05:33) (18 - 18)  SpO2: 100% (11-01-18 @ 05:33) (97% - 100%)    PHYSICAL EXAM:  GENERAL: NAD  HEAD:  Atraumatic  EYES: EOMI,  conjunctiva and sclera clear  NECK: Supple,   CHEST/LUNG: Clear to auscultation bilaterally; No wheeze  HEART: Regular rate and rhythm;  ABDOMEN: Soft, Nontender, Nondistended; Bowel sounds present  EXTREMITIES:  No clubbing, cyanosis, or edema  NEUROLOGY: non-focal; oreinted to self       LABS:                        11.7   5.68  )-----------( 153      ( 01 Nov 2018 05:45 )             35.4     11-01    142  |  106  |  18  ----------------------------<  90  4.0   |  23  |  1.25    Ca    9.3      01 Nov 2018 05:45  Phos  3.0     11-01  Mg     1.9     11-01                  RADIOLOGY & ADDITIONAL TESTS:    Imaging Personally Reviewed:< from: US Kidney and Bladder (10.31.18 @ 11:51) >  EXAM:  US KIDNEYS AND BLADDER        PROCEDURE DATE:  Oct 31 2018         INTERPRETATION:  CLINICAL INFORMATION: Acute renal insufficiency.    COMPARISON: None available.    TECHNIQUE: Sonography of the kidneys and bladder.     FINDINGS:    Right kidney:  10 cm. No renal mass, hydronephrosis or calculi.    Left kidney:  10.1 cm. No renal mass, hydronephrosis or calculi.    Urinary bladder: Minimally distended.    IMPRESSION:     No renal abnormality.    < end of copied text >    < from: VA Duplex Carotid Arteries, Bilateral. (10.31.18 @ 11:47) >  Summary/Impressions:  Minimal heterogenous plaque noted within the proximal  right and left internal carotid arteries, consistent with  1-15% stenoses.  No hemodynamically significant stenoses  noted.  ----------------------------------    < end of copied text >    Consultant(s) Notes Reviewed:      Care Discussed with Consultants/Other Providers: Patient is a 85y old  Male who presents with a chief complaint of AMS (31 Oct 2018 08:55)      SUBJECTIVE / OVERNIGHT EVENTS: Haldol given ON 0.5 X1. now appears calm. review of systems unobtainable due to dementia     MEDICATIONS  (STANDING):  aspirin enteric coated 81 milliGRAM(s) Oral daily  atorvastatin 80 milliGRAM(s) Oral at bedtime  cyanocobalamin 1000 MICROGram(s) Oral daily  docusate sodium 100 milliGRAM(s) Oral two times a day  heparin  Injectable 5000 Unit(s) SubCutaneous every 12 hours  levETIRAcetam 750 milliGRAM(s) Oral two times a day  melatonin 3 milliGRAM(s) Oral at bedtime  midodrine 2.5 milliGRAM(s) Oral three times a day  QUEtiapine 25 milliGRAM(s) Oral at bedtime  sodium chloride 0.9%. 1000 milliLiter(s) (75 mL/Hr) IV Continuous <Continuous>  tamsulosin 0.4 milliGRAM(s) Oral at bedtime    MEDICATIONS  (PRN):  haloperidol     Tablet 0.5 milliGRAM(s) Oral every 6 hours PRN severe agitation  haloperidol    Injectable 0.5 milliGRAM(s) IntraMuscular every 6 hours PRN severe agitation  haloperidol    Injectable 0.5 milliGRAM(s) IV Push every 6 hours PRN severe agitation  polyethylene glycol 3350 17 Gram(s) Oral at bedtime PRN Constipation        CAPILLARY BLOOD GLUCOSE        I&O's Summary      T(C): 36.8 (11-01-18 @ 05:33), Max: 36.8 (10-31-18 @ 21:09)  HR: 78 (11-01-18 @ 05:33) (78 - 86)  BP: 150/88 (11-01-18 @ 05:33) (131/84 - 156/80)  RR: 18 (11-01-18 @ 05:33) (18 - 18)  SpO2: 100% (11-01-18 @ 05:33) (97% - 100%)    PHYSICAL EXAM:  GENERAL: NAD  HEAD:  Atraumatic  EYES: EOMI,  conjunctiva and sclera clear  NECK: Supple,   CHEST/LUNG: Clear to auscultation bilaterally; No wheeze  HEART: Regular rate and rhythm;  ABDOMEN: Soft, Nontender, Nondistended; Bowel sounds present  EXTREMITIES:  No clubbing, cyanosis, or edema  NEUROLOGY: non-focal; oriented to self       LABS:                        11.7   5.68  )-----------( 153      ( 01 Nov 2018 05:45 )             35.4     11-01    142  |  106  |  18  ----------------------------<  90  4.0   |  23  |  1.25    Ca    9.3      01 Nov 2018 05:45  Phos  3.0     11-01  Mg     1.9     11-01                  RADIOLOGY & ADDITIONAL TESTS:    Imaging Personally Reviewed:< from: US Kidney and Bladder (10.31.18 @ 11:51) >  EXAM:  US KIDNEYS AND BLADDER        PROCEDURE DATE:  Oct 31 2018         INTERPRETATION:  CLINICAL INFORMATION: Acute renal insufficiency.    COMPARISON: None available.    TECHNIQUE: Sonography of the kidneys and bladder.     FINDINGS:    Right kidney:  10 cm. No renal mass, hydronephrosis or calculi.    Left kidney:  10.1 cm. No renal mass, hydronephrosis or calculi.    Urinary bladder: Minimally distended.    IMPRESSION:     No renal abnormality.    < end of copied text >    < from: VA Duplex Carotid Arteries, Bilateral. (10.31.18 @ 11:47) >  Summary/Impressions:  Minimal heterogenous plaque noted within the proximal  right and left internal carotid arteries, consistent with  1-15% stenoses.  No hemodynamically significant stenoses  noted.  ----------------------------------    < end of copied text >    Consultant(s) Notes Reviewed:      Care Discussed with Consultants/Other Providers:

## 2018-11-01 NOTE — DISCHARGE NOTE ADULT - HOSPITAL COURSE
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    Hospital Course    Delirium- MRI brain showing a small subacute lacunar infarct involving the right posterior external capsule/basal ganglia. CXR neg. Psych Dr Balbuena consulted rec Seroquel 25mg qhs PO (if QTc < 500ms) -10/29 , Seroquel 25mg q6h PO PRN, Haldol 0.5mg q6h PO/IM/IV PRN severe agitation. RPR- neg, B12 - 363 WNL, Folate -20.0, TSH- 1.76. Carotid doppler- showed mild plaque bilaterally with no significant stenosis. Good blood pressure control/modification of vascular risk factors. Aspirin 81 mg daily, Atorvastatin 80 mg qhs    Orthostatic hypotension- Echo in Jan 2018 normal LVEF. Midodrine as needed hold for SBP>160, f/u orthostatic vitals, if he is not orthostatic then may DC midodrine    NICK- Bladder US- No renal abnormality. Post void 0cc, s/p IVF     BPH- Flomax.     Hyperlipidemia- on Lipitor  Hga1c 5.4%    Advance care planning - discussed with daughters at length about work up of dementia/cognitive impairment, daughters are concerned Pt significant decline of function status, expressed worries about difficulty taking care of him at home when he is agitated. Due to culture/Yazidism, daughters still want to take care of Pt at home. 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    Hospital Course    Delirium-  Psych Dr Balbuena consulted rec Seroquel 25mg qhs PO (if QTc < 500ms) -10/29 , Seroquel 25mg q6h PO PRN, Haldol 0.5mg q6h PO/IM/IV PRN severe agitation. RPR- neg, B12 - 363 WNL, Folate -20.0, TSH- 1.76  Stroke- MRI brain showing a small subacute lacunar infarct involving the right posterior external capsule/basal ganglia. CXR neg. Carotid doppler- showed mild plaque bilaterally with no significant stenosis. Good blood pressure control/modification of vascular risk factors. Aspirin 81 mg daily, Atorvastatin 80 mg qhs. ECHO with bubble study- EF 62%. Mild MR. Mild AR. Normal left ventricular systolic function. Normal right ventricular systolic function. A bubble study was performed and was inconclusive regarding the presence of an interatrial shunt. No obvious cardiac source of embolus was identified on this transthoracic study.     Orthostatic hypotension- Echo in Jan 2018 normal LVEF. Midodrine as needed hold for SBP>160, f/u orthostatic vitals, if he is not orthostatic then may DC midodrine    NICK- Bladder US- No renal abnormality. Post void 0cc, s/p IVF     BPH- Flomax.     Hyperlipidemia- on Lipitor  Hga1c 5.4%    Advance care planning - discussed with daughters at length about work up of dementia/cognitive impairment, daughters are concerned Pt significant decline of function status, expressed worries about difficulty taking care of him at home when he is agitated. Due to culture/Alevism, daughters still want to take care of Pt at home.

## 2018-11-01 NOTE — DISCHARGE NOTE ADULT - CARE PROVIDER_API CALL
Alex Marquez (MD), Neurology  [address] [address]  Phone: [phone]  Fax: [phone]

## 2018-11-01 NOTE — DISCHARGE NOTE ADULT - HOME CARE AGENCY
[place] [phone]  The Nurse and Physical Therapist will contact you the day after discharge to see you in your home. [facility]   [phone] The Nurse and Physical Therapist will contact you the day after discharge to see you in your home.

## 2018-11-01 NOTE — PROGRESS NOTE BEHAVIORAL HEALTH - SUMMARY
84 y/o male, domiciled in private residence w/his daughter, no known PPHx, PMH traumatic subdural hematoma s/p Craniotomy with drain in Jan 2018, w/reportedly mild cognitive impairment post-op, HTN, HLD, orthostatic hypotension on midodrine, admitted for AMS/agitation x 1 day after taking Ativan for outpatient MRI. Pt unable to be interviewed, obtunded, occasionally will open eyes with tactile stimuli and go back to sleep. Per collateral, patient is not at baseline. Delirium 2/2 ativan administration likely. Pt's family amenable to antipsychotic treatment, and patient receives Seroquel PRNs at home occasionally.     10/30 - Pt arousable to verbal/tactile stimuli today, oriented to self, otherwise disoriented.   10/31 - pt is more alert this morning/more conversational, oriented to self, otherwise disoriented.   11/1 - pt awake on assessment, states he recognizes writer, oriented to self, oriented to country otherwise disoriented to location/time/situation.     Plan    1. No 1:1 needed from psychiatric perspective  2. Seroquel 25mg qhs PO  3. PRN anxiety/agitation/severe agitation: Haldol 0.5mg q6h PO/IM/IV (if QTc < 500ms)   5. The patient would also benefit from maintenance of regular sleep/wake cycles, frequent re-orientation, family member at bedside, ensuring personal eyeglasses or hearing aids available if used, avoidance of  benzodiazepines and anticholinergic medications, and judicious use of opiates for pain control if necessary.    Differential: Delirium 2/2 benzodiazapine use.

## 2018-11-01 NOTE — DISCHARGE NOTE ADULT - MEDICATION SUMMARY - MEDICATIONS TO CHANGE
I will SWITCH the dose or number of times a day I take the medications listed below when I get home from the hospital:    QUEtiapine 25 mg oral tablet  -- 1 tab(s) by mouth 2 times a day MDD:2 tabs    atorvastatin 20 mg oral tablet  -- 1 tab(s) by mouth once a day (at bedtime) MDD:1 tab    midodrine 5 mg oral tablet  -- 1 tab(s) by mouth once a day

## 2018-11-01 NOTE — PROGRESS NOTE ADULT - ASSESSMENT
Orthostatic hypotension  much improved  cont low dose midodrine     AMS  fu with primary team for work up

## 2018-11-01 NOTE — PROGRESS NOTE ADULT - SUBJECTIVE AND OBJECTIVE BOX
Subjective: Patient seen and examined. No new events except as noted.     SUBJECTIVE/ROS:        MEDICATIONS:  MEDICATIONS  (STANDING):  aspirin enteric coated 81 milliGRAM(s) Oral daily  atorvastatin 80 milliGRAM(s) Oral at bedtime  cyanocobalamin 1000 MICROGram(s) Oral daily  docusate sodium 100 milliGRAM(s) Oral two times a day  heparin  Injectable 5000 Unit(s) SubCutaneous every 12 hours  levETIRAcetam 750 milliGRAM(s) Oral two times a day  melatonin 3 milliGRAM(s) Oral at bedtime  midodrine 2.5 milliGRAM(s) Oral three times a day  QUEtiapine 25 milliGRAM(s) Oral at bedtime  sodium chloride 0.9%. 1000 milliLiter(s) (75 mL/Hr) IV Continuous <Continuous>  tamsulosin 0.4 milliGRAM(s) Oral at bedtime      PHYSICAL EXAM:  T(C): 36.8 (11-01-18 @ 05:33), Max: 36.8 (10-31-18 @ 21:09)  HR: 78 (11-01-18 @ 05:33) (78 - 86)  BP: 150/88 (11-01-18 @ 05:33) (131/84 - 156/80)  RR: 18 (11-01-18 @ 05:33) (18 - 18)  SpO2: 100% (11-01-18 @ 05:33) (97% - 100%)  Wt(kg): --  I&O's Summary      JVP: Normal  Neck: supple  Lung: clear   CV: S1 S2 , Murmur:  Abd: soft  Ext: No edema  neuro: Awake / alert  Psych: flat affect  Skin: normal      LABS/DATA:    CARDIAC MARKERS:                                11.7   5.68  )-----------( 153      ( 01 Nov 2018 05:45 )             35.4     11-01    142  |  106  |  18  ----------------------------<  90  4.0   |  23  |  1.25    Ca    9.3      01 Nov 2018 05:45  Phos  3.0     11-01  Mg     1.9     11-01      proBNP:   Lipid Profile:   HgA1c:   TSH:     TELE:  EKG:

## 2018-11-01 NOTE — PROGRESS NOTE ADULT - ASSESSMENT
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 +insomnia +MRI -subacute lacunar infarct Rt posterior external capsule/basal ganglia junction.

## 2018-11-01 NOTE — PROGRESS NOTE BEHAVIORAL HEALTH - NSBHATTESTSEENBY_PSY_A_CORE
Trainee with telephonic supervision from Attending Psychiatrist
Attending Psychiatrist supervising NP/Trainee, meeting pt...
Attending Psychiatrist supervising NP/Trainee, meeting pt...

## 2018-11-01 NOTE — DISCHARGE NOTE ADULT - PATIENT PORTAL LINK FT
You can access the [facility], offered by [facility], by registering with the following website: [email or web address]

## 2018-11-01 NOTE — PROGRESS NOTE ADULT - PROBLEM SELECTOR PLAN 4
- Echo in Jan 2018 normal LVEF  - continue midodrine as per cards recs +orthostasis midodrine as per cards 2.5 mg TID if BP SBP<160

## 2018-11-02 LAB — VIT B1 SERPL-MCNC: 153.9 NMOL/L — SIGNIFICANT CHANGE UP (ref 66.5–200)

## 2018-11-04 LAB — VIT B1 SERPL-MCNC: < 20 NMOL/L — LOW (ref 66.5–200)

## 2018-11-26 ENCOUNTER — APPOINTMENT (OUTPATIENT)
Dept: NEUROLOGY | Facility: CLINIC | Age: 83
End: 2018-11-26
Payer: MEDICARE

## 2018-11-26 DIAGNOSIS — N40.0 BENIGN PROSTATIC HYPERPLASIA WITHOUT LOWER URINARY TRACT SYMPMS: ICD-10-CM

## 2018-11-26 PROCEDURE — 99214 OFFICE O/P EST MOD 30 MIN: CPT

## 2018-11-26 RX ORDER — LORAZEPAM 2 MG/1
2 TABLET ORAL
Qty: 2 | Refills: 0 | Status: DISCONTINUED | COMMUNITY
Start: 2018-08-28 | End: 2018-11-26

## 2018-12-29 NOTE — PROGRESS NOTE ADULT - PROBLEM SELECTOR PLAN 2
:s/p evacuation on 1/22.  Pain control.  Dressing care as per Neuro Sx.  Lovenox for DVT prophylaxis.  Keppra for seizure prophylaxis. no

## 2019-02-04 ENCOUNTER — INPATIENT (INPATIENT)
Facility: HOSPITAL | Age: 84
LOS: 3 days | Discharge: HOME | End: 2019-02-08
Attending: INTERNAL MEDICINE | Admitting: INTERNAL MEDICINE
Payer: MEDICARE

## 2019-02-04 VITALS
DIASTOLIC BLOOD PRESSURE: 57 MMHG | OXYGEN SATURATION: 96 % | SYSTOLIC BLOOD PRESSURE: 137 MMHG | TEMPERATURE: 98 F | RESPIRATION RATE: 18 BRPM | HEART RATE: 74 BPM

## 2019-02-04 DIAGNOSIS — Z29.9 ENCOUNTER FOR PROPHYLACTIC MEASURES, UNSPECIFIED: ICD-10-CM

## 2019-02-04 DIAGNOSIS — E78.5 HYPERLIPIDEMIA, UNSPECIFIED: ICD-10-CM

## 2019-02-04 DIAGNOSIS — I95.1 ORTHOSTATIC HYPOTENSION: ICD-10-CM

## 2019-02-04 DIAGNOSIS — Z98.890 OTHER SPECIFIED POSTPROCEDURAL STATES: Chronic | ICD-10-CM

## 2019-02-04 DIAGNOSIS — R41.82 ALTERED MENTAL STATUS, UNSPECIFIED: ICD-10-CM

## 2019-02-04 DIAGNOSIS — R11.10 VOMITING, UNSPECIFIED: ICD-10-CM

## 2019-02-04 DIAGNOSIS — N40.1 BENIGN PROSTATIC HYPERPLASIA WITH LOWER URINARY TRACT SYMPTOMS: ICD-10-CM

## 2019-02-04 LAB
ALBUMIN SERPL ELPH-MCNC: 3.7 G/DL — SIGNIFICANT CHANGE UP (ref 3.3–5)
ALP SERPL-CCNC: 57 U/L — SIGNIFICANT CHANGE UP (ref 40–120)
ALT FLD-CCNC: 45 U/L — HIGH (ref 4–41)
ANION GAP SERPL CALC-SCNC: 14 MMO/L — SIGNIFICANT CHANGE UP (ref 7–14)
AST SERPL-CCNC: 52 U/L — HIGH (ref 4–40)
BASOPHILS # BLD AUTO: 0.02 K/UL — SIGNIFICANT CHANGE UP (ref 0–0.2)
BASOPHILS NFR BLD AUTO: 0.3 % — SIGNIFICANT CHANGE UP (ref 0–2)
BILIRUB SERPL-MCNC: 0.6 MG/DL — SIGNIFICANT CHANGE UP (ref 0.2–1.2)
BUN SERPL-MCNC: 14 MG/DL — SIGNIFICANT CHANGE UP (ref 7–23)
CALCIUM SERPL-MCNC: 9.5 MG/DL — SIGNIFICANT CHANGE UP (ref 8.4–10.5)
CHLORIDE SERPL-SCNC: 106 MMOL/L — SIGNIFICANT CHANGE UP (ref 98–107)
CO2 SERPL-SCNC: 23 MMOL/L — SIGNIFICANT CHANGE UP (ref 22–31)
CREAT SERPL-MCNC: 1.31 MG/DL — HIGH (ref 0.5–1.3)
EOSINOPHIL # BLD AUTO: 0.03 K/UL — SIGNIFICANT CHANGE UP (ref 0–0.5)
EOSINOPHIL NFR BLD AUTO: 0.5 % — SIGNIFICANT CHANGE UP (ref 0–6)
GLUCOSE SERPL-MCNC: 139 MG/DL — HIGH (ref 70–99)
HCT VFR BLD CALC: 38.9 % — LOW (ref 39–50)
HGB BLD-MCNC: 12.2 G/DL — LOW (ref 13–17)
IMM GRANULOCYTES NFR BLD AUTO: 0.3 % — SIGNIFICANT CHANGE UP (ref 0–1.5)
LYMPHOCYTES # BLD AUTO: 1.4 K/UL — SIGNIFICANT CHANGE UP (ref 1–3.3)
LYMPHOCYTES # BLD AUTO: 21.1 % — SIGNIFICANT CHANGE UP (ref 13–44)
MCHC RBC-ENTMCNC: 27.7 PG — SIGNIFICANT CHANGE UP (ref 27–34)
MCHC RBC-ENTMCNC: 31.4 % — LOW (ref 32–36)
MCV RBC AUTO: 88.2 FL — SIGNIFICANT CHANGE UP (ref 80–100)
MONOCYTES # BLD AUTO: 0.64 K/UL — SIGNIFICANT CHANGE UP (ref 0–0.9)
MONOCYTES NFR BLD AUTO: 9.7 % — SIGNIFICANT CHANGE UP (ref 2–14)
NEUTROPHILS # BLD AUTO: 4.52 K/UL — SIGNIFICANT CHANGE UP (ref 1.8–7.4)
NEUTROPHILS NFR BLD AUTO: 68.1 % — SIGNIFICANT CHANGE UP (ref 43–77)
NRBC # FLD: 0 K/UL — LOW (ref 25–125)
PLATELET # BLD AUTO: 148 K/UL — LOW (ref 150–400)
PMV BLD: 11.3 FL — SIGNIFICANT CHANGE UP (ref 7–13)
POTASSIUM SERPL-MCNC: 4.3 MMOL/L — SIGNIFICANT CHANGE UP (ref 3.5–5.3)
POTASSIUM SERPL-SCNC: 4.3 MMOL/L — SIGNIFICANT CHANGE UP (ref 3.5–5.3)
PROT SERPL-MCNC: 7.2 G/DL — SIGNIFICANT CHANGE UP (ref 6–8.3)
RBC # BLD: 4.41 M/UL — SIGNIFICANT CHANGE UP (ref 4.2–5.8)
RBC # FLD: 13.9 % — SIGNIFICANT CHANGE UP (ref 10.3–14.5)
SODIUM SERPL-SCNC: 143 MMOL/L — SIGNIFICANT CHANGE UP (ref 135–145)
TROPONIN T, HIGH SENSITIVITY: 17 NG/L — SIGNIFICANT CHANGE UP (ref ?–14)
WBC # BLD: 6.63 K/UL — SIGNIFICANT CHANGE UP (ref 3.8–10.5)
WBC # FLD AUTO: 6.63 K/UL — SIGNIFICANT CHANGE UP (ref 3.8–10.5)

## 2019-02-04 PROCEDURE — 70450 CT HEAD/BRAIN W/O DYE: CPT | Mod: 26

## 2019-02-04 PROCEDURE — 73502 X-RAY EXAM HIP UNI 2-3 VIEWS: CPT | Mod: 26,LT

## 2019-02-04 PROCEDURE — 71045 X-RAY EXAM CHEST 1 VIEW: CPT | Mod: 26

## 2019-02-04 RX ORDER — ASCORBIC ACID 60 MG
1000 TABLET,CHEWABLE ORAL DAILY
Qty: 0 | Refills: 0 | Status: DISCONTINUED | OUTPATIENT
Start: 2019-02-04 | End: 2019-02-08

## 2019-02-04 RX ORDER — ASPIRIN/CALCIUM CARB/MAGNESIUM 324 MG
81 TABLET ORAL DAILY
Qty: 0 | Refills: 0 | Status: DISCONTINUED | OUTPATIENT
Start: 2019-02-04 | End: 2019-02-08

## 2019-02-04 RX ORDER — ACETAMINOPHEN 500 MG
975 TABLET ORAL ONCE
Qty: 0 | Refills: 0 | Status: COMPLETED | OUTPATIENT
Start: 2019-02-04 | End: 2019-02-04

## 2019-02-04 RX ORDER — CHOLECALCIFEROL (VITAMIN D3) 125 MCG
400 CAPSULE ORAL DAILY
Qty: 0 | Refills: 0 | Status: DISCONTINUED | OUTPATIENT
Start: 2019-02-04 | End: 2019-02-08

## 2019-02-04 RX ORDER — SODIUM CHLORIDE 9 MG/ML
3 INJECTION INTRAMUSCULAR; INTRAVENOUS; SUBCUTANEOUS EVERY 8 HOURS
Qty: 0 | Refills: 0 | Status: DISCONTINUED | OUTPATIENT
Start: 2019-02-04 | End: 2019-02-08

## 2019-02-04 RX ORDER — QUETIAPINE FUMARATE 200 MG/1
25 TABLET, FILM COATED ORAL AT BEDTIME
Qty: 0 | Refills: 0 | Status: DISCONTINUED | OUTPATIENT
Start: 2019-02-05 | End: 2019-02-05

## 2019-02-04 RX ORDER — LANOLIN ALCOHOL/MO/W.PET/CERES
6 CREAM (GRAM) TOPICAL AT BEDTIME
Qty: 0 | Refills: 0 | Status: DISCONTINUED | OUTPATIENT
Start: 2019-02-04 | End: 2019-02-08

## 2019-02-04 RX ORDER — ATORVASTATIN CALCIUM 80 MG/1
80 TABLET, FILM COATED ORAL AT BEDTIME
Qty: 0 | Refills: 0 | Status: DISCONTINUED | OUTPATIENT
Start: 2019-02-04 | End: 2019-02-08

## 2019-02-04 RX ORDER — PREGABALIN 225 MG/1
1000 CAPSULE ORAL DAILY
Qty: 0 | Refills: 0 | Status: DISCONTINUED | OUTPATIENT
Start: 2019-02-04 | End: 2019-02-08

## 2019-02-04 RX ORDER — TAMSULOSIN HYDROCHLORIDE 0.4 MG/1
0.4 CAPSULE ORAL AT BEDTIME
Qty: 0 | Refills: 0 | Status: DISCONTINUED | OUTPATIENT
Start: 2019-02-04 | End: 2019-02-08

## 2019-02-04 RX ORDER — LANOLIN ALCOHOL/MO/W.PET/CERES
1 CREAM (GRAM) TOPICAL
Qty: 0 | Refills: 0 | COMMUNITY

## 2019-02-04 RX ORDER — QUETIAPINE FUMARATE 200 MG/1
25 TABLET, FILM COATED ORAL
Qty: 0 | Refills: 0 | Status: DISCONTINUED | OUTPATIENT
Start: 2019-02-04 | End: 2019-02-04

## 2019-02-04 RX ORDER — VITAMIN E 100 UNIT
400 CAPSULE ORAL DAILY
Qty: 0 | Refills: 0 | Status: DISCONTINUED | OUTPATIENT
Start: 2019-02-04 | End: 2019-02-08

## 2019-02-04 RX ORDER — DOCUSATE SODIUM 100 MG
100 CAPSULE ORAL
Qty: 0 | Refills: 0 | Status: DISCONTINUED | OUTPATIENT
Start: 2019-02-04 | End: 2019-02-06

## 2019-02-04 RX ORDER — MIDODRINE HYDROCHLORIDE 2.5 MG/1
5 TABLET ORAL THREE TIMES A DAY
Qty: 0 | Refills: 0 | Status: DISCONTINUED | OUTPATIENT
Start: 2019-02-04 | End: 2019-02-08

## 2019-02-04 RX ADMIN — TAMSULOSIN HYDROCHLORIDE 0.4 MILLIGRAM(S): 0.4 CAPSULE ORAL at 22:17

## 2019-02-04 RX ADMIN — ATORVASTATIN CALCIUM 80 MILLIGRAM(S): 80 TABLET, FILM COATED ORAL at 22:17

## 2019-02-04 RX ADMIN — Medication 6 MILLIGRAM(S): at 22:17

## 2019-02-04 RX ADMIN — MIDODRINE HYDROCHLORIDE 5 MILLIGRAM(S): 2.5 TABLET ORAL at 22:17

## 2019-02-04 RX ADMIN — SODIUM CHLORIDE 3 MILLILITER(S): 9 INJECTION INTRAMUSCULAR; INTRAVENOUS; SUBCUTANEOUS at 22:17

## 2019-02-04 RX ADMIN — Medication 975 MILLIGRAM(S): at 14:20

## 2019-02-04 NOTE — ED PROVIDER NOTE - PHYSICAL EXAMINATION
Gen: No acute distress, alert, cooperative  HENT: Normocephalic, atraumatic. External ears normal, no rhinorrhea, moist mucous membranes, normal conjunctiva  Eyes: PERRLA, EOMI    Resp: CTAB, non-labored, speaking without difficulty on room air, no wheeze  CV: rrr, no murmur  Abd: soft, NTND, no rebound or guarding  MSK: No CVAT bilaterally, no midline ttp  Skin: warm and dry  Neuro: AOx2, speech is fluent and appropriate, no focal deficits  Psych: Normal affect

## 2019-02-04 NOTE — H&P ADULT - PROBLEM SELECTOR PLAN 3
Monitor blood pressure  Continue Midodrie 2.5 po Monitor blood pressure  Continue Midodrine 5 po tid Monitor blood pressure  Continue Midodrine 5 po tid with hold parameters  fall precautions

## 2019-02-04 NOTE — H&P ADULT - NSHPPHYSICALEXAM_GEN_ALL_CORE
Vital Signs Last 24 Hrs  T(C): 37 (05 Feb 2019 02:16), Max: 37 (05 Feb 2019 02:16)  T(F): 98.6 (05 Feb 2019 02:16), Max: 98.6 (05 Feb 2019 02:16)  HR: 89 (05 Feb 2019 02:16) (74 - 89)  BP: 151/90 (05 Feb 2019 02:16) (137/57 - 165/88)  BP(mean): --  RR: 18 (05 Feb 2019 02:16) (16 - 18)  SpO2: 95% (05 Feb 2019 02:16) (95% - 99%)

## 2019-02-04 NOTE — ED ADULT NURSE NOTE - NSIMPLEMENTINTERV_GEN_ALL_ED
Implemented All Fall Risk Interventions:  [place] to call system. Call bell, personal items and telephone within reach. Instruct patient to call for assistance. Room bathroom lighting operational. Non-slip footwear when patient is off stretcher. Physically safe environment: no spills, clutter or unnecessary equipment. Stretcher in lowest position, wheels locked, appropriate side rails in place. Provide visual cue, wrist band, yellow gown, etc. Monitor gait and stability. Monitor for mental status changes and reorient to person, place, and time. Review medications for side effects contributing to fall risk. Reinforce activity limits and safety measures with patient and family.

## 2019-02-04 NOTE — H&P ADULT - GASTROINTESTINAL DETAILS
bowel sounds normal/no rebound tenderness/nontender/no guarding soft/bowel sounds normal/no rigidity/no rebound tenderness/nontender/no guarding

## 2019-02-04 NOTE — H&P ADULT - NSHPLABSRESULTS_GEN_ALL_CORE
12.2   6.63  )-----------( 148      ( 04 Feb 2019 14:35 )             38.9   02-04    143  |  106  |  14  ----------------------------<  139<H>  4.3   |  23  |  1.31<H>    Ca    9.5      04 Feb 2019 14:36    TPro  7.2  /  Alb  3.7  /  TBili  0.6  /  DBili  x   /  AST  52<H>  /  ALT  45<H>  /  AlkPhos  57  02-04    Vital Signs Last 24 Hrs  T(C): 36.8 (04 Feb 2019 18:18), Max: 36.8 (04 Feb 2019 18:18)  T(F): 98.2 (04 Feb 2019 18:18), Max: 98.2 (04 Feb 2019 18:18)  HR: 80 (04 Feb 2019 18:18) (74 - 80)  BP: 165/88 (04 Feb 2019 18:18) (137/57 - 165/88)  BP(mean): --  RR: 18 (04 Feb 2019 18:18) (18 - 18)  SpO2: 97% (04 Feb 2019 18:18) (96% - 97%)    Xray Chest 1 View-  Single frontal chest from 2/4/2019 at 1547. Compared to prior study from   10/28/2018.    IMPRESSION:  Vertically oriented thin linear subsegmental atelectasis or scar in   medial left base/retrocardiac region. Clear remaining visualized lungs.   No pleural effusions or pneumothorax.    Stable sightly prominent appearing cardiac and mediastinal silhouettes.    Trachea midline.    Unremarkable osseous structures.      Xray Hip w/ Pelvis 2 or 3 Views, Left     INTERPRETATION:  CLINICAL INDICATION: left hip pain; dementia; no known   trauma    EXAM:  AP pelvis and dedicated AP and frog-lateral left hip from 2/4/2019 at   1547. No similar prior studies available for comparison.    IMPRESSION:  No fracture or dislocation.    Lower lumbar spine degenerative change apparent. Preserved left hip joint   space with smooth and intact articular surfaces and normally offset   femoral head neck junction.    No gross radiographic evidence for AVN.    Chronic small spur projecting from inferior left lesser trochanter   margin.     Generalized mild osteopenia otherwise no discrete lytic or blastic   lesions.     Unremarkable remaining imaged pelvic osseous structures. 02-04    143  |  106  |  14  ----------------------------<  139<H>  4.3   |  23  |  1.31<H>    Ca    9.5      04 Feb 2019 14:36    TPro  7.2  /  Alb  3.7  /  TBili  0.6  /  DBili  x   /  AST  52<H>  /  ALT  45<H>  /  AlkPhos  57  02-04                        12.2   6.63  )-----------( 148      ( 04 Feb 2019 14:35 )             38.9     Troponin T, High Sensitivity: 17 ng/L (02.04.19 @ 14:36)    < from: Xray Chest 1 View- PORTABLE-Urgent (02.04.19 @ 15:47) >  IMPRESSION: Vertically oriented thin linear subsegmental atelectasis or scar in medial left base/retrocardiac region. Clear remaining visualized lungs. No pleural effusions or pneumothorax. Stable sightly prominent appearing cardiac and mediastinal silhouettes. Trachea midline. Unremarkable osseous structures.  < end of copied text >    < from: Xray Hip w/ Pelvis 2 or 3 Views, Left (02.04.19 @ 15:47) >  No fracture or dislocation. Lower lumbar spine degenerative change apparent. Preserved left hip joint space with smooth and intact articular surfaces and normally offset femoral head neck junction. No gross radiographic evidence for AVN. Chronic small spur projecting from inferior left lesser trochanter margin. Generalized mild osteopenia otherwise no discrete lytic or blastic lesions. Unremarkable remaining imaged pelvic osseous structures.  < end of copied text >    EKG personally reviewed - 82bpm NSR, PAC, sinusoidal T in T3-T5; QTc 378ms - grossly unchanged from prior

## 2019-02-04 NOTE — ED PROVIDER NOTE - OBJECTIVE STATEMENT
87yo M hx craniectomy s/p fall, mild stroke, dementia, now on quetiapine prn for agitation presenting with unresponsive episode. Patient was given quetiapine this morning as he had a doctor appt this afternoon(prepare for agitation/trip). Finished breakfast, sitting in chair, and then was unarousable. Breathing on his own, appeared to be in deep sleep. BP 75/40. EMS arrived /54. Back to baseline now, no complaints. No CP, sob, abdominal pain, headache, dizziness. Seeing cardiologist for issue with orthostatic BP, currently on midodrine.

## 2019-02-04 NOTE — H&P ADULT - HISTORY OF PRESENT ILLNESS
85yo M PMHx traumatic subdural hematoma s/p Craniotomy with drain in Jan 2018, HTN, HLD, orthostatic hypotension on midodrine, cognitive impairment post neurosurgery, dementia brought in for AMS today. [facility] daughter (Roxanne Carrillo) was contacted for further history. Patients daughter states she gave her father quetiapine twice this morning for agitation and afterwards he was sitting in his chair unresponsive. Daughter  states her father was eating breakfast and talking before, but when she came back into the kitchen he "appeared to be in a deep sleep" x 20 minutes. Reports one episode of non bilious non bloody emesis. Patient is a poor historian and is unable to recall today's events. Reports last BM today- normal stool.  Patient states his daughters names and that he is from [facility]. Patient c/o left hip pain. Xrays done in ED- no fracture. Daughter reports no fall/trauma. Denies headache, vision change, chest pain, dyspnea, abdominal pain, nausea, hematuria, melena, hematochezia. 87yo M PMHx traumatic subdural hematoma s/p Craniotomy in Jan 2018, HTN, HLD, orthostatic hypotension on midodrine, cognitive impairment post neurosurgery, dementia brought in for one episode of AMS today. [facility] daughter (Roxanne Carrillo) was contacted for further history. Patients daughter states she gave her father quetiapine twice this morning for agitation and afterwards he was sitting in his chair unresponsive. Daughter  states her father was eating breakfast and talking before, but when she came back into the kitchen he "appeared to be in a deep sleep" x 20 minutes. Reports one episode of non bilious non bloody emesis. Patient is a poor historian and is unable to recall today's events. Reports last BM today- normal stool.  Patient states his daughters names and that he is from [facility]. Patient c/o left hip pain. Xrays done in ED- no fracture. Daughter reports no fall/trauma. Denies headache, vision change, chest pain, dyspnea, abdominal pain, nausea, hematuria, melena, hematochezia.

## 2019-02-04 NOTE — ED ADULT TRIAGE NOTE - CHIEF COMPLAINT QUOTE
pt BIBA from home, pt had episode of unresponsiveness at home, pt was started on seroquel yesterday.  offers no compaints.

## 2019-02-04 NOTE — H&P ADULT - NSHPREVIEWOFSYSTEMS_GEN_ALL_CORE
difficult to obtain ROS from patient, often will not answer questions, answers with unrelated stories

## 2019-02-04 NOTE — H&P ADULT - ATTENDING COMMENTS
Agree with PA H&P and plan as edited above.    iSTOP Reference #: [id number] (negative). Patient a very poor historian, unable to explain why he is here.  Prior admission notes reviewed.  Clarify dosing of midodrine and frequency of quetiapine use with daughter in AM.  Would benefit from rhonda-psych eval in AM given paranoia and agitation at home.

## 2019-02-04 NOTE — H&P ADULT - PROBLEM SELECTOR PLAN 6
DVT ppx pending CT head  Mechanical ppx= SCDs stable CKD  renally dose meds, avoid nephrotoxins, trend Cr

## 2019-02-04 NOTE — ED ADULT NURSE NOTE - NEURO WDL
Alert and oriented to person, place and time, memory intact, behavior appropriate to situation, PERRL. AOx1-2 (baseline)

## 2019-02-04 NOTE — H&P ADULT - ASSESSMENT
85yo M PMHx traumatic subdural hematoma s/p Craniotomy in Jan 2018, HTN, HLD, orthostatic hypotension on midodrine, cognitive impairment post neurosurgery, dementia admitted for AMS

## 2019-02-04 NOTE — ED PROVIDER NOTE - ATTENDING CONTRIBUTION TO CARE
I performed a face-to-face evaluation of the patient and performed a history and physical examination. I agree with the history and physical examination.    Dementia, stroke, unresponsive this AM. Back to baseline now. No e/o injury or clinical e/o infection. Check labs and UA and CXR to eval for medical causes of brief AMS.

## 2019-02-04 NOTE — H&P ADULT - PROBLEM SELECTOR PLAN 1
admit to tele  possibly 2/2 increased quetiapine dose  CT head   Urinalysis   cbc, bmp, a1c, tsh, lipid profile  Cards- Dr. Hale   recent tte Nov 2018 - normal LV fxn  Trop 17, trend CE's   f/u MD note  consider psych c/s admit to tele  unresponsiveness possibly 2/2 quetiapine  CT head   Urinalysis   cbc, bmp, a1c, tsh, lipid profile  Cards- Dr. Hale   recent tte Nov 2018 - normal LV fxn  Trop 17, trend CE's   f/u MD note  consider psych c/s unresponsiveness possibly 2/2 quetiapine (confirm frequency of dosing with daughter in AM)   currently A&Ox2, with cognitive impairment noted on prior admission - likely dementia with behavioral disturbances; placed on enhanced supervision for wandering in ED  admit to tele  CT head pending  check urinalysis   cbc, bmp, a1c, tsh, lipid profile  Cards- Dr. Hale, to see in AM  recent tte Nov 2018 - normal LV fxn  Trop 17, trend CE's   rhonda-psych c/s in AM

## 2019-02-04 NOTE — ED PROVIDER NOTE - MEDICAL DECISION MAKING DETAILS
87yo M hx craniectomy s/p fall, mild stroke, dementia, now on quetiapine prn for agitation presenting with unresponsive episode. Possible side affect of quetiapine, sleeping soundly. Will check labs, ekg.

## 2019-02-04 NOTE — H&P ADULT - RS GEN PE MLT RESP DETAILS PC
breath sounds equal/clear to auscultation bilaterally/good air movement/no rhonchi/respirations non-labored/no rales good air movement/breath sounds equal/no intercostal retractions/no rhonchi/no wheezes/respirations non-labored/no rales/clear to auscultation bilaterally

## 2019-02-04 NOTE — H&P ADULT - PROBLEM SELECTOR PLAN 2
monitor lytes  BMP hemolyzed, repeat BMP ordered  abdomen distended   Xray Abdomen  continue docusate

## 2019-02-04 NOTE — H&P ADULT - PSYCHIATRIC COMMENTS
paranoid thoughts regarding family trying to steal from him, not being able to trust people; pleasant/cooperative with exam

## 2019-02-04 NOTE — ED ADULT NURSE NOTE - OBJECTIVE STATEMENT
85yo M hx craniectomy s/p fall, mild stroke, dementia, now on quetiapine prn for agitation presenting with unresponsive episode. Patient was given quetiapine this morning as he had a doctor appt this afternoon(prepare for agitation/trip). Finished breakfast, sitting in chair, and then was unarousable for 45 minutes. As per daughter pt was breathing on his own, appeared to be in deep sleep. Pt then began vomiting. BP 75/40. EMS arrived /54. Back to baseline now, no complaints. No CP, sob, abdominal pain, headache, dizziness. Seeing cardiologist for issue with orthostatic BP, currently on midodrine. Pt denies chest pain, SOB, n/v/d. NSR on CM. currently AOx3 87yo M hx craniectomy s/p fall, mild stroke, dementia, now on quetiapine prn for agitation presenting with unresponsive episode. Patient was given quetiapine this morning as he had a doctor appt this afternoon(prepare for agitation/trip). Finished breakfast, sitting in chair, and then was unarousable for 45 minutes. As per daughter pt was breathing on his own, appeared to be in deep sleep. Pt then began vomiting. BP 75/40. EMS arrived /54. Back to baseline now, no complaints. No CP, sob, abdominal pain, headache, dizziness. Seeing cardiologist for issue with orthostatic BP, currently on midodrine. Pt denies chest pain, SOB, n/v/d. NSR on CM. currently AO1-2 (baseline), family at bedside.

## 2019-02-05 DIAGNOSIS — N18.3 CHRONIC KIDNEY DISEASE, STAGE 3 (MODERATE): ICD-10-CM

## 2019-02-05 DIAGNOSIS — F03.90 UNSPECIFIED DEMENTIA WITHOUT BEHAVIORAL DISTURBANCE: ICD-10-CM

## 2019-02-05 DIAGNOSIS — F05 DELIRIUM DUE TO KNOWN PHYSIOLOGICAL CONDITION: ICD-10-CM

## 2019-02-05 LAB
ANION GAP SERPL CALC-SCNC: 12 MMO/L — SIGNIFICANT CHANGE UP (ref 7–14)
BASOPHILS # BLD AUTO: 0.02 K/UL — SIGNIFICANT CHANGE UP (ref 0–0.2)
BASOPHILS NFR BLD AUTO: 0.3 % — SIGNIFICANT CHANGE UP (ref 0–2)
BUN SERPL-MCNC: 16 MG/DL — SIGNIFICANT CHANGE UP (ref 7–23)
CALCIUM SERPL-MCNC: 9.1 MG/DL — SIGNIFICANT CHANGE UP (ref 8.4–10.5)
CHLORIDE SERPL-SCNC: 106 MMOL/L — SIGNIFICANT CHANGE UP (ref 98–107)
CHOLEST SERPL-MCNC: 75 MG/DL — LOW (ref 120–199)
CO2 SERPL-SCNC: 25 MMOL/L — SIGNIFICANT CHANGE UP (ref 22–31)
CREAT SERPL-MCNC: 1.18 MG/DL — SIGNIFICANT CHANGE UP (ref 0.5–1.3)
EOSINOPHIL # BLD AUTO: 0.14 K/UL — SIGNIFICANT CHANGE UP (ref 0–0.5)
EOSINOPHIL NFR BLD AUTO: 1.9 % — SIGNIFICANT CHANGE UP (ref 0–6)
GLUCOSE SERPL-MCNC: 91 MG/DL — SIGNIFICANT CHANGE UP (ref 70–99)
HBA1C BLD-MCNC: 5.7 % — HIGH (ref 4–5.6)
HCT VFR BLD CALC: 35.9 % — LOW (ref 39–50)
HDLC SERPL-MCNC: 29 MG/DL — LOW (ref 35–55)
HGB BLD-MCNC: 11.6 G/DL — LOW (ref 13–17)
IMM GRANULOCYTES NFR BLD AUTO: 0.3 % — SIGNIFICANT CHANGE UP (ref 0–1.5)
LIPID PNL WITH DIRECT LDL SERPL: 45 MG/DL — SIGNIFICANT CHANGE UP
LYMPHOCYTES # BLD AUTO: 2.42 K/UL — SIGNIFICANT CHANGE UP (ref 1–3.3)
LYMPHOCYTES # BLD AUTO: 33.1 % — SIGNIFICANT CHANGE UP (ref 13–44)
MAGNESIUM SERPL-MCNC: 1.9 MG/DL — SIGNIFICANT CHANGE UP (ref 1.6–2.6)
MCHC RBC-ENTMCNC: 28 PG — SIGNIFICANT CHANGE UP (ref 27–34)
MCHC RBC-ENTMCNC: 32.3 % — SIGNIFICANT CHANGE UP (ref 32–36)
MCV RBC AUTO: 86.7 FL — SIGNIFICANT CHANGE UP (ref 80–100)
MONOCYTES # BLD AUTO: 0.83 K/UL — SIGNIFICANT CHANGE UP (ref 0–0.9)
MONOCYTES NFR BLD AUTO: 11.4 % — SIGNIFICANT CHANGE UP (ref 2–14)
NEUTROPHILS # BLD AUTO: 3.88 K/UL — SIGNIFICANT CHANGE UP (ref 1.8–7.4)
NEUTROPHILS NFR BLD AUTO: 53 % — SIGNIFICANT CHANGE UP (ref 43–77)
NRBC # FLD: 0 K/UL — LOW (ref 25–125)
PHOSPHATE SERPL-MCNC: 3 MG/DL — SIGNIFICANT CHANGE UP (ref 2.5–4.5)
PLATELET # BLD AUTO: 135 K/UL — LOW (ref 150–400)
PMV BLD: 11.5 FL — SIGNIFICANT CHANGE UP (ref 7–13)
POTASSIUM SERPL-MCNC: 4.2 MMOL/L — SIGNIFICANT CHANGE UP (ref 3.5–5.3)
POTASSIUM SERPL-SCNC: 4.2 MMOL/L — SIGNIFICANT CHANGE UP (ref 3.5–5.3)
RBC # BLD: 4.14 M/UL — LOW (ref 4.2–5.8)
RBC # FLD: 14 % — SIGNIFICANT CHANGE UP (ref 10.3–14.5)
SODIUM SERPL-SCNC: 143 MMOL/L — SIGNIFICANT CHANGE UP (ref 135–145)
TRIGL SERPL-MCNC: 43 MG/DL — SIGNIFICANT CHANGE UP (ref 10–149)
TSH SERPL-MCNC: 1.59 UIU/ML — SIGNIFICANT CHANGE UP (ref 0.27–4.2)
WBC # BLD: 7.31 K/UL — SIGNIFICANT CHANGE UP (ref 3.8–10.5)
WBC # FLD AUTO: 7.31 K/UL — SIGNIFICANT CHANGE UP (ref 3.8–10.5)

## 2019-02-05 PROCEDURE — 99223 1ST HOSP IP/OBS HIGH 75: CPT

## 2019-02-05 PROCEDURE — 74018 RADEX ABDOMEN 1 VIEW: CPT | Mod: 26

## 2019-02-05 RX ORDER — QUETIAPINE FUMARATE 200 MG/1
12.5 TABLET, FILM COATED ORAL AT BEDTIME
Qty: 0 | Refills: 0 | Status: DISCONTINUED | OUTPATIENT
Start: 2019-02-05 | End: 2019-02-05

## 2019-02-05 RX ORDER — HALOPERIDOL DECANOATE 100 MG/ML
0.5 INJECTION INTRAMUSCULAR AT BEDTIME
Qty: 0 | Refills: 0 | Status: DISCONTINUED | OUTPATIENT
Start: 2019-02-05 | End: 2019-02-06

## 2019-02-05 RX ORDER — HALOPERIDOL DECANOATE 100 MG/ML
0.5 INJECTION INTRAMUSCULAR EVERY 6 HOURS
Qty: 0 | Refills: 0 | Status: DISCONTINUED | OUTPATIENT
Start: 2019-02-05 | End: 2019-02-07

## 2019-02-05 RX ORDER — SODIUM CHLORIDE 9 MG/ML
1000 INJECTION INTRAMUSCULAR; INTRAVENOUS; SUBCUTANEOUS
Qty: 0 | Refills: 0 | Status: DISCONTINUED | OUTPATIENT
Start: 2019-02-05 | End: 2019-02-08

## 2019-02-05 RX ADMIN — Medication 6 MILLIGRAM(S): at 21:27

## 2019-02-05 RX ADMIN — SODIUM CHLORIDE 3 MILLILITER(S): 9 INJECTION INTRAMUSCULAR; INTRAVENOUS; SUBCUTANEOUS at 07:38

## 2019-02-05 RX ADMIN — Medication 400 UNIT(S): at 13:38

## 2019-02-05 RX ADMIN — MIDODRINE HYDROCHLORIDE 5 MILLIGRAM(S): 2.5 TABLET ORAL at 07:38

## 2019-02-05 RX ADMIN — MIDODRINE HYDROCHLORIDE 5 MILLIGRAM(S): 2.5 TABLET ORAL at 21:27

## 2019-02-05 RX ADMIN — SODIUM CHLORIDE 50 MILLILITER(S): 9 INJECTION INTRAMUSCULAR; INTRAVENOUS; SUBCUTANEOUS at 22:00

## 2019-02-05 RX ADMIN — ATORVASTATIN CALCIUM 80 MILLIGRAM(S): 80 TABLET, FILM COATED ORAL at 21:25

## 2019-02-05 RX ADMIN — TAMSULOSIN HYDROCHLORIDE 0.4 MILLIGRAM(S): 0.4 CAPSULE ORAL at 21:27

## 2019-02-05 RX ADMIN — SODIUM CHLORIDE 3 MILLILITER(S): 9 INJECTION INTRAMUSCULAR; INTRAVENOUS; SUBCUTANEOUS at 22:00

## 2019-02-05 RX ADMIN — Medication 1000 MILLIGRAM(S): at 13:38

## 2019-02-05 RX ADMIN — Medication 81 MILLIGRAM(S): at 13:37

## 2019-02-05 RX ADMIN — PREGABALIN 1000 MICROGRAM(S): 225 CAPSULE ORAL at 13:38

## 2019-02-05 RX ADMIN — Medication 400 INTERNATIONAL UNIT(S): at 13:37

## 2019-02-05 RX ADMIN — HALOPERIDOL DECANOATE 0.5 MILLIGRAM(S): 100 INJECTION INTRAMUSCULAR at 21:27

## 2019-02-05 RX ADMIN — SODIUM CHLORIDE 3 MILLILITER(S): 9 INJECTION INTRAMUSCULAR; INTRAVENOUS; SUBCUTANEOUS at 13:35

## 2019-02-05 RX ADMIN — HALOPERIDOL DECANOATE 0.5 MILLIGRAM(S): 100 INJECTION INTRAMUSCULAR at 21:56

## 2019-02-05 NOTE — BEHAVIORAL HEALTH ASSESSMENT NOTE - CASE SUMMARY
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  Psych CL consulted as patient is on Seroquel 25mg HS and there was concern that perhaps it may have contributed to his episode. Patient was found to have ileus which may have caused the AMS and hypotension.  Pt's family does not want to try a higher dose of Seroquel at this time.  Pt may benefit from Trazodone 50mg po q6pm and Haldol as a prn for agitation or paranoia.

## 2019-02-05 NOTE — BEHAVIORAL HEALTH ASSESSMENT NOTE - OTHER PAST PSYCHIATRIC HISTORY (INCLUDE DETAILS REGARDING ONSET, COURSE OF ILLNESS, INPATIENT/OUTPATIENT TREATMENT)
saw Psych CL on last admission for delirium  has been taking seroquel 25mg HS, though daughter Roxanne said she stopped it and would only give it to him as needed. saw Psych CL on last admission for delirium  has been taking Seroquel 25mg HS, though daughter Roxanne said she stopped it and would only give it to him as needed.

## 2019-02-05 NOTE — BEHAVIORAL HEALTH ASSESSMENT NOTE - OTHER
superficially cooperative at times would take blanket off him and expose legs no cogwheel rigidity, no muscle stiffness in RUE did not assess at times fast, but able to interrupt no SI/HI, talks about going to [facility]

## 2019-02-05 NOTE — PROGRESS NOTE ADULT - SUBJECTIVE AND OBJECTIVE BOX
CHIEF COMPLAINT:Patient is a 86y old  Male who presents with a chief complaint of AMS (05 Feb 2019 12:02)    	        PAST MEDICAL & SURGICAL HISTORY:  Hyperlipidemia, unspecified hyperlipidemia type  Benign prostatic hyperplasia with urinary frequency  HTN (hypertension)  Status post craniectomy          REVIEW OF SYSTEMS:  no cp or sob  no h/as  no fever or chills  no rashes    Medications:  MEDICATIONS  (STANDING):  ascorbic acid 1000 milliGRAM(s) Oral daily  aspirin enteric coated 81 milliGRAM(s) Oral daily  atorvastatin 80 milliGRAM(s) Oral at bedtime  cholecalciferol 400 Unit(s) Oral daily  cyanocobalamin 1000 MICROGram(s) Oral daily  melatonin 6 milliGRAM(s) Oral at bedtime  midodrine 5 milliGRAM(s) Oral three times a day  QUEtiapine 12.5 milliGRAM(s) Oral at bedtime  sodium chloride 0.9% lock flush 3 milliLiter(s) IV Push every 8 hours  tamsulosin 0.4 milliGRAM(s) Oral at bedtime  vitamin E 400 International Unit(s) Oral daily    MEDICATIONS  (PRN):  docusate sodium 100 milliGRAM(s) Oral two times a day PRN Constipation    	    PHYSICAL EXAM:  T(C): 36.1 (02-05-19 @ 09:39), Max: 37 (02-05-19 @ 02:16)  HR: 80 (02-05-19 @ 09:39) (78 - 89)  BP: 124/70 (02-05-19 @ 09:39) (124/70 - 165/88)  RR: 18 (02-05-19 @ 09:39) (16 - 18)  SpO2: 100% (02-05-19 @ 09:39) (95% - 100%)  Wt(kg): --  I&O's Summary      Appearance: Normal	  HEENT:   Normal oral mucosa, PERRL, EOMI	  Lymphatic: No lymphadenopathy  Cardiovascular: Normal S1 S2, No JVD, No murmurs, No edema  Respiratory: Lungs clear to auscultation	  Psychiatry: confused   Gastrointestinal:  Soft, Non-tender, + BS	  Skin: No rashes, No ecchymoses, No cyanosis	  Neurologic: Non-focal  Extremities: Normal range of motion, No clubbing, cyanosis or edema  Vascular: Peripheral pulses palpable 2+ bilaterally    TELEMETRY: 	    ECG:  	  RADIOLOGY:  OTHER: 	  	  LABS:	 	    CARDIAC MARKERS:                                11.6   7.31  )-----------( 135      ( 05 Feb 2019 07:35 )             35.9     02-05    143  |  106  |  16  ----------------------------<  91  4.2   |  25  |  1.18    Ca    9.1      05 Feb 2019 07:35  Phos  3.0     02-05  Mg     1.9     02-05    TPro  7.2  /  Alb  3.7  /  TBili  0.6  /  DBili  x   /  AST  52<H>  /  ALT  45<H>  /  AlkPhos  57  02-04    proBNP:   Lipid Profile:   HgA1c: Hemoglobin A1C, Whole Blood: 5.7 % (02-05 @ 07:35)    TSH: Thyroid Stimulating Hormone, Serum: 1.59 uIU/mL (02-05 @ 07:35)

## 2019-02-05 NOTE — PROGRESS NOTE ADULT - ASSESSMENT
85yo M PMHx traumatic subdural hematoma s/p Craniotomy in Jan 2018, HTN, HLD, orthostatic hypotension on midodrine, cognitive impairment post neurosurgery, dementia admitted for AMS       : Altered mental status.    unresponsiveness possibly 2/2 quetiapine   will lower dose for now  psych eval for alternatives ?  - likely dementia with behavioral disturbances; placed on enhanced supervision for wandering in ED   tele  CT head neg      cards eval       ·  Problem: Vomiting.  Plan: monitor lytes  resolved  if recurs   gi eval     ·  Problem: Orthostatic hypotension.  Plan: Monitor blood pressure  Continue Midodrine 5 po tid with hold parameters  fall precautions.    ·  Problem: Benign prostatic hyperplasia with urinary frequency  .  Plan: continue flomax.       ·  Problem: Hyperlipidemia, unspecified hyperlipidemia type.    lipid profile  low cholesterol diet  continue atorvastatin.       Problem: CKD (chronic kidney disease) stage 3  stable  f.u bmp     dvt proph

## 2019-02-05 NOTE — BEHAVIORAL HEALTH ASSESSMENT NOTE - HPI (INCLUDE ILLNESS QUALITY, SEVERITY, DURATION, TIMING, CONTEXT, MODIFYING FACTORS, ASSOCIATED SIGNS AND SYMPTOMS)
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  Per chart review, patient was agitated in the AM, received PRN dose- but then later found to be unarousable. Per ER note, "...Finished breakfast, sitting in chair, and then was unarousable. Breathing on his own, appeared to be in deep sleep. BP 75/40. EMS arrived /54. Back to baseline now, no complaints. No CP, sob, abdominal pain, headache, dizziness. Seeing cardiologist for issue with orthostatic BP, currently on midodrine."    Psych CL consulted for concern that seroquel (which patient has been prescribed) may have contributed to this.   Patient seen this evening, his daughter Barbara is by his bedside. He is pleasant, but confused. He believes he is going to [facility]. He denies SI/HI. Denies AH/VH. He does not know where he is, does not know the state that he is in.  Barbara states that he often states things that do not make sense, however she denies any violence or hallucinations. She states that his "agitation" is when he tries to leave the house. She notes that he can "sundown."  Spoke also to his daughter Roxanne- who does not like how seroquel works. She had not been giving it to him regularly, however she noted that this weekend he was more agitated so she did have to give it to him on two different occasions. She wonders if it is related to his current presentation.    Spoke to daughter Barbara about trying haldol instead. She is in agreement (of note, that is a PRN he received on last admission for agitation). Went over black box warning for antipsychotics with patients that have dementia with behavioral disturbance. She agrees to try medication.    Of note, patient is found to have likely ileus- which very well may be the underlying culprit of his AMS. 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  Per chart review, patient was agitated in the AM, received PRN dose- but then later found to be unarousable. Per ER note, "...Finished breakfast, sitting in chair, and then was unarousable. Breathing on his own, appeared to be in deep sleep. BP 75/40. EMS arrived /54. Back to baseline now, no complaints. No CP, sob, abdominal pain, headache, dizziness. Seeing cardiologist for issue with orthostatic BP, currently on midodrine."    Psych CL consulted for concern that seroquel (which patient has been prescribed) may have contributed to this.   Patient seen this evening, his daughter Barbara is by his bedside. He is pleasant, but confused. He believes he is going to [facility]. He denies SI/HI. Denies AH/VH. He does not know where he is, does not know the state that he is in.  Babrara states that he often states things that do not make sense, however she denies any violence or hallucinations. She states that his "agitation" is when he tries to leave the house. She notes that he can "sundown."  Spoke also to his daughter Roxanne- who does not like how Seroquel works. She had not been giving it to him regularly, however she noted that this weekend he was more agitated so she did have to give it to him on two different occasions. She wonders if it is related to his current presentation.    Spoke to daughter Barbara about trying haldol instead. She is in agreement (of note, that is a PRN he received on last admission for agitation). Went over black box warning for antipsychotics with patients that have dementia with behavioral disturbance. She agrees to try medication.    Of note, patient is found to have likely ileus- which very well may be the underlying culprit of his AMS.

## 2019-02-05 NOTE — CONSULT NOTE ADULT - ASSESSMENT
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  Surgery consulted for rule out bowel obstruction after AXR revealed possible ileus    - Patient appears clinically well and not obstructed although poor historian and unclear if passing flatus.  If continues not to have bowel movements or pass flatus recommend CTAP to evaluate for bowel obstruction   - If develops persistent N/V would consider NGT placement however appears asymptomatic at this time.    D/W Dr. Vazquez     [place] [id number] x [phone]

## 2019-02-05 NOTE — BEHAVIORAL HEALTH ASSESSMENT NOTE - SUMMARY
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  Psych CL consulted as patient is on seroquel 25mg HS and there was concern that perhaps it may have contributed to his episode.    Of note, patient found to have ileus. While it is unlikely that seroquel was sole culprit, as family does not like this medication- will try haldol instead.    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  Psych CL consulted as patient is on Seroquel 25mg HS and there was concern that perhaps it may have contributed to his episode.    Of note, patient found to have ileus. While it is unlikely that Seroquel was sole culprit, as family does not like this medication- will try haldol instead as a prn with Trazodone standing at bedtime.    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  Psych CL consulted as patient is on Seroquel 25mg HS and there was concern that perhaps it may have contributed to his episode.    Of note, patient found to have ileus. While it is unlikely that Seroquel was sole culprit, as family does not like this medication- will try haldol instead as a prn with Trazodone standing at bedtime.    - stop seroquel  -Trazodone 50mg po q6pm  - if agitated, can give haldol 0.5mg q6h prn for severe agitation  -Ativan 0.5mg IVP q6prn severe restlessness/ Akathisia  (no prior doses received this admission)  - check B12, folate, UA  - workup of ileus per primary team  - enhanced supervision  - daily EKGs to monitor QTC, if QTC > 500, do not administer antipsychotics

## 2019-02-05 NOTE — CONSULT NOTE ADULT - ASSESSMENT
Orthostatic hypotension   on midodrine     Syncope  likely due to hypotensive side effect from seroquel   recommend to dc Seroquel and substitute to different agent , consider psych eval   will consider ILR    Discussed with family

## 2019-02-05 NOTE — CONSULT NOTE ADULT - SUBJECTIVE AND OBJECTIVE BOX
GENERAL SURGERY CONSULT NOTE  Attending: Concha Espitia  Service: ACS  Contact: p [phone]    HPI  87yo M  with PMH of  traumatic subdural hematoma s/p Craniotomy in Jan 2018 with TBI, HTN, HLD, orthostatic hypotension on midodrine,  dementia brought in for one episode of AMS yesterday. Patients daughter states she gave her father quetiapine twice that morning for agitation and afterwards he was unresponsive. Daughter  states her father was eating breakfast and talking before, but when she came back into the kitchen he "appeared to be in a deep sleep" x 20 minutes. Additionally he had one episode of non bilious non bloody emesis after eating his breakfast immediately before becoming unconscious. Daughter reports no recent  fall/trauma. Denies headache, vision change, chest pain, dyspnea, abdominal pain, nausea, hematuria, melena, hematochezia. Patient was admitted to medicine and underwent a CT head that was unrevealing.  He additionally underwent an abodminal XRay for the history of vomiting which revealed constipation and possible ileus.  Daughter states he last had a BM on 2/2/19.  General surgery consulted for rule out bowel obstruction.    On exam he was hemodynamically stable and afebrile.  He was A&O x 1 (only oriented to himself) and sitting comfortably in bed.  His daughter was at the bedside and she denies any nausea or vomiting today.  Nurse, daughter and patient state he has not had a bowel movement.  Unclear if passing flatus.  On exam his abdomen was softly distended but nontender to palpation.  His labs were largely within normal limits.       PMH/PSH  Hyperlipidemia, unspecified hyperlipidemia type  Benign prostatic hyperplasia with urinary frequency  HTN (hypertension)    Status post craniectomy      MEDICATIONS  ascorbic acid 1000 milliGRAM(s) Oral daily  aspirin enteric coated 81 milliGRAM(s) Oral daily  atorvastatin 80 milliGRAM(s) Oral at bedtime  cholecalciferol 400 Unit(s) Oral daily  cyanocobalamin 1000 MICROGram(s) Oral daily  docusate sodium 100 milliGRAM(s) Oral two times a day PRN  haloperidol     Tablet 0.5 milliGRAM(s) Oral at bedtime  haloperidol    Injectable 0.5 milliGRAM(s) IV Push every 6 hours PRN  melatonin 6 milliGRAM(s) Oral at bedtime  midodrine 5 milliGRAM(s) Oral three times a day  sodium chloride 0.9% lock flush 3 milliLiter(s) IV Push every 8 hours  sodium chloride 0.9%. 1000 milliLiter(s) IV Continuous <Continuous>  tamsulosin 0.4 milliGRAM(s) Oral at bedtime  vitamin E 400 International Unit(s) Oral daily      Allergies    No Known Allergies    Intolerances        Social: lives at home with daughter     Physical Exam  T(C): 36.4 (02-05-19 @ 17:57), Max: 37.4 (02-05-19 @ 13:34)  HR: 92 (02-05-19 @ 17:57) (78 - 92)  BP: 164/72 (02-05-19 @ 17:57) (124/70 - 164/72)  RR: 16 (02-05-19 @ 17:57) (16 - 18)  SpO2: 100% (02-05-19 @ 17:57) (95% - 100%)  Wt(kg): --  Tmax: T(C): , Max: 37.4 (02-05-19 @ 13:34)  Wt(kg): --      Gen: NAD  Neuro: AAOx1  HEENT: normocephalic, atraumatic, no scleral icterus  CV: S1, S2, RRR  Pulm: CTA B/L  Abd: Softly distended, NT, no rebound, no guarding, no palpable organomegaly/masses  Ext: warm, no edema    LABS                        11.6   7.31  )-----------( 135      ( 05 Feb 2019 07:35 )             35.9     02-05    143  |  106  |  16  ----------------------------<  91  4.2   |  25  |  1.18    Ca    9.1      05 Feb 2019 07:35  Phos  3.0     02-05  Mg     1.9     02-05    TPro  7.2  /  Alb  3.7  /  TBili  0.6  /  DBili  x   /  AST  52<H>  /  ALT  45<H>  /  AlkPhos  57  02-04      IMAGING  < from: Xray Abdomen 1 View PORTABLE -Routine (02.05.19 @ 08:46) >    EXAM:  XR ABDOMEN PORTABLE ROUTINE 1V        PROCEDURE DATE:  Feb 5 2019         INTERPRETATION:  CLINICAL INFORMATION: Distended abdomen.    EXAM: Single frontal radiograph of the abdomen.    COMPARISON: None available    FINDINGS:  Moderate to large stool burden seen in the ascending colon, descending   colon and rectum.  Prominent small bowel loops are seen overlying the left hemiabdomen.  Nonobstructive bowel gas pattern.  The visualized osseous structures demonstrate no acute pathology.    IMPRESSION:   Moderate to large stool burden in the colon, with prominent small bowel   loops, suggesting ileus.          < end of copied text >

## 2019-02-05 NOTE — BEHAVIORAL HEALTH ASSESSMENT NOTE - RISK ASSESSMENT
Patient denies SI, has no history of violence per daughter. He has supportive family. He has low imminent risk of self harm.

## 2019-02-05 NOTE — BEHAVIORAL HEALTH ASSESSMENT NOTE - NSBHCHARTREVIEWINVESTIGATE_PSY_A_CORE FT
EKG 2/4:  qtc 378  Diagnosis Line Sinus rhythm with Premature atrial complexes  Nonspecific T wave abnormality  Abnormal ECG

## 2019-02-05 NOTE — CONSULT NOTE ADULT - SUBJECTIVE AND OBJECTIVE BOX
CHIEF COMPLAINT:Patient is a 86y old  Male who presents with a chief complaint of AMS (04 Feb 2019 19:54)      HISTORY OF PRESENT ILLNESS:    86 male with history as below admitted with AMS and episode of unresponsiveness after seroquel not associated with positional change   noted to have also low BP   Due to altered mental status, subjective information were not able to be obtained from the patient. History was obtained, to the extent possible, from review of the chart and collateral sources of information.  history taken from family     PAST MEDICAL & SURGICAL HISTORY:  Hyperlipidemia, unspecified hyperlipidemia type  Benign prostatic hyperplasia with urinary frequency  HTN (hypertension)  Status post craniectomy          MEDICATIONS:  aspirin enteric coated 81 milliGRAM(s) Oral daily  midodrine 5 milliGRAM(s) Oral three times a day  tamsulosin 0.4 milliGRAM(s) Oral at bedtime        melatonin 6 milliGRAM(s) Oral at bedtime  QUEtiapine 25 milliGRAM(s) Oral at bedtime    docusate sodium 100 milliGRAM(s) Oral two times a day PRN    atorvastatin 80 milliGRAM(s) Oral at bedtime    ascorbic acid 1000 milliGRAM(s) Oral daily  cholecalciferol 400 Unit(s) Oral daily  cyanocobalamin 1000 MICROGram(s) Oral daily  sodium chloride 0.9% lock flush 3 milliLiter(s) IV Push every 8 hours  vitamin E 400 International Unit(s) Oral daily      FAMILY HISTORY:  No pertinent family history in first degree relatives      Non-contributory    SOCIAL HISTORY:    No tobacco, drugs or etoh    Allergies    No Known Allergies    Intolerances    	    REVIEW OF SYSTEMS:  as above  The rest of the 14 points ROS reviewed and except above they are unremarkable.        PHYSICAL EXAM:  T(C): 36.1 (02-05-19 @ 09:39), Max: 37 (02-05-19 @ 02:16)  HR: 80 (02-05-19 @ 09:39) (74 - 89)  BP: 124/70 (02-05-19 @ 09:39) (124/70 - 165/88)  RR: 18 (02-05-19 @ 09:39) (16 - 18)  SpO2: 100% (02-05-19 @ 09:39) (95% - 100%)  Wt(kg): --  I&O's Summary      JVP: Normal  Neck: supple  Lung: clear   CV: S1 S2 , Murmur:  Abd: soft  Ext: No edema  neuro: Awake / alert  Psych: flat affect  Skin: normal      LABS/DATA:    TELEMETRY: 	    ECG:  	   	  CARDIAC MARKERS:                                      11.6   7.31  )-----------( 135      ( 05 Feb 2019 07:35 )             35.9     02-05    143  |  106  |  16  ----------------------------<  91  4.2   |  25  |  1.18    Ca    9.1      05 Feb 2019 07:35  Phos  3.0     02-05  Mg     1.9     02-05    TPro  7.2  /  Alb  3.7  /  TBili  0.6  /  DBili  x   /  AST  52<H>  /  ALT  45<H>  /  AlkPhos  57  02-04    proBNP:   Lipid Profile:   HgA1c: Hemoglobin A1C, Whole Blood: 5.7 % (02-05 @ 07:35)    TSH: Thyroid Stimulating Hormone, Serum: 1.59 uIU/mL (02-05 @ 07:35)

## 2019-02-05 NOTE — BEHAVIORAL HEALTH ASSESSMENT NOTE - ATTENTION / CONCENTRATION
Called Dr Zafar' office. Last level drawn  4/16.   Yearly appt w/ you on 10/26. Would you like to place an order same day, prior to his appt. I will refill until then?/amk    See below & advise  
Called l/m for pt to call. Will need to come in prior to his appt for Dig level. Refilled #[phone] x0    **Please make sure he understands to hold his Dig the day of bloodwork until after his labs are drawn-amk  
No Dig level in chart  
Placed order.PILY  
Impaired

## 2019-02-05 NOTE — BEHAVIORAL HEALTH ASSESSMENT NOTE - NSBHCHARTREVIEWIMAGING_PSY_A_CORE FT
2/4  CTH:  Impression: This exam somewhat limited by motion though grossly unchanged   when compared with the prior exam.  XRay Hip:  IMPRESSION:  No fracture or dislocation.  Lower lumbar spine degenerative change apparent. Preserved left hip joint   space with smooth and intact articular surfaces and normally offset   femoral head neck junction.  No gross radiographic evidence for AVN.  Chronic small spur projecting from inferior left lesser trochanter   margin.   Generalized mild osteopenia otherwise no discrete lytic or blastic   lesions.   Unremarkable remaining imaged pelvic osseous structures.  Xray Chest:    2/5/19:  Xray Abd:  IMPRESSION:   Moderate to large stool burden in the colon, with prominent small bowel   loops, suggesting ileus.

## 2019-02-06 DIAGNOSIS — K59.01 SLOW TRANSIT CONSTIPATION: ICD-10-CM

## 2019-02-06 LAB
ANION GAP SERPL CALC-SCNC: 11 MMO/L — SIGNIFICANT CHANGE UP (ref 7–14)
BASOPHILS # BLD AUTO: 0.03 K/UL — SIGNIFICANT CHANGE UP (ref 0–0.2)
BASOPHILS NFR BLD AUTO: 0.5 % — SIGNIFICANT CHANGE UP (ref 0–2)
BUN SERPL-MCNC: 15 MG/DL — SIGNIFICANT CHANGE UP (ref 7–23)
CALCIUM SERPL-MCNC: 9.2 MG/DL — SIGNIFICANT CHANGE UP (ref 8.4–10.5)
CHLORIDE SERPL-SCNC: 105 MMOL/L — SIGNIFICANT CHANGE UP (ref 98–107)
CO2 SERPL-SCNC: 24 MMOL/L — SIGNIFICANT CHANGE UP (ref 22–31)
CREAT SERPL-MCNC: 1.2 MG/DL — SIGNIFICANT CHANGE UP (ref 0.5–1.3)
EOSINOPHIL # BLD AUTO: 0.19 K/UL — SIGNIFICANT CHANGE UP (ref 0–0.5)
EOSINOPHIL NFR BLD AUTO: 2.9 % — SIGNIFICANT CHANGE UP (ref 0–6)
FOLATE SERPL-MCNC: > 20 NG/ML — HIGH (ref 4.7–20)
GLUCOSE SERPL-MCNC: 96 MG/DL — SIGNIFICANT CHANGE UP (ref 70–99)
HCT VFR BLD CALC: 37.2 % — LOW (ref 39–50)
HGB BLD-MCNC: 12.3 G/DL — LOW (ref 13–17)
IMM GRANULOCYTES NFR BLD AUTO: 0.3 % — SIGNIFICANT CHANGE UP (ref 0–1.5)
LYMPHOCYTES # BLD AUTO: 2.55 K/UL — SIGNIFICANT CHANGE UP (ref 1–3.3)
LYMPHOCYTES # BLD AUTO: 38.9 % — SIGNIFICANT CHANGE UP (ref 13–44)
MAGNESIUM SERPL-MCNC: 1.8 MG/DL — SIGNIFICANT CHANGE UP (ref 1.6–2.6)
MCHC RBC-ENTMCNC: 27.7 PG — SIGNIFICANT CHANGE UP (ref 27–34)
MCHC RBC-ENTMCNC: 33.1 % — SIGNIFICANT CHANGE UP (ref 32–36)
MCV RBC AUTO: 83.8 FL — SIGNIFICANT CHANGE UP (ref 80–100)
MONOCYTES # BLD AUTO: 0.92 K/UL — HIGH (ref 0–0.9)
MONOCYTES NFR BLD AUTO: 14 % — SIGNIFICANT CHANGE UP (ref 2–14)
NEUTROPHILS # BLD AUTO: 2.84 K/UL — SIGNIFICANT CHANGE UP (ref 1.8–7.4)
NEUTROPHILS NFR BLD AUTO: 43.4 % — SIGNIFICANT CHANGE UP (ref 43–77)
NRBC # FLD: 0 K/UL — LOW (ref 25–125)
PLATELET # BLD AUTO: 129 K/UL — LOW (ref 150–400)
PMV BLD: 10.6 FL — SIGNIFICANT CHANGE UP (ref 7–13)
POTASSIUM SERPL-MCNC: 4.2 MMOL/L — SIGNIFICANT CHANGE UP (ref 3.5–5.3)
POTASSIUM SERPL-SCNC: 4.2 MMOL/L — SIGNIFICANT CHANGE UP (ref 3.5–5.3)
RBC # BLD: 4.44 M/UL — SIGNIFICANT CHANGE UP (ref 4.2–5.8)
RBC # FLD: 13.8 % — SIGNIFICANT CHANGE UP (ref 10.3–14.5)
SODIUM SERPL-SCNC: 140 MMOL/L — SIGNIFICANT CHANGE UP (ref 135–145)
VIT B12 SERPL-MCNC: 660 PG/ML — SIGNIFICANT CHANGE UP (ref 200–900)
WBC # BLD: 6.55 K/UL — SIGNIFICANT CHANGE UP (ref 3.8–10.5)
WBC # FLD AUTO: 6.55 K/UL — SIGNIFICANT CHANGE UP (ref 3.8–10.5)

## 2019-02-06 PROCEDURE — 74177 CT ABD & PELVIS W/CONTRAST: CPT | Mod: 26

## 2019-02-06 PROCEDURE — 93010 ELECTROCARDIOGRAM REPORT: CPT

## 2019-02-06 PROCEDURE — 90792 PSYCH DIAG EVAL W/MED SRVCS: CPT

## 2019-02-06 RX ORDER — DOCUSATE SODIUM 100 MG
100 CAPSULE ORAL THREE TIMES A DAY
Qty: 0 | Refills: 0 | Status: DISCONTINUED | OUTPATIENT
Start: 2019-02-06 | End: 2019-02-08

## 2019-02-06 RX ORDER — TRAZODONE HCL 50 MG
50 TABLET ORAL
Qty: 0 | Refills: 0 | Status: DISCONTINUED | OUTPATIENT
Start: 2019-02-06 | End: 2019-02-08

## 2019-02-06 RX ORDER — SENNA PLUS 8.6 MG/1
2 TABLET ORAL AT BEDTIME
Qty: 0 | Refills: 0 | Status: DISCONTINUED | OUTPATIENT
Start: 2019-02-06 | End: 2019-02-08

## 2019-02-06 RX ORDER — TRAZODONE HCL 50 MG
50 TABLET ORAL AT BEDTIME
Qty: 0 | Refills: 0 | Status: DISCONTINUED | OUTPATIENT
Start: 2019-02-06 | End: 2019-02-08

## 2019-02-06 RX ORDER — POLYETHYLENE GLYCOL 3350 17 G/17G
17 POWDER, FOR SOLUTION ORAL
Qty: 0 | Refills: 0 | Status: DISCONTINUED | OUTPATIENT
Start: 2019-02-06 | End: 2019-02-08

## 2019-02-06 RX ORDER — GLYCERIN ADULT
1 SUPPOSITORY, RECTAL RECTAL AT BEDTIME
Qty: 0 | Refills: 0 | Status: DISCONTINUED | OUTPATIENT
Start: 2019-02-06 | End: 2019-02-08

## 2019-02-06 RX ORDER — MULTIVIT WITH MIN/MFOLATE/K2 340-15/3 G
1 POWDER (GRAM) ORAL ONCE
Qty: 0 | Refills: 0 | Status: DISCONTINUED | OUTPATIENT
Start: 2019-02-06 | End: 2019-02-07

## 2019-02-06 RX ORDER — LACTULOSE 10 G/15ML
20 SOLUTION ORAL ONCE
Qty: 0 | Refills: 0 | Status: DISCONTINUED | OUTPATIENT
Start: 2019-02-06 | End: 2019-02-06

## 2019-02-06 RX ADMIN — Medication 100 MILLIGRAM(S): at 21:47

## 2019-02-06 RX ADMIN — Medication 1000 MILLIGRAM(S): at 14:39

## 2019-02-06 RX ADMIN — MIDODRINE HYDROCHLORIDE 5 MILLIGRAM(S): 2.5 TABLET ORAL at 14:39

## 2019-02-06 RX ADMIN — SENNA PLUS 2 TABLET(S): 8.6 TABLET ORAL at 21:47

## 2019-02-06 RX ADMIN — POLYETHYLENE GLYCOL 3350 17 GRAM(S): 17 POWDER, FOR SOLUTION ORAL at 17:34

## 2019-02-06 RX ADMIN — ATORVASTATIN CALCIUM 80 MILLIGRAM(S): 80 TABLET, FILM COATED ORAL at 21:47

## 2019-02-06 RX ADMIN — SODIUM CHLORIDE 3 MILLILITER(S): 9 INJECTION INTRAMUSCULAR; INTRAVENOUS; SUBCUTANEOUS at 14:00

## 2019-02-06 RX ADMIN — TAMSULOSIN HYDROCHLORIDE 0.4 MILLIGRAM(S): 0.4 CAPSULE ORAL at 21:47

## 2019-02-06 RX ADMIN — Medication 81 MILLIGRAM(S): at 14:39

## 2019-02-06 RX ADMIN — SODIUM CHLORIDE 3 MILLILITER(S): 9 INJECTION INTRAMUSCULAR; INTRAVENOUS; SUBCUTANEOUS at 21:48

## 2019-02-06 RX ADMIN — Medication 50 MILLIGRAM(S): at 17:45

## 2019-02-06 RX ADMIN — Medication 1 SUPPOSITORY(S): at 21:48

## 2019-02-06 RX ADMIN — PREGABALIN 1000 MICROGRAM(S): 225 CAPSULE ORAL at 14:39

## 2019-02-06 RX ADMIN — HALOPERIDOL DECANOATE 0.5 MILLIGRAM(S): 100 INJECTION INTRAMUSCULAR at 22:19

## 2019-02-06 RX ADMIN — Medication 6 MILLIGRAM(S): at 21:47

## 2019-02-06 RX ADMIN — Medication 400 UNIT(S): at 14:39

## 2019-02-06 RX ADMIN — MIDODRINE HYDROCHLORIDE 5 MILLIGRAM(S): 2.5 TABLET ORAL at 05:47

## 2019-02-06 RX ADMIN — Medication 100 MILLIGRAM(S): at 14:39

## 2019-02-06 RX ADMIN — Medication 400 INTERNATIONAL UNIT(S): at 17:33

## 2019-02-06 RX ADMIN — MIDODRINE HYDROCHLORIDE 5 MILLIGRAM(S): 2.5 TABLET ORAL at 21:47

## 2019-02-06 RX ADMIN — SODIUM CHLORIDE 3 MILLILITER(S): 9 INJECTION INTRAMUSCULAR; INTRAVENOUS; SUBCUTANEOUS at 05:47

## 2019-02-06 NOTE — CONSULT NOTE ADULT - PROBLEM SELECTOR RECOMMENDATION 9
- CT without ileus; increased stool burden; pt is without obstructive symptoms  - senna qhs  - colace tid   - miralax bid   - glycerin suppository qhs  - Mg Citrate x 1 dose ordered for this morning; may need enema as well   - surgery input appreciated; if develops n/v change to npo and consider NGT   - clear liquid diet, advance as tolerated

## 2019-02-06 NOTE — PROGRESS NOTE ADULT - SUBJECTIVE AND OBJECTIVE BOX
Surgery B Team Daily Progress Note    SUBJECTIVE: patient seen and examined during the morning round, patient denies abdominal pain, nausea, vomiting, fever or chills. Per nursing staff, patient has been passing gas but no bowel movement overnight.     OBJECTIVE:   Vital Signs Last 24 Hrs  T(C): 37.1 (06 Feb 2019 05:45), Max: 37.4 (05 Feb 2019 13:34)  T(F): 98.8 (06 Feb 2019 05:45), Max: 99.4 (05 Feb 2019 13:34)  HR: 82 (06 Feb 2019 05:45) (82 - 92)  BP: 126/78 (06 Feb 2019 05:45) (126/78 - 164/72)  BP(mean): --  RR: 17 (06 Feb 2019 05:45) (16 - 18)  SpO2: 98% (06 Feb 2019 05:45) (98% - 100%)    PHYSICAL EXAM:  Constitutional: resting in bed with no acute distress  Respiratory:  unlabored breathing  Gastrointestinal: Abdomen soft, non distended, non tenderness  Extremities:  No edema, no calf tenderness    RADIOLOGY & ADDITIONAL STUDIES:  EXAM:  CT ABDOMEN AND PELVIS IC    No bowel obstruction or abnormally dilated loops of bowel. No evidence   of ileus.

## 2019-02-06 NOTE — PROGRESS NOTE ADULT - SUBJECTIVE AND OBJECTIVE BOX
CHIEF COMPLAINT:Patient is a 86y old  Male who presents with a chief complaint of AMS (06 Feb 2019 11:06)    	        PAST MEDICAL & SURGICAL HISTORY:  Hyperlipidemia, unspecified hyperlipidemia type  Benign prostatic hyperplasia with urinary frequency  HTN (hypertension)  Status post craniectomy          REVIEW OF SYSTEMS:  CONSTITUTIONAL: restless at times  EYES: No eye pain, visual disturbances, or discharge  NECK: No pain or stiffness  RESPIRATORY: No cough, wheezing, chills or hemoptysis; No Shortness of Breath  CARDIOVASCULAR: No chest pain, palpitations, passing out, dizziness, or leg swelling  GASTROINTESTINAL: No abdominal or epigastric pain. No nausea, vomiting, or hematemesis; No diarrhea or constipation. No melena or hematochezia.  GENITOURINARY: No dysuria, frequency, hematuria, or incontinence  NEUROLOGICAL: No headaches,    Medications:  MEDICATIONS  (STANDING):  ascorbic acid 1000 milliGRAM(s) Oral daily  aspirin enteric coated 81 milliGRAM(s) Oral daily  atorvastatin 80 milliGRAM(s) Oral at bedtime  cholecalciferol 400 Unit(s) Oral daily  cyanocobalamin 1000 MICROGram(s) Oral daily  docusate sodium 100 milliGRAM(s) Oral three times a day  glycerin Suppository - Adult 1 Suppository(s) Rectal at bedtime  haloperidol     Tablet 0.5 milliGRAM(s) Oral at bedtime  magnesium citrate Oral Solution 1 Bottle Oral once  melatonin 6 milliGRAM(s) Oral at bedtime  midodrine 5 milliGRAM(s) Oral three times a day  polyethylene glycol 3350 17 Gram(s) Oral two times a day  senna 2 Tablet(s) Oral at bedtime  sodium chloride 0.9% lock flush 3 milliLiter(s) IV Push every 8 hours  sodium chloride 0.9%. 1000 milliLiter(s) (50 mL/Hr) IV Continuous <Continuous>  tamsulosin 0.4 milliGRAM(s) Oral at bedtime  vitamin E 400 International Unit(s) Oral daily    MEDICATIONS  (PRN):  haloperidol    Injectable 0.5 milliGRAM(s) IV Push every 6 hours PRN Agitation    	    PHYSICAL EXAM:  T(C): 37.1 (02-06-19 @ 05:45), Max: 37.4 (02-05-19 @ 13:34)  HR: 82 (02-06-19 @ 05:45) (82 - 92)  BP: 126/78 (02-06-19 @ 05:45) (126/78 - 164/72)  RR: 17 (02-06-19 @ 05:45) (16 - 18)  SpO2: 98% (02-06-19 @ 05:45) (98% - 100%)  Wt(kg): --  I&O's Summary      Appearance: Normal	  HEENT:   Normal oral mucosa, PERRL, EOMI	  Lymphatic: No lymphadenopathy  Cardiovascular: Normal S1 S2, No JVD, No murmurs, No edema  Respiratory: Lungs clear to auscultation	  Gastrointestinal:  Soft, Non-tender, + BS	  Skin: No rashes, No ecchymoses, No cyanosis	  Neurologic: Non-focal  Extremities: Normal range of motion, No clubbing, cyanosis or edema  Vascular: Peripheral pulses palpable 2+ bilaterally    TELEMETRY: 	    ECG:  	  RADIOLOGY:  OTHER: 	  	  LABS:	 	    CARDIAC MARKERS:                                12.3   6.55  )-----------( 129      ( 06 Feb 2019 05:30 )             37.2     02-06    140  |  105  |  15  ----------------------------<  96  4.2   |  24  |  1.20    Ca    9.2      06 Feb 2019 05:30  Phos  3.0     02-05  Mg     1.8     02-06    TPro  7.2  /  Alb  3.7  /  TBili  0.6  /  DBili  x   /  AST  52<H>  /  ALT  45<H>  /  AlkPhos  57  02-04    proBNP:   Lipid Profile:   HgA1c:   TSH:

## 2019-02-06 NOTE — CONSULT NOTE ADULT - ASSESSMENT
87yo male with h/o traumatic subdural hematoma s/p Craniotomy in Jan 2018, HTN, HLD, orthostatic hypotension on midodrine, cognitive impairment post neurosurgery, dementia brought in for one episode of AMS noted with abdominal distention and an AXR with increased stool burden possible ileus, however, CT Abd without ileus

## 2019-02-06 NOTE — PROGRESS NOTE ADULT - SUBJECTIVE AND OBJECTIVE BOX
Subjective: Patient seen and examined. No new events except as noted.     SUBJECTIVE/ROS:        MEDICATIONS:  MEDICATIONS  (STANDING):  ascorbic acid 1000 milliGRAM(s) Oral daily  aspirin enteric coated 81 milliGRAM(s) Oral daily  atorvastatin 80 milliGRAM(s) Oral at bedtime  cholecalciferol 400 Unit(s) Oral daily  cyanocobalamin 1000 MICROGram(s) Oral daily  docusate sodium 100 milliGRAM(s) Oral three times a day  glycerin Suppository - Adult 1 Suppository(s) Rectal at bedtime  haloperidol     Tablet 0.5 milliGRAM(s) Oral at bedtime  magnesium citrate Oral Solution 1 Bottle Oral once  melatonin 6 milliGRAM(s) Oral at bedtime  midodrine 5 milliGRAM(s) Oral three times a day  polyethylene glycol 3350 17 Gram(s) Oral two times a day  senna 2 Tablet(s) Oral at bedtime  sodium chloride 0.9% lock flush 3 milliLiter(s) IV Push every 8 hours  sodium chloride 0.9%. 1000 milliLiter(s) (50 mL/Hr) IV Continuous <Continuous>  tamsulosin 0.4 milliGRAM(s) Oral at bedtime  vitamin E 400 International Unit(s) Oral daily      PHYSICAL EXAM:  T(C): 37.1 (02-06-19 @ 05:45), Max: 37.4 (02-05-19 @ 13:34)  HR: 82 (02-06-19 @ 05:45) (82 - 92)  BP: 126/78 (02-06-19 @ 05:45) (126/78 - 164/72)  RR: 17 (02-06-19 @ 05:45) (16 - 18)  SpO2: 98% (02-06-19 @ 05:45) (98% - 100%)  Wt(kg): --  I&O's Summary    Height (cm): 187.96 (02-05 @ 17:57)  Weight (kg): 76.4 (02-05 @ 17:57)  BMI (kg/m2): 21.6 (02-05 @ 17:57)  BSA (m2): 2.02 (02-05 @ 17:57)    JVP: Normal  Neck: supple  Lung: clear   CV: S1 S2 , Murmur:  Abd: soft  Ext: No edema  neuro: Awake / alert  Psych: flat affect  Skin: normal        LABS/DATA:    CARDIAC MARKERS:                                12.3   6.55  )-----------( 129      ( 06 Feb 2019 05:30 )             37.2     02-06    140  |  105  |  15  ----------------------------<  96  4.2   |  24  |  1.20    Ca    9.2      06 Feb 2019 05:30  Phos  3.0     02-05  Mg     1.8     02-06    TPro  7.2  /  Alb  3.7  /  TBili  0.6  /  DBili  x   /  AST  52<H>  /  ALT  45<H>  /  AlkPhos  57  02-04    proBNP:   Lipid Profile:   HgA1c:   TSH:     TELE:  EKG:

## 2019-02-06 NOTE — PROGRESS NOTE ADULT - ASSESSMENT
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  Plan: monitor lytes  ileus resolved  sx eval noted       ·  Problem: Orthostatic hypotension.  Plan: Monitor blood pressure  Continue Midodrine 5 po tid with hold parameters  fall precautions.    ·  Problem: Benign prostatic hyperplasia with urinary frequency  .  Plan: continue flomax.       ·  Problem: Hyperlipidemia, unspecified hyperlipidemia type.    lipid profile  low cholesterol diet  continue atorvastatin.       Problem: CKD (chronic kidney disease) stage 3  stable  f/u labs

## 2019-02-06 NOTE — PROGRESS NOTE ADULT - ASSESSMENT
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  Surgery consulted for rule out bowel obstruction after AXR revealed possible ileus. Patient passing gas without abdominal pain with benign exam. Nausea is better. CT scan showed no obstruction or ileus     - No surgical need, surgery will sign off, please call back with further questions at [phone]

## 2019-02-06 NOTE — CONSULT NOTE ADULT - SUBJECTIVE AND OBJECTIVE BOX
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 [facility] daughter (Roxanne Carrillo) was contacted for further history. Patients daughter states she gave her father quetiapine twice this morning for agitation and afterwards he was sitting in his chair unresponsive. Daughter  states her father was eating breakfast and talking before, but when she came back into the kitchen he "appeared to be in a deep sleep" x 20 minutes. Reports one episode of non bilious non bloody emesis. Patient is a poor historian and is unable to recall today's events. Reports last BM today- normal stool.  Patient states his daughters names and that he is from [facility]. Patient c/o left hip pain. Xrays done in ED- no fracture. Daughter reports no fall/trauma. Denies headache, vision change, chest pain, dyspnea, abdominal pain, nausea, hematuria, melena, hematochezia. GI consulted for constipation/ileus. Pt is poor historian, oriented only to self, unclear whether patient is passing flatus this morning. Last bm per family was 2/2. He is without complaint of abdominal pain nausea or vomiting; but is with some distention. RN states no n/v or bm this morning.       No Known Allergies      ascorbic acid 1000 milliGRAM(s) Oral daily  aspirin enteric coated 81 milliGRAM(s) Oral daily  atorvastatin 80 milliGRAM(s) Oral at bedtime  cholecalciferol 400 Unit(s) Oral daily  cyanocobalamin 1000 MICROGram(s) Oral daily  docusate sodium 100 milliGRAM(s) Oral two times a day PRN  haloperidol     Tablet 0.5 milliGRAM(s) Oral at bedtime  haloperidol    Injectable 0.5 milliGRAM(s) IV Push every 6 hours PRN  melatonin 6 milliGRAM(s) Oral at bedtime  midodrine 5 milliGRAM(s) Oral three times a day  sodium chloride 0.9% lock flush 3 milliLiter(s) IV Push every 8 hours  sodium chloride 0.9%. 1000 milliLiter(s) IV Continuous <Continuous>  tamsulosin 0.4 milliGRAM(s) Oral at bedtime  vitamin E 400 International Unit(s) Oral daily        FAMILY HISTORY:  No pertinent family history in first degree relatives        Review of Systems: *poor historian    General:  No wt loss, fevers, chills, night sweats, fatigue  Eyes:  Good vision, no reported pain  ENT:  No sore throat, pain, runny nose, dysphagia  CV:  No pain, palpitations, no lightheadedness  Resp:  No dyspnea, cough, tachypnea, wheezing  GI: as above  :  No pain, bleeding, incontinence, nocturia  Muscle:  No pain, weakness  Neuro:  No weakness, tingling, memory problems  Psych:  No fatigue, insomnia, mood problems, depression  Endocrine:  No polyuria, polydypsia, cold/heat intolerance  Heme:  No petechiae, ecchymosis, easy bruisability  Skin:  No rash, tattoos, scars, edema    Relevant Family History:   n/c    Relevant Social History: n/c      Physical Exam:    Vital Signs:  Vital Signs Last 24 Hrs  T(C): 37.1 (06 Feb 2019 05:45), Max: 37.4 (05 Feb 2019 13:34)  T(F): 98.8 (06 Feb 2019 05:45), Max: 99.4 (05 Feb 2019 13:34)  HR: 82 (06 Feb 2019 05:45) (80 - 92)  BP: 126/78 (06 Feb 2019 05:45) (124/70 - 164/72)  BP(mean): --  RR: 17 (06 Feb 2019 05:45) (16 - 18)  SpO2: 98% (06 Feb 2019 05:45) (98% - 100%)  Daily Height in cm: 187.96 (05 Feb 2019 17:57)    Daily     General:  Appears stated age, well-groomed, nad  HEENT:  NC/AT,  conjunctivae clear and pink, no thyromegaly, nodules, adenopathy, no JVD  Chest:  Full & symmetric excursion, no increased effort, breath sounds clear  Cardiovascular:  Regular rhythm, S1, S2, no murmur/rub/S3/S4, no abdominal bruit, no edema  Abdomen:  Soft, non-tender, non-distended, normoactive bowel sounds,  no masses ,no hepatosplenomeagaly, no signs of chronic liver disease  Extremities:  no cyanosis,clubbing or edema  Skin:  No rash/erythema/ecchymoses/petechiae/wounds/abscess/warm/dry  Neuro/Psych:  A&Ox1 (poor historian)  , no asterixis, no tremor, no encephalopathy    Laboratory:                            12.3   6.55  )-----------( 129      ( 06 Feb 2019 05:30 )             37.2     02-06    140  |  105  |  15  ----------------------------<  96  4.2   |  24  |  1.20    Ca    9.2      06 Feb 2019 05:30  Phos  3.0     02-05  Mg     1.8     02-06    TPro  7.2  /  Alb  3.7  /  TBili  0.6  /  DBili  x   /  AST  52<H>  /  ALT  45<H>  /  AlkPhos  57  02-04    LIVER FUNCTIONS - ( 04 Feb 2019 14:36 )  Alb: 3.7 g/dL / Pro: 7.2 g/dL / ALK PHOS: 57 u/L / ALT: 45 u/L / AST: 52 u/L / GGT: x                 Imaging:    < from: Xray Abdomen 1 View PORTABLE -Routine (02.05.19 @ 08:46) >    EXAM:  XR ABDOMEN PORTABLE ROUTINE 1V        PROCEDURE DATE:  Feb 5 2019         INTERPRETATION:  CLINICAL INFORMATION: Distended abdomen.    EXAM: Single frontal radiograph of the abdomen.    COMPARISON: None available    FINDINGS:  Moderate to large stool burden seen in the ascending colon, descending   colon and rectum.  Prominent small bowel loops are seen overlying the left hemiabdomen.  Nonobstructive bowel gas pattern.  The visualized osseous structures demonstrate no acute pathology.    IMPRESSION:   Moderate to large stool burden in the colon, with prominent small bowel   loops, suggesting ileus.              SLIME MEDINA M.D., RADIOLOGY RESIDENT  This document has been electronically signed.  WES TAVARES M.D., ATTENDING RADIOLOGIST  This document has been electronically signed. Feb 5 2019  3:02PM                  < end of copied text >        < from: CT Abdomen and Pelvis w/ IV Cont (02.06.19 @ 08:59) >    EXAM:  CT ABDOMEN AND PELVIS IC        PROCEDURE DATE:  Feb 6 2019         INTERPRETATION:  CLINICAL INFORMATION: 86-year-old male with ileus on   abdominal x-ray.         COMPARISON: None.    PROCEDURE:   CT of the Abdomen and Pelvis was performed with intravenous contrast.   Intravenous contrast: 90 ml Omnipaque 350. 10 ml discarded.  Oral contrast: None.  Sagittal and coronal reformats were performed.    FINDINGS:    LOWER CHEST: Within normal limits.    LIVER: Within normal limits.  BILE DUCTS: Normal caliber.  GALLBLADDER: Within normal limits.  SPLEEN: Within normal limits.  PANCREAS: Within normal limits.  ADRENALS: Within normal limits.  KIDNEYS/URETERS: Within normal limits.    BLADDER: Mild thickening of the bladder wall, consistent with chronic   changes of bladder outlet obstruction.  REPRODUCTIVE ORGANS: Borderline enlarged prostate.    BOWEL: No bowel obstruction or abnormally dilated loops of bowel.   Appendix is normal.  PERITONEUM: No ascites.  VESSELS:  Within normal limits.  RETROPERITONEUM: No lymphadenopathy.    ABDOMINAL WALL: Tortuous aorta. Atherosclerotic calcifications.  BONES: Degenerative changes of the spine.    IMPRESSION:    No bowel obstruction or abnormally dilated loops of bowel. No evidence   of ileus.                      TONIO GR M.D., ATTENDING RADIOLOGIST  This document has been electronically signed. 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 [facility] daughter (Roxanne Carrillo) was contacted for further history. Patients daughter states she gave her father quetiapine twice this morning for agitation and afterwards he was sitting in his chair unresponsive. Daughter  states her father was eating breakfast and talking before, but when she came back into the kitchen he "appeared to be in a deep sleep" x 20 minutes. Reports one episode of non bilious non bloody emesis. Patient is a poor historian and is unable to recall today's events. Reports last BM today- normal stool.  Patient states his daughters names and that he is from [facility]. Patient c/o left hip pain. Xrays done in ED- no fracture. Daughter reports no fall/trauma. Denies headache, vision change, chest pain, dyspnea, abdominal pain, nausea, hematuria, melena, hematochezia. GI consulted for constipation/ileus. Pt is poor historian, oriented only to self, unclear whether patient is passing flatus this morning. Last bm per family was 2/2. He is without complaint of abdominal pain nausea or vomiting; but is with some distention. RN states no n/v or bm this morning. He is slightly agitated that no one will allow him to eat. Patient reports no abdominal pain, no nausea/vomiting, no melena/hematochezia, no hematemesis, no diarrhea, no weight loss or appetite changes, no bowel habit changes, no dysphagia/odynophagia, no fever/chills.     No Known Allergies      ascorbic acid 1000 milliGRAM(s) Oral daily  aspirin enteric coated 81 milliGRAM(s) Oral daily  atorvastatin 80 milliGRAM(s) Oral at bedtime  cholecalciferol 400 Unit(s) Oral daily  cyanocobalamin 1000 MICROGram(s) Oral daily  docusate sodium 100 milliGRAM(s) Oral two times a day PRN  haloperidol     Tablet 0.5 milliGRAM(s) Oral at bedtime  haloperidol    Injectable 0.5 milliGRAM(s) IV Push every 6 hours PRN  melatonin 6 milliGRAM(s) Oral at bedtime  midodrine 5 milliGRAM(s) Oral three times a day  sodium chloride 0.9% lock flush 3 milliLiter(s) IV Push every 8 hours  sodium chloride 0.9%. 1000 milliLiter(s) IV Continuous <Continuous>  tamsulosin 0.4 milliGRAM(s) Oral at bedtime  vitamin E 400 International Unit(s) Oral daily        FAMILY HISTORY:  No pertinent family history in first degree relatives        Review of Systems: *poor historian    General:  No wt loss, fevers, chills, night sweats, fatigue  Eyes:  Good vision, no reported pain  ENT:  No sore throat, pain, runny nose, dysphagia  CV:  No pain, palpitations, no lightheadedness  Resp:  No dyspnea, cough, tachypnea, wheezing  GI: as above  :  No pain, bleeding, incontinence, nocturia  Muscle:  No pain, weakness  Neuro:  No weakness, tingling, memory problems  Psych:  No fatigue, insomnia, mood problems, depression  Endocrine:  No polyuria, polydypsia, cold/heat intolerance  Heme:  No petechiae, ecchymosis, easy bruisability  Skin:  No rash, tattoos, scars, edema    Relevant Family History:   n/c    Relevant Social History: n/c      Physical Exam:    Vital Signs:  Vital Signs Last 24 Hrs  T(C): 37.1 (06 Feb 2019 05:45), Max: 37.4 (05 Feb 2019 13:34)  T(F): 98.8 (06 Feb 2019 05:45), Max: 99.4 (05 Feb 2019 13:34)  HR: 82 (06 Feb 2019 05:45) (80 - 92)  BP: 126/78 (06 Feb 2019 05:45) (124/70 - 164/72)  BP(mean): --  RR: 17 (06 Feb 2019 05:45) (16 - 18)  SpO2: 98% (06 Feb 2019 05:45) (98% - 100%)  Daily Height in cm: 187.96 (05 Feb 2019 17:57)    Daily     General:  Appears stated age, well-groomed, nad  HEENT:  NC/AT,  conjunctivae clear and pink, no thyromegaly, nodules, adenopathy, no JVD  Chest:  Full & symmetric excursion, no increased effort, breath sounds clear  Cardiovascular:  Regular rhythm, S1, S2, no murmur/rub/S3/S4, no abdominal bruit, no edema  Abdomen:  Soft, non-tender, (+)distended, normoactive bowel sounds,  no masses ,no hepatosplenomeagaly, no signs of chronic liver disease  Extremities:  no cyanosis, clubbing or edema  Skin:  No rash/erythema/ecchymoses/petechiae/wounds/abscess/warm/dry  Neuro/Psych:  A&Ox1 (poor historian)  , no asterixis, no tremor, no encephalopathy    Laboratory:                            12.3   6.55  )-----------( 129      ( 06 Feb 2019 05:30 )             37.2     02-06    140  |  105  |  15  ----------------------------<  96  4.2   |  24  |  1.20    Ca    9.2      06 Feb 2019 05:30  Phos  3.0     02-05  Mg     1.8     02-06    TPro  7.2  /  Alb  3.7  /  TBili  0.6  /  DBili  x   /  AST  52<H>  /  ALT  45<H>  /  AlkPhos  57  02-04    LIVER FUNCTIONS - ( 04 Feb 2019 14:36 )  Alb: 3.7 g/dL / Pro: 7.2 g/dL / ALK PHOS: 57 u/L / ALT: 45 u/L / AST: 52 u/L / GGT: x                 Imaging:    < from: Xray Abdomen 1 View PORTABLE -Routine (02.05.19 @ 08:46) >    EXAM:  XR ABDOMEN PORTABLE ROUTINE 1V        PROCEDURE DATE:  Feb 5 2019         INTERPRETATION:  CLINICAL INFORMATION: Distended abdomen.    EXAM: Single frontal radiograph of the abdomen.    COMPARISON: None available    FINDINGS:  Moderate to large stool burden seen in the ascending colon, descending   colon and rectum.  Prominent small bowel loops are seen overlying the left hemiabdomen.  Nonobstructive bowel gas pattern.  The visualized osseous structures demonstrate no acute pathology.    IMPRESSION:   Moderate to large stool burden in the colon, with prominent small bowel   loops, suggesting ileus.              SLIME MEDINA M.D., RADIOLOGY RESIDENT  This document has been electronically signed.  WES TAVARES M.D., ATTENDING RADIOLOGIST  This document has been electronically signed. Feb 5 2019  3:02PM                  < end of copied text >        < from: CT Abdomen and Pelvis w/ IV Cont (02.06.19 @ 08:59) >    EXAM:  CT ABDOMEN AND PELVIS IC        PROCEDURE DATE:  Feb 6 2019         INTERPRETATION:  CLINICAL INFORMATION: 86-year-old male with ileus on   abdominal x-ray.         COMPARISON: None.    PROCEDURE:   CT of the Abdomen and Pelvis was performed with intravenous contrast.   Intravenous contrast: 90 ml Omnipaque 350. 10 ml discarded.  Oral contrast: None.  Sagittal and coronal reformats were performed.    FINDINGS:    LOWER CHEST: Within normal limits.    LIVER: Within normal limits.  BILE DUCTS: Normal caliber.  GALLBLADDER: Within normal limits.  SPLEEN: Within normal limits.  PANCREAS: Within normal limits.  ADRENALS: Within normal limits.  KIDNEYS/URETERS: Within normal limits.    BLADDER: Mild thickening of the bladder wall, consistent with chronic   changes of bladder outlet obstruction.  REPRODUCTIVE ORGANS: Borderline enlarged prostate.    BOWEL: No bowel obstruction or abnormally dilated loops of bowel.   Appendix is normal.  PERITONEUM: No ascites.  VESSELS:  Within normal limits.  RETROPERITONEUM: No lymphadenopathy.    ABDOMINAL WALL: Tortuous aorta. Atherosclerotic calcifications.  BONES: Degenerative changes of the spine.    IMPRESSION:    No bowel obstruction or abnormally dilated loops of bowel. No evidence   of ileus.                      TONIO GR M.D., ATTENDING RADIOLOGIST  This document has been electronically signed. Feb 6 2019  9:12AM                  < end of copied text >

## 2019-02-06 NOTE — PROGRESS NOTE ADULT - ASSESSMENT
Orthostatic hypotension   on midodrine     Syncope  likely due to hypotensive side effect from seroquel   on haldol  will need EKG to check qtc  will consider ILR

## 2019-02-07 LAB
ANION GAP SERPL CALC-SCNC: 10 MMO/L — SIGNIFICANT CHANGE UP (ref 7–14)
BASOPHILS # BLD AUTO: 0.03 K/UL — SIGNIFICANT CHANGE UP (ref 0–0.2)
BASOPHILS NFR BLD AUTO: 0.5 % — SIGNIFICANT CHANGE UP (ref 0–2)
BUN SERPL-MCNC: 17 MG/DL — SIGNIFICANT CHANGE UP (ref 7–23)
CALCIUM SERPL-MCNC: 9.1 MG/DL — SIGNIFICANT CHANGE UP (ref 8.4–10.5)
CHLORIDE SERPL-SCNC: 106 MMOL/L — SIGNIFICANT CHANGE UP (ref 98–107)
CO2 SERPL-SCNC: 24 MMOL/L — SIGNIFICANT CHANGE UP (ref 22–31)
CREAT SERPL-MCNC: 1.22 MG/DL — SIGNIFICANT CHANGE UP (ref 0.5–1.3)
EOSINOPHIL # BLD AUTO: 0.24 K/UL — SIGNIFICANT CHANGE UP (ref 0–0.5)
EOSINOPHIL NFR BLD AUTO: 3.8 % — SIGNIFICANT CHANGE UP (ref 0–6)
GLUCOSE SERPL-MCNC: 110 MG/DL — HIGH (ref 70–99)
HCT VFR BLD CALC: 35.4 % — LOW (ref 39–50)
HGB BLD-MCNC: 11.6 G/DL — LOW (ref 13–17)
IMM GRANULOCYTES NFR BLD AUTO: 0.2 % — SIGNIFICANT CHANGE UP (ref 0–1.5)
LYMPHOCYTES # BLD AUTO: 2.55 K/UL — SIGNIFICANT CHANGE UP (ref 1–3.3)
LYMPHOCYTES # BLD AUTO: 39.8 % — SIGNIFICANT CHANGE UP (ref 13–44)
MAGNESIUM SERPL-MCNC: 2 MG/DL — SIGNIFICANT CHANGE UP (ref 1.6–2.6)
MCHC RBC-ENTMCNC: 28.2 PG — SIGNIFICANT CHANGE UP (ref 27–34)
MCHC RBC-ENTMCNC: 32.8 % — SIGNIFICANT CHANGE UP (ref 32–36)
MCV RBC AUTO: 86.1 FL — SIGNIFICANT CHANGE UP (ref 80–100)
MONOCYTES # BLD AUTO: 1.02 K/UL — HIGH (ref 0–0.9)
MONOCYTES NFR BLD AUTO: 15.9 % — HIGH (ref 2–14)
NEUTROPHILS # BLD AUTO: 2.55 K/UL — SIGNIFICANT CHANGE UP (ref 1.8–7.4)
NEUTROPHILS NFR BLD AUTO: 39.8 % — LOW (ref 43–77)
NRBC # FLD: 0 K/UL — LOW (ref 25–125)
PHOSPHATE SERPL-MCNC: 3.7 MG/DL — SIGNIFICANT CHANGE UP (ref 2.5–4.5)
PLATELET # BLD AUTO: 119 K/UL — LOW (ref 150–400)
PMV BLD: 10.2 FL — SIGNIFICANT CHANGE UP (ref 7–13)
POTASSIUM SERPL-MCNC: 4.2 MMOL/L — SIGNIFICANT CHANGE UP (ref 3.5–5.3)
POTASSIUM SERPL-SCNC: 4.2 MMOL/L — SIGNIFICANT CHANGE UP (ref 3.5–5.3)
RBC # BLD: 4.11 M/UL — LOW (ref 4.2–5.8)
RBC # FLD: 13.6 % — SIGNIFICANT CHANGE UP (ref 10.3–14.5)
SODIUM SERPL-SCNC: 140 MMOL/L — SIGNIFICANT CHANGE UP (ref 135–145)
WBC # BLD: 6.4 K/UL — SIGNIFICANT CHANGE UP (ref 3.8–10.5)
WBC # FLD AUTO: 6.4 K/UL — SIGNIFICANT CHANGE UP (ref 3.8–10.5)

## 2019-02-07 PROCEDURE — 99232 SBSQ HOSP IP/OBS MODERATE 35: CPT

## 2019-02-07 RX ORDER — OLANZAPINE 15 MG/1
2.5 TABLET, FILM COATED ORAL EVERY 6 HOURS
Qty: 0 | Refills: 0 | Status: DISCONTINUED | OUTPATIENT
Start: 2019-02-07 | End: 2019-02-08

## 2019-02-07 RX ORDER — MULTIVIT WITH MIN/MFOLATE/K2 340-15/3 G
1 POWDER (GRAM) ORAL ONCE
Qty: 0 | Refills: 0 | Status: COMPLETED | OUTPATIENT
Start: 2019-02-07 | End: 2019-02-07

## 2019-02-07 RX ORDER — OLANZAPINE 15 MG/1
2.5 TABLET, FILM COATED ORAL
Qty: 0 | Refills: 0 | Status: DISCONTINUED | OUTPATIENT
Start: 2019-02-07 | End: 2019-02-08

## 2019-02-07 RX ADMIN — SODIUM CHLORIDE 3 MILLILITER(S): 9 INJECTION INTRAMUSCULAR; INTRAVENOUS; SUBCUTANEOUS at 21:34

## 2019-02-07 RX ADMIN — ATORVASTATIN CALCIUM 80 MILLIGRAM(S): 80 TABLET, FILM COATED ORAL at 21:29

## 2019-02-07 RX ADMIN — Medication 100 MILLIGRAM(S): at 06:30

## 2019-02-07 RX ADMIN — Medication 400 INTERNATIONAL UNIT(S): at 11:25

## 2019-02-07 RX ADMIN — SODIUM CHLORIDE 3 MILLILITER(S): 9 INJECTION INTRAMUSCULAR; INTRAVENOUS; SUBCUTANEOUS at 05:02

## 2019-02-07 RX ADMIN — Medication 1 SUPPOSITORY(S): at 21:34

## 2019-02-07 RX ADMIN — Medication 6 MILLIGRAM(S): at 21:29

## 2019-02-07 RX ADMIN — SENNA PLUS 2 TABLET(S): 8.6 TABLET ORAL at 21:29

## 2019-02-07 RX ADMIN — Medication 81 MILLIGRAM(S): at 11:24

## 2019-02-07 RX ADMIN — Medication 1000 MILLIGRAM(S): at 11:24

## 2019-02-07 RX ADMIN — Medication 100 MILLIGRAM(S): at 21:28

## 2019-02-07 RX ADMIN — MIDODRINE HYDROCHLORIDE 5 MILLIGRAM(S): 2.5 TABLET ORAL at 21:29

## 2019-02-07 RX ADMIN — Medication 1 BOTTLE: at 15:56

## 2019-02-07 RX ADMIN — Medication 50 MILLIGRAM(S): at 17:48

## 2019-02-07 RX ADMIN — PREGABALIN 1000 MICROGRAM(S): 225 CAPSULE ORAL at 11:25

## 2019-02-07 RX ADMIN — POLYETHYLENE GLYCOL 3350 17 GRAM(S): 17 POWDER, FOR SOLUTION ORAL at 17:47

## 2019-02-07 RX ADMIN — POLYETHYLENE GLYCOL 3350 17 GRAM(S): 17 POWDER, FOR SOLUTION ORAL at 06:30

## 2019-02-07 RX ADMIN — Medication 0.5 MILLIGRAM(S): at 00:20

## 2019-02-07 RX ADMIN — Medication 400 UNIT(S): at 17:48

## 2019-02-07 RX ADMIN — Medication 100 MILLIGRAM(S): at 11:28

## 2019-02-07 RX ADMIN — Medication 50 MILLIGRAM(S): at 21:29

## 2019-02-07 RX ADMIN — MIDODRINE HYDROCHLORIDE 5 MILLIGRAM(S): 2.5 TABLET ORAL at 13:44

## 2019-02-07 RX ADMIN — OLANZAPINE 2.5 MILLIGRAM(S): 15 TABLET, FILM COATED ORAL at 17:47

## 2019-02-07 RX ADMIN — MIDODRINE HYDROCHLORIDE 5 MILLIGRAM(S): 2.5 TABLET ORAL at 06:30

## 2019-02-07 RX ADMIN — OLANZAPINE 2.5 MILLIGRAM(S): 15 TABLET, FILM COATED ORAL at 22:21

## 2019-02-07 RX ADMIN — SODIUM CHLORIDE 3 MILLILITER(S): 9 INJECTION INTRAMUSCULAR; INTRAVENOUS; SUBCUTANEOUS at 11:26

## 2019-02-07 RX ADMIN — TAMSULOSIN HYDROCHLORIDE 0.4 MILLIGRAM(S): 0.4 CAPSULE ORAL at 21:29

## 2019-02-07 NOTE — PHYSICAL THERAPY INITIAL EVALUATION ADULT - CRITERIA FOR SKILLED THERAPEUTIC INTERVENTIONS
risk reduction/prevention/anticipated discharge recommendation/rehab potential/predicted duration of therapy intervention/therapy frequency/anticipated equipment needs at discharge/impairments found

## 2019-02-07 NOTE — PROGRESS NOTE ADULT - PROBLEM SELECTOR PLAN 1
- resolving, cont bowel regimen   - senna qhs  - colace tid   - miralax bid   - glycerin suppository qhs  - regular diet

## 2019-02-07 NOTE — PROGRESS NOTE ADULT - ASSESSMENT
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    unresponsiveness possibly 2/2 quetiapine   seroquel stopped  haldol as per psych   - likely dementia with behavioral disturbances  ; placed on enhanced supervision  CT head neg      cards eval noted      ·  Problem: Vomiting.  Plan: monitor lytes  ileus resolved  sx eval noted       ·  Problem: Orthostatic hypotension.  Plan: Monitor blood pressure  Continue Midodrine 5 po tid with hold parameters  fall precautions.    ·  Problem: Benign prostatic hyperplasia with urinary frequency  .  Plan: continue flomax.       ·  Problem: Hyperlipidemia, unspecified hyperlipidemia type.    lipid profile  low cholesterol diet  continue atorvastatin.       Problem: CKD (chronic kidney disease) stage 3  stable  f/u labs       d/c planning   ?placement   rehab vs ltc

## 2019-02-07 NOTE — PROGRESS NOTE ADULT - ASSESSMENT
Orthostatic hypotension   on midodrine     Syncope  likely due to hypotensive side effect from seroquel   no significant events on tele except frequent apcs   will consider ILR

## 2019-02-07 NOTE — PROGRESS NOTE ADULT - SUBJECTIVE AND OBJECTIVE BOX
Subjective: Patient seen and examined. No new events except as noted.     SUBJECTIVE/ROS:  NAD      MEDICATIONS:  MEDICATIONS  (STANDING):  ascorbic acid 1000 milliGRAM(s) Oral daily  aspirin enteric coated 81 milliGRAM(s) Oral daily  atorvastatin 80 milliGRAM(s) Oral at bedtime  cholecalciferol 400 Unit(s) Oral daily  cyanocobalamin 1000 MICROGram(s) Oral daily  docusate sodium 100 milliGRAM(s) Oral three times a day  glycerin Suppository - Adult 1 Suppository(s) Rectal at bedtime  melatonin 6 milliGRAM(s) Oral at bedtime  midodrine 5 milliGRAM(s) Oral three times a day  polyethylene glycol 3350 17 Gram(s) Oral two times a day  senna 2 Tablet(s) Oral at bedtime  sodium chloride 0.9% lock flush 3 milliLiter(s) IV Push every 8 hours  sodium chloride 0.9%. 1000 milliLiter(s) (50 mL/Hr) IV Continuous <Continuous>  tamsulosin 0.4 milliGRAM(s) Oral at bedtime  traZODone 50 milliGRAM(s) Oral <User Schedule>  vitamin E 400 International Unit(s) Oral daily      PHYSICAL EXAM:  T(C): 36.6 (02-07-19 @ 05:45), Max: 36.6 (02-06-19 @ 19:34)  HR: 74 (02-07-19 @ 05:45) (74 - 87)  BP: 109/67 (02-07-19 @ 05:45) (109/67 - 140/84)  RR: 18 (02-07-19 @ 05:45) (18 - 18)  SpO2: 99% (02-07-19 @ 05:45) (99% - 99%)  Wt(kg): --  I&O's Summary        JVP: Normal  Neck: supple  Lung: clear   CV: S1 S2 , Murmur:  Abd: soft  Ext: No edema  neuro: Awake / alert  Psych: flat affect  Skin: normal      LABS/DATA:    CARDIAC MARKERS:                                11.6   6.40  )-----------( 119      ( 07 Feb 2019 06:10 )             35.4     02-07    140  |  106  |  17  ----------------------------<  110<H>  4.2   |  24  |  1.22    Ca    9.1      07 Feb 2019 06:10  Mg     1.8     02-06      proBNP:   Lipid Profile:   HgA1c:   TSH:     TELE:  EKG:

## 2019-02-07 NOTE — PROGRESS NOTE ADULT - SUBJECTIVE AND OBJECTIVE BOX
CHIEF COMPLAINT:Patient is a 86y old  Male who presents with a chief complaint of AMS (06 Feb 2019 12:28)    	        PAST MEDICAL & SURGICAL HISTORY:  Hyperlipidemia, unspecified hyperlipidemia type  Benign prostatic hyperplasia with urinary frequency  HTN (hypertension)  Status post craniectomy          REVIEW OF SYSTEMS:  sleeping  no apparent cp or sob  no abd pain   no chills  calm this am     Medications:  MEDICATIONS  (STANDING):  ascorbic acid 1000 milliGRAM(s) Oral daily  aspirin enteric coated 81 milliGRAM(s) Oral daily  atorvastatin 80 milliGRAM(s) Oral at bedtime  cholecalciferol 400 Unit(s) Oral daily  cyanocobalamin 1000 MICROGram(s) Oral daily  docusate sodium 100 milliGRAM(s) Oral three times a day  glycerin Suppository - Adult 1 Suppository(s) Rectal at bedtime  melatonin 6 milliGRAM(s) Oral at bedtime  midodrine 5 milliGRAM(s) Oral three times a day  polyethylene glycol 3350 17 Gram(s) Oral two times a day  senna 2 Tablet(s) Oral at bedtime  sodium chloride 0.9% lock flush 3 milliLiter(s) IV Push every 8 hours  sodium chloride 0.9%. 1000 milliLiter(s) (50 mL/Hr) IV Continuous <Continuous>  tamsulosin 0.4 milliGRAM(s) Oral at bedtime  traZODone 50 milliGRAM(s) Oral <User Schedule>  vitamin E 400 International Unit(s) Oral daily    MEDICATIONS  (PRN):  haloperidol    Injectable 0.5 milliGRAM(s) IV Push every 6 hours PRN Agitation  LORazepam   Injectable 0.5 milliGRAM(s) IV Push every 6 hours PRN Severe restlessness or Akathisia  traZODone 50 milliGRAM(s) Oral at bedtime PRN insomnia    	    PHYSICAL EXAM:  T(C): 36.6 (02-07-19 @ 05:45), Max: 36.6 (02-06-19 @ 19:34)  HR: 74 (02-07-19 @ 05:45) (74 - 87)  BP: 109/67 (02-07-19 @ 05:45) (109/67 - 140/84)  RR: 18 (02-07-19 @ 05:45) (18 - 18)  SpO2: 99% (02-07-19 @ 05:45) (99% - 99%)  Wt(kg): --  I&O's Summary      Appearance: Normal	  HEENT:   Normal oral mucosa, PERRL, EOMI	  Lymphatic: No lymphadenopathy  Cardiovascular: Normal S1 S2, No JVD, No murmurs, No edema  Respiratory: Lungs clear to auscultation	  Gastrointestinal:  Soft, Non-tender, + BS	  Skin: No rashes, No ecchymoses, No cyanosis	  Neurologic: Non-focal  Extremities: dec range of motion, No clubbing, cyanosis or edema  Vascular: Peripheral pulses palpable 2+ bilaterally    TELEMETRY: 	    ECG:  	  RADIOLOGY:  OTHER: 	  	  LABS:	 	    CARDIAC MARKERS:                                11.6   6.40  )-----------( 119      ( 07 Feb 2019 06:10 )             35.4     02-07    140  |  106  |  17  ----------------------------<  110<H>  4.2   |  24  |  1.22    Ca    9.1      07 Feb 2019 06:10  Mg     1.8     02-06      proBNP:   Lipid Profile:   HgA1c:   TSH:

## 2019-02-07 NOTE — PROGRESS NOTE BEHAVIORAL HEALTH - NSBHCONSULTMEDS_PSY_A_CORE FT
-Begin Zyprexa 2.5mg po q6pm for paranoia  -Zyprexa 2.5mg po/IM prn agitation/Severe agitation  -Trazodone 50mg po q6pm

## 2019-02-07 NOTE — PHYSICAL THERAPY INITIAL EVALUATION ADULT - GENERAL OBSERVATIONS, REHAB EVAL
Consult received, chart reviewed. Patient received seated at EOB, NAD, +tele. Patient agreed to Evaluation from Physical Therapist. Consult received, chart reviewed. Patient received seated at edge of bed, NAD, +tele. Patient agreed to Evaluation from Physical Therapist.

## 2019-02-07 NOTE — PROGRESS NOTE BEHAVIORAL HEALTH - NSBHFUPINTERVALHXFT_PSY_A_CORE
Pt's daughters have complained that he has been paranoid at home.  He did receive a prn of Haldol 0.5mg and a prn of Ativan 0.5mg overnight.

## 2019-02-07 NOTE — PHYSICAL THERAPY INITIAL EVALUATION ADULT - PERTINENT HX OF CURRENT PROBLEM, REHAB EVAL
Pt. admitted for AMS. Per radiology report, X-ray left hip/pelvis revealed no fracture or dislocation. PMH of subdural hematoma s/p Craniotomy in Jan 2018, HTN, HLD, orthostatic hypotension on midodrine, cognitive impairment post neurosurgery, dementia.

## 2019-02-07 NOTE — PHYSICAL THERAPY INITIAL EVALUATION ADULT - ADDITIONAL COMMENTS
Pt. poor historian, A+Ox1. Social history and previous level of function obtained from documentation. Pt. has aide for 12 hours/day 7 days/week.     Pt. was left seated in chair post PT Evaluation, NAD, +chair alarm. RN Natacha aware of pt. participation in PT.

## 2019-02-07 NOTE — PROGRESS NOTE BEHAVIORAL HEALTH - OTHER
superficially cooperative at times would take blanket off him and expose legs no cogwheel rigidity, no muscle stiffness in RUE did not assess at times fast, but able to interrupt no SI/HI, talks about going to [facility]

## 2019-02-07 NOTE — PHYSICAL THERAPY INITIAL EVALUATION ADULT - DISCHARGE DISPOSITION, PT EVAL
Home with Home Physical Therapy to address impairments in balance and strength, and improve functional mobility.

## 2019-02-07 NOTE — PROGRESS NOTE BEHAVIORAL HEALTH - NSBHCHARTREVIEWVS_PSY_A_CORE FT
Vital Signs Last 24 Hrs  T(C): 36.9 (07 Feb 2019 13:19), Max: 36.9 (07 Feb 2019 13:19)  T(F): 98.5 (07 Feb 2019 13:19), Max: 98.5 (07 Feb 2019 13:19)  HR: 70 (07 Feb 2019 13:19) (70 - 87)  BP: 139/90 (07 Feb 2019 13:19) (109/67 - 140/84)  BP(mean): --  RR: 18 (07 Feb 2019 13:19) (18 - 18)  SpO2: 98% (07 Feb 2019 13:19) (98% - 99%)

## 2019-02-07 NOTE — PROGRESS NOTE ADULT - SUBJECTIVE AND OBJECTIVE BOX
INTERVAL HPI/OVERNIGHT EVENTS:    large bm x 1 overnight  pt confused but without abdominal complaints  tolerating PO intake well     MEDICATIONS  (STANDING):  ascorbic acid 1000 milliGRAM(s) Oral daily  aspirin enteric coated 81 milliGRAM(s) Oral daily  atorvastatin 80 milliGRAM(s) Oral at bedtime  cholecalciferol 400 Unit(s) Oral daily  cyanocobalamin 1000 MICROGram(s) Oral daily  docusate sodium 100 milliGRAM(s) Oral three times a day  glycerin Suppository - Adult 1 Suppository(s) Rectal at bedtime  melatonin 6 milliGRAM(s) Oral at bedtime  midodrine 5 milliGRAM(s) Oral three times a day  OLANZapine 2.5 milliGRAM(s) Oral <User Schedule>  polyethylene glycol 3350 17 Gram(s) Oral two times a day  senna 2 Tablet(s) Oral at bedtime  sodium chloride 0.9% lock flush 3 milliLiter(s) IV Push every 8 hours  sodium chloride 0.9%. 1000 milliLiter(s) (50 mL/Hr) IV Continuous <Continuous>  tamsulosin 0.4 milliGRAM(s) Oral at bedtime  traZODone 50 milliGRAM(s) Oral <User Schedule>  vitamin E 400 International Unit(s) Oral daily    MEDICATIONS  (PRN):  OLANZapine 2.5 milliGRAM(s) Oral every 6 hours PRN Agitation  OLANZapine Injectable 2.5 milliGRAM(s) IntraMuscular every 6 hours PRN Severe Agitation  traZODone 50 milliGRAM(s) Oral at bedtime PRN insomnia      Allergies    No Known Allergies    Intolerances        Review of Systems:    General:  No wt loss, fevers, chills, night sweats, fatigue   Eyes:  Good vision, no reported pain  ENT:  No sore throat, pain, runny nose, dysphagia  CV:  No pain, palpitations, hypo/hypertension  Resp:  No dyspnea, cough, tachypnea, wheezing  GI:  No pain, No nausea, No vomiting, No diarrhea, No constipation, No weight loss, No fever, No pruritis, No rectal bleeding, No melena, No dysphagia  :  No pain, bleeding, incontinence, nocturia  Muscle:  No pain, weakness  Neuro:  No weakness, tingling, memory problems  Psych:  No fatigue, insomnia, mood problems, depression  Endocrine:  No polyuria, polydypsia, cold/heat intolerance  Heme:  No petechiae, ecchymosis, easy bruisability  Skin:  No rash, tattoos, scars, edema      Vital Signs Last 24 Hrs  T(C): 36.6 (07 Feb 2019 05:45), Max: 36.6 (06 Feb 2019 19:34)  T(F): 97.8 (07 Feb 2019 05:45), Max: 97.9 (06 Feb 2019 19:34)  HR: 74 (07 Feb 2019 05:45) (74 - 87)  BP: 109/67 (07 Feb 2019 05:45) (109/67 - 140/84)  BP(mean): --  RR: 18 (07 Feb 2019 05:45) (18 - 18)  SpO2: 99% (07 Feb 2019 05:45) (99% - 99%)    PHYSICAL EXAM:    Constitutional: NAD  HEENT: EOMI, throat clear  Neck: No LAD, supple  Respiratory: CTA and P  Cardiovascular: S1 and S2, RRR, no M  Gastrointestinal: BS+, soft, NT/ND, neg HSM,  Extremities: No peripheral edema, neg clubbing, cyanosis  Vascular: 2+ peripheral pulses  Neurological: A/O x 1, no focal deficits  Psychiatric: Normal mood, normal affect  Skin: No rashes      LABS:                        11.6   6.40  )-----------( 119      ( 07 Feb 2019 06:10 )             35.4     02-07    140  |  106  |  17  ----------------------------<  110<H>  4.2   |  24  |  1.22    Ca    9.1      07 Feb 2019 06:10  Mg     1.8     02-06            RADIOLOGY & ADDITIONAL TESTS:

## 2019-02-07 NOTE — PROGRESS NOTE BEHAVIORAL HEALTH - SUMMARY
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  Psych CL consulted as patient is on Seroquel 25mg HS and there was concern that perhaps it may have contributed to his episode.    Of note, patient found to have ileus. While it is unlikely that Seroquel was sole culprit, as family does not like this medication- will try haldol instead as a prn with Trazodone standing at bedtime.  -Begin Zyprexa 2.5mg po q6pm for paranoia  -Zyprexa 2.5mg po/IM prn agitation/Severe agitation  -Trazodone 50mg po q6pm  - check B12, folate, UA  - workup of ileus per primary team  - enhanced supervision  - daily EKGs to monitor QTC, if QTC > 500, do not administer antipsychotics

## 2019-02-08 ENCOUNTER — TRANSCRIPTION ENCOUNTER (OUTPATIENT)
Age: 84
End: 2019-02-08

## 2019-02-08 VITALS
HEART RATE: 64 BPM | DIASTOLIC BLOOD PRESSURE: 66 MMHG | SYSTOLIC BLOOD PRESSURE: 127 MMHG | RESPIRATION RATE: 18 BRPM | OXYGEN SATURATION: 98 % | TEMPERATURE: 97 F

## 2019-02-08 LAB
ANION GAP SERPL CALC-SCNC: 13 MMO/L — SIGNIFICANT CHANGE UP (ref 7–14)
BASOPHILS # BLD AUTO: 0.02 K/UL — SIGNIFICANT CHANGE UP (ref 0–0.2)
BASOPHILS NFR BLD AUTO: 0.3 % — SIGNIFICANT CHANGE UP (ref 0–2)
BUN SERPL-MCNC: 15 MG/DL — SIGNIFICANT CHANGE UP (ref 7–23)
CALCIUM SERPL-MCNC: 9.4 MG/DL — SIGNIFICANT CHANGE UP (ref 8.4–10.5)
CHLORIDE SERPL-SCNC: 105 MMOL/L — SIGNIFICANT CHANGE UP (ref 98–107)
CO2 SERPL-SCNC: 24 MMOL/L — SIGNIFICANT CHANGE UP (ref 22–31)
CREAT SERPL-MCNC: 1.35 MG/DL — HIGH (ref 0.5–1.3)
EOSINOPHIL # BLD AUTO: 0.17 K/UL — SIGNIFICANT CHANGE UP (ref 0–0.5)
EOSINOPHIL NFR BLD AUTO: 2.7 % — SIGNIFICANT CHANGE UP (ref 0–6)
GLUCOSE SERPL-MCNC: 115 MG/DL — HIGH (ref 70–99)
HCT VFR BLD CALC: 36.3 % — LOW (ref 39–50)
HGB BLD-MCNC: 12.1 G/DL — LOW (ref 13–17)
IMM GRANULOCYTES NFR BLD AUTO: 0.5 % — SIGNIFICANT CHANGE UP (ref 0–1.5)
LYMPHOCYTES # BLD AUTO: 1.94 K/UL — SIGNIFICANT CHANGE UP (ref 1–3.3)
LYMPHOCYTES # BLD AUTO: 30.4 % — SIGNIFICANT CHANGE UP (ref 13–44)
MCHC RBC-ENTMCNC: 28.1 PG — SIGNIFICANT CHANGE UP (ref 27–34)
MCHC RBC-ENTMCNC: 33.3 % — SIGNIFICANT CHANGE UP (ref 32–36)
MCV RBC AUTO: 84.2 FL — SIGNIFICANT CHANGE UP (ref 80–100)
MONOCYTES # BLD AUTO: 0.77 K/UL — SIGNIFICANT CHANGE UP (ref 0–0.9)
MONOCYTES NFR BLD AUTO: 12.1 % — SIGNIFICANT CHANGE UP (ref 2–14)
NEUTROPHILS # BLD AUTO: 3.45 K/UL — SIGNIFICANT CHANGE UP (ref 1.8–7.4)
NEUTROPHILS NFR BLD AUTO: 54 % — SIGNIFICANT CHANGE UP (ref 43–77)
NRBC # FLD: 0 K/UL — LOW (ref 25–125)
PLATELET # BLD AUTO: 151 K/UL — SIGNIFICANT CHANGE UP (ref 150–400)
PMV BLD: 11.4 FL — SIGNIFICANT CHANGE UP (ref 7–13)
POTASSIUM SERPL-MCNC: 4.4 MMOL/L — SIGNIFICANT CHANGE UP (ref 3.5–5.3)
POTASSIUM SERPL-SCNC: 4.4 MMOL/L — SIGNIFICANT CHANGE UP (ref 3.5–5.3)
RBC # BLD: 4.31 M/UL — SIGNIFICANT CHANGE UP (ref 4.2–5.8)
RBC # FLD: 13.7 % — SIGNIFICANT CHANGE UP (ref 10.3–14.5)
SODIUM SERPL-SCNC: 142 MMOL/L — SIGNIFICANT CHANGE UP (ref 135–145)
WBC # BLD: 6.38 K/UL — SIGNIFICANT CHANGE UP (ref 3.8–10.5)
WBC # FLD AUTO: 6.38 K/UL — SIGNIFICANT CHANGE UP (ref 3.8–10.5)

## 2019-02-08 PROCEDURE — 99232 SBSQ HOSP IP/OBS MODERATE 35: CPT

## 2019-02-08 RX ORDER — PREGABALIN 225 MG/1
1 CAPSULE ORAL
Qty: 0 | Refills: 0 | DISCHARGE
Start: 2019-02-08

## 2019-02-08 RX ORDER — LANOLIN ALCOHOL/MO/W.PET/CERES
2 CREAM (GRAM) TOPICAL
Qty: 0 | Refills: 0 | DISCHARGE
Start: 2019-02-08

## 2019-02-08 RX ORDER — TAMSULOSIN HYDROCHLORIDE 0.4 MG/1
1 CAPSULE ORAL
Qty: 0 | Refills: 0 | COMMUNITY

## 2019-02-08 RX ORDER — OLANZAPINE 15 MG/1
1 TABLET, FILM COATED ORAL
Qty: 30 | Refills: 0 | OUTPATIENT
Start: 2019-02-08 | End: 2019-03-09

## 2019-02-08 RX ORDER — GLYCERIN ADULT
1 SUPPOSITORY, RECTAL RECTAL
Qty: 1 | Refills: 0
Start: 2019-02-08 | End: 2019-03-09

## 2019-02-08 RX ORDER — LANOLIN ALCOHOL/MO/W.PET/CERES
1 CREAM (GRAM) TOPICAL
Qty: 0 | Refills: 0 | COMMUNITY

## 2019-02-08 RX ORDER — ATORVASTATIN CALCIUM 80 MG/1
1 TABLET, FILM COATED ORAL
Qty: 0 | Refills: 0 | DISCHARGE
Start: 2019-02-08

## 2019-02-08 RX ORDER — DOCUSATE SODIUM 100 MG
1 CAPSULE ORAL
Qty: 90 | Refills: 0
Start: 2019-02-08 | End: 2019-03-09

## 2019-02-08 RX ORDER — CHOLECALCIFEROL (VITAMIN D3) 125 MCG
400 CAPSULE ORAL
Qty: 0 | Refills: 0 | COMMUNITY
Start: 2019-02-08

## 2019-02-08 RX ORDER — HALOPERIDOL DECANOATE 100 MG/ML
0.5 INJECTION INTRAMUSCULAR ONCE
Qty: 0 | Refills: 0 | Status: COMPLETED | OUTPATIENT
Start: 2019-02-08 | End: 2019-02-08

## 2019-02-08 RX ORDER — ASPIRIN/CALCIUM CARB/MAGNESIUM 324 MG
1 TABLET ORAL
Qty: 0 | Refills: 0 | COMMUNITY

## 2019-02-08 RX ORDER — ASCORBIC ACID 60 MG
1 TABLET,CHEWABLE ORAL
Qty: 0 | Refills: 0 | COMMUNITY

## 2019-02-08 RX ORDER — TAMSULOSIN HYDROCHLORIDE 0.4 MG/1
1 CAPSULE ORAL
Qty: 0 | Refills: 0 | DISCHARGE
Start: 2019-02-08

## 2019-02-08 RX ORDER — MIDODRINE HYDROCHLORIDE 2.5 MG/1
1 TABLET ORAL
Qty: 0 | Refills: 0 | COMMUNITY

## 2019-02-08 RX ORDER — TRAZODONE HCL 50 MG
1 TABLET ORAL
Qty: 30 | Refills: 0 | OUTPATIENT
Start: 2019-02-08 | End: 2019-03-09

## 2019-02-08 RX ORDER — ASCORBIC ACID 60 MG
1 TABLET,CHEWABLE ORAL
Qty: 0 | Refills: 0 | DISCHARGE
Start: 2019-02-08

## 2019-02-08 RX ORDER — SENNA PLUS 8.6 MG/1
2 TABLET ORAL
Qty: 60 | Refills: 0
Start: 2019-02-08 | End: 2019-03-09

## 2019-02-08 RX ORDER — ATORVASTATIN CALCIUM 80 MG/1
1 TABLET, FILM COATED ORAL
Qty: 0 | Refills: 0 | COMMUNITY

## 2019-02-08 RX ORDER — PREGABALIN 225 MG/1
1 CAPSULE ORAL
Qty: 0 | Refills: 0 | COMMUNITY

## 2019-02-08 RX ORDER — MIDODRINE HYDROCHLORIDE 2.5 MG/1
1 TABLET ORAL
Qty: 90 | Refills: 0 | OUTPATIENT
Start: 2019-02-08 | End: 2019-03-09

## 2019-02-08 RX ORDER — CHOLECALCIFEROL (VITAMIN D3) 125 MCG
1 CAPSULE ORAL
Qty: 0 | Refills: 0 | COMMUNITY

## 2019-02-08 RX ORDER — OLANZAPINE 15 MG/1
1 TABLET, FILM COATED ORAL
Qty: 28 | Refills: 0 | OUTPATIENT
Start: 2019-02-08 | End: 2019-02-14

## 2019-02-08 RX ORDER — VITAMIN E 100 UNIT
1 CAPSULE ORAL
Qty: 0 | Refills: 0 | DISCHARGE
Start: 2019-02-08

## 2019-02-08 RX ORDER — OLANZAPINE 15 MG/1
5 TABLET, FILM COATED ORAL
Qty: 0 | Refills: 0 | Status: DISCONTINUED | OUTPATIENT
Start: 2019-02-08 | End: 2019-02-08

## 2019-02-08 RX ORDER — DOCUSATE SODIUM 100 MG
1 CAPSULE ORAL
Qty: 0 | Refills: 0 | COMMUNITY

## 2019-02-08 RX ORDER — POLYETHYLENE GLYCOL 3350 17 G/17G
17 POWDER, FOR SOLUTION ORAL
Qty: 1 | Refills: 0
Start: 2019-02-08 | End: 2019-03-09

## 2019-02-08 RX ORDER — QUETIAPINE FUMARATE 200 MG/1
1 TABLET, FILM COATED ORAL
Qty: 0 | Refills: 0 | COMMUNITY

## 2019-02-08 RX ORDER — ASPIRIN/CALCIUM CARB/MAGNESIUM 324 MG
1 TABLET ORAL
Qty: 0 | Refills: 0 | DISCHARGE
Start: 2019-02-08

## 2019-02-08 RX ORDER — CHOLECALCIFEROL (VITAMIN D3) 125 MCG
1 CAPSULE ORAL
Qty: 0 | Refills: 0 | DISCHARGE
Start: 2019-02-08

## 2019-02-08 RX ORDER — VITAMIN E 100 UNIT
1 CAPSULE ORAL
Qty: 0 | Refills: 0 | COMMUNITY

## 2019-02-08 RX ADMIN — SODIUM CHLORIDE 3 MILLILITER(S): 9 INJECTION INTRAMUSCULAR; INTRAVENOUS; SUBCUTANEOUS at 13:24

## 2019-02-08 RX ADMIN — Medication 100 MILLIGRAM(S): at 06:45

## 2019-02-08 RX ADMIN — Medication 400 UNIT(S): at 13:25

## 2019-02-08 RX ADMIN — MIDODRINE HYDROCHLORIDE 5 MILLIGRAM(S): 2.5 TABLET ORAL at 06:45

## 2019-02-08 RX ADMIN — SODIUM CHLORIDE 3 MILLILITER(S): 9 INJECTION INTRAMUSCULAR; INTRAVENOUS; SUBCUTANEOUS at 06:55

## 2019-02-08 RX ADMIN — Medication 100 MILLIGRAM(S): at 13:25

## 2019-02-08 RX ADMIN — POLYETHYLENE GLYCOL 3350 17 GRAM(S): 17 POWDER, FOR SOLUTION ORAL at 06:45

## 2019-02-08 RX ADMIN — HALOPERIDOL DECANOATE 0.5 MILLIGRAM(S): 100 INJECTION INTRAMUSCULAR at 01:36

## 2019-02-08 RX ADMIN — MIDODRINE HYDROCHLORIDE 5 MILLIGRAM(S): 2.5 TABLET ORAL at 13:25

## 2019-02-08 RX ADMIN — Medication 81 MILLIGRAM(S): at 13:25

## 2019-02-08 RX ADMIN — Medication 1000 MILLIGRAM(S): at 13:25

## 2019-02-08 RX ADMIN — Medication 400 INTERNATIONAL UNIT(S): at 13:25

## 2019-02-08 NOTE — DISCHARGE NOTE ADULT - MEDICATION SUMMARY - MEDICATIONS TO TAKE
I will START or STAY ON the medications listed below when I get home from the hospital:    aspirin 81 mg oral delayed release tablet  -- 1 tab(s) by mouth once a day  -- Indication: For Need for prophylactic measure    tamsulosin 0.4 mg oral capsule  -- 1 cap(s) by mouth once a day (at bedtime)  -- Indication: For Benign prostatic hyperplasia with urinary frequency    traZODone 50 mg oral tablet  -- 1 tab(s) by mouth once a day  -- Indication: For Dementia without behavioral disturbance, unspecified dementia type    atorvastatin 80 mg oral tablet  -- 1 tab(s) by mouth once a day (at bedtime)  -- Indication: For HLD    OLANZapine 2.5 mg oral tablet  -- 1 tab(s) by mouth every 6 hours, As needed, Agitation  -- Indication: For Dementia without behavioral disturbance, unspecified dementia type    ZyPREXA Zydis 5 mg oral tablet, disintegrating  -- 1 tab(s) by mouth once a day   -- It is very important that you take or use this exactly as directed.  Do not skip doses or discontinue unless directed by your doctor.  May cause drowsiness.  Alcohol may intensify this effect.  Use care when operating dangerous machinery.  Obtain medical advice before taking any non-prescription drugs as some may affect the action of this medication.    -- Indication: For Dementia without behavioral disturbance, unspecified dementia type    docusate sodium 100 mg oral capsule  -- 1 cap(s) by mouth 3 times a day  -- Indication: For Bowel regimen    glycerin adult rectal suppository  -- 1 suppository(ies) rectally once a day (at bedtime), As Needed   -- Indication: For Bowel regimen    polyethylene glycol 3350 oral powder for reconstitution  -- 17 gram(s) by mouth 2 times a day, As Needed   -- Indication: For Bowel Regimen    senna oral tablet  -- 2 tab(s) by mouth once a day (at bedtime)  -- Indication: For Bowel Regimen    midodrine 5 mg oral tablet  -- 1 tab(s) by mouth 3 times a day  -- Indication: For Orthostatic hypotension    melatonin 3 mg oral tablet  -- 2 tab(s) by mouth once a day (at bedtime)  -- Indication: For insomnia    ascorbic acid 1000 mg oral tablet  -- 1 tab(s) by mouth once a day  -- Indication: For supplement    cholecalciferol oral tablet  -- 400 unit(s) by mouth once a day  -- Indication: For supplement    cyanocobalamin 1000 mcg oral tablet  -- 1 tab(s) by mouth once a day  -- Indication: For supplement    vitamin E 400 intl units oral capsule  -- 1 cap(s) by mouth once a day  -- Indication: For supplement

## 2019-02-08 NOTE — DISCHARGE NOTE ADULT - CARE PLAN
Principal Discharge DX:	Altered mental status  Goal:	Stable/improved  Assessment and plan of treatment:	Seroquel stopped, Changed to Zyprexa and Trazodone.  Follow up with your PMD within one week; Follow up with your own neurologist; and you should follow up at the [facility] - [phone] - Call to schedule follow up appointments.  Secondary Diagnosis:	Dementia without behavioral disturbance, unspecified dementia type  Goal:	stable  Assessment and plan of treatment:	Improved on current regimen, continue Zyprexa and Trazodone as ordered.  Secondary Diagnosis:	Constipation, slow transit  Goal:	Resolved  Assessment and plan of treatment:	Continue bowel regimen as ordered.  Make sure patient is having regular bowel movements. Encourage a variety of healthful foods, provide patient preferences, stay well hydrated.  Secondary Diagnosis:	Orthostatic hypotension  Goal:	stable  Assessment and plan of treatment:	Continue Midodrine as ordered  Secondary Diagnosis:	HTN (hypertension)  Goal:	stable  Assessment and plan of treatment:	Continue medications as directed  Secondary Diagnosis:	Hyperlipidemia, unspecified hyperlipidemia type  Goal:	stable  Assessment and plan of treatment:	Continue medications as directed  Secondary Diagnosis:	Irregular heart rhythm  Goal:	stable  Assessment and plan of treatment:	You were seen and evaluated by your cardiologist, Dr. Hale, for your orthostatic hypotension and irregular heart rhythm.  No interventions at this time, Will hold off on anticoagulation at this time for his increased risk of bleeding due to his history of falls and syncope Principal Discharge DX:	Altered mental status  Goal:	Stable/improved  Assessment and plan of treatment:	Seroquel stopped, Changed to Zyprexa and Trazodone.  Follow up with your PMD within one week; Follow up with your own neurologist; and you should follow up at the [facility] - [phone] - Call to schedule follow up appointments.  Secondary Diagnosis:	Dementia without behavioral disturbance, unspecified dementia type  Goal:	stable  Assessment and plan of treatment:	Improved on current regimen, continue Zyprexa and Trazodone as ordered.  Secondary Diagnosis:	Constipation, slow transit  Goal:	Resolved  Assessment and plan of treatment:	Continue bowel regimen as ordered.  Make sure patient is having regular bowel movements. Encourage a variety of healthful foods, provide patient preferences, stay well hydrated.  Secondary Diagnosis:	Orthostatic hypotension  Goal:	stable  Assessment and plan of treatment:	Continue Midodrine as ordered  Secondary Diagnosis:	HTN (hypertension)  Goal:	stable  Assessment and plan of treatment:	Continue medications as directed  Secondary Diagnosis:	Hyperlipidemia, unspecified hyperlipidemia type  Goal:	stable  Assessment and plan of treatment:	Continue medications as directed  Secondary Diagnosis:	Irregular heart rhythm  Goal:	stable  Assessment and plan of treatment:	You were seen and evaluated by your cardiologist, Dr. Hale, for your orthostatic hypotension and irregular heart rhythm.  No interventions at this time, Will hold off on anticoagulation at this time for his increased risk of bleeding due to his history of falls and syncope. Follow up with Dr. Hale within 2 weeks.

## 2019-02-08 NOTE — DISCHARGE NOTE ADULT - SECONDARY DIAGNOSIS.
Dementia without behavioral disturbance, unspecified dementia type Constipation, slow transit Orthostatic hypotension HTN (hypertension) Hyperlipidemia, unspecified hyperlipidemia type Irregular heart rhythm

## 2019-02-08 NOTE — PROGRESS NOTE BEHAVIORAL HEALTH - RISK ASSESSMENT
Patient denies SI, has no history of violence per daughter. He has supportive family. He has low imminent risk of self harm.
Patient denies SI, has no history of violence per daughter. He has supportive family. He has low imminent risk of self harm.

## 2019-02-08 NOTE — PROGRESS NOTE BEHAVIORAL HEALTH - NSBHCHARTREVIEWVS_PSY_A_CORE FT
ICU Vital Signs Last 24 Hrs  T(C): 36.7 (08 Feb 2019 06:38), Max: 36.9 (07 Feb 2019 13:19)  T(F): 98.1 (08 Feb 2019 06:38), Max: 98.5 (07 Feb 2019 13:19)  HR: 87 (08 Feb 2019 06:38) (70 - 87)  BP: 147/81 (08 Feb 2019 06:38) (138/71 - 147/81)  BP(mean): --  ABP: --  ABP(mean): --  RR: 18 (08 Feb 2019 06:38) (18 - 18)  SpO2: 100% (08 Feb 2019 06:38) (98% - 100%) Vital Signs Last 24 Hrs  T(C): 36.3 (08 Feb 2019 13:21), Max: 36.7 (08 Feb 2019 06:38)  T(F): 97.3 (08 Feb 2019 13:21), Max: 98.1 (08 Feb 2019 06:38)  HR: 64 (08 Feb 2019 13:21) (64 - 87)  BP: 127/66 (08 Feb 2019 13:21) (127/66 - 147/81)  BP(mean): --  RR: 18 (08 Feb 2019 13:21) (18 - 18)  SpO2: 98% (08 Feb 2019 13:21) (98% - 100%)

## 2019-02-08 NOTE — DISCHARGE NOTE ADULT - MEDICATION SUMMARY - MEDICATIONS TO CHANGE
I will SWITCH the dose or number of times a day I take the medications listed below when I get home from the hospital:    QUEtiapine 25 mg oral tablet  -- 1 tab(s) by mouth 2 times a day

## 2019-02-08 NOTE — PROGRESS NOTE BEHAVIORAL HEALTH - NSBHCHARTREVIEWLAB_PSY_A_CORE FT
CBC Full  -  ( 08 Feb 2019 06:30 )  WBC Count : 6.38 K/uL  Hemoglobin : 12.1 g/dL  Hematocrit : 36.3 %  Platelet Count - Automated : 151 K/uL  Mean Cell Volume : 84.2 fL  Mean Cell Hemoglobin : 28.1 pg  Mean Cell Hemoglobin Concentration : 33.3 %  Auto Neutrophil # : 3.45 K/uL  Auto Lymphocyte # : 1.94 K/uL  Auto Monocyte # : 0.77 K/uL  Auto Eosinophil # : 0.17 K/uL  Auto Basophil # : 0.02 K/uL  Auto Neutrophil % : 54.0 %  Auto Lymphocyte % : 30.4 %  Auto Monocyte % : 12.1 %  Auto Eosinophil % : 2.7 %  Auto Basophil % : 0.3 %    02-08    142  |  105  |  15  ----------------------------<  115<H>  4.4   |  24  |  1.35<H>    Ca    9.4      08 Feb 2019 06:30  Phos  3.7     02-07  Mg     2.0     02-07
11.6   6.40  )-----------( 119      ( 07 Feb 2019 06:10 )             35.4   02-07    140  |  106  |  17  ----------------------------<  110<H>  4.2   |  24  |  1.22    Ca    9.1      07 Feb 2019 06:10  Phos  3.7     02-07  Mg     2.0     02-07

## 2019-02-08 NOTE — PROGRESS NOTE ADULT - ASSESSMENT
85yo M PMHx traumatic subdural hematoma s/p Craniotomy in Jan 2018, HTN, HLD, orthostatic hypotension on midodrine, cognitive impairment post neurosurgery, dementia admitted for AMS       : Altered mental status.    unresponsiveness possibly 2/2 quetiapine   seroquel stopped  haldol as per psych   - likely dementia with behavioral disturbances  ; placed on enhanced supervision  CT head neg  psych f/u      cards eval noted      ·  Problem: Vomiting.  Plan: monitor lytes  ileus resolved  sx eval noted       ·  Problem: Orthostatic hypotension.  Plan: Monitor blood pressure  Continue Midodrine 5 po tid with hold parameters  fall precautions.    ·  Problem: Benign prostatic hyperplasia with urinary frequency  .  Plan: continue flomax.       ·  Problem: Hyperlipidemia, unspecified hyperlipidemia type.    lipid profile  low cholesterol diet  continue atorvastatin.       Problem: CKD (chronic kidney disease) stage 3  stable  f/u labs       d/c planning   ?placement   rehab vs ltc  d/c if cleared by psych

## 2019-02-08 NOTE — PROGRESS NOTE ADULT - SUBJECTIVE AND OBJECTIVE BOX
INTERVAL HPI/OVERNIGHT EVENTS:    having brown stools after bowel regimen  pt confused and without abdominal complaints    MEDICATIONS  (STANDING):  ascorbic acid 1000 milliGRAM(s) Oral daily  aspirin enteric coated 81 milliGRAM(s) Oral daily  atorvastatin 80 milliGRAM(s) Oral at bedtime  cholecalciferol 400 Unit(s) Oral daily  cyanocobalamin 1000 MICROGram(s) Oral daily  docusate sodium 100 milliGRAM(s) Oral three times a day  glycerin Suppository - Adult 1 Suppository(s) Rectal at bedtime  melatonin 6 milliGRAM(s) Oral at bedtime  midodrine 5 milliGRAM(s) Oral three times a day  OLANZapine 2.5 milliGRAM(s) Oral <User Schedule>  polyethylene glycol 3350 17 Gram(s) Oral two times a day  senna 2 Tablet(s) Oral at bedtime  sodium chloride 0.9% lock flush 3 milliLiter(s) IV Push every 8 hours  sodium chloride 0.9%. 1000 milliLiter(s) (50 mL/Hr) IV Continuous <Continuous>  tamsulosin 0.4 milliGRAM(s) Oral at bedtime  traZODone 50 milliGRAM(s) Oral <User Schedule>  vitamin E 400 International Unit(s) Oral daily    MEDICATIONS  (PRN):  OLANZapine 2.5 milliGRAM(s) Oral every 6 hours PRN Agitation  OLANZapine Injectable 2.5 milliGRAM(s) IntraMuscular every 6 hours PRN Severe Agitation  traZODone 50 milliGRAM(s) Oral at bedtime PRN insomnia      Allergies    No Known Allergies    Intolerances        Review of Systems:    General:  No wt loss, fevers, chills, night sweats, fatigue   Eyes:  Good vision, no reported pain  ENT:  No sore throat, pain, runny nose, dysphagia  CV:  No pain, palpitations, hypo/hypertension  Resp:  No dyspnea, cough, tachypnea, wheezing  GI:  No pain, No nausea, No vomiting, No diarrhea, No constipation, No weight loss, No fever, No pruritis, No rectal bleeding, No melena, No dysphagia  :  No pain, bleeding, incontinence, nocturia  Muscle:  No pain, weakness  Neuro:  No weakness, tingling, memory problems  Psych:  No fatigue, insomnia, mood problems, depression  Endocrine:  No polyuria, polydypsia, cold/heat intolerance  Heme:  No petechiae, ecchymosis, easy bruisability  Skin:  No rash, tattoos, scars, edema      Vital Signs Last 24 Hrs  T(C): 36.7 (08 Feb 2019 06:38), Max: 36.9 (07 Feb 2019 13:19)  T(F): 98.1 (08 Feb 2019 06:38), Max: 98.5 (07 Feb 2019 13:19)  HR: 87 (08 Feb 2019 06:38) (70 - 87)  BP: 147/81 (08 Feb 2019 06:38) (138/71 - 147/81)  BP(mean): --  RR: 18 (08 Feb 2019 06:38) (18 - 18)  SpO2: 100% (08 Feb 2019 06:38) (98% - 100%)    PHYSICAL EXAM:    Constitutional: NAD  HEENT: EOMI, throat clear  Neck: No LAD, supple  Respiratory: CTA and P  Cardiovascular: S1 and S2, RRR, no M  Gastrointestinal: BS+, soft, NT/ND, neg HSM,  Extremities: No peripheral edema, neg clubbing, cyanosis  Vascular: 2+ peripheral pulses  Neurological: A/O x 1, no focal deficits  Psychiatric: Normal mood, normal affect  Skin: No rashes      LABS:                        12.1   6.38  )-----------( 151      ( 08 Feb 2019 06:30 )             36.3     02-08    142  |  105  |  15  ----------------------------<  115<H>  4.4   |  24  |  1.35<H>    Ca    9.4      08 Feb 2019 06:30  Phos  3.7     02-07  Mg     2.0     02-07            RADIOLOGY & ADDITIONAL TESTS:

## 2019-02-08 NOTE — DISCHARGE NOTE ADULT - PATIENT PORTAL LINK FT
You can access the [facility] Portal, offered by [facility], by registering with the following website: [email or web address]

## 2019-02-08 NOTE — PROGRESS NOTE ADULT - ASSESSMENT
Orthostatic hypotension   on midodrine     Syncope  likely due to hypotensive side effect from seroquel     Episode of afib   newly discovered  HR stable  discussed risk and benefit of a/c with family given his frequent falls and syncope  they agree to hold off to a/c for now and re address a/c in near future if he remains falls / syncope free   follow up with me in 2 weeks

## 2019-02-08 NOTE — DISCHARGE NOTE ADULT - HOSPITAL COURSE
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    unresponsiveness possibly 2/2 quetiapine   seroquel stopped  haldol as per psych    likely dementia with behavioral disturbances  CT head neg  psych f/u    cards eval noted     Vomiting.    monitor lytes  ileus resolved  sx eval noted     Orthostatic hypotension.    Monitor blood pressure  Continue Midodrine 5 po tid with hold parameters  fall precautions.    Benign prostatic hyperplasia with urinary frequency  .  Plan: continue flomax.       Hyperlipidemia, unspecified hyperlipidemia type.    lipid profile  low cholesterol diet  continue atorvastatin.     CKD (chronic kidney disease) stage 3  stable    d/c planning

## 2019-02-08 NOTE — PROGRESS NOTE BEHAVIORAL HEALTH - NSBHCHARTREVIEWIMAGING_PSY_A_CORE FT
no new
2/4  CTH:  Impression: This exam somewhat limited by motion though grossly unchanged   when compared with the prior exam.  XRay Hip:  IMPRESSION:  No fracture or dislocation.  Lower lumbar spine degenerative change apparent. Preserved left hip joint   space with smooth and intact articular surfaces and normally offset   femoral head neck junction.  No gross radiographic evidence for AVN.  Chronic small spur projecting from inferior left lesser trochanter   margin.   Generalized mild osteopenia otherwise no discrete lytic or blastic   lesions.   Unremarkable remaining imaged pelvic osseous structures.  Xray Chest:    2/5/19:  Xray Abd:  IMPRESSION:   Moderate to large stool burden in the colon, with prominent small bowel   loops, suggesting ileus.

## 2019-02-08 NOTE — DISCHARGE NOTE ADULT - PLAN OF CARE
Stable/improved Seroquel stopped, Changed to Zyprexa and Trazodone.  Follow up with your PMD within one week; Follow up with your own neurologist; and you should follow up at the [facility] - [phone] - Call to schedule follow up appointments. stable Improved on current regimen, continue Zyprexa and Trazodone as ordered. Resolved Continue bowel regimen as ordered.  Make sure patient is having regular bowel movements. Encourage a variety of healthful foods, provide patient preferences, stay well hydrated. Continue Midodrine as ordered Continue medications as directed You were seen and evaluated by your cardiologist, Dr. Hale, for your orthostatic hypotension and irregular heart rhythm.  No interventions at this time, Will hold off on anticoagulation at this time for his increased risk of bleeding due to his history of falls and syncope You were seen and evaluated by your cardiologist, Dr. Hale, for your orthostatic hypotension and irregular heart rhythm.  No interventions at this time, Will hold off on anticoagulation at this time for his increased risk of bleeding due to his history of falls and syncope. Follow up with Dr. Hale within 2 weeks.

## 2019-02-08 NOTE — PROGRESS NOTE BEHAVIORAL HEALTH - NSBHCHARTREVIEWINVESTIGATE_PSY_A_CORE FT
no new
EKG 2/4:  qtc 378  Diagnosis Line Sinus rhythm with Premature atrial complexes  Nonspecific T wave abnormality  Abnormal ECG

## 2019-02-08 NOTE — DISCHARGE NOTE ADULT - HOME CARE AGENCY
[facility] [place] [phone], [phone].  Nurse to visit on the day after discharge.  Other appropriate services to be arranged thereafter.

## 2019-02-08 NOTE — PROGRESS NOTE BEHAVIORAL HEALTH - CASE SUMMARY
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  Psych CL consulted as patient is on Seroquel 25mg HS and there was concern that perhaps it may have contributed to his episode. Of note, patient found to have possible ileus.  Pt is tolerating the Zyprexa scheduled and PRN. He received an additional dose of prn trazodone and Zyprexa. He will be discharged on Zyprexa 5mg and Trazodone 50mg.

## 2019-02-08 NOTE — PROGRESS NOTE BEHAVIORAL HEALTH - NSBHCONSULTMEDS_PSY_A_CORE FT
- zyprexa 5mg ODT q6pm (can be discharged on ODT)  - zyprexa 2.5mg PO BID prn  - continue with trazodone 50mg HS  - ensure that patient is on bowel regimen  - enhanced supervision while in the hospital  - daily EKGs to monitor QTC, if QTC > 500, do not administer antipsychotics - Zyprexa 5mg ODT q6pm (can be discharged on ODT)  - Zyprexa 2.5mg PO BID prn  - continue with trazodone 50mg HS  - ensure that patient is on bowel regimen  - enhanced supervision while in the hospital  - daily EKGs to monitor QTC, if QTC > 500, do not administer antipsychotics

## 2019-02-08 NOTE — PROGRESS NOTE ADULT - PROBLEM SELECTOR PLAN 1
- resolved   - senna qhs  - colace tid   - miralax bid   - glycerin suppository qhs  - regular diet  - no gi objection to dc planning per primary team

## 2019-02-08 NOTE — DISCHARGE NOTE ADULT - CARE PROVIDER_API CALL
Prince Ahuja (MD)  Clinical Neurophysiology; EEGEpilepsy; Neurology; Sleep Medicine  [address], [address] [place] [address], [place] [address]  Phone: [phone]  Fax: [phone]  Follow Up Time:

## 2019-02-08 NOTE — PROGRESS NOTE ADULT - PROBLEM SELECTOR PROBLEM 2
Denied.  Closing this encounter.  Nedra Brady RN      
levothyroxine (SYNTHROID/LEVOTHROID) 125 MCG tablet 90 tablet 3 7/10/2017  No      Sig: Take 1 tablet (125 mcg) by mouth daily          Last Written Prescription Date: 7/5/16  Last Quantity: 60, # refills: 5  Last Office Visit with [facility], [facility] or [facility] prescribing provider: 6/14/17        TSH   Date Value Ref Range Status   07/10/2017 0.88 0.40 - 4.00 mU/L Final         
Altered mental status
Altered mental status

## 2019-02-08 NOTE — PROGRESS NOTE ADULT - SUBJECTIVE AND OBJECTIVE BOX
Subjective: Patient seen and examined. No new events except as noted.     SUBJECTIVE/ROS:  feels ok  NAD      MEDICATIONS:  MEDICATIONS  (STANDING):  ascorbic acid 1000 milliGRAM(s) Oral daily  aspirin enteric coated 81 milliGRAM(s) Oral daily  atorvastatin 80 milliGRAM(s) Oral at bedtime  cholecalciferol 400 Unit(s) Oral daily  cyanocobalamin 1000 MICROGram(s) Oral daily  docusate sodium 100 milliGRAM(s) Oral three times a day  glycerin Suppository - Adult 1 Suppository(s) Rectal at bedtime  melatonin 6 milliGRAM(s) Oral at bedtime  midodrine 5 milliGRAM(s) Oral three times a day  OLANZapine 2.5 milliGRAM(s) Oral <User Schedule>  polyethylene glycol 3350 17 Gram(s) Oral two times a day  senna 2 Tablet(s) Oral at bedtime  sodium chloride 0.9% lock flush 3 milliLiter(s) IV Push every 8 hours  sodium chloride 0.9%. 1000 milliLiter(s) (50 mL/Hr) IV Continuous <Continuous>  tamsulosin 0.4 milliGRAM(s) Oral at bedtime  traZODone 50 milliGRAM(s) Oral <User Schedule>  vitamin E 400 International Unit(s) Oral daily      PHYSICAL EXAM:  T(C): 36.7 (02-08-19 @ 06:38), Max: 36.9 (02-07-19 @ 13:19)  HR: 87 (02-08-19 @ 06:38) (70 - 87)  BP: 147/81 (02-08-19 @ 06:38) (138/71 - 147/81)  RR: 18 (02-08-19 @ 06:38) (18 - 18)  SpO2: 100% (02-08-19 @ 06:38) (98% - 100%)  Wt(kg): --  I&O's Summary      JVP: Normal  Neck: supple  Lung: clear   CV: S1 S2 , Murmur:  Abd: soft  Ext: No edema  neuro: Awake / alert  Psych: flat affect  Skin: normal        LABS/DATA:    CARDIAC MARKERS:                                12.1   6.38  )-----------( 151      ( 08 Feb 2019 06:30 )             36.3     02-08    142  |  105  |  15  ----------------------------<  115<H>  4.4   |  24  |  1.35<H>    Ca    9.4      08 Feb 2019 06:30  Phos  3.7     02-07  Mg     2.0     02-07      proBNP:   Lipid Profile:   HgA1c:   TSH:     TELE: afib   EKG:

## 2019-02-08 NOTE — PROGRESS NOTE BEHAVIORAL HEALTH - SUMMARY
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  Psych CL consulted as patient is on Seroquel 25mg HS and there was concern that perhaps it may have contributed to his episode.    Of note, patient found to have possible ileus.  Last night was switched to zyprexa scheduled and PRN. Got an additional dose of prn trazodone and zyprexa.    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  Psych CL consulted as patient is on Seroquel 25mg HS and there was concern that perhaps it may have contributed to his episode.    Of note, patient found to have possible ileus.  Last night was switched to Zyprexa scheduled and PRN. Got an additional dose of prn trazodone and Zyprexa.    Recommend the following:  - Zyprexa 5mg ODT q6pm (can be discharged on ODT)  - Zyprexa 2.5mg PO BID prn  - continue with trazodone 50mg HS  - ensure that patient is on bowel regimen  - enhanced supervision while in the hospital  - daily EKGs to monitor QTC, if QTC > 500, do not administer antipsychotics

## 2019-02-08 NOTE — PROGRESS NOTE ADULT - SUBJECTIVE AND OBJECTIVE BOX
CHIEF COMPLAINT:Patient is a 86y old  Male who presents with a chief complaint of AMS (08 Feb 2019 08:38)    	        PAST MEDICAL & SURGICAL HISTORY:  Hyperlipidemia, unspecified hyperlipidemia type  Benign prostatic hyperplasia with urinary frequency  HTN (hypertension)  Status post craniectomy          REVIEW OF SYSTEMS:  no apparent cp or sob  no n/v  no chills  confused     Medications:  MEDICATIONS  (STANDING):  ascorbic acid 1000 milliGRAM(s) Oral daily  aspirin enteric coated 81 milliGRAM(s) Oral daily  atorvastatin 80 milliGRAM(s) Oral at bedtime  cholecalciferol 400 Unit(s) Oral daily  cyanocobalamin 1000 MICROGram(s) Oral daily  docusate sodium 100 milliGRAM(s) Oral three times a day  glycerin Suppository - Adult 1 Suppository(s) Rectal at bedtime  melatonin 6 milliGRAM(s) Oral at bedtime  midodrine 5 milliGRAM(s) Oral three times a day  OLANZapine 2.5 milliGRAM(s) Oral <User Schedule>  polyethylene glycol 3350 17 Gram(s) Oral two times a day  senna 2 Tablet(s) Oral at bedtime  sodium chloride 0.9% lock flush 3 milliLiter(s) IV Push every 8 hours  sodium chloride 0.9%. 1000 milliLiter(s) (50 mL/Hr) IV Continuous <Continuous>  tamsulosin 0.4 milliGRAM(s) Oral at bedtime  traZODone 50 milliGRAM(s) Oral <User Schedule>  vitamin E 400 International Unit(s) Oral daily    MEDICATIONS  (PRN):  OLANZapine 2.5 milliGRAM(s) Oral every 6 hours PRN Agitation  OLANZapine Injectable 2.5 milliGRAM(s) IntraMuscular every 6 hours PRN Severe Agitation  traZODone 50 milliGRAM(s) Oral at bedtime PRN insomnia    	    PHYSICAL EXAM:  T(C): 36.7 (02-08-19 @ 06:38), Max: 36.9 (02-07-19 @ 13:19)  HR: 87 (02-08-19 @ 06:38) (70 - 87)  BP: 147/81 (02-08-19 @ 06:38) (138/71 - 147/81)  RR: 18 (02-08-19 @ 06:38) (18 - 18)  SpO2: 100% (02-08-19 @ 06:38) (98% - 100%)  Wt(kg): --  I&O's Summary      Appearance: Normal	  HEENT:   Normal oral mucosa, PERRL, EOMI	  Lymphatic: No lymphadenopathy  Cardiovascular: Normal S1 S2, No JVD, No murmurs, No edema  Respiratory: Lungs clear to auscultation	  Gastrointestinal:  Soft, Non-tender, + BS	  Skin: No rashes, No ecchymoses, No cyanosis	  Neurologic: Non-focal  Extremities: Normal range of motion, No clubbing, cyanosis or edema  Vascular: Peripheral pulses palpable 2+ bilaterally    TELEMETRY: 	    ECG:  	  RADIOLOGY:  OTHER: 	  	  LABS:	 	    CARDIAC MARKERS:                                12.1   6.38  )-----------( 151      ( 08 Feb 2019 06:30 )             36.3     02-08    142  |  105  |  15  ----------------------------<  115<H>  4.4   |  24  |  1.35<H>    Ca    9.4      08 Feb 2019 06:30  Phos  3.7     02-07  Mg     2.0     02-07      proBNP:   Lipid Profile:   HgA1c:   TSH:

## 2019-02-08 NOTE — PROGRESS NOTE BEHAVIORAL HEALTH - NSBHFUPINTERVALHXFT_PSY_A_CORE
Chart reviewed. Patient received PRN last night of both trazodone and zyprexa.    This AM, patient seen twice. He is calm and pleasant. States that he is doing well. Believes that he Chart reviewed. Patient received PRN last night of both trazodone and Zyprexa.    This AM, patient seen twice. He is calm and pleasant. States that he is doing well.

## 2019-02-08 NOTE — PROGRESS NOTE BEHAVIORAL HEALTH - OTHER
grossly wnl, found to be sitting calmly in chair, later was standing, but standing calmly no cogwheel rigidity, no muscle stiffness in RUE did not assess slightly goal directed, somewhat disorganized at times no SI/HI

## 2019-02-10 ENCOUNTER — INBOUND DOCUMENT (OUTPATIENT)
Age: 84
End: 2019-02-10

## 2019-02-17 ENCOUNTER — EMERGENCY (EMERGENCY)
Facility: HOSPITAL | Age: 84
LOS: 1 days | Discharge: HOME | End: 2019-02-17
Attending: EMERGENCY MEDICINE | Admitting: EMERGENCY MEDICINE
Payer: MEDICARE

## 2019-02-17 VITALS
OXYGEN SATURATION: 100 % | RESPIRATION RATE: 16 BRPM | TEMPERATURE: 98 F | HEART RATE: 53 BPM | SYSTOLIC BLOOD PRESSURE: 168 MMHG | DIASTOLIC BLOOD PRESSURE: 78 MMHG

## 2019-02-17 VITALS
SYSTOLIC BLOOD PRESSURE: 175 MMHG | HEART RATE: 78 BPM | DIASTOLIC BLOOD PRESSURE: 75 MMHG | RESPIRATION RATE: 16 BRPM | OXYGEN SATURATION: 100 % | TEMPERATURE: 98 F

## 2019-02-17 DIAGNOSIS — F03.91 UNSPECIFIED DEMENTIA WITH BEHAVIORAL DISTURBANCE: ICD-10-CM

## 2019-02-17 DIAGNOSIS — Z98.890 OTHER SPECIFIED POSTPROCEDURAL STATES: Chronic | ICD-10-CM

## 2019-02-17 LAB
ALBUMIN SERPL ELPH-MCNC: 4.1 G/DL — SIGNIFICANT CHANGE UP (ref 3.3–5)
ALP SERPL-CCNC: 61 U/L — SIGNIFICANT CHANGE UP (ref 40–120)
ALT FLD-CCNC: 39 U/L — SIGNIFICANT CHANGE UP (ref 4–41)
ANION GAP SERPL CALC-SCNC: 12 MMO/L — SIGNIFICANT CHANGE UP (ref 7–14)
APPEARANCE UR: CLEAR — SIGNIFICANT CHANGE UP
AST SERPL-CCNC: 49 U/L — HIGH (ref 4–40)
BACTERIA # UR AUTO: NEGATIVE — SIGNIFICANT CHANGE UP
BASOPHILS # BLD AUTO: 0.04 K/UL — SIGNIFICANT CHANGE UP (ref 0–0.2)
BASOPHILS NFR BLD AUTO: 0.7 % — SIGNIFICANT CHANGE UP (ref 0–2)
BILIRUB SERPL-MCNC: 0.6 MG/DL — SIGNIFICANT CHANGE UP (ref 0.2–1.2)
BILIRUB UR-MCNC: NEGATIVE — SIGNIFICANT CHANGE UP
BLOOD UR QL VISUAL: NEGATIVE — SIGNIFICANT CHANGE UP
BUN SERPL-MCNC: 14 MG/DL — SIGNIFICANT CHANGE UP (ref 7–23)
CALCIUM SERPL-MCNC: 9 MG/DL — SIGNIFICANT CHANGE UP (ref 8.4–10.5)
CHLORIDE SERPL-SCNC: 105 MMOL/L — SIGNIFICANT CHANGE UP (ref 98–107)
CO2 SERPL-SCNC: 25 MMOL/L — SIGNIFICANT CHANGE UP (ref 22–31)
COLOR SPEC: YELLOW — SIGNIFICANT CHANGE UP
CREAT SERPL-MCNC: 1.24 MG/DL — SIGNIFICANT CHANGE UP (ref 0.5–1.3)
EOSINOPHIL # BLD AUTO: 0.15 K/UL — SIGNIFICANT CHANGE UP (ref 0–0.5)
EOSINOPHIL NFR BLD AUTO: 2.5 % — SIGNIFICANT CHANGE UP (ref 0–6)
GLUCOSE SERPL-MCNC: 95 MG/DL — SIGNIFICANT CHANGE UP (ref 70–99)
GLUCOSE UR-MCNC: NEGATIVE — SIGNIFICANT CHANGE UP
HCT VFR BLD CALC: 38.9 % — LOW (ref 39–50)
HGB BLD-MCNC: 12.2 G/DL — LOW (ref 13–17)
HYALINE CASTS # UR AUTO: NEGATIVE — SIGNIFICANT CHANGE UP
IMM GRANULOCYTES NFR BLD AUTO: 0.2 % — SIGNIFICANT CHANGE UP (ref 0–1.5)
KETONES UR-MCNC: NEGATIVE — SIGNIFICANT CHANGE UP
LEUKOCYTE ESTERASE UR-ACNC: NEGATIVE — SIGNIFICANT CHANGE UP
LYMPHOCYTES # BLD AUTO: 2.22 K/UL — SIGNIFICANT CHANGE UP (ref 1–3.3)
LYMPHOCYTES # BLD AUTO: 37.2 % — SIGNIFICANT CHANGE UP (ref 13–44)
MAGNESIUM SERPL-MCNC: 2.2 MG/DL — SIGNIFICANT CHANGE UP (ref 1.6–2.6)
MCHC RBC-ENTMCNC: 27.4 PG — SIGNIFICANT CHANGE UP (ref 27–34)
MCHC RBC-ENTMCNC: 31.4 % — LOW (ref 32–36)
MCV RBC AUTO: 87.2 FL — SIGNIFICANT CHANGE UP (ref 80–100)
MONOCYTES # BLD AUTO: 0.9 K/UL — SIGNIFICANT CHANGE UP (ref 0–0.9)
MONOCYTES NFR BLD AUTO: 15.1 % — HIGH (ref 2–14)
NEUTROPHILS # BLD AUTO: 2.65 K/UL — SIGNIFICANT CHANGE UP (ref 1.8–7.4)
NEUTROPHILS NFR BLD AUTO: 44.3 % — SIGNIFICANT CHANGE UP (ref 43–77)
NITRITE UR-MCNC: NEGATIVE — SIGNIFICANT CHANGE UP
NRBC # FLD: 0 K/UL — LOW (ref 25–125)
PH UR: 6 — SIGNIFICANT CHANGE UP (ref 5–8)
PHOSPHATE SERPL-MCNC: 2.9 MG/DL — SIGNIFICANT CHANGE UP (ref 2.5–4.5)
PLATELET # BLD AUTO: 214 K/UL — SIGNIFICANT CHANGE UP (ref 150–400)
PMV BLD: 10.3 FL — SIGNIFICANT CHANGE UP (ref 7–13)
POTASSIUM SERPL-MCNC: 4.5 MMOL/L — SIGNIFICANT CHANGE UP (ref 3.5–5.3)
POTASSIUM SERPL-SCNC: 4.5 MMOL/L — SIGNIFICANT CHANGE UP (ref 3.5–5.3)
PROT SERPL-MCNC: 7.5 G/DL — SIGNIFICANT CHANGE UP (ref 6–8.3)
PROT UR-MCNC: 20 — SIGNIFICANT CHANGE UP
RBC # BLD: 4.46 M/UL — SIGNIFICANT CHANGE UP (ref 4.2–5.8)
RBC # FLD: 14.1 % — SIGNIFICANT CHANGE UP (ref 10.3–14.5)
RBC CASTS # UR COMP ASSIST: SIGNIFICANT CHANGE UP (ref 0–?)
SODIUM SERPL-SCNC: 142 MMOL/L — SIGNIFICANT CHANGE UP (ref 135–145)
SP GR SPEC: 1.02 — SIGNIFICANT CHANGE UP (ref 1–1.04)
SQUAMOUS # UR AUTO: SIGNIFICANT CHANGE UP
UROBILINOGEN FLD QL: HIGH
WBC # BLD: 5.97 K/UL — SIGNIFICANT CHANGE UP (ref 3.8–10.5)
WBC # FLD AUTO: 5.97 K/UL — SIGNIFICANT CHANGE UP (ref 3.8–10.5)
WBC UR QL: SIGNIFICANT CHANGE UP (ref 0–?)

## 2019-02-17 PROCEDURE — 90792 PSYCH DIAG EVAL W/MED SRVCS: CPT

## 2019-02-17 PROCEDURE — 99284 EMERGENCY DEPT VISIT MOD MDM: CPT | Mod: GC

## 2019-02-17 PROCEDURE — 71046 X-RAY EXAM CHEST 2 VIEWS: CPT | Mod: 26

## 2019-02-17 RX ORDER — OLANZAPINE 15 MG/1
1 TABLET, FILM COATED ORAL
Qty: 60 | Refills: 0 | OUTPATIENT
Start: 2019-02-17 | End: 2019-03-18

## 2019-02-17 RX ORDER — TRAZODONE HCL 50 MG
50 TABLET ORAL ONCE
Qty: 0 | Refills: 0 | Status: COMPLETED | OUTPATIENT
Start: 2019-02-17 | End: 2019-02-17

## 2019-02-17 RX ORDER — TRAZODONE HCL 50 MG
1 TABLET ORAL
Qty: 30 | Refills: 0 | OUTPATIENT
Start: 2019-02-17 | End: 2019-03-18

## 2019-02-17 RX ORDER — OLANZAPINE 15 MG/1
5 TABLET, FILM COATED ORAL ONCE
Qty: 0 | Refills: 0 | Status: COMPLETED | OUTPATIENT
Start: 2019-02-17 | End: 2019-02-17

## 2019-02-17 RX ADMIN — OLANZAPINE 5 MILLIGRAM(S): 15 TABLET, FILM COATED ORAL at 17:41

## 2019-02-17 RX ADMIN — Medication 50 MILLIGRAM(S): at 17:41

## 2019-02-17 NOTE — ED PROVIDER NOTE - ATTENDING CONTRIBUTION TO CARE
Pt was seen and evaluated by me. Pt is a 87 y/o male with PMHx of HTN and Dementia presenting to the ED for increased agitation. Pt was discharged on 2/8 for AMS and discharged. Pt was on Seroquel and changed to Zyprexa but PMD took pt off with insurance issues. Pt was placed on melatonin with decreased Trazadone. Daughter notes pt has had decreased sleeping and then increased agitation at night. Pt denies any headache, neck pain, back pain, fever, chills, nausea, vomiting, SOB, chest pain, or abd pain. Lungs CTA b/l. RRR. Abd soft, non-tender.

## 2019-02-17 NOTE — ED ADULT TRIAGE NOTE - CHIEF COMPLAINT QUOTE
pt amb to triage w/ family stating patient having anxiousness, inability to sleep, and "fussing" more than baseline, Hx dementia, recently switched from seroquel to haldol 2/8, calm/cooperative on presentation

## 2019-02-17 NOTE — ED PROVIDER NOTE - OBJECTIVE STATEMENT
86M PMH HTN now with orthostatic hypotension, dementia, subdural hematoma presents from home with agitation and trouble sleeping.  Pt was discharged from [facility] on 2/8 for AMS.  On discharge, seroquel was stopped, zyprexa was started with olazapine prn.  Per family, pt was at PMD, Dr. Prince Rolle and at this time, zyprexa was stopped, melatonin was stopped, and trazadone was decreased on 2/12.  Since then, pt has been increasingly agitated, waking up at night, wandering around.  Family states that this has become and increased issue and he presents to ED at this time for evaluation.

## 2019-02-17 NOTE — ED BEHAVIORAL HEALTH ASSESSMENT NOTE - MEDICATIONS (PRESCRIPTIONS, DIRECTIONS)
Provide script for olanzapine 5mg BID prn for insomnia/agitation (family instructed to give 5mg standing 1 hour before bedtime and to give 2.5mg PRN for agitation/insomnia), continue trazodone 50mg qHS

## 2019-02-17 NOTE — ED PROVIDER NOTE - PROGRESS NOTE DETAILS
Dr. Rodriguez: Attempted to call PMD. Dr. Prince Rolle but office closed and the emergency number given, [phone] was not in service. [place], PGY2:  reviewed psych note.  Will send scripts to pharmacy and give family medications for tonight.  Discussed plan with family and they expressed understanding.  Will follow up with psych in 1 month.

## 2019-02-17 NOTE — ED PROVIDER NOTE - CLINICAL SUMMARY MEDICAL DECISION MAKING FREE TEXT BOX
86M pmh dementia presenting with increased agitation after medication change, likely 2/2 to medication change, however will obtain cbc, cmp, cxr, ua and urine culture to ensure no occult infectious process. Dispo pending labs.

## 2019-02-17 NOTE — ED BEHAVIORAL HEALTH ASSESSMENT NOTE - DESCRIPTION
Pt has been calm and in good behavioral control.    Vital Signs (24 Hrs):  T(C): 36.9 (02-17-19 @ 14:28), Max: 36.9 (02-17-19 @ 14:28)  HR: 78 (02-17-19 @ 14:28) (53 - 78)  BP: 175/75 (02-17-19 @ 14:28) (168/78 - 175/75)  RR: 16 (02-17-19 @ 14:28) (16 - 16)  SpO2: 100% (02-17-19 @ 14:28) (100% - 100%)  Wt(kg): --  Daily     Daily     I&O's Summary see above lives with daughter

## 2019-02-17 NOTE — ED BEHAVIORAL HEALTH ASSESSMENT NOTE - OTHER PAST PSYCHIATRIC HISTORY (INCLUDE DETAILS REGARDING ONSET, COURSE OF ILLNESS, INPATIENT/OUTPATIENT TREATMENT)
saw Psych CL on last two admission for delirium  previously took Seroquel 25mg, which was changed to Zyprexa 5mg on last admission

## 2019-02-17 NOTE — ED PROVIDER NOTE - NSFOLLOWUPINSTRUCTIONS_ED_ALL_ED_FT
Please take olanzapine 5 mg nightly, 1 hour prior to bedtime.  For agitation or further insomnia, take 1/2 pill as needed of olanzapine.  Take trazodone 50 mg nightly for insomnia.  Please follow up with psychiatry as scheduled.

## 2019-02-17 NOTE — ED ADULT NURSE NOTE - NSIMPLEMENTINTERV_GEN_ALL_ED
Implemented All Universal Safety Interventions:  [place] to call system. Call bell, personal items and telephone within reach. Instruct patient to call for assistance. Room bathroom lighting operational. Non-slip footwear when patient is off stretcher. Physically safe environment: no spills, clutter or unnecessary equipment. Stretcher in lowest position, wheels locked, appropriate side rails in place.

## 2019-02-17 NOTE — ED BEHAVIORAL HEALTH ASSESSMENT NOTE - CASE SUMMARY
I personally evaluated Mr. Parisi in the [facility] ED today.  He is an 87 y/o male, domiciled in private residence w/ his daughter, with PPH significant for dementia and PMH significant for traumatic subdural hematoma s/p craniotomy HTN, HLD, orthostatic hypotension BIB his daughter  for insomnia and trouble filling Zyprexa and Trazodone scripts (Zyprexa was prescribed during a recent medical admission at [facility] for AMS).  It is somewhat unclear why but as per daughter the patient's insurance refused to cover the Zyprexa script thus it was not filled and the patient has not been taking it.  He continues to have insomnia and mild agitation but there are no acute safety concerns.  During eval in the ED he is calm, euthymic and cognitively impaired (as per his baseline).  He has an appointment with neurology scheduled for early April but daughter will call to attempt to have appointment moved to an earlier date, or in the alternative she will take him to the [facility] walk-in clinic.  Given that his presentation is his chronic baseline and there are no acute concerns for safety psychiatric admission is not warranted.  Patient will be discharged to daughter's care with Zyprexa script to be provided by ED MD, and she will f/u with neurologist and/or  [facility] walk in clinic.  She knows to call [phone] return to the ED if patient becomes unsafe for any reason.

## 2019-02-17 NOTE — ED BEHAVIORAL HEALTH ASSESSMENT NOTE - SUMMARY
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  Pt was recently discharged from [facility] after admission for AMS with script for olanzapine and trazodone, part of which family could not fill due to insurance.  He has had some insomnia and mild agitation since but no acute safety concerns.  He is currently euthymic without SI/HI, AVH, paranoia, though he is cognitively impaired due to demential.  He does not require psychiatric admission at this time and is psychiatrically cleared for discharge.  He should be provided a new script for olanzapine that will likely be covered by his insurance.

## 2019-02-17 NOTE — ED ADULT NURSE NOTE - OBJECTIVE STATEMENT
86yom a&ox2 amb brought to ED by daughter for decreased sleep and increased agitation since dc on 2/8. daughter reports pts seroquel was dc'ed after recent admission and ever since he came home he hasn't been sleeping, been up and walking around all night and has had increased confusion. pt appears well upon examination, is calm cooperative and predictable. daughter reports a&o status is baseline for pt. pt denies medical complaints. breathing even/unlabored. MD at bedside for eval. will continue to monitor.

## 2019-02-19 LAB
BACTERIA UR CULT: SIGNIFICANT CHANGE UP
SPECIMEN SOURCE: SIGNIFICANT CHANGE UP

## 2019-03-01 RX ORDER — LEVETIRACETAM 750 MG/1
750 TABLET, FILM COATED ORAL TWICE DAILY
Qty: 14 | Refills: 0 | Status: DISCONTINUED | COMMUNITY
Start: 2018-11-26 | End: 2019-03-01

## 2019-03-01 RX ORDER — QUETIAPINE FUMARATE 25 MG/1
25 TABLET ORAL TWICE DAILY
Qty: 60 | Refills: 0 | Status: DISCONTINUED | COMMUNITY
End: 2019-03-01

## 2019-03-04 ENCOUNTER — FORM ENCOUNTER (OUTPATIENT)
Age: 84
End: 2019-03-04

## 2019-03-04 NOTE — PATIENT PROFILE ADULT - FUNCTIONAL SCREEN CURRENT LEVEL: BATHING, MLM
Z Plasty Text: The lesion was extirpated to the level of the fat with a #15 scalpel blade.  Given the location of the defect, shape of the defect and the proximity to free margins a Z-plasty was deemed most appropriate for repair.  Using a sterile surgical marker, the appropriate transposition arms of the Z-plasty were drawn incorporating the defect and placing the expected incisions within the relaxed skin tension lines where possible.    The area thus outlined was incised deep to adipose tissue with a #15 scalpel blade.  The skin margins were undermined to an appropriate distance in all directions utilizing iris scissors.  The opposing transposition arms were then transposed into place in opposite direction and anchored with interrupted buried subcutaneous sutures. 2 = assistive person

## 2019-03-05 ENCOUNTER — APPOINTMENT (OUTPATIENT)
Dept: CT IMAGING | Facility: CLINIC | Age: 84
End: 2019-03-05
Payer: MEDICARE

## 2019-03-05 ENCOUNTER — INPATIENT (INPATIENT)
Facility: HOSPITAL | Age: 84
LOS: 2 days | Discharge: HOME HEALTH | End: 2019-03-08
Attending: INTERNAL MEDICINE | Admitting: INTERNAL MEDICINE
Payer: MEDICARE

## 2019-03-05 ENCOUNTER — OUTPATIENT (OUTPATIENT)
Dept: OUTPATIENT SERVICES | Facility: HOSPITAL | Age: 84
LOS: 1 days | End: 2019-03-05
Payer: MEDICARE

## 2019-03-05 ENCOUNTER — APPOINTMENT (OUTPATIENT)
Dept: NEUROLOGY | Facility: CLINIC | Age: 84
End: 2019-03-05
Payer: COMMERCIAL

## 2019-03-05 VITALS
HEIGHT: 71 IN | WEIGHT: 168 LBS | DIASTOLIC BLOOD PRESSURE: 52 MMHG | BODY MASS INDEX: 23.52 KG/M2 | HEART RATE: 82 BPM | SYSTOLIC BLOOD PRESSURE: 90 MMHG

## 2019-03-05 VITALS
HEART RATE: 80 BPM | RESPIRATION RATE: 20 BRPM | OXYGEN SATURATION: 99 % | DIASTOLIC BLOOD PRESSURE: 67 MMHG | SYSTOLIC BLOOD PRESSURE: 106 MMHG | TEMPERATURE: 98 F

## 2019-03-05 DIAGNOSIS — R55 SYNCOPE AND COLLAPSE: ICD-10-CM

## 2019-03-05 DIAGNOSIS — Z29.9 ENCOUNTER FOR PROPHYLACTIC MEASURES, UNSPECIFIED: ICD-10-CM

## 2019-03-05 DIAGNOSIS — F03.91 UNSPECIFIED DEMENTIA WITH BEHAVIORAL DISTURBANCE: ICD-10-CM

## 2019-03-05 DIAGNOSIS — Z98.890 OTHER SPECIFIED POSTPROCEDURAL STATES: Chronic | ICD-10-CM

## 2019-03-05 DIAGNOSIS — N40.0 BENIGN PROSTATIC HYPERPLASIA WITHOUT LOWER URINARY TRACT SYMPTOMS: ICD-10-CM

## 2019-03-05 DIAGNOSIS — I62.03 NONTRAUMATIC CHRONIC SUBDURAL HEMORRHAGE: ICD-10-CM

## 2019-03-05 DIAGNOSIS — I95.1 ORTHOSTATIC HYPOTENSION: ICD-10-CM

## 2019-03-05 DIAGNOSIS — E78.5 HYPERLIPIDEMIA, UNSPECIFIED: ICD-10-CM

## 2019-03-05 LAB
ALBUMIN SERPL ELPH-MCNC: 3.4 G/DL — SIGNIFICANT CHANGE UP (ref 3.3–5)
ALP SERPL-CCNC: 59 U/L — SIGNIFICANT CHANGE UP (ref 40–120)
ALT FLD-CCNC: 32 U/L — SIGNIFICANT CHANGE UP (ref 4–41)
ANION GAP SERPL CALC-SCNC: 12 MMO/L — SIGNIFICANT CHANGE UP (ref 7–14)
APPEARANCE UR: CLEAR — SIGNIFICANT CHANGE UP
APTT BLD: 24.3 SEC — LOW (ref 27.5–36.3)
AST SERPL-CCNC: 46 U/L — HIGH (ref 4–40)
BASOPHILS # BLD AUTO: 0.03 K/UL — SIGNIFICANT CHANGE UP (ref 0–0.2)
BASOPHILS NFR BLD AUTO: 0.5 % — SIGNIFICANT CHANGE UP (ref 0–2)
BILIRUB SERPL-MCNC: 0.4 MG/DL — SIGNIFICANT CHANGE UP (ref 0.2–1.2)
BILIRUB UR-MCNC: NEGATIVE — SIGNIFICANT CHANGE UP
BLOOD UR QL VISUAL: NEGATIVE — SIGNIFICANT CHANGE UP
BUN SERPL-MCNC: 15 MG/DL — SIGNIFICANT CHANGE UP (ref 7–23)
CALCIUM SERPL-MCNC: 8.7 MG/DL — SIGNIFICANT CHANGE UP (ref 8.4–10.5)
CHLORIDE SERPL-SCNC: 106 MMOL/L — SIGNIFICANT CHANGE UP (ref 98–107)
CO2 SERPL-SCNC: 22 MMOL/L — SIGNIFICANT CHANGE UP (ref 22–31)
COLOR SPEC: YELLOW — SIGNIFICANT CHANGE UP
CREAT SERPL-MCNC: 1.3 MG/DL — SIGNIFICANT CHANGE UP (ref 0.5–1.3)
EOSINOPHIL # BLD AUTO: 0.03 K/UL — SIGNIFICANT CHANGE UP (ref 0–0.5)
EOSINOPHIL NFR BLD AUTO: 0.5 % — SIGNIFICANT CHANGE UP (ref 0–6)
GLUCOSE SERPL-MCNC: 127 MG/DL — HIGH (ref 70–99)
GLUCOSE UR-MCNC: NEGATIVE — SIGNIFICANT CHANGE UP
HCT VFR BLD CALC: 39.5 % — SIGNIFICANT CHANGE UP (ref 39–50)
HGB BLD-MCNC: 12.6 G/DL — LOW (ref 13–17)
IMM GRANULOCYTES NFR BLD AUTO: 0.4 % — SIGNIFICANT CHANGE UP (ref 0–1.5)
KETONES UR-MCNC: NEGATIVE — SIGNIFICANT CHANGE UP
LEUKOCYTE ESTERASE UR-ACNC: NEGATIVE — SIGNIFICANT CHANGE UP
LYMPHOCYTES # BLD AUTO: 1.26 K/UL — SIGNIFICANT CHANGE UP (ref 1–3.3)
LYMPHOCYTES # BLD AUTO: 22.7 % — SIGNIFICANT CHANGE UP (ref 13–44)
MCHC RBC-ENTMCNC: 28 PG — SIGNIFICANT CHANGE UP (ref 27–34)
MCHC RBC-ENTMCNC: 31.9 % — LOW (ref 32–36)
MCV RBC AUTO: 87.8 FL — SIGNIFICANT CHANGE UP (ref 80–100)
MONOCYTES # BLD AUTO: 0.69 K/UL — SIGNIFICANT CHANGE UP (ref 0–0.9)
MONOCYTES NFR BLD AUTO: 12.4 % — SIGNIFICANT CHANGE UP (ref 2–14)
NEUTROPHILS # BLD AUTO: 3.53 K/UL — SIGNIFICANT CHANGE UP (ref 1.8–7.4)
NEUTROPHILS NFR BLD AUTO: 63.5 % — SIGNIFICANT CHANGE UP (ref 43–77)
NITRITE UR-MCNC: NEGATIVE — SIGNIFICANT CHANGE UP
NRBC # FLD: 0 K/UL — LOW (ref 25–125)
PH UR: 6 — SIGNIFICANT CHANGE UP (ref 5–8)
PLATELET # BLD AUTO: 149 K/UL — LOW (ref 150–400)
PMV BLD: 10.8 FL — SIGNIFICANT CHANGE UP (ref 7–13)
POTASSIUM SERPL-MCNC: 4.8 MMOL/L — SIGNIFICANT CHANGE UP (ref 3.5–5.3)
POTASSIUM SERPL-SCNC: 4.8 MMOL/L — SIGNIFICANT CHANGE UP (ref 3.5–5.3)
PROT SERPL-MCNC: 6.6 G/DL — SIGNIFICANT CHANGE UP (ref 6–8.3)
PROT UR-MCNC: 10 — SIGNIFICANT CHANGE UP
RBC # BLD: 4.5 M/UL — SIGNIFICANT CHANGE UP (ref 4.2–5.8)
RBC # FLD: 13.6 % — SIGNIFICANT CHANGE UP (ref 10.3–14.5)
SODIUM SERPL-SCNC: 140 MMOL/L — SIGNIFICANT CHANGE UP (ref 135–145)
SP GR SPEC: 1.02 — SIGNIFICANT CHANGE UP (ref 1–1.04)
TROPONIN T, HIGH SENSITIVITY: 25 NG/L — SIGNIFICANT CHANGE UP (ref ?–14)
TROPONIN T, HIGH SENSITIVITY: 28 NG/L — SIGNIFICANT CHANGE UP (ref ?–14)
TSH SERPL-MCNC: 1.23 UIU/ML — SIGNIFICANT CHANGE UP (ref 0.27–4.2)
UROBILINOGEN FLD QL: SIGNIFICANT CHANGE UP
WBC # BLD: 5.56 K/UL — SIGNIFICANT CHANGE UP (ref 3.8–10.5)
WBC # FLD AUTO: 5.56 K/UL — SIGNIFICANT CHANGE UP (ref 3.8–10.5)

## 2019-03-05 PROCEDURE — 70450 CT HEAD/BRAIN W/O DYE: CPT | Mod: 26,77

## 2019-03-05 PROCEDURE — 70450 CT HEAD/BRAIN W/O DYE: CPT

## 2019-03-05 PROCEDURE — 99214 OFFICE O/P EST MOD 30 MIN: CPT

## 2019-03-05 PROCEDURE — 99223 1ST HOSP IP/OBS HIGH 75: CPT

## 2019-03-05 PROCEDURE — 70450 CT HEAD/BRAIN W/O DYE: CPT | Mod: 26

## 2019-03-05 PROCEDURE — 71046 X-RAY EXAM CHEST 2 VIEWS: CPT | Mod: 26

## 2019-03-05 RX ORDER — SENNA PLUS 8.6 MG/1
2 TABLET ORAL AT BEDTIME
Qty: 0 | Refills: 0 | Status: DISCONTINUED | OUTPATIENT
Start: 2019-03-05 | End: 2019-03-08

## 2019-03-05 RX ORDER — OLANZAPINE 5 MG/1
5 TABLET, ORALLY DISINTEGRATING ORAL
Qty: 30 | Refills: 0 | Status: COMPLETED | COMMUNITY
Start: 2019-02-08

## 2019-03-05 RX ORDER — VITAMIN E 100 UNIT
400 CAPSULE ORAL DAILY
Qty: 0 | Refills: 0 | Status: DISCONTINUED | OUTPATIENT
Start: 2019-03-05 | End: 2019-03-08

## 2019-03-05 RX ORDER — ASCORBIC ACID 60 MG
1000 TABLET,CHEWABLE ORAL DAILY
Qty: 0 | Refills: 0 | Status: DISCONTINUED | OUTPATIENT
Start: 2019-03-05 | End: 2019-03-08

## 2019-03-05 RX ORDER — OLANZAPINE 15 MG/1
2.5 TABLET, FILM COATED ORAL ONCE
Qty: 0 | Refills: 0 | Status: COMPLETED | OUTPATIENT
Start: 2019-03-05 | End: 2019-03-06

## 2019-03-05 RX ORDER — ASPIRIN/CALCIUM CARB/MAGNESIUM 324 MG
81 TABLET ORAL DAILY
Qty: 0 | Refills: 0 | Status: DISCONTINUED | OUTPATIENT
Start: 2019-03-05 | End: 2019-03-08

## 2019-03-05 RX ORDER — POLYETHYLENE GLYCOL 3350 17 G/17G
17 POWDER, FOR SOLUTION ORAL
Qty: 0 | Refills: 0 | Status: DISCONTINUED | OUTPATIENT
Start: 2019-03-05 | End: 2019-03-08

## 2019-03-05 RX ORDER — MIDODRINE HYDROCHLORIDE 2.5 MG/1
10 TABLET ORAL THREE TIMES A DAY
Qty: 0 | Refills: 0 | Status: DISCONTINUED | OUTPATIENT
Start: 2019-03-05 | End: 2019-03-08

## 2019-03-05 RX ORDER — LANOLIN ALCOHOL/MO/W.PET/CERES
6 CREAM (GRAM) TOPICAL AT BEDTIME
Qty: 0 | Refills: 0 | Status: DISCONTINUED | OUTPATIENT
Start: 2019-03-05 | End: 2019-03-08

## 2019-03-05 RX ORDER — PREGABALIN 225 MG/1
1000 CAPSULE ORAL DAILY
Qty: 0 | Refills: 0 | Status: DISCONTINUED | OUTPATIENT
Start: 2019-03-05 | End: 2019-03-08

## 2019-03-05 RX ORDER — CHOLECALCIFEROL (VITAMIN D3) 125 MCG
400 CAPSULE ORAL DAILY
Qty: 0 | Refills: 0 | Status: DISCONTINUED | OUTPATIENT
Start: 2019-03-05 | End: 2019-03-08

## 2019-03-05 RX ORDER — OLANZAPINE 15 MG/1
5 TABLET, FILM COATED ORAL AT BEDTIME
Qty: 0 | Refills: 0 | Status: DISCONTINUED | OUTPATIENT
Start: 2019-03-05 | End: 2019-03-07

## 2019-03-05 RX ORDER — DOCUSATE SODIUM 100 MG
100 CAPSULE ORAL THREE TIMES A DAY
Qty: 0 | Refills: 0 | Status: DISCONTINUED | OUTPATIENT
Start: 2019-03-05 | End: 2019-03-08

## 2019-03-05 RX ORDER — ATORVASTATIN CALCIUM 80 MG/1
80 TABLET, FILM COATED ORAL AT BEDTIME
Qty: 0 | Refills: 0 | Status: DISCONTINUED | OUTPATIENT
Start: 2019-03-05 | End: 2019-03-08

## 2019-03-05 RX ORDER — GLYCERIN ADULT
1 SUPPOSITORY, RECTAL RECTAL AT BEDTIME
Qty: 0 | Refills: 0 | Status: DISCONTINUED | OUTPATIENT
Start: 2019-03-05 | End: 2019-03-08

## 2019-03-05 RX ORDER — MORPHINE SULFATE 50 MG/1
4 CAPSULE, EXTENDED RELEASE ORAL ONCE
Qty: 0 | Refills: 0 | Status: DISCONTINUED | OUTPATIENT
Start: 2019-03-05 | End: 2019-03-05

## 2019-03-05 RX ORDER — TAMSULOSIN HYDROCHLORIDE 0.4 MG/1
0.4 CAPSULE ORAL AT BEDTIME
Qty: 0 | Refills: 0 | Status: DISCONTINUED | OUTPATIENT
Start: 2019-03-05 | End: 2019-03-08

## 2019-03-05 RX ORDER — MIDODRINE HYDROCHLORIDE 5 MG/1
5 TABLET ORAL
Qty: 90 | Refills: 0 | Status: COMPLETED | COMMUNITY
Start: 2018-08-29

## 2019-03-05 RX ORDER — ATORVASTATIN CALCIUM 80 MG/1
80 TABLET, FILM COATED ORAL
Qty: 30 | Refills: 0 | Status: COMPLETED | COMMUNITY
Start: 2018-11-01

## 2019-03-05 RX ORDER — MIDODRINE HYDROCHLORIDE 2.5 MG/1
5 TABLET ORAL THREE TIMES A DAY
Qty: 0 | Refills: 0 | Status: DISCONTINUED | OUTPATIENT
Start: 2019-03-05 | End: 2019-03-05

## 2019-03-05 RX ORDER — TRAZODONE HCL 50 MG
50 TABLET ORAL
Qty: 0 | Refills: 0 | Status: DISCONTINUED | OUTPATIENT
Start: 2019-03-05 | End: 2019-03-06

## 2019-03-05 RX ORDER — OLANZAPINE 2.5 MG/1
2.5 TABLET, FILM COATED ORAL
Qty: 60 | Refills: 0 | Status: COMPLETED | COMMUNITY
Start: 2019-03-01

## 2019-03-05 RX ADMIN — Medication 100 MILLIGRAM(S): at 22:10

## 2019-03-05 RX ADMIN — ATORVASTATIN CALCIUM 80 MILLIGRAM(S): 80 TABLET, FILM COATED ORAL at 22:33

## 2019-03-05 RX ADMIN — Medication 6 MILLIGRAM(S): at 22:09

## 2019-03-05 RX ADMIN — OLANZAPINE 5 MILLIGRAM(S): 15 TABLET, FILM COATED ORAL at 23:28

## 2019-03-05 RX ADMIN — Medication 50 MILLIGRAM(S): at 18:47

## 2019-03-05 NOTE — H&P ADULT - NSHPSOURCEINFOTX_GEN_ALL_CORE
Pt is an unreliable historian given advanced dementia. Daughter at bedside for collateral information.

## 2019-03-05 NOTE — ED PROVIDER NOTE - CARE PLAN
Principal Discharge DX:	Syncope  Secondary Diagnosis:	Polypharmacy  Secondary Diagnosis:	Hypotension

## 2019-03-05 NOTE — ED ADULT NURSE NOTE - CHIEF COMPLAINT QUOTE
From Cardiology Harshad Hale [phone] for syncope, pt AOx2 cooperative, pt was seen by Neurology prior to his Cardiology a CT of head was done for F/U as per EMS pt was hypotensive 90/59 with TE=000   pt with R. AC 20g with approx. 200cc NS given by EMS  pt awake, denies any CP.  see MD note "orthostatic hypotension "

## 2019-03-05 NOTE — BEHAVIORAL HEALTH ASSESSMENT NOTE - NSBHCHARTREVIEWVS_PSY_A_CORE FT
Vital Signs Last 24 Hrs  T(C): 37.1 (05 Mar 2019 16:59), Max: 37.1 (05 Mar 2019 16:59)  T(F): 98.7 (05 Mar 2019 16:59), Max: 98.7 (05 Mar 2019 16:59)  HR: 84 (05 Mar 2019 16:59) (80 - 84)  BP: 140/81 (05 Mar 2019 16:59) (106/67 - 140/81)  BP(mean): --  RR: 16 (05 Mar 2019 16:59) (16 - 20)  SpO2: 98% (05 Mar 2019 16:59) (98% - 99%)

## 2019-03-05 NOTE — PHYSICAL EXAM
[General Appearance - Alert] : alert [General Appearance - In No Acute Distress] : in no acute distress [Impaired Insight] : insight and judgment were intact [Affect] : the affect was normal [Person] : oriented to person [Remote Intact] : remote memory intact [Registration Intact] : recent registration memory intact [Concentration Intact] : normal concentrating ability [Visual Intact] : visual attention was ~T not ~L decreased [Naming Objects] : no difficulty naming common objects [Repeating Phrases] : no difficulty repeating a phrase [Writing A Sentence] : no difficulty writing a sentence [Fluency] : fluency intact [Comprehension] : comprehension intact [Reading] : reading intact [Past History] : adequate knowledge of personal past history [Vocabulary] : adequate range of vocabulary [Total Score ___ / 30] : the patient achieved a score of [unfilled] /30 [Date / Time ___ / 5] : date / time [unfilled] / 5 [Place ___ / 5] : place [unfilled] / 5 [Registration ___ / 3] : registration [unfilled] / 3 [Serial Sevens ___/5] : serial sevens [unfilled] / 5 [Naming 2 Objects ___ / 2] : naming two objects [unfilled] / 2 [Repeating a Sentence ___ / 1] : repeating a sentence [unfilled] / 1 [Writing a Sentence ___ / 1] : write sentence [unfilled] / 1 [3-stage Verbal Command ___ / 3] : three-stage verbal command [unfilled] / 3 [Written Command ___ / 1] : written command [unfilled] / 1 [Copy a Design ___ / 1] : copy a design [unfilled] / 1 [Recall ___ / 3] : recall [unfilled] / 3 [Cranial Nerves Optic (II)] : visual acuity intact bilaterally,  visual fields full to confrontation, pupils equal round and reactive to light [Cranial Nerves Oculomotor (III)] : extraocular motion intact [Cranial Nerves Trigeminal (V)] : facial sensation intact symmetrically [Cranial Nerves Facial (VII)] : face symmetrical [Cranial Nerves Vestibulocochlear (VIII)] : hearing was intact bilaterally [Cranial Nerves Glossopharyngeal (IX)] : tongue and palate midline [Cranial Nerves Accessory (XI - Cranial And Spinal)] : head turning and shoulder shrug symmetric [Cranial Nerves Hypoglossal (XII)] : there was no tongue deviation with protrusion [Motor Strength] : muscle strength was normal in all four extremities [Involuntary Movements] : no involuntary movements were seen [No Muscle Atrophy] : normal bulk in all four extremities [Motor Handedness Right-Handed] : the patient is right hand dominant [Sensation Tactile Decrease] : light touch was intact [Sensation Pain / Temperature Decrease] : pain and temperature was intact [Sensation Vibration Decrease] : vibration was intact [Proprioception] : proprioception was intact [Balance] : balance was intact [1+] : Ankle jerk right 1+ [2+] : Ankle jerk left 2+ [Sclera] : the sclera and conjunctiva were normal [PERRL With Normal Accommodation] : pupils were equal in size, round, reactive to light, with normal accommodation [Extraocular Movements] : extraocular movements were intact [Optic Disc Abnormality] : the optic disc were normal in size and color [No APD] : no afferent pupillary defect [No DANIELLE] : no internuclear ophthalmoplegia [Full Visual Field] : full visual field [Outer Ear] : the ears and nose were normal in appearance [Oropharynx] : the oropharynx was normal [Neck Appearance] : the appearance of the neck was normal [Neck Cervical Mass (___cm)] : no neck mass was observed [Jugular Venous Distention Increased] : there was no jugular-venous distention [Thyroid Diffuse Enlargement] : the thyroid was not enlarged [Thyroid Nodule] : there were no palpable thyroid nodules [Auscultation Breath Sounds / Voice Sounds] : lungs were clear to auscultation bilaterally [Heart Sounds] : normal S1 and S2 [Heart Sounds Gallop] : no gallops [Murmurs] : no murmurs [Heart Sounds Pericardial Friction Rub] : no pericardial rub [Arterial Pulses Carotid] : carotid pulses were normal with no bruits [Full Pulse] : the pedal pulses are present [Edema] : there was no peripheral edema [Bowel Sounds] : normal bowel sounds [Abdomen Soft] : soft [Abdomen Tenderness] : non-tender [Abdomen Mass (___ Cm)] : no abdominal mass palpated [No CVA Tenderness] : no ~M costovertebral angle tenderness [No Spinal Tenderness] : no spinal tenderness [Abnormal Walk] : normal gait [Nail Clubbing] : no clubbing  or cyanosis of the fingernails [Musculoskeletal - Swelling] : no joint swelling seen [Motor Tone] : muscle strength and tone were normal [Skin Color & Pigmentation] : normal skin color and pigmentation [Skin Turgor] : normal skin turgor [] : no rash [Place] : disoriented to place [Time] : disoriented to time [Short Term Intact] : short term memory impaired [Span Intact] : the attention span was decreased [Current Events] : inadequate knowledge of current events [Motor Strength Upper Extremities Bilaterally] : strength was normal in both upper extremities [Motor Strength Lower Extremities Bilaterally] : strength was normal in both lower extremities [Romberg's Sign] : Romberg's sign was negtive [Dysesthesia] : no dysesthesia [Hyperesthesia] : no hyperesthesia [Limited Balance] : balance was intact [Past-pointing] : there was no past-pointing [Tremor] : no tremor present [Dysdiadochokinesia Bilaterally] : not present [Coordination - Dysmetria Impaired Finger-to-Nose Bilateral] : not present [Coordination - Dysmetria Impaired Heel-to-Shin Bilateral] : not present [Plantar Reflex Right Only] : normal on the right [Plantar Reflex Left Only] : normal on the left [FreeTextEntry4] : Mental Status Exam \par Presidents: 1\par Alternating Pattern: copies, but has poor planning\par Spiral: poor planning, draws circles, excentric\par Clock: 1/3-numbers are counterclockwise, no hands; can read clocks, needs time, some uncertainty\par Repetition: ok\par R/L discrimination on self and examiner: ok, difficulty with Xline determination\par Cross-line commands: difficulty with Xline command.\par Praxis:\par -Motor: ok, poor planning\par -Dynamic/Luria: forgets pattern, no perseveration\par -Ideomotor/Imitation: poor\par -Ideational/writing/closing-in: poor\par -Dressing: ok. [FreeTextEntry5] : + glabella [FreeTextEntry8] : gait is bit slower than previously, pt tends to lean to the L side, with reduced swing on L. Not dystonic. [FreeTextEntry9] : ? x-adductors [FreeTextEntry1] : irregular HR

## 2019-03-05 NOTE — H&P ADULT - PROBLEM SELECTOR PLAN 1
Admit to telemetry for monitoring  EKG NSR with no ischemic changes  Delta trop negative  CT head negative  Recent echo normal  Check orthostatics per routine  Fall precautions  Cardiology and psych consult for concern for polypharmacy  PT eval ordered  F/U MD note

## 2019-03-05 NOTE — BEHAVIORAL HEALTH ASSESSMENT NOTE - OTHER
superficially cooperative utilization behaviors no cogwheel rigidity, tremors, mild finger nose dysmetria though may be 2/2 poor ability to follow commands did not assess no SI/HI, mildly impoverished

## 2019-03-05 NOTE — BEHAVIORAL HEALTH ASSESSMENT NOTE - SUMMARY
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 Psych c/s for management of meds for agitation given recent hypotension.     Patient has profound dementia, currently in behavioral control. Daughters reluctant to try new psych agent currently though amenable to manipulating doses of current agents, will consider depakote or mirtazapine if today's dose adjustments fail.    Most meds that would be useful for agitation control for this patient would increase risk of hypotension/orthostatic hypotension, including antipsychotics sedative-type antidepressants, and in rare occasion some anticonvulsants. Reviewed matters with daughters.     - if hypotension is a concern may consider tapering off zyprexa 5mg day 1 -> 2.5mg day 2 -> STOP  - would increase trazodone to 75mg po q6pm  - Begin melatonin 6mg q6pm   - if agitated zyprexa 2.5mg q6h prn   - repeat ECG, if qtc>500 stop antipsychotics

## 2019-03-05 NOTE — H&P ADULT - NEGATIVE CARDIOVASCULAR SYMPTOMS
no orthopnea/no paroxysmal nocturnal dyspnea/no claudication/no palpitations/no dyspnea on exertion/no chest pain/no peripheral edema

## 2019-03-05 NOTE — H&P ADULT - PROBLEM SELECTOR PLAN 3
Pt calm and cooperative with no need for 1:1 at this time  Psych consult called for management of psych medications as syncope may be secondary to antipsychotics

## 2019-03-05 NOTE — H&P ADULT - ASSESSMENT
87 y/o male with a PMHx of traumatic ICH S/P craniectomy, orthostatics hypotension on Midodrine, HLD, BPH and dementia with behavioral disturbances presents to ED with an unresponsive episode at Dr. Hael's office.
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
## 2019-03-05 NOTE — H&P ADULT - PMH
BPH (benign prostatic hyperplasia)    Dementia with behavioral disturbance    HLD (hyperlipidemia)    Orthostatic hypotension    Traumatic intracerebral hemorrhage
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
## 2019-03-05 NOTE — H&P ADULT - RS GEN PE MLT RESP DETAILS PC
breath sounds equal/respirations non-labored/no chest wall tenderness/no intercostal retractions/airway patent/clear to auscultation bilaterally/no rales/good air movement/no rhonchi/no wheezes

## 2019-03-05 NOTE — REASON FOR VISIT
[Follow-Up: _____] : a [unfilled] follow-up visit [Family Member] : family member [FreeTextEntry1] : Cognitive decline, hypotension

## 2019-03-05 NOTE — ED PROVIDER NOTE - CLINICAL SUMMARY MEDICAL DECISION MAKING FREE TEXT BOX
Pt presenting with AMS, hypotension, h/o known hypotension on midodrine, recently had medication change (quietepine d/c and started on trazadone and olanzapine), aggressive behavior past several days; DDX: neuro, polypharmacy, infectious, metabolic. Plan: EKG, CT head non-con, CXC, labs, urine, to be admitted

## 2019-03-05 NOTE — BEHAVIORAL HEALTH ASSESSMENT NOTE - NSBHCHARTREVIEWIMAGING_PSY_A_CORE FT
< from: CT Head No Cont (03.05.19 @ 14:27) >    IMPRESSION:    Stable exam.    No mass effect, hemorrhage or evidence of acute intracranial pathology.      < end of copied text >    < from: MR Head No Cont (10.27.18 @ 17:08) >      Redemonstrated bilateral anterior parietal maxi holes. No subdural   hematomas visualized.    Punctate subacute lacunar infarction involving the right posterior   external capsule/basal ganglia junction.    Multiple extensive patchy confluent nonspecific abnormal white matter   foci of T2/FLAIR prolongation statistically favoring microvascular   disease.     < end of copied text >

## 2019-03-05 NOTE — ED ADULT TRIAGE NOTE - CHIEF COMPLAINT QUOTE
From Cardiology Harshad Hale [phone] for syncope, pt AOx2 cooperative, pt was seen by Neurology prior to his Cardiology a CT of head was done for F/U as per EMS pt was hypotensive 90/59 with XJ=368   pt with R. AC 20g with approx. 200cc NS given by EMS  pt awake, denies any CP.  see MD note "orthostatic hypotension "

## 2019-03-05 NOTE — ED ADULT NURSE NOTE - OBJECTIVE STATEMENT
Pt AxOx2- oriented to place and person, presenting to room 1 with c/o syncopal episode in cardiologist's office. Pt's daughter at bedside states pt has a hx of fall last year, had a subdural hematoma that was operated on. PMH of irregular heartbeat- pt's daughter states HR was medically controlled. Pt's daughter states pt was acting aggressive and violent at home towards her, was seen by neurologist for MRI of head for possible new brain bleed. Pt was then brought to cardiologist as well, cardiologist sent pt to ED after pt had syncopal episode- pt's daughter stated pt was unresponsive, drooling and could not move extremities during this time, pt was positive for orthostatics at cardiologist's office. Pt currently awake and oriented, able to follow commands, has equal strength in all extremities. Pupils are pinpoint and unreactive. Skin dry and intact without wounds present. IV established by EMS with 20g, flushing well with good blood return.

## 2019-03-05 NOTE — H&P ADULT - NEGATIVE GASTROINTESTINAL SYMPTOMS
no diarrhea/no constipation/no abdominal pain/no melena/no hematochezia/no change in bowel habits/no vomiting/no flatulence/no nausea

## 2019-03-05 NOTE — ED PROVIDER NOTE - OBJECTIVE STATEMENT
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 Per daughter, patient recently hospitalized about one month ago due to hypotension and vomiting, at that hospitalization was d/c'd off of quetiapine (on for behavioral reasons 2/2 dementia) and started on olanzapine and Trazadone. Today, pt had a neurologist appointment today that his daughter made for him due to the recent aggressive behavior and there the neurologist noted he was having difficulty walking. Pt had head imaging done there (daughter thinks CT but also said could be MRI) and then went to his cardiologist appointment from there. In the cardiologists office, BP 90's systolic despite taking his usual midodrine dose this morning and pt had a syncopal episode in a chair at the cardiologist's office lasting about 15 minutes. Daughter states patient was slumped and unresponsive with tongue hanging out.     Neuro: Jonatan Bearden   Card: Dr. Harshad Hale  PMD: Greg Rlole

## 2019-03-05 NOTE — BEHAVIORAL HEALTH ASSESSMENT NOTE - OTHER PAST PSYCHIATRIC HISTORY (INCLUDE DETAILS REGARDING ONSET, COURSE OF ILLNESS, INPATIENT/OUTPATIENT TREATMENT)
saw Psych CL on last admission for delirium  has been taking Seroquel 25mg HS, though daughter Roxanne said she stopped it and would only give it to him as needed.

## 2019-03-05 NOTE — ASSESSMENT
[FreeTextEntry1] : Assessment: \par 85yo RH AAM, with vascular risk factors, SDH in 1/2018, s/p maxi holes, never seizure and negative EEGs, weaned off Keppra in 2/2018, persistent cognitive decline, now not independent. Recent R lacunar stroke at [facility], restarted on Keppra for ? reason. More recet admissions to [facility] for hypotension and bradycardia, ? if related to Seroquel. Off Keppra and Seroquel, on Zyprexa and Trazodone.\par Mildly leaning to L side today.\par \par Impression: \par Vascular or mixed pathology was underlying and preceding the SDH, given pervasive deficits on MS exam, and SDH might have triggered the presentation.\par \par Plan:\par -Cont with ASA, Statin\par -Cont midodrine per cardiology-will see Cardio today-need to figure out hypotension, today again BP was low, as it will reduce cerebral perfusion and contribute to confusion\par -Melatonin up to 5mg\par -Zyprexa: gradually increase from 25mg bid to 5mg bid\par -Trazodone: 50mg QHS for now, will consider further increase if he does not sleep.\par \par A thorough discussion was entertained with the patient/caregiver regarding the use of psychoactive medications, their possible benefits and AE profile, including the risk of cardiovascular complications.\par We discussed the benefits of being active, physically and mentally, and the need to to establish a routine in this respect.\par Driving abilities and firearms possession and use were discussed, in relation to progression of the cognitive decline, and the need to assess them periodically.\par Patient/caregiver understand and agree with the plan.

## 2019-03-05 NOTE — ED PROVIDER NOTE - ATTENDING CONTRIBUTION TO CARE
Dr. Koroma: 85 yo male with BPH, HTN, HLD, ICH approx 1 year ago, dementia, orthostatic hypotension on midodrine, in ED after episode of weakness while at cardiologist's office.  Pt's daughter at bedside states that this morning pt awoke and was generally weak.  BP at that time was approx 90 systolic.  He took his midodrine and went to have a routine MRI of the brain.  After MRI he went to his cardiologist Dr. Hale's office and while there had an episode in which he became very week generally and slumped over in a chair.  No fall or head injury.  Pt quickly returned to baseline.  In ED pt is at his baseline (AOx1-2) and without acute complaints himself.  On exam pt chronically-ill appearing but in NAD, heart RRR, lungs CTAB, abd NTND, extremities without swelling, strength 5/5 in all extremities proximally and distally and skin without rash.  EOMI/PERRLA.  AOx1.  +orthostatics in ED Dr. Koroma: 85 yo male with BPH, HTN, HLD, ICH approx 1 year ago, dementia, orthostatic hypotension on midodrine, in ED after episode of weakness while at cardiologist's office.  Pt's daughter at bedside states that this morning pt awoke and was generally weak.  BP at that time was approx 90 systolic.  He took his midodrine and went to have a routine MRI of the brain.  After MRI he went to his cardiologist Dr. Hale's office and while there had an episode in which he became very week generally and slumped over in a chair.  No fall or head injury.  Pt quickly returned to baseline.  In ED pt is at his baseline (AOx1-2) and without acute complaints himself.  On exam pt chronically-ill appearing but in NAD, heart RRR, lungs CTAB, abd NTND, extremities without swelling, strength 5/5 in all extremities proximally and distally and skin without rash.  EOMI/PERRLA.  AOx1.  negative orthostatics in ED

## 2019-03-05 NOTE — HISTORY OF PRESENT ILLNESS
[FreeTextEntry1] : HPI-Interval hx [id number]:\par Patient here with his daughter. He has been more agitated recently, also in relation to being in the hospital for hypotension.\par Recent admission to [facility], Seroquel changed to Zyprexa 2.5mg BID and Trazodone 50mg qhs for sleep.\par Still not sleeping well, not even napping during the day.\par On Midodrine 5mg TID. Sleeps with 2 pillows.\par Rest is stable, daughter has not noticed any HA, nor physical changes. \par \par HPI-Interval hx [id number]:\par Pt was recently in the hospital, with confusion, and MRI brain showed R IC lacunar stroke.\par No seizures.\par Also, he was given Ativel for the MRI, and became delirious, remitted.\par Unfortunately, he was dc with Keppra 750mg BID again, with no evidence of seizures, and now has mood swings and irritability, possibly from the Rx. \par Insomnia: tends to go to bed late, and tends to wake up multiple times a night for BPH.\par Appetite is ok.\par \par PMH:\par 84yo RH AAM, HTN, HLD, recent SDH in 1/2018 s/p a fall, s/p NSX, here for evaluation of cognitive changes s/p SDH.\par \par Until December 2017, he was perfectly normal, per family, living in [facility], monitored remotely by [facility] with cameras, completely independent.\par On 12/22/2017 he had an accidental fall ([facility]), and procured a SDH, which was addressed only a month later, when he was taken to [facility] and after taking aspirin a few days prior. in the meantime, he was taken to hospitals in [facility] and to [facility], where apparently CT head did not show any intracranial bleed. His clinical status was gradually getting worse, with more confusion, and inability to walk.\par \par After maxi holes, jan 2018, he improved, but remains confused, disoriented, and has memory lapses.\par He was put on Keppra for sx prevention, dc due to intense agitation.\par He was also prescribed Seroquel for agitation, but not tolerated it.\par EEG in 3/2018 showed slowing. Pt's daughter said Seroquel gave him a seizure.\par CTh showed progressive resolution of the SDH, till 8/21.\par Last CTH shows significant WM vascular changes and significant atrophy.\par MRI not done, pt was fighting, possible claustrophobia.\par \par Sleep: interrupted by urination, but goes back to sleep.\par \par Appetite: no issues, stable. \par \par ADL: full.\par IADL: fully dependent.\par CDR: 1.5\par \par Psychiatric symptoms: none.\par \par PMD and other physicians:\par Dr. Prince Ahuja MD\par Neurologist in [place], [place]\par Address: [address] [place] [address]\par Phone: [phone]\par \par of note, recent EKG from [facility] showed possible AFib.

## 2019-03-05 NOTE — H&P ADULT - PROBLEM SELECTOR PLAN 2
Orthostatics here negative however given unresponsive episode, will increase Midodrine to 10mg TID  Fall precautions  Cards consult with Dr. Hale called

## 2019-03-05 NOTE — BEHAVIORAL HEALTH ASSESSMENT NOTE - NSBHCHARTREVIEWLAB_PSY_A_CORE FT
03-05    140  |  106  |  15  ----------------------------<  127<H>  4.8   |  22  |  1.30    Ca    8.7      05 Mar 2019 12:40    TPro  6.6  /  Alb  3.4  /  TBili  0.4  /  DBili  x   /  AST  46<H>  /  ALT  32  /  AlkPhos  59  03-05                          12.6   5.56  )-----------( 149      ( 05 Mar 2019 12:40 )             39.5

## 2019-03-05 NOTE — H&P ADULT - NEGATIVE NEUROLOGICAL SYMPTOMS
no transient paralysis/no hemiparesis/no weakness/no paresthesias/no facial palsy/no tremors/no vertigo/no headache/no loss of sensation/no difficulty walking

## 2019-03-05 NOTE — H&P ADULT - NSHPLABSRESULTS_GEN_ALL_CORE
November 2018, Echo: EF 62%. Normal left ventricular internal dimensions and wall thicknesses. Normal left ventricular systolic function. No segmental wall motion abnormalities. Grossly normal right ventricular systolic function.  ----------  EKG: NSR at 79 bpm with 1st degree AV block with sinus arrhythmia  CE x1: Trop 28  H/H: 12.6/39.5  Platelets: 149  Glucose: 127  UA: Negative

## 2019-03-05 NOTE — H&P ADULT - NEGATIVE OPHTHALMOLOGIC SYMPTOMS
no diplopia/no pain L/no loss of vision R/no blurred vision L/no pain R/no loss of vision L/no blurred vision R/no photophobia

## 2019-03-05 NOTE — DATA REVIEWED
[de-identified] : 3/21/2108-EEG Summary / Classification:\par Abnormal EEG in the awake, drowsy states.\par -Mild to moderate generalized slowing.\par \par EEG Impression / Clinical Correlate:\par Abnormal EEG in the awake, drowsy states.\par 1. Mild to moderate nonspecific diffuse or multifocal cerebral dysfunction.\par 2. There were no epileptiform abnormalities recorded. \par  [de-identified] : Imaging reviewed on PACS.

## 2019-03-06 DIAGNOSIS — R41.0 DISORIENTATION, UNSPECIFIED: ICD-10-CM

## 2019-03-06 PROBLEM — I10 ESSENTIAL (PRIMARY) HYPERTENSION: Chronic | Status: INACTIVE | Noted: 2018-01-21 | Resolved: 2019-03-05

## 2019-03-06 PROBLEM — E78.5 HYPERLIPIDEMIA, UNSPECIFIED: Chronic | Status: INACTIVE | Noted: 2018-01-21 | Resolved: 2019-03-05

## 2019-03-06 PROBLEM — N40.1 BENIGN PROSTATIC HYPERPLASIA WITH LOWER URINARY TRACT SYMPTOMS: Chronic | Status: INACTIVE | Noted: 2018-01-21 | Resolved: 2019-03-05

## 2019-03-06 LAB
ANION GAP SERPL CALC-SCNC: 17 MMO/L — HIGH (ref 7–14)
BUN SERPL-MCNC: 16 MG/DL — SIGNIFICANT CHANGE UP (ref 7–23)
CALCIUM SERPL-MCNC: 9.3 MG/DL — SIGNIFICANT CHANGE UP (ref 8.4–10.5)
CHLORIDE SERPL-SCNC: 104 MMOL/L — SIGNIFICANT CHANGE UP (ref 98–107)
CHOLEST SERPL-MCNC: 85 MG/DL — LOW (ref 120–199)
CO2 SERPL-SCNC: 21 MMOL/L — LOW (ref 22–31)
CREAT SERPL-MCNC: 1.21 MG/DL — SIGNIFICANT CHANGE UP (ref 0.5–1.3)
GLUCOSE SERPL-MCNC: 128 MG/DL — HIGH (ref 70–99)
HBA1C BLD-MCNC: 5.4 % — SIGNIFICANT CHANGE UP (ref 4–5.6)
HCT VFR BLD CALC: 37.8 % — LOW (ref 39–50)
HDLC SERPL-MCNC: 33 MG/DL — LOW (ref 35–55)
HGB BLD-MCNC: 12.1 G/DL — LOW (ref 13–17)
LIPID PNL WITH DIRECT LDL SERPL: 56 MG/DL — SIGNIFICANT CHANGE UP
MAGNESIUM SERPL-MCNC: 1.9 MG/DL — SIGNIFICANT CHANGE UP (ref 1.6–2.6)
MCHC RBC-ENTMCNC: 27.9 PG — SIGNIFICANT CHANGE UP (ref 27–34)
MCHC RBC-ENTMCNC: 32 % — SIGNIFICANT CHANGE UP (ref 32–36)
MCV RBC AUTO: 87.3 FL — SIGNIFICANT CHANGE UP (ref 80–100)
NRBC # FLD: 0 K/UL — LOW (ref 25–125)
PLATELET # BLD AUTO: 174 K/UL — SIGNIFICANT CHANGE UP (ref 150–400)
PMV BLD: 11.4 FL — SIGNIFICANT CHANGE UP (ref 7–13)
POTASSIUM SERPL-MCNC: 3.8 MMOL/L — SIGNIFICANT CHANGE UP (ref 3.5–5.3)
POTASSIUM SERPL-SCNC: 3.8 MMOL/L — SIGNIFICANT CHANGE UP (ref 3.5–5.3)
RBC # BLD: 4.33 M/UL — SIGNIFICANT CHANGE UP (ref 4.2–5.8)
RBC # FLD: 13.4 % — SIGNIFICANT CHANGE UP (ref 10.3–14.5)
SODIUM SERPL-SCNC: 142 MMOL/L — SIGNIFICANT CHANGE UP (ref 135–145)
SPECIMEN SOURCE: SIGNIFICANT CHANGE UP
TRIGL SERPL-MCNC: 67 MG/DL — SIGNIFICANT CHANGE UP (ref 10–149)
TSH SERPL-MCNC: 2.59 UIU/ML — SIGNIFICANT CHANGE UP (ref 0.27–4.2)
WBC # BLD: 8.69 K/UL — SIGNIFICANT CHANGE UP (ref 3.8–10.5)
WBC # FLD AUTO: 8.69 K/UL — SIGNIFICANT CHANGE UP (ref 3.8–10.5)

## 2019-03-06 PROCEDURE — 99233 SBSQ HOSP IP/OBS HIGH 50: CPT

## 2019-03-06 RX ORDER — OLANZAPINE 15 MG/1
2.5 TABLET, FILM COATED ORAL EVERY 6 HOURS
Qty: 0 | Refills: 0 | Status: DISCONTINUED | OUTPATIENT
Start: 2019-03-06 | End: 2019-03-07

## 2019-03-06 RX ORDER — OLANZAPINE 15 MG/1
2.5 TABLET, FILM COATED ORAL ONCE
Qty: 0 | Refills: 0 | Status: COMPLETED | OUTPATIENT
Start: 2019-03-06 | End: 2019-03-06

## 2019-03-06 RX ORDER — TRAZODONE HCL 50 MG
75 TABLET ORAL
Qty: 0 | Refills: 0 | Status: DISCONTINUED | OUTPATIENT
Start: 2019-03-06 | End: 2019-03-06

## 2019-03-06 RX ORDER — TRAZODONE HYDROCHLORIDE 50 MG/1
50 TABLET ORAL TWICE DAILY
Qty: 60 | Refills: 0 | Status: DISCONTINUED | COMMUNITY
Start: 2019-03-01 | End: 2019-03-06

## 2019-03-06 RX ADMIN — OLANZAPINE 2.5 MILLIGRAM(S): 15 TABLET, FILM COATED ORAL at 11:08

## 2019-03-06 RX ADMIN — OLANZAPINE 2.5 MILLIGRAM(S): 15 TABLET, FILM COATED ORAL at 08:18

## 2019-03-06 RX ADMIN — Medication 1000 MILLIGRAM(S): at 12:19

## 2019-03-06 RX ADMIN — Medication 400 UNIT(S): at 14:10

## 2019-03-06 RX ADMIN — Medication 400 INTERNATIONAL UNIT(S): at 14:09

## 2019-03-06 RX ADMIN — OLANZAPINE 2.5 MILLIGRAM(S): 15 TABLET, FILM COATED ORAL at 01:04

## 2019-03-06 RX ADMIN — MIDODRINE HYDROCHLORIDE 10 MILLIGRAM(S): 2.5 TABLET ORAL at 21:21

## 2019-03-06 RX ADMIN — TAMSULOSIN HYDROCHLORIDE 0.4 MILLIGRAM(S): 0.4 CAPSULE ORAL at 21:21

## 2019-03-06 RX ADMIN — MIDODRINE HYDROCHLORIDE 10 MILLIGRAM(S): 2.5 TABLET ORAL at 12:20

## 2019-03-06 RX ADMIN — SENNA PLUS 2 TABLET(S): 8.6 TABLET ORAL at 21:21

## 2019-03-06 RX ADMIN — ATORVASTATIN CALCIUM 80 MILLIGRAM(S): 80 TABLET, FILM COATED ORAL at 21:21

## 2019-03-06 RX ADMIN — Medication 6 MILLIGRAM(S): at 21:21

## 2019-03-06 RX ADMIN — OLANZAPINE 2.5 MILLIGRAM(S): 15 TABLET, FILM COATED ORAL at 14:11

## 2019-03-06 RX ADMIN — PREGABALIN 1000 MICROGRAM(S): 225 CAPSULE ORAL at 12:19

## 2019-03-06 RX ADMIN — Medication 81 MILLIGRAM(S): at 12:19

## 2019-03-06 RX ADMIN — Medication 1 TABLET(S): at 12:19

## 2019-03-06 NOTE — PHYSICAL THERAPY INITIAL EVALUATION ADULT - PATIENT PROFILE REVIEW, REHAB EVAL
yes/PT orders received: ambulate with assistance. Consult with RN Tia FIGUEROA, pt may participate in PT evaluation.

## 2019-03-06 NOTE — CONSULT NOTE ADULT - ASSESSMENT
orthostatic hypotension   autonomic dysfunction   Dysautonomia    increase midodrine to 10  taper zyprexa off  would benefit from being off trazodone if possible  discussed with psych

## 2019-03-06 NOTE — PHYSICAL THERAPY INITIAL EVALUATION ADULT - PREDICTED DURATION OF THERAPY (DAYS/WKS), PT EVAL
Patient performing at baseline level and ambulating independently-->will not be placed on PT program. Patient not appropriate for skilled PT secondary to cognitive status.

## 2019-03-06 NOTE — PHYSICAL THERAPY INITIAL EVALUATION ADULT - GENERAL OBSERVATIONS, REHAB EVAL
Patient was received ambulating in room in [facility].
<<-----Click here for Discharge Medication Review

## 2019-03-06 NOTE — CONSULT NOTE ADULT - SUBJECTIVE AND OBJECTIVE BOX
CHIEF COMPLAINT:Patient is a 86y old  Male who presents with a chief complaint of Syncope (06 Mar 2019 11:16)      HISTORY OF PRESENT ILLNESS:  86 male with history as below in my office yesterday while sitting had episode of syncope with low BP   admitted for eval  Due to altered mental status, subjective information were not able to be obtained from the patient. History was obtained, to the extent possible, from review of the chart and collateral sources of information.      PAST MEDICAL & SURGICAL HISTORY:  Dementia with behavioral disturbance  Traumatic intracerebral hemorrhage  Orthostatic hypotension  BPH (benign prostatic hyperplasia)  HLD (hyperlipidemia)  Status post craniectomy          MEDICATIONS:  aspirin enteric coated 81 milliGRAM(s) Oral daily  midodrine 10 milliGRAM(s) Oral three times a day  tamsulosin 0.4 milliGRAM(s) Oral at bedtime        melatonin 6 milliGRAM(s) Oral at bedtime  OLANZapine 2.5 milliGRAM(s) Oral every 6 hours PRN  OLANZapine 5 milliGRAM(s) Oral at bedtime PRN    docusate sodium 100 milliGRAM(s) Oral three times a day PRN  glycerin Suppository - Adult 1 Suppository(s) Rectal at bedtime PRN  polyethylene glycol 3350 17 Gram(s) Oral two times a day PRN  senna 2 Tablet(s) Oral at bedtime    atorvastatin 80 milliGRAM(s) Oral at bedtime    ascorbic acid 1000 milliGRAM(s) Oral daily  cholecalciferol 400 Unit(s) Oral daily  cyanocobalamin 1000 MICROGram(s) Oral daily  multivitamin 1 Tablet(s) Oral daily  vitamin E 400 International Unit(s) Oral daily      FAMILY HISTORY:      Non-contributory    SOCIAL HISTORY:    No tobacco, drugs or etoh    Allergies    No Known Allergies    Intolerances    	    REVIEW OF SYSTEMS:  as above  The rest of the 14 points ROS reviewed and except above they are unremarkable.        PHYSICAL EXAM:  T(C): 36.8 (03-06-19 @ 15:14), Max: 37.1 (03-05-19 @ 16:59)  HR: 90 (03-06-19 @ 15:14) (70 - 90)  BP: 152/90 (03-06-19 @ 15:14) (140/81 - 175/79)  RR: 18 (03-06-19 @ 15:14) (16 - 19)  SpO2: 100% (03-06-19 @ 15:14) (97% - 100%)  Wt(kg): --  I&O's Summary      JVP: Normal  Neck: supple  Lung: clear   CV: S1 S2 , Murmur:  Abd: soft  Ext: No edema  neuro: Awake / alert  Psych: flat affect  Skin: normal      LABS/DATA:    TELEMETRY: 	    ECG:  	   	  CARDIAC MARKERS:                                      12.1   8.69  )-----------( 174      ( 06 Mar 2019 07:40 )             37.8     03-06    142  |  104  |  16  ----------------------------<  128<H>  3.8   |  21<L>  |  1.21    Ca    9.3      06 Mar 2019 07:40  Mg     1.9     03-06    TPro  6.6  /  Alb  3.4  /  TBili  0.4  /  DBili  x   /  AST  46<H>  /  ALT  32  /  AlkPhos  59  03-05    proBNP:   Lipid Profile:   HgA1c: Hemoglobin A1C, Whole Blood: 5.4 % (03-06 @ 07:40)    TSH: Thyroid Stimulating Hormone, Serum: 2.59 uIU/mL (03-06 @ 07:40)

## 2019-03-06 NOTE — PHYSICAL THERAPY INITIAL EVALUATION ADULT - PERTINENT HX OF CURRENT PROBLEM, REHAB EVAL
Patient is an 86 year old male admitted to [facility] on 3/5 s/p unresponsive episode. PMH includes: traumatic ICH S/P craniectomy, orthostatics hypotension on Midodrine, HLD, BPH and dementia with behavioral disturbances. CT Head: No mass effect, hemorrhage or evidence of acute intracranial pathology.

## 2019-03-06 NOTE — PROGRESS NOTE BEHAVIORAL HEALTH - NSBHCHARTREVIEWLAB_PSY_A_CORE FT
03-06    142  |  104  |  16  ----------------------------<  128<H>  3.8   |  21<L>  |  1.21    Ca    9.3      06 Mar 2019 07:40  Mg     1.9     03-06    TPro  6.6  /  Alb  3.4  /  TBili  0.4  /  DBili  x   /  AST  46<H>  /  ALT  32  /  AlkPhos  59  03-05                        12.1   8.69  )-----------( 174      ( 06 Mar 2019 07:40 )             37.8

## 2019-03-06 NOTE — CHART NOTE - NSCHARTNOTEFT_GEN_A_CORE
Patient was restless and walking around aimlessly.  Zyprexa 2.5 mg P given x1.. continue to monitor.  Aviva SALEH

## 2019-03-06 NOTE — PHYSICAL THERAPY INITIAL EVALUATION ADULT - ADDITIONAL COMMENTS
Patient is a poor historian, all information was obtained through chart review. Patient lives with his daughters whom are his care takers. Patient ambulated independently prior to admission.    Patient was left sitting at the edge of the bed, 1:1 present, all lines/tubes intact and call forrester within reach, BETH palacio

## 2019-03-06 NOTE — PROGRESS NOTE BEHAVIORAL HEALTH - NSBHFUPINTERVALHXFT_PSY_A_CORE
patient agitated, errantly wandering all day, combative, had received PRN zyprexa before eval  on eval patient somnolent, unable to stay awake for eval

## 2019-03-06 NOTE — PROGRESS NOTE BEHAVIORAL HEALTH - NSBHCHARTREVIEWVS_PSY_A_CORE FT
Vital Signs Last 24 Hrs  T(C): 36.8 (06 Mar 2019 15:14), Max: 37.1 (05 Mar 2019 16:59)  T(F): 98.2 (06 Mar 2019 15:14), Max: 98.7 (05 Mar 2019 16:59)  HR: 90 (06 Mar 2019 15:14) (70 - 90)  BP: 152/90 (06 Mar 2019 15:14) (140/81 - 175/79)  BP(mean): --  RR: 18 (06 Mar 2019 15:14) (16 - 19)  SpO2: 100% (06 Mar 2019 15:14) (97% - 100%)

## 2019-03-06 NOTE — PROGRESS NOTE BEHAVIORAL HEALTH - SUMMARY
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 Psych c/s for management of meds for agitation given recent hypotension.     Patient has profound dementia, intermittently agitated.     3/6:Most meds that would be useful for agitation control for this patient would increase risk of hypotension/orthostatic hypotension, including antipsychotics sedative-type antidepressants, and in rare occasion some anticonvulsants. Reviewed matters with daughters.     3/7: Per discussion with cardio will attempt to avoid antipsychotics due to hypotension risk including zyprexa, trazodone still has risk though will attempt to go up so as to avoid antipsychotics. prn ativan discussed to see if this would control behaviors without hypotension, though risk of falls and worsening delirium would be omnipresent.     - increase trazodone to 25mg qAM + 75mg qHS  - Begin melatonin 6mg q6pm   - ativan 0.5mg q6h prn agitation, avoid if possible given risk of worsening delirium   - repeat ECG, if qtc>500 stop antipsychotics

## 2019-03-06 NOTE — CHART NOTE - NSCHARTNOTEFT_GEN_A_CORE
Was notified by nurse that the patient was agitated, refusing to stay in bed and was walking around the floor despite effort to redirect the patient. The pt has history of dementia and delirium and was seen by Behavioral Health earlier today. When I went to see the pt he was sitting in a chair at the nurses station, and seemed confused. I was able to redirect him and bring him to his bed, however he insisted on "not going to bed" and was preoccupied with "having to pay my bills."  The pt was moved to an enhanced care room, Behavioral Health was called who recommended the pt get 2.5mg of Zyprexa. Will continue to monitor.

## 2019-03-06 NOTE — PHYSICAL THERAPY INITIAL EVALUATION ADULT - MANUAL MUSCLE TESTING RESULTS, REHAB EVAL
grossly assessed due to/bilateral UEs & LEs grossly 3+/5 throughout based on observed functional movement

## 2019-03-07 LAB
ANION GAP SERPL CALC-SCNC: 12 MMO/L — SIGNIFICANT CHANGE UP (ref 7–14)
BACTERIA UR CULT: SIGNIFICANT CHANGE UP
BUN SERPL-MCNC: 16 MG/DL — SIGNIFICANT CHANGE UP (ref 7–23)
CALCIUM SERPL-MCNC: 9.3 MG/DL — SIGNIFICANT CHANGE UP (ref 8.4–10.5)
CHLORIDE SERPL-SCNC: 107 MMOL/L — SIGNIFICANT CHANGE UP (ref 98–107)
CO2 SERPL-SCNC: 25 MMOL/L — SIGNIFICANT CHANGE UP (ref 22–31)
CREAT SERPL-MCNC: 1.24 MG/DL — SIGNIFICANT CHANGE UP (ref 0.5–1.3)
GLUCOSE SERPL-MCNC: 100 MG/DL — HIGH (ref 70–99)
HCT VFR BLD CALC: 36.7 % — LOW (ref 39–50)
HGB BLD-MCNC: 11.9 G/DL — LOW (ref 13–17)
MAGNESIUM SERPL-MCNC: 2 MG/DL — SIGNIFICANT CHANGE UP (ref 1.6–2.6)
MCHC RBC-ENTMCNC: 27.8 PG — SIGNIFICANT CHANGE UP (ref 27–34)
MCHC RBC-ENTMCNC: 32.4 % — SIGNIFICANT CHANGE UP (ref 32–36)
MCV RBC AUTO: 85.7 FL — SIGNIFICANT CHANGE UP (ref 80–100)
NRBC # FLD: 0 K/UL — LOW (ref 25–125)
PLATELET # BLD AUTO: 158 K/UL — SIGNIFICANT CHANGE UP (ref 150–400)
PMV BLD: 11.5 FL — SIGNIFICANT CHANGE UP (ref 7–13)
POTASSIUM SERPL-MCNC: 4.1 MMOL/L — SIGNIFICANT CHANGE UP (ref 3.5–5.3)
POTASSIUM SERPL-SCNC: 4.1 MMOL/L — SIGNIFICANT CHANGE UP (ref 3.5–5.3)
RBC # BLD: 4.28 M/UL — SIGNIFICANT CHANGE UP (ref 4.2–5.8)
RBC # FLD: 13.5 % — SIGNIFICANT CHANGE UP (ref 10.3–14.5)
SODIUM SERPL-SCNC: 144 MMOL/L — SIGNIFICANT CHANGE UP (ref 135–145)
WBC # BLD: 5.17 K/UL — SIGNIFICANT CHANGE UP (ref 3.8–10.5)
WBC # FLD AUTO: 5.17 K/UL — SIGNIFICANT CHANGE UP (ref 3.8–10.5)

## 2019-03-07 RX ORDER — TRAZODONE HCL 50 MG
75 TABLET ORAL AT BEDTIME
Qty: 0 | Refills: 0 | Status: DISCONTINUED | OUTPATIENT
Start: 2019-03-07 | End: 2019-03-08

## 2019-03-07 RX ORDER — TRAZODONE HCL 50 MG
25 TABLET ORAL DAILY
Qty: 0 | Refills: 0 | Status: DISCONTINUED | OUTPATIENT
Start: 2019-03-07 | End: 2019-03-08

## 2019-03-07 RX ADMIN — Medication 75 MILLIGRAM(S): at 21:06

## 2019-03-07 RX ADMIN — PREGABALIN 1000 MICROGRAM(S): 225 CAPSULE ORAL at 12:30

## 2019-03-07 RX ADMIN — Medication 1000 MILLIGRAM(S): at 12:30

## 2019-03-07 RX ADMIN — Medication 0.5 MILLIGRAM(S): at 15:26

## 2019-03-07 RX ADMIN — MIDODRINE HYDROCHLORIDE 10 MILLIGRAM(S): 2.5 TABLET ORAL at 21:06

## 2019-03-07 RX ADMIN — Medication 25 MILLIGRAM(S): at 12:31

## 2019-03-07 RX ADMIN — TAMSULOSIN HYDROCHLORIDE 0.4 MILLIGRAM(S): 0.4 CAPSULE ORAL at 21:05

## 2019-03-07 RX ADMIN — SENNA PLUS 2 TABLET(S): 8.6 TABLET ORAL at 21:07

## 2019-03-07 RX ADMIN — Medication 400 INTERNATIONAL UNIT(S): at 12:29

## 2019-03-07 RX ADMIN — Medication 0.5 MILLIGRAM(S): at 12:27

## 2019-03-07 RX ADMIN — MIDODRINE HYDROCHLORIDE 10 MILLIGRAM(S): 2.5 TABLET ORAL at 05:20

## 2019-03-07 RX ADMIN — Medication 400 UNIT(S): at 12:30

## 2019-03-07 RX ADMIN — Medication 1 TABLET(S): at 12:29

## 2019-03-07 RX ADMIN — Medication 0.5 MILLIGRAM(S): at 23:55

## 2019-03-07 RX ADMIN — ATORVASTATIN CALCIUM 80 MILLIGRAM(S): 80 TABLET, FILM COATED ORAL at 21:05

## 2019-03-07 RX ADMIN — MIDODRINE HYDROCHLORIDE 10 MILLIGRAM(S): 2.5 TABLET ORAL at 15:26

## 2019-03-07 RX ADMIN — Medication 6 MILLIGRAM(S): at 21:05

## 2019-03-07 RX ADMIN — Medication 81 MILLIGRAM(S): at 12:30

## 2019-03-07 NOTE — CHART NOTE - NSCHARTNOTEFT_GEN_A_CORE
Pt still having some episodes of agitation. Pt was wondering outside his room and refused to stay in bed. Pt has no complaints at this time. Spoke to pt who does not want to cooperate. Gave extra dose of Ativan .5mg po. Will continue to follow. No further complaints at this time. Await further psych recs.

## 2019-03-08 ENCOUNTER — TRANSCRIPTION ENCOUNTER (OUTPATIENT)
Age: 84
End: 2019-03-08

## 2019-03-08 VITALS
DIASTOLIC BLOOD PRESSURE: 82 MMHG | HEART RATE: 70 BPM | SYSTOLIC BLOOD PRESSURE: 146 MMHG | OXYGEN SATURATION: 100 % | TEMPERATURE: 98 F | RESPIRATION RATE: 18 BRPM

## 2019-03-08 LAB
ANION GAP SERPL CALC-SCNC: 12 MMO/L — SIGNIFICANT CHANGE UP (ref 7–14)
BUN SERPL-MCNC: 19 MG/DL — SIGNIFICANT CHANGE UP (ref 7–23)
CALCIUM SERPL-MCNC: 9.7 MG/DL — SIGNIFICANT CHANGE UP (ref 8.4–10.5)
CHLORIDE SERPL-SCNC: 108 MMOL/L — HIGH (ref 98–107)
CO2 SERPL-SCNC: 24 MMOL/L — SIGNIFICANT CHANGE UP (ref 22–31)
CREAT SERPL-MCNC: 1.3 MG/DL — SIGNIFICANT CHANGE UP (ref 0.5–1.3)
GLUCOSE SERPL-MCNC: 95 MG/DL — SIGNIFICANT CHANGE UP (ref 70–99)
HCT VFR BLD CALC: 38.3 % — LOW (ref 39–50)
HGB BLD-MCNC: 12 G/DL — LOW (ref 13–17)
MAGNESIUM SERPL-MCNC: 2 MG/DL — SIGNIFICANT CHANGE UP (ref 1.6–2.6)
MCHC RBC-ENTMCNC: 27.8 PG — SIGNIFICANT CHANGE UP (ref 27–34)
MCHC RBC-ENTMCNC: 31.3 % — LOW (ref 32–36)
MCV RBC AUTO: 88.7 FL — SIGNIFICANT CHANGE UP (ref 80–100)
NRBC # FLD: 0 K/UL — LOW (ref 25–125)
PLATELET # BLD AUTO: 151 K/UL — SIGNIFICANT CHANGE UP (ref 150–400)
PMV BLD: 11.3 FL — SIGNIFICANT CHANGE UP (ref 7–13)
POTASSIUM SERPL-MCNC: 4.3 MMOL/L — SIGNIFICANT CHANGE UP (ref 3.5–5.3)
POTASSIUM SERPL-SCNC: 4.3 MMOL/L — SIGNIFICANT CHANGE UP (ref 3.5–5.3)
RBC # BLD: 4.32 M/UL — SIGNIFICANT CHANGE UP (ref 4.2–5.8)
RBC # FLD: 13.6 % — SIGNIFICANT CHANGE UP (ref 10.3–14.5)
SODIUM SERPL-SCNC: 144 MMOL/L — SIGNIFICANT CHANGE UP (ref 135–145)
WBC # BLD: 5.59 K/UL — SIGNIFICANT CHANGE UP (ref 3.8–10.5)
WBC # FLD AUTO: 5.59 K/UL — SIGNIFICANT CHANGE UP (ref 3.8–10.5)

## 2019-03-08 RX ORDER — TRAZODONE HCL 50 MG
0.5 TABLET ORAL
Qty: 15 | Refills: 0
Start: 2019-03-08 | End: 2019-04-06

## 2019-03-08 RX ORDER — TRAZODONE HCL 50 MG
1.5 TABLET ORAL
Qty: 45 | Refills: 0
Start: 2019-03-08 | End: 2019-04-06

## 2019-03-08 RX ORDER — MIDODRINE HYDROCHLORIDE 2.5 MG/1
1 TABLET ORAL
Qty: 90 | Refills: 0
Start: 2019-03-08 | End: 2019-04-06

## 2019-03-08 RX ADMIN — Medication 400 UNIT(S): at 11:30

## 2019-03-08 RX ADMIN — PREGABALIN 1000 MICROGRAM(S): 225 CAPSULE ORAL at 11:30

## 2019-03-08 RX ADMIN — MIDODRINE HYDROCHLORIDE 10 MILLIGRAM(S): 2.5 TABLET ORAL at 05:50

## 2019-03-08 RX ADMIN — Medication 400 INTERNATIONAL UNIT(S): at 11:29

## 2019-03-08 RX ADMIN — Medication 1 TABLET(S): at 11:30

## 2019-03-08 RX ADMIN — Medication 1000 MILLIGRAM(S): at 11:30

## 2019-03-08 RX ADMIN — Medication 81 MILLIGRAM(S): at 11:30

## 2019-03-08 RX ADMIN — Medication 25 MILLIGRAM(S): at 11:30

## 2019-03-08 NOTE — DISCHARGE NOTE PROVIDER - PROVIDER TOKENS
PROVIDER:[TOKEN:[[id number]],FOLLOWUP:[2 weeks]],FREE:[LAST:[Dr Prince Rolle],PHONE:[(   )    -],FAX:[(   )    -],ADDRESS:[PCP],FOLLOWUP:[2 weeks]],FREE:[LAST:[deborah Albarran],PHONE:[phone]],FAX:[(   )    -],FOLLOWUP:[2 weeks]]

## 2019-03-08 NOTE — PROGRESS NOTE ADULT - ASSESSMENT
87 y/o male with a PMHx of traumatic ICH S/P craniectomy, orthostatics hypotension on Midodrine, HLD, BPH and dementia with behavioral disturbances presents to ED with an unresponsive episode at Dr. Hale's office.      ·  Problem: Syncope.    med related?  CT head negative  Recent echo normal  Fall precautions  psych eval noted  zyprexa stopped  trazadone taper if possible   PT         ·  Problem: Orthostatic hypotension.   cont midodrine /inc as per cards   Fall precautions        ·  Problem: Dementia with behavioral disturbance.  psych f/u noted  ativan prn         ·  Problem: HLD (hyperlipidemia).  Plan: Continue Lipitor  Check FLP  DASH diet.       ·  Problem: BPH (benign prostatic hyperplasia).    Continue Flomax  Fall precautions with Flomax.       d/c planning
87 y/o male with a PMHx of traumatic ICH S/P craniectomy, orthostatics hypotension on Midodrine, HLD, BPH and dementia with behavioral disturbances presents to ED with an unresponsive episode at Dr. Hale's office.      ·  Problem: Syncope.    med related?  CT head negative  Recent echo normal  Fall precautions  psych eval noted  PT eval         ·  Problem: Orthostatic hypotension.   cont midodrine   Fall precautions  Cards consult with Dr. Hale     ·  Problem: Dementia with behavioral disturbance.  : Pt calm and cooperative now        ·  Problem: HLD (hyperlipidemia).  Plan: Continue Lipitor  Check FLP  DASH diet.       ·  Problem: BPH (benign prostatic hyperplasia).    Continue Flomax  Fall precautions with Flomax.       Problem: Need for prophylactic measure. Plan: Antiembolism stockings ordered  No pharm DVT prophylaxis given history of ICH.
87 y/o male with a PMHx of traumatic ICH S/P craniectomy, orthostatics hypotension on Midodrine, HLD, BPH and dementia with behavioral disturbances presents to ED with an unresponsive episode at Dr. Hale's office.      ·  Problem: Syncope.    med related?  CT head negative  Recent echo normal  Fall precautions  psych eval noted  zyprexa stopped  PT eval         ·  Problem: Orthostatic hypotension.   cont midodrine /inc as per cards   Fall precautions        ·  Problem: Dementia with behavioral disturbance.  psych f/u noted  ativan prn         ·  Problem: HLD (hyperlipidemia).  Plan: Continue Lipitor  Check FLP  DASH diet.       ·  Problem: BPH (benign prostatic hyperplasia).    Continue Flomax  Fall precautions with Flomax.       Problem: Need for prophylactic measure. Plan: Antiembolism stockings   No pharm DVT prophylaxis given history of ICH.
orthostatic hypotension   autonomic dysfunction   Dysautonomia    cont midodrine  off zyprexa
orthostatic hypotension   autonomic dysfunction   Dysautonomia    cont midodrine  taper zyprexa off  would benefit from being off trazodone if possible  discussed with psych   DC planning

## 2019-03-08 NOTE — PROVIDER CONTACT NOTE (OTHER) - ASSESSMENT
Arranged [facility] [phone]. Trip# [id number]. P/U 7.30. Taxi was arranged by CM earlier, never came and pt has Dementia not safe to take Taxi. Arranged [facility] [phone]. Trip# [id number]. P/U 7.30. Notified pt's dtr Barbara [phone]. Taxi was arranged by CM earlier, never came and pt has Dementia not safe to take Taxi.

## 2019-03-08 NOTE — PROGRESS NOTE ADULT - SUBJECTIVE AND OBJECTIVE BOX
CHIEF COMPLAINT:Patient is a 86y old  Male who presents with a chief complaint of Syncope (07 Mar 2019 17:56)    	        PAST MEDICAL & SURGICAL HISTORY:  Dementia with behavioral disturbance  Traumatic intracerebral hemorrhage  Orthostatic hypotension  BPH (benign prostatic hyperplasia)  HLD (hyperlipidemia)  Status post craniectomy          REVIEW OF SYSTEMS:  CONSTITUTIONAL: No fever, weight loss, or fatigue  EYES: No eye pain, visual disturbances, or discharge  NECK: No pain or stiffness  RESPIRATORY: No cough, wheezing, chills or hemoptysis; No Shortness of Breath  CARDIOVASCULAR: No chest pain, palpitations, passing out, dizziness, or leg swelling  GASTROINTESTINAL: No abdominal or epigastric pain. No nausea, vomiting, or hematemesis; No diarrhea or constipation. No melena or hematochezia.  GENITOURINARY: No dysuria, frequency, hematuria, or incontinence  NEUROLOGICAL: No headaches,  confused at time s    Medications:  MEDICATIONS  (STANDING):  ascorbic acid 1000 milliGRAM(s) Oral daily  aspirin enteric coated 81 milliGRAM(s) Oral daily  atorvastatin 80 milliGRAM(s) Oral at bedtime  cholecalciferol 400 Unit(s) Oral daily  cyanocobalamin 1000 MICROGram(s) Oral daily  melatonin 6 milliGRAM(s) Oral at bedtime  midodrine 10 milliGRAM(s) Oral three times a day  multivitamin 1 Tablet(s) Oral daily  senna 2 Tablet(s) Oral at bedtime  tamsulosin 0.4 milliGRAM(s) Oral at bedtime  traZODone 75 milliGRAM(s) Oral at bedtime  traZODone 25 milliGRAM(s) Oral daily  vitamin E 400 International Unit(s) Oral daily    MEDICATIONS  (PRN):  docusate sodium 100 milliGRAM(s) Oral three times a day PRN Constipation  glycerin Suppository - Adult 1 Suppository(s) Rectal at bedtime PRN Constipation  LORazepam     Tablet 0.5 milliGRAM(s) Oral every 6 hours PRN Agitation  polyethylene glycol 3350 17 Gram(s) Oral two times a day PRN Constipation    	    PHYSICAL EXAM:  T(C): 36.8 (03-08-19 @ 05:50), Max: 36.8 (03-08-19 @ 05:50)  HR: 83 (03-08-19 @ 05:50) (64 - 83)  BP: 140/79 (03-08-19 @ 05:50) (140/79 - 149/83)  RR: 18 (03-08-19 @ 05:50) (18 - 18)  SpO2: 100% (03-08-19 @ 05:50) (100% - 100%)  Wt(kg): --  I&O's Summary      Appearance: Normal	  HEENT:   Normal oral mucosa, PERRL, EOMI	  Lymphatic: No lymphadenopathy  Cardiovascular: Normal S1 S2, No JVD, No murmurs, No edema  Respiratory: Lungs clear to auscultation	  Psychiatry: A & O x 3, Mood & affect appropriate  Gastrointestinal:  Soft, Non-tender, + BS	  Skin: No rashes, No ecchymoses, No cyanosis	  Neurologic: Non-focal  Extremities: Normal range of motion, No clubbing, cyanosis or edema  Vascular: Peripheral pulses palpable 2+ bilaterally    TELEMETRY: 	    ECG:  	  RADIOLOGY:  OTHER: 	  	  LABS:	 	    CARDIAC MARKERS:                                12.0   5.59  )-----------( 151      ( 08 Mar 2019 06:53 )             38.3     03-08    144  |  108<H>  |  19  ----------------------------<  95  4.3   |  24  |  1.30    Ca    9.7      08 Mar 2019 06:53  Mg     2.0     03-08      proBNP:   Lipid Profile:   HgA1c:   TSH:
CHIEF COMPLAINT:Patient is a 86y old  Male who presents with a chief complaint of Syncope (05 Mar 2019 17:02)    	        PAST MEDICAL & SURGICAL HISTORY:  Dementia with behavioral disturbance  Traumatic intracerebral hemorrhage  Orthostatic hypotension  BPH (benign prostatic hyperplasia)  HLD (hyperlipidemia)  Status post craniectomy          REVIEW OF SYSTEMS:  no cp or sob  no abd pain   no chills  no n/v  no h/a s     Medications:  MEDICATIONS  (STANDING):  ascorbic acid 1000 milliGRAM(s) Oral daily  aspirin enteric coated 81 milliGRAM(s) Oral daily  atorvastatin 80 milliGRAM(s) Oral at bedtime  cholecalciferol 400 Unit(s) Oral daily  cyanocobalamin 1000 MICROGram(s) Oral daily  melatonin 6 milliGRAM(s) Oral at bedtime  midodrine 10 milliGRAM(s) Oral three times a day  multivitamin 1 Tablet(s) Oral daily  senna 2 Tablet(s) Oral at bedtime  tamsulosin 0.4 milliGRAM(s) Oral at bedtime  traZODone 75 milliGRAM(s) Oral <User Schedule>  vitamin E 400 International Unit(s) Oral daily    MEDICATIONS  (PRN):  docusate sodium 100 milliGRAM(s) Oral three times a day PRN Constipation  glycerin Suppository - Adult 1 Suppository(s) Rectal at bedtime PRN Constipation  OLANZapine 2.5 milliGRAM(s) Oral every 6 hours PRN agitation  OLANZapine 5 milliGRAM(s) Oral at bedtime PRN Insomnia  polyethylene glycol 3350 17 Gram(s) Oral two times a day PRN Constipation    	    PHYSICAL EXAM:  T(C): 36.4 (03-06-19 @ 04:59), Max: 37.1 (03-05-19 @ 16:59)  HR: 80 (03-06-19 @ 04:59) (70 - 84)  BP: 175/79 (03-06-19 @ 04:59) (106/67 - 175/79)  RR: 17 (03-06-19 @ 04:59) (16 - 20)  SpO2: 98% (03-06-19 @ 04:59) (97% - 99%)  Wt(kg): --  I&O's Summary      Appearance: Normal	  HEENT:   Normal oral mucosa, PERRL, EOMI	  Lymphatic: No lymphadenopathy  Cardiovascular: Normal S1 S2, No JVD, No murmurs, No edema  Respiratory: Lungs clear to auscultation	  awake / calm / some confusion   Gastrointestinal:  Soft, Non-tender, + BS	  Skin: No rashes, No ecchymoses, No cyanosis	  Neurologic: Non-focal  Extremities: Normal range of motion, No clubbing, cyanosis or edema  Vascular: Peripheral pulses palpable 2+ bilaterally    TELEMETRY: 	    ECG:  	  RADIOLOGY:  OTHER: 	  	  LABS:	 	    CARDIAC MARKERS:                                12.1   8.69  )-----------( 174      ( 06 Mar 2019 07:40 )             37.8     03-06    142  |  104  |  16  ----------------------------<  128<H>  3.8   |  21<L>  |  1.21    Ca    9.3      06 Mar 2019 07:40  Mg     1.9     03-06    TPro  6.6  /  Alb  3.4  /  TBili  0.4  /  DBili  x   /  AST  46<H>  /  ALT  32  /  AlkPhos  59  03-05    proBNP:   Lipid Profile:   HgA1c: Hemoglobin A1C, Whole Blood: 5.4 % (03-06 @ 07:40)    TSH: Thyroid Stimulating Hormone, Serum: 2.59 uIU/mL (03-06 @ 07:40)  Thyroid Stimulating Hormone, Serum: 1.23 uIU/mL (03-05 @ 13:24)
CHIEF COMPLAINT:Patient is a 86y old  Male who presents with a chief complaint of Syncope (06 Mar 2019 16:36)    	        PAST MEDICAL & SURGICAL HISTORY:  Dementia with behavioral disturbance  Traumatic intracerebral hemorrhage  Orthostatic hypotension  BPH (benign prostatic hyperplasia)  HLD (hyperlipidemia)  Status post craniectomy          REVIEW OF SYSTEMS:  sleeping  arousable  no cp or sob  no abd pain  confused at times    no fever or chills    Medications:  MEDICATIONS  (STANDING):  ascorbic acid 1000 milliGRAM(s) Oral daily  aspirin enteric coated 81 milliGRAM(s) Oral daily  atorvastatin 80 milliGRAM(s) Oral at bedtime  cholecalciferol 400 Unit(s) Oral daily  cyanocobalamin 1000 MICROGram(s) Oral daily  melatonin 6 milliGRAM(s) Oral at bedtime  midodrine 10 milliGRAM(s) Oral three times a day  multivitamin 1 Tablet(s) Oral daily  senna 2 Tablet(s) Oral at bedtime  tamsulosin 0.4 milliGRAM(s) Oral at bedtime  traZODone 75 milliGRAM(s) Oral at bedtime  traZODone 25 milliGRAM(s) Oral daily  vitamin E 400 International Unit(s) Oral daily    MEDICATIONS  (PRN):  docusate sodium 100 milliGRAM(s) Oral three times a day PRN Constipation  glycerin Suppository - Adult 1 Suppository(s) Rectal at bedtime PRN Constipation  LORazepam     Tablet 0.5 milliGRAM(s) Oral every 6 hours PRN Agitation  polyethylene glycol 3350 17 Gram(s) Oral two times a day PRN Constipation    	    PHYSICAL EXAM:  T(C): 36.9 (03-07-19 @ 05:02), Max: 37.2 (03-06-19 @ 21:15)  HR: 85 (03-07-19 @ 05:02) (85 - 86)  BP: 157/95 (03-07-19 @ 05:02) (157/95 - 158/85)  RR: 18 (03-07-19 @ 05:02) (18 - 18)  SpO2: 100% (03-07-19 @ 05:02) (100% - 100%)  Wt(kg): --  I&O's Summary      Appearance: Normal	  HEENT:   Normal oral mucosa, PERRL, EOMI	  Lymphatic: No lymphadenopathy  Cardiovascular: Normal S1 S2, No JVD, No murmurs, No edema  Respiratory: Lungs clear to auscultation	  Gastrointestinal:  Soft, Non-tender, + BS	  Skin: No rashes, No ecchymoses, No cyanosis	  Neurologic: Non-focal  Extremities: Normal range of motion, No clubbing, cyanosis or edema  Vascular: Peripheral pulses palpable 2+ bilaterally    TELEMETRY: 	    ECG:  	  RADIOLOGY:  OTHER: 	  	  LABS:	 	    CARDIAC MARKERS:                                11.9   5.17  )-----------( 158      ( 07 Mar 2019 05:18 )             36.7     03-07    144  |  107  |  16  ----------------------------<  100<H>  4.1   |  25  |  1.24    Ca    9.3      07 Mar 2019 07:18  Mg     2.0     03-07      proBNP:   Lipid Profile:   HgA1c:   TSH:
Subjective: Patient seen and examined. No new events except as noted.     SUBJECTIVE/ROS:  No chest pain, dyspnea, palpitation, or dizziness.       MEDICATIONS:  MEDICATIONS  (STANDING):  ascorbic acid 1000 milliGRAM(s) Oral daily  aspirin enteric coated 81 milliGRAM(s) Oral daily  atorvastatin 80 milliGRAM(s) Oral at bedtime  cholecalciferol 400 Unit(s) Oral daily  cyanocobalamin 1000 MICROGram(s) Oral daily  melatonin 6 milliGRAM(s) Oral at bedtime  midodrine 10 milliGRAM(s) Oral three times a day  multivitamin 1 Tablet(s) Oral daily  senna 2 Tablet(s) Oral at bedtime  tamsulosin 0.4 milliGRAM(s) Oral at bedtime  traZODone 75 milliGRAM(s) Oral at bedtime  traZODone 25 milliGRAM(s) Oral daily  vitamin E 400 International Unit(s) Oral daily      PHYSICAL EXAM:  T(C): 36.9 (03-07-19 @ 05:02), Max: 37.2 (03-06-19 @ 21:15)  HR: 85 (03-07-19 @ 05:02) (85 - 86)  BP: 157/95 (03-07-19 @ 05:02) (157/95 - 158/85)  RR: 18 (03-07-19 @ 05:02) (18 - 18)  SpO2: 100% (03-07-19 @ 05:02) (100% - 100%)  Wt(kg): --  I&O's Summary          JVP: Normal  Neck: supple  Lung: clear   CV: S1 S2 , Murmur:  Abd: soft  Ext: No edema  neuro: Awake / alert  Psych: flat affect  Skin: normal``    LABS/DATA:    CARDIAC MARKERS:                                11.9   5.17  )-----------( 158      ( 07 Mar 2019 05:18 )             36.7     03-07    144  |  107  |  16  ----------------------------<  100<H>  4.1   |  25  |  1.24    Ca    9.3      07 Mar 2019 07:18  Mg     2.0     03-07      proBNP:   Lipid Profile:   HgA1c:   TSH:     TELE:  EKG:
Subjective: Patient seen and examined. No new events except as noted.     SUBJECTIVE/ROS:  feels ok       MEDICATIONS:  MEDICATIONS  (STANDING):  ascorbic acid 1000 milliGRAM(s) Oral daily  aspirin enteric coated 81 milliGRAM(s) Oral daily  atorvastatin 80 milliGRAM(s) Oral at bedtime  cholecalciferol 400 Unit(s) Oral daily  cyanocobalamin 1000 MICROGram(s) Oral daily  melatonin 6 milliGRAM(s) Oral at bedtime  midodrine 10 milliGRAM(s) Oral three times a day  multivitamin 1 Tablet(s) Oral daily  senna 2 Tablet(s) Oral at bedtime  tamsulosin 0.4 milliGRAM(s) Oral at bedtime  traZODone 75 milliGRAM(s) Oral at bedtime  traZODone 25 milliGRAM(s) Oral daily  vitamin E 400 International Unit(s) Oral daily      PHYSICAL EXAM:  T(C): 36.8 (03-08-19 @ 05:50), Max: 36.8 (03-08-19 @ 05:50)  HR: 83 (03-08-19 @ 05:50) (64 - 83)  BP: 140/79 (03-08-19 @ 05:50) (140/79 - 149/83)  RR: 18 (03-08-19 @ 05:50) (18 - 18)  SpO2: 100% (03-08-19 @ 05:50) (100% - 100%)  Wt(kg): --  I&O's Summary          JVP: Normal  Neck: supple  Lung: clear   CV: S1 S2 , Murmur:  Abd: soft  Ext: No edema  neuro: Awake  Psych: flat affect  Skin: normal``    LABS/DATA:    CARDIAC MARKERS:                                12.0   5.59  )-----------( 151      ( 08 Mar 2019 06:53 )             38.3     03-08    144  |  108<H>  |  19  ----------------------------<  95  4.3   |  24  |  1.30    Ca    9.7      08 Mar 2019 06:53  Mg     2.0     03-08      proBNP:   Lipid Profile:   HgA1c:   TSH:     TELE:  EKG:

## 2019-03-08 NOTE — DISCHARGE NOTE PROVIDER - NSDCCPCAREPLAN_GEN_ALL_CORE_FT
PRINCIPAL DISCHARGE DIAGNOSIS  Problem: Syncope  Assessment and Plan of Treatment:       SECONDARY DISCHARGE DIAGNOSES  Problem: Polypharmacy  Assessment and Plan of Treatment:     Problem: Hypotension  Assessment and Plan of Treatment: PRINCIPAL DISCHARGE DIAGNOSIS  Problem: Syncope  Assessment and Plan of Treatment: fall prevention, followup with your PMD in 2 weeks      SECONDARY DISCHARGE DIAGNOSES  Problem: Hypotension  Assessment and Plan of Treatment: possible related to zyprexa which was tappered off. encourage PO intake    Problem: Dementia  Assessment and Plan of Treatment: continue trazadone as directed, you were tappered off zyprexa which can cause hypotension. Followup with [facility] clinic call [phone] in 2 weeks. if emergency walkin clinic [phone]    Problem: HLD (hyperlipidemia)  Assessment and Plan of Treatment: Low fat diet, exercise daily and continue current medications. Follow up with primary care physician and cardiologist for management.

## 2019-03-08 NOTE — DISCHARGE NOTE NURSING/CASE MANAGEMENT/SOCIAL WORK - NSDCDPATPORTLINK_GEN_ALL_CORE
You can access the [facility] Patient Portal, offered by [facility], by registering with the following website: [email or web address]
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
## 2019-03-08 NOTE — DISCHARGE NOTE PROVIDER - CARE PROVIDER_API CALL
Harsahd Hale)  Cardiovascular Disease; Internal Medicine  [address], [address]  [place] [address]  Phone: [phone]  Fax: [phone]  Follow Up Time: 2 weeks    Dr Prince Rolle,   PCP  Phone: (   )    -  Fax: (   )    -  Follow Up Time: 2 weeks    deborah Albarran,   Phone: [phone]  Fax: (   )    -  Follow Up Time: 2 weeks

## 2019-04-07 ENCOUNTER — RX RENEWAL (OUTPATIENT)
Age: 84
End: 2019-04-07

## 2019-04-08 ENCOUNTER — APPOINTMENT (OUTPATIENT)
Dept: NEUROLOGY | Facility: CLINIC | Age: 84
End: 2019-04-08
Payer: COMMERCIAL

## 2019-04-08 VITALS
DIASTOLIC BLOOD PRESSURE: 74 MMHG | HEIGHT: 71 IN | HEART RATE: 90 BPM | WEIGHT: 173 LBS | BODY MASS INDEX: 24.22 KG/M2 | SYSTOLIC BLOOD PRESSURE: 120 MMHG

## 2019-04-08 DIAGNOSIS — Z86.79 OTHER SPECIFIED POSTPROCEDURAL STATES: ICD-10-CM

## 2019-04-08 DIAGNOSIS — E78.5 HYPERLIPIDEMIA, UNSPECIFIED: ICD-10-CM

## 2019-04-08 DIAGNOSIS — Z98.890 OTHER SPECIFIED POSTPROCEDURAL STATES: ICD-10-CM

## 2019-04-08 DIAGNOSIS — I63.81 OTHER CEREBRAL INFARCTION DUE TO OCCLUSION OR STENOSIS OF SMALL ARTERY: ICD-10-CM

## 2019-04-08 PROCEDURE — 99214 OFFICE O/P EST MOD 30 MIN: CPT

## 2019-04-08 RX ORDER — CHLORHEXIDINE GLUCONATE 4 %
5 LIQUID (ML) TOPICAL DAILY
Qty: 30 | Refills: 0 | Status: DISCONTINUED | COMMUNITY
Start: 2018-11-26 | End: 2019-04-08

## 2019-04-08 RX ORDER — MULTIVIT-MIN/FOLIC/VIT K/LYCOP 400-300MCG
1000 TABLET ORAL DAILY
Refills: 0 | Status: ACTIVE | COMMUNITY
Start: 2019-04-08

## 2019-04-08 RX ORDER — MIDODRINE HYDROCHLORIDE 10 MG/1
10 TABLET ORAL 3 TIMES DAILY
Refills: 0 | Status: ACTIVE | COMMUNITY
Start: 2018-11-26

## 2019-04-08 RX ORDER — OLANZAPINE 5 MG/1
5 TABLET, FILM COATED ORAL
Qty: 60 | Refills: 1 | Status: DISCONTINUED | COMMUNITY
Start: 2019-03-01 | End: 2019-04-08

## 2019-04-08 RX ORDER — OMEGA-3/DHA/EPA/FISH OIL 35-113.5MG
1000 TABLET,CHEWABLE ORAL DAILY
Qty: 30 | Refills: 0 | Status: ACTIVE | COMMUNITY
Start: 2019-04-08

## 2019-04-08 RX ORDER — ASPIRIN 81 MG/1
81 TABLET, CHEWABLE ORAL
Refills: 3 | Status: ACTIVE | COMMUNITY
Start: 2019-04-08

## 2019-04-08 NOTE — ASSESSMENT
[FreeTextEntry1] : Assessment: \par 85yo RH AAM, with vascular risk factors, SDH in 1/2018, s/p maxi holes, never seizure and negative EEGs, weaned off Keppra in 2/2018, persistent cognitive decline, now not independent. Recent R lacunar stroke at [facility], restarted on Keppra for ? reason. More recent admissions to [facility] for hypotension and bradycardia and again for syncope in 3/2019. Off Keppra, Zyprexa and Seroquel, on Trazodone.\par Pt likely not tolerating antipsychotics. \par \par Impression: \par dementia-Vascular or mixed pathology was underlying and preceding the SDH, given pervasive deficits on MS exam, and SDH might have triggered the presentation.\par \par Plan:\par -Cont with ASA, Statin\par -Cont midodrine per cardiology\par -Melatonin up to 5mg\par -Trazodone: 50mg QHS for now, plus 25mg PRN in the am.\par \par A thorough discussion was entertained with the patient/caregiver regarding the use of psychoactive medications, their possible benefits and AE profile, including the risk of cardiovascular complications.\par We discussed the benefits of being active, physically and mentally, and the need to to establish a routine in this respect.\par Driving abilities and firearms possession and use were discussed, in relation to progression of the cognitive decline, and the need to assess them periodically.\par Patient/caregiver understand and agree with the plan.

## 2019-04-08 NOTE — HISTORY OF PRESENT ILLNESS
[FreeTextEntry1] : HPI-Interval hx [id number]:\par pt was in [facility] in early march, kenneth montoya our RPA, for low BP and a syncope.\par Possible Olanzapine contributing to the low BP.\par Now off Olanzapine, taking only Trazodone 50mg at night and 25mg in the am as needed. Midodrine increased to 10mg TID.\par Doing fine, sleeps well, appetite is good.\par \par HPI-Interval hx [id number]:\par Patient here with his daughter. He has been more agitated recently, also in relation to being in the hospital for hypotension.\par Recent admission to [facility], Seroquel changed to Zyprexa 2.5mg BID and Trazodone 50mg qhs for sleep.\par Still not sleeping well, not even napping during the day.\par On Midodrine 5mg TID. Sleeps with 2 pillows.\par Rest is stable, daughter has not noticed any HA, nor physical changes. \par \par HPI-Interval hx [id number]:\par Pt was recently in the hospital, with confusion, and MRI brain showed R IC lacunar stroke.\par No seizures.\par Also, he was given Ativel for the MRI, and became delirious, remitted.\par Unfortunately, he was dc with Keppra 750mg BID again, with no evidence of seizures, and now has mood swings and irritability, possibly from the Rx. \par Insomnia: tends to go to bed late, and tends to wake up multiple times a night for BPH.\par Appetite is ok.\par \par PMH:\par 86yo RH AAM, HTN, HLD, recent SDH in 1/2018 s/p a fall, s/p NSX, here for evaluation of cognitive changes s/p SDH.\par \par Until December 2017, he was perfectly normal, per family, living in [facility], monitored remotely by max with cameras, completely independent.\par On 12/22/2017 he had an accidental fall ([facility]), and procured a SDH, which was addressed only a month later, when he was taken to [facility] and after taking aspirin a few days prior. in the meantime, he was taken to hospitals in [facility] and to [facility], where apparently CT head did not show any intracranial bleed. His clinical status was gradually getting worse, with more confusion, and inability to walk.\par \par After maxi holes, jan 2018, he improved, but remains confused, disoriented, and has memory lapses.\par He was put on Keppra for sx prevention, dc due to intense agitation.\par He was also prescribed Seroquel for agitation, but not tolerated it.\par EEG in 3/2018 showed slowing. Pt's daughter said Seroquel gave him a seizure.\par CTh showed progressive resolution of the SDH, till 8/21.\par Last CTH shows significant WM vascular changes and significant atrophy.\par MRI not done, pt was fighting, possible claustrophobia.\par \par Sleep: interrupted by urination, but goes back to sleep.\par \par Appetite: no issues, stable. \par \par ADL: full.\par IADL: fully dependent.\par CDR: 1.5\par \par Psychiatric symptoms: none.\par \par PMD and other physicians:\par Dr. Prince Ahuja MD\par Neurologist in [place], [place]\par Address: [address] [place] [address]\par Phone: [phone]\par \par of note, recent EKG from [facility] showed possible AFib.

## 2019-04-08 NOTE — DATA REVIEWED
[de-identified] : 3/21/2108-EEG Summary / Classification:\par Abnormal EEG in the awake, drowsy states.\par -Mild to moderate generalized slowing.\par \par EEG Impression / Clinical Correlate:\par Abnormal EEG in the awake, drowsy states.\par 1. Mild to moderate nonspecific diffuse or multifocal cerebral dysfunction.\par 2. There were no epileptiform abnormalities recorded. \par  [de-identified] : Imaging reviewed on PACS.

## 2019-04-08 NOTE — PHYSICAL THERAPY INITIAL EVALUATION ADULT - LEVEL OF INDEPENDENCE, REHAB EVAL
supervision O-T Plasty Text: The defect edges were debeveled with a #15c scalpel blade.  Given the location of the defect, shape of the defect and the proximity to free margins an O-T plasty was deemed most appropriate.  Using a sterile surgical marker, an appropriate O-T plasty was drawn incorporating the defect and placing the expected incisions within the relaxed skin tension lines where possible.    The area thus outlined was incised deep to adipose tissue with a #15 scalpel blade.  The skin margins were undermined to an appropriate distance in all directions utilizing iris scissors.

## 2019-04-08 NOTE — PHYSICAL EXAM
[General Appearance - Alert] : alert [General Appearance - In No Acute Distress] : in no acute distress [Impaired Insight] : insight and judgment were intact [Affect] : the affect was normal [Person] : oriented to person [Remote Intact] : remote memory intact [Registration Intact] : recent registration memory intact [Concentration Intact] : normal concentrating ability [Visual Intact] : visual attention was ~T not ~L decreased [Naming Objects] : no difficulty naming common objects [Repeating Phrases] : no difficulty repeating a phrase [Writing A Sentence] : no difficulty writing a sentence [Fluency] : fluency intact [Comprehension] : comprehension intact [Reading] : reading intact [Past History] : adequate knowledge of personal past history [Vocabulary] : adequate range of vocabulary [Total Score ___ / 30] : the patient achieved a score of [unfilled] /30 [Date / Time ___ / 5] : date / time [unfilled] / 5 [Place ___ / 5] : place [unfilled] / 5 [Registration ___ / 3] : registration [unfilled] / 3 [Serial Sevens ___/5] : serial sevens [unfilled] / 5 [Naming 2 Objects ___ / 2] : naming two objects [unfilled] / 2 [Repeating a Sentence ___ / 1] : repeating a sentence [unfilled] / 1 [Writing a Sentence ___ / 1] : write sentence [unfilled] / 1 [3-stage Verbal Command ___ / 3] : three-stage verbal command [unfilled] / 3 [Written Command ___ / 1] : written command [unfilled] / 1 [Copy a Design ___ / 1] : copy a design [unfilled] / 1 [Recall ___ / 3] : recall [unfilled] / 3 [Cranial Nerves Optic (II)] : visual acuity intact bilaterally,  visual fields full to confrontation, pupils equal round and reactive to light [Cranial Nerves Oculomotor (III)] : extraocular motion intact [Cranial Nerves Trigeminal (V)] : facial sensation intact symmetrically [Cranial Nerves Facial (VII)] : face symmetrical [Cranial Nerves Vestibulocochlear (VIII)] : hearing was intact bilaterally [Cranial Nerves Glossopharyngeal (IX)] : tongue and palate midline [Cranial Nerves Accessory (XI - Cranial And Spinal)] : head turning and shoulder shrug symmetric [Cranial Nerves Hypoglossal (XII)] : there was no tongue deviation with protrusion [Motor Strength] : muscle strength was normal in all four extremities [Involuntary Movements] : no involuntary movements were seen [No Muscle Atrophy] : normal bulk in all four extremities [Motor Handedness Right-Handed] : the patient is right hand dominant [Sensation Tactile Decrease] : light touch was intact [Sensation Pain / Temperature Decrease] : pain and temperature was intact [Sensation Vibration Decrease] : vibration was intact [Proprioception] : proprioception was intact [Balance] : balance was intact [2+] : Patella left 2+ [Sclera] : the sclera and conjunctiva were normal [PERRL With Normal Accommodation] : pupils were equal in size, round, reactive to light, with normal accommodation [Extraocular Movements] : extraocular movements were intact [Optic Disc Abnormality] : the optic disc were normal in size and color [No APD] : no afferent pupillary defect [No DANIELLE] : no internuclear ophthalmoplegia [Full Visual Field] : full visual field [Outer Ear] : the ears and nose were normal in appearance [Oropharynx] : the oropharynx was normal [Neck Appearance] : the appearance of the neck was normal [Neck Cervical Mass (___cm)] : no neck mass was observed [Jugular Venous Distention Increased] : there was no jugular-venous distention [Thyroid Diffuse Enlargement] : the thyroid was not enlarged [Thyroid Nodule] : there were no palpable thyroid nodules [Auscultation Breath Sounds / Voice Sounds] : lungs were clear to auscultation bilaterally [Heart Sounds] : normal S1 and S2 [Heart Sounds Gallop] : no gallops [Murmurs] : no murmurs [Heart Sounds Pericardial Friction Rub] : no pericardial rub [Arterial Pulses Carotid] : carotid pulses were normal with no bruits [Full Pulse] : the pedal pulses are present [Edema] : there was no peripheral edema [Bowel Sounds] : normal bowel sounds [Abdomen Soft] : soft [Abdomen Tenderness] : non-tender [Abdomen Mass (___ Cm)] : no abdominal mass palpated [No CVA Tenderness] : no ~M costovertebral angle tenderness [No Spinal Tenderness] : no spinal tenderness [Abnormal Walk] : normal gait [Nail Clubbing] : no clubbing  or cyanosis of the fingernails [Musculoskeletal - Swelling] : no joint swelling seen [Motor Tone] : muscle strength and tone were normal [Skin Color & Pigmentation] : normal skin color and pigmentation [Skin Turgor] : normal skin turgor [] : no rash [1+] : Ankle jerk left 1+ [Place] : disoriented to place [Time] : disoriented to time [Short Term Intact] : short term memory impaired [Span Intact] : the attention span was decreased [Current Events] : inadequate knowledge of current events [Motor Strength Upper Extremities Bilaterally] : strength was normal in both upper extremities [Motor Strength Lower Extremities Bilaterally] : strength was normal in both lower extremities [Romberg's Sign] : Romberg's sign was negtive [Dysesthesia] : no dysesthesia [Hyperesthesia] : no hyperesthesia [Limited Balance] : balance was intact [Past-pointing] : there was no past-pointing [Tremor] : no tremor present [Dysdiadochokinesia Bilaterally] : not present [Coordination - Dysmetria Impaired Finger-to-Nose Bilateral] : not present [Coordination - Dysmetria Impaired Heel-to-Shin Bilateral] : not present [Plantar Reflex Right Only] : normal on the right [Plantar Reflex Left Only] : normal on the left [FreeTextEntry4] : Mental Status Exam \par Presidents: 1\par Alternating Pattern: poor planning.\par Spiral: poor planning, draws circles, excentric\par Clock: 1/3.\par Repetition: ok\par R/L discrimination on self and examiner: ok, difficulty with Xline determination\par Cross-line commands: difficulty with Xline command.\par Praxis:\par -Motor: ok, poor planning\par -Dynamic/Luria: forgets pattern, no perseveration\par -Ideomotor/Imitation: poor\par -Ideational/writing/closing-in: poor\par -Dressing: ok. [FreeTextEntry5] : + glabella [FreeTextEntry8] : Gait is better today, walks straight, no leaning, no shuffling.  [FreeTextEntry9] : ? x-adductors [FreeTextEntry1] : irregular HR

## 2019-04-09 PROBLEM — S06.369A TRAUMATIC HEMORRHAGE OF CEREBRUM, UNSPECIFIED, WITH LOSS OF CONSCIOUSNESS OF UNSPECIFIED DURATION, INITIAL ENCOUNTER: Chronic | Status: ACTIVE | Noted: 2019-03-05

## 2019-04-09 PROBLEM — N40.0 BENIGN PROSTATIC HYPERPLASIA WITHOUT LOWER URINARY TRACT SYMPTOMS: Chronic | Status: ACTIVE | Noted: 2019-03-05

## 2019-04-09 PROBLEM — F03.91 UNSPECIFIED DEMENTIA WITH BEHAVIORAL DISTURBANCE: Chronic | Status: ACTIVE | Noted: 2019-03-05

## 2019-04-09 PROBLEM — E78.5 HYPERLIPIDEMIA, UNSPECIFIED: Chronic | Status: ACTIVE | Noted: 2019-03-05

## 2019-04-09 PROBLEM — I95.1 ORTHOSTATIC HYPOTENSION: Chronic | Status: ACTIVE | Noted: 2019-03-05

## 2019-08-22 NOTE — ED BEHAVIORAL HEALTH ASSESSMENT NOTE - [facility]
Medicare Wellness Visit  Plan for Preventive Care    A good way for you to stay healthy is to use preventive care.  Medicare covers many services that can help you stay healthy.* The goal of these services is to find any health problems as quickly as possible. Finding problems early can help make them easier to treat.  Your personal plan below lists the services you may need and when they are due.     Health Maintenance Summary     Diabetes Eye Exam (Yearly)  Postponed until 10/22/2019    Shingles Vaccine (1 of 2)  Postponed until 12/5/2019    DTaP/Tdap/Td Vaccine (1 - Tdap)  Postponed until 10/17/2022    Influenza Vaccine (1)  Next due on 9/1/2019    Diabetes A1C (Every 6 Months)  Next due on 2/15/2020    Diabetes Foot Exam (Yearly)  Next due on 4/17/2020    DM/CKD GFR (Yearly)  Next due on 8/15/2020    Medicare Wellness 65+ (Yearly)  Next due on 8/22/2020    Depression Screening (Yearly)  Next due on 8/22/2020    Colorectal Cancer Screening-Colonoscopy (Every 10 Years)  Next due on 2/23/2021    Hepatitis C Screening   Completed    Abdominal Aortic Aneurysm (AAA) Screening   Completed    Pneumococcal Vaccine 65+   Completed    Meningococcal Vaccine   Aged Out           Preventive Care for Women and Men    Heart Screenings (Cardiovascular):  · Blood tests are used to check your cholesterol, lipid and triglyceride levels. High levels can increase your risk for heart disease and stroke. High levels can be treated with medications, diet and exercise. Lowering your levels can help keep your heart and blood vessels healthy.  Your provider will order these tests if they are needed.    · An ultrasound is done to see if you have an abdominal aortic aneurysm (AAA).  This is an enlargement of one of the main blood vessels that delivers blood to the body.   In the [place], 9,000 deaths are caused by AAA.  You may not even know you have this problem and as many as 1 in 3 people will have a serious problem if it is not  treated.  Early diagnosis allows for more effective treatment and cure.  If you have a family history of AAA or are a male age 65-75 who has smoked, you are at higher risk of an AAA.  Your provider can order this test, if needed.    Colorectal Screening:  · There are many tests that are used to check for cancer of your colon and rectum. You and your provider should discuss what test is best for you and when to have it done.  Options include:  · Screening Colonoscopy: exam of the entire colon, seen through a flexible lighted tube.  · Flexible Sigmoidoscopy: exam of the last third (sigmoid portion) of the colon and rectum, seen through a flexible lighted tube.  · Cologuard DNA stool test: a sample of your stool is used to screen for cancer and unseen blood in your stool.  · Fecal Occult Blood Test: a sample of your stool is studied to find any unseen blood    Flu Shot:  · An immunization that helps to prevent influenza (the flu). You should get this every year. The best time to get the shot is in the fall.    Pneumococcal Shot:  • Vaccines are available that can help prevent pneumococcal disease, which is any type of infection caused by Streptococcus pneumoniae bacteria.   Their use can prevent some cases of pneumonia, meningitis, and sepsis. There are two types of pneumococcal vaccines:   o Conjugate vaccines (PCV-13 or Prevnar 13®) - helps protect against the 13 types of pneumococcal bacteria that are the most common causes of serious infections in children and adults.    o Polysaccharide vaccine (PPSV23 or Iatvojoqr75®) - helps protect against 23 types of pneumococcal bacteria for patients who are recommended to get it.  These vaccines should be given at least 12 months apart.  A booster is usually not needed.     Hepatitis B Shot:  · An immunization that helps to protect people from getting Hepatitis B. Hepatitis B is a virus that spreads through contact with infected blood or body fluids. Many people with the  virus do not have symptoms.  The virus can lead to serious problems, such as liver disease. Some people are at higher risk than others. Your doctor will tell you if you need this shot.     Diabetes Screening:  · A test to measure sugar (glucose) in your blood is called a fasting blood sugar. Fasting means you cannot have food or drink for at least 8 hours before the test. This test can detect diabetes long before you may notice symptoms.    Glaucoma Screening:  · Glaucoma screening is performed by your eye doctor. The test measures the fluid pressure inside your eyes to determine if you have glaucoma.     Hepatitis C Screening:  · A blood test to see if you have the hepatitis C virus.  Hepatitis C attacks the liver and is a major cause of chronic liver disease.  Medicare will cover a single screening for all adults born between 1945 & 1965, or high risk patients (people who have injected illegal drugs or people who have had blood transfusions).  High risk patients who continue to inject illegal drugs can be screened for Hepatitis C every year.    Smoking and Tobacco-Use Cessation Counseling:  · Tobacco is the single greatest cause of disease and early death in our country today. Medication and counseling together can increase a person’s chance of quitting for good.   · Medicare covers two quitting attempts per year, with four counseling sessions per attempt (eight sessions in a 12 month period)    Preventive Screening tests for Women    Screening Mammograms and Breast Exams:  · An x-ray of your breasts to check for breast cancer before you or your doctor may be able to feel it.  If breast cancer is found early it can usually be treated with success.    Pelvic Exams and Pap Tests:  · An exam to check for cervical and vaginal cancer. A Pap test is a lab test in which cells are taken from your cervix and sent to the lab to look for signs of cervical cancer. If cancer of the cervix is found early, chances for a cure are  good. Testing can generally end at age 65, or if a woman has a hysterectomy for a benign condition. Your provider may recommend more frequent testing if certain abnormal results are found.    Bone Mass Measurements:  · A painless x-ray of your bone density to see if you are at risk for a broken bone. Bone density refers to the thickness of bones or how tightly the bone tissue is packed.    Preventive Screening tests for Men    Prostate Screening:  · PSA - Prostate Cancer blood test.  Experts do not recommend routine screening of healthy men with no signs or symptoms of prostate disease.  However, men should not ignore urinary symptoms, and should discuss their family history with their doctor.    *Medicare pays for many preventive services to keep you healthy. For some of these services, you might have to pay a deductible, coinsurance, and / or copayment.  The amounts vary depending on the type of services you need and the kind of Medicare health plan you have.               None known

## 2019-09-10 ENCOUNTER — APPOINTMENT (OUTPATIENT)
Dept: NEUROLOGY | Facility: CLINIC | Age: 84
End: 2019-09-10
Payer: COMMERCIAL

## 2019-09-10 VITALS — SYSTOLIC BLOOD PRESSURE: 111 MMHG | HEART RATE: 84 BPM | DIASTOLIC BLOOD PRESSURE: 66 MMHG

## 2019-09-10 DIAGNOSIS — R41.89 OTHER SYMPTOMS AND SIGNS INVOLVING COGNITIVE FUNCTIONS AND AWARENESS: ICD-10-CM

## 2019-09-10 PROCEDURE — 99214 OFFICE O/P EST MOD 30 MIN: CPT

## 2019-09-10 RX ORDER — TRAZODONE HYDROCHLORIDE 50 MG/1
50 TABLET ORAL AT BEDTIME
Refills: 0 | Status: DISCONTINUED | COMMUNITY
Start: 2019-04-08 | End: 2019-09-10

## 2019-09-10 NOTE — PHYSICAL EXAM
[General Appearance - Alert] : alert [General Appearance - In No Acute Distress] : in no acute distress [Impaired Insight] : insight and judgment were intact [Affect] : the affect was normal [Person] : oriented to person [Remote Intact] : remote memory intact [Registration Intact] : recent registration memory intact [Concentration Intact] : normal concentrating ability [Visual Intact] : visual attention was ~T not ~L decreased [Naming Objects] : no difficulty naming common objects [Repeating Phrases] : no difficulty repeating a phrase [Writing A Sentence] : no difficulty writing a sentence [Fluency] : fluency intact [Comprehension] : comprehension intact [Reading] : reading intact [Past History] : adequate knowledge of personal past history [Vocabulary] : adequate range of vocabulary [Total Score ___ / 30] : the patient achieved a score of [unfilled] /30 [Date / Time ___ / 5] : date / time [unfilled] / 5 [Place ___ / 5] : place [unfilled] / 5 [Registration ___ / 3] : registration [unfilled] / 3 [Serial Sevens ___/5] : serial sevens [unfilled] / 5 [Naming 2 Objects ___ / 2] : naming two objects [unfilled] / 2 [Repeating a Sentence ___ / 1] : repeating a sentence [unfilled] / 1 [Writing a Sentence ___ / 1] : write sentence [unfilled] / 1 [3-stage Verbal Command ___ / 3] : three-stage verbal command [unfilled] / 3 [Written Command ___ / 1] : written command [unfilled] / 1 [Copy a Design ___ / 1] : copy a design [unfilled] / 1 [Recall ___ / 3] : recall [unfilled] / 3 [Cranial Nerves Oculomotor (III)] : extraocular motion intact [Cranial Nerves Optic (II)] : visual acuity intact bilaterally,  visual fields full to confrontation, pupils equal round and reactive to light [Cranial Nerves Facial (VII)] : face symmetrical [Cranial Nerves Trigeminal (V)] : facial sensation intact symmetrically [Cranial Nerves Vestibulocochlear (VIII)] : hearing was intact bilaterally [Cranial Nerves Accessory (XI - Cranial And Spinal)] : head turning and shoulder shrug symmetric [Cranial Nerves Glossopharyngeal (IX)] : tongue and palate midline [Cranial Nerves Hypoglossal (XII)] : there was no tongue deviation with protrusion [Motor Strength] : muscle strength was normal in all four extremities [Involuntary Movements] : no involuntary movements were seen [No Muscle Atrophy] : normal bulk in all four extremities [Motor Handedness Right-Handed] : the patient is right hand dominant [Sensation Pain / Temperature Decrease] : pain and temperature was intact [Sensation Tactile Decrease] : light touch was intact [Sensation Vibration Decrease] : vibration was intact [Proprioception] : proprioception was intact [Balance] : balance was intact [2+] : Patella left 2+ [1+] : Ankle jerk right 1+ [Sclera] : the sclera and conjunctiva were normal [PERRL With Normal Accommodation] : pupils were equal in size, round, reactive to light, with normal accommodation [Extraocular Movements] : extraocular movements were intact [Optic Disc Abnormality] : the optic disc were normal in size and color [No APD] : no afferent pupillary defect [No DANIELLE] : no internuclear ophthalmoplegia [Full Visual Field] : full visual field [Outer Ear] : the ears and nose were normal in appearance [Neck Appearance] : the appearance of the neck was normal [Oropharynx] : the oropharynx was normal [Neck Cervical Mass (___cm)] : no neck mass was observed [Jugular Venous Distention Increased] : there was no jugular-venous distention [Thyroid Diffuse Enlargement] : the thyroid was not enlarged [Thyroid Nodule] : there were no palpable thyroid nodules [Heart Sounds] : normal S1 and S2 [Auscultation Breath Sounds / Voice Sounds] : lungs were clear to auscultation bilaterally [Heart Sounds Gallop] : no gallops [Murmurs] : no murmurs [Heart Sounds Pericardial Friction Rub] : no pericardial rub [Arterial Pulses Carotid] : carotid pulses were normal with no bruits [Full Pulse] : the pedal pulses are present [Edema] : there was no peripheral edema [Bowel Sounds] : normal bowel sounds [Abdomen Soft] : soft [Abdomen Tenderness] : non-tender [Abdomen Mass (___ Cm)] : no abdominal mass palpated [No CVA Tenderness] : no ~M costovertebral angle tenderness [No Spinal Tenderness] : no spinal tenderness [Abnormal Walk] : normal gait [Nail Clubbing] : no clubbing  or cyanosis of the fingernails [Musculoskeletal - Swelling] : no joint swelling seen [Motor Tone] : muscle strength and tone were normal [Skin Color & Pigmentation] : normal skin color and pigmentation [Skin Turgor] : normal skin turgor [] : no rash [Place] : disoriented to place [Short Term Intact] : short term memory impaired [Time] : disoriented to time [Span Intact] : the attention span was decreased [Current Events] : inadequate knowledge of current events [Motor Strength Upper Extremities Bilaterally] : strength was normal in both upper extremities [Motor Strength Lower Extremities Bilaterally] : strength was normal in both lower extremities [Romberg's Sign] : Romberg's sign was negtive [Dysesthesia] : no dysesthesia [Limited Balance] : balance was intact [Hyperesthesia] : no hyperesthesia [Past-pointing] : there was no past-pointing [Tremor] : no tremor present [Dysdiadochokinesia Bilaterally] : not present [Coordination - Dysmetria Impaired Finger-to-Nose Bilateral] : not present [Coordination - Dysmetria Impaired Heel-to-Shin Bilateral] : not present [Plantar Reflex Right Only] : normal on the right [Plantar Reflex Left Only] : normal on the left [FreeTextEntry4] : Mental Status Exam \par Presidents: 1\par Alternating Pattern: poor planning.\par Spiral: poor planning, draws circles, excentric\par Clock: 1/3.\par Repetition: ok\par R/L discrimination on self and examiner: ok, difficulty with Xline determination\par Cross-line commands: difficulty with Xline command.\par Praxis:\par -Motor: ok, poor planning\par -Dynamic/Luria: forgets pattern, no perseveration\par -Ideomotor/Imitation: poor\par -Ideational/writing/closing-in: poor\par -Dressing: ok. [FreeTextEntry5] : + glabella [FreeTextEntry8] : Gait is better today, walks straight, no leaning, no shuffling.  [FreeTextEntry9] : ? x-adductors [FreeTextEntry1] : irregular HR

## 2019-09-10 NOTE — DATA REVIEWED
[de-identified] : 3/21/2108-EEG Summary / Classification:\par Abnormal EEG in the awake, drowsy states.\par -Mild to moderate generalized slowing.\par \par EEG Impression / Clinical Correlate:\par Abnormal EEG in the awake, drowsy states.\par 1. Mild to moderate nonspecific diffuse or multifocal cerebral dysfunction.\par 2. There were no epileptiform abnormalities recorded. \par  [de-identified] : Imaging reviewed on PACS.

## 2019-09-10 NOTE — HISTORY OF PRESENT ILLNESS
[FreeTextEntry1] : HPI-Interval hx [id number]\par Pt has progressed, more confused, barely recognizes his family members, tends to wander around a lot, but can find his way back home. Disoriented, thinks he is back home.\par Has been eating less, no loss of weight.\par Sleep: better at night, does not sleep during the day now.\par \par He has not tolerated Zyprexa, Seroquel, and now is off Trazodone as well, unclear why, possibly felt too drowsy.\par \par Physically he is fine.\par \par \par HPI-Interval hx [id number]:\par pt was in [facility] in early march, kenneth montoya our RPA, for low BP and a syncope.\par Possible Olanzapine contributing to the low BP.\par Now off Olanzapine, taking only Trazodone 50mg at night and 25mg in the am as needed. Midodrine increased to 10mg TID.\par Doing fine, sleeps well, appetite is good.\par \par HPI-Interval hx [id number]:\par Patient here with his daughter. He has been more agitated recently, also in relation to being in the hospital for hypotension.\par Recent admission to [facility], Seroquel changed to Zyprexa 2.5mg BID and Trazodone 50mg qhs for sleep.\par Still not sleeping well, not even napping during the day.\par On Midodrine 5mg TID. Sleeps with 2 pillows.\par Rest is stable, daughter has not noticed any HA, nor physical changes. 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 \par Insomnia: tends to go to bed late, and tends to wake up multiple times a night for BPH.\par Appetite is ok.\par \par PMH:\par 84yo RH AAM, HTN, HLD, recent SDH in 1/2018 s/p a fall, s/p NSX, here for evaluation of cognitive changes s/p SDH.\par \par Until December 2017, he was perfectly normal, per family, living in [facility], monitored remotely by max with cameras, completely independent.\par On 12/22/2017 he had an accidental fall ([facility]), and procured a SDH, which was addressed only a month later, when he was taken to [facility] and after taking aspirin a few days prior. in the meantime, he was taken to hospitals in [facility] and to [facility], where apparently CT head did not show any intracranial bleed. His clinical status was gradually getting worse, with more confusion, and inability to walk.\par \par After maxi holes, jan 2018, he improved, but remains confused, disoriented, and has memory lapses.\par He was put on Keppra for sx prevention, dc due to intense agitation.\par He was also prescribed Seroquel for agitation, but not tolerated it.\par EEG in 3/2018 showed slowing. Pt's daughter said Seroquel gave him a seizure.\par CTh showed progressive resolution of the SDH, till 8/21.\par Last CTH shows significant WM vascular changes and significant atrophy.\par MRI not done, pt was fighting, possible claustrophobia.\par \par Sleep: interrupted by urination, but goes back to sleep.\par \par Appetite: no issues, stable. \par \par ADL: full.\par IADL: fully dependent.\par CDR: 1.5\par \par Psychiatric symptoms: none.\par \par PMD and other physicians:\par Dr. Prince Ahuja MD\par Neurologist in [place], [place]\par Address: [address] , [place] [address]\par Phone: [phone]\par \par of note, recent EKG from [facility] showed possible AFib.

## 2019-09-10 NOTE — ASSESSMENT
[FreeTextEntry1] : Assessment: \par 87yo RH AAM, with vascular risk factors, SDH in 1/2018, s/p maxi holes, never seizure and negative EEGs, weaned off Keppra in 2/2018, persistent cognitive decline, now not independent. Recent R lacunar stroke at [facility], restarted on Keppra for ? reason. More recent admissions to [facility] for hypotension and bradycardia and again for syncope in 3/2019. Off Keppra, Zyprexa and Seroquel, on Trazodone.\par \par Pt not tolerating antipsychotics, so far, but is still agitated and tends to wander out of the home a lot.\par Daughter trying to get more help at home to supervise him.\par \par Impression: \par Dementia-Vascular/mixed pathology.\par \par Plan:\par -Cont with ASA, Statin\par -Cont midodrine per cardiology\par -Melatonin up to 5mg\par -we can try low dose Abilify, 2mg-->to BID and increase as needed/tolerated\par -Will call me for progress in 2 weeks.\par \par A thorough discussion was entertained with the patient/caregiver regarding the use of psychoactive medications, their possible benefits and AE profile, including the risk of cardiovascular complications.\par We discussed the benefits of being active, physically and mentally, and the need to to establish a routine in this respect.\par Driving abilities and firearms possession and use were discussed, in relation to progression of the cognitive decline, and the need to assess them periodically.\par Patient/caregiver understand and agree with the plan.

## 2019-09-20 ENCOUNTER — RX RENEWAL (OUTPATIENT)
Age: 84
End: 2019-09-20

## 2019-11-06 RX ORDER — ARIPIPRAZOLE 5 MG/1
5 TABLET ORAL DAILY
Qty: 30 | Refills: 0 | Status: DISCONTINUED | COMMUNITY
Start: 2019-09-10 | End: 2019-11-06

## 2019-12-05 ENCOUNTER — RX RENEWAL (OUTPATIENT)
Age: 84
End: 2019-12-05

## 2020-03-02 ENCOUNTER — APPOINTMENT (OUTPATIENT)
Dept: NEUROLOGY | Facility: CLINIC | Age: 85
End: 2020-03-02
Payer: COMMERCIAL

## 2020-03-02 VITALS
WEIGHT: 178 LBS | BODY MASS INDEX: 24.92 KG/M2 | HEIGHT: 71 IN | HEART RATE: 106 BPM | SYSTOLIC BLOOD PRESSURE: 117 MMHG | DIASTOLIC BLOOD PRESSURE: 61 MMHG

## 2020-03-02 DIAGNOSIS — I95.9 HYPOTENSION, UNSPECIFIED: ICD-10-CM

## 2020-03-02 PROCEDURE — 99214 OFFICE O/P EST MOD 30 MIN: CPT

## 2020-03-02 RX ORDER — TAMSULOSIN HYDROCHLORIDE 0.4 MG/1
0.4 CAPSULE ORAL
Refills: 0 | Status: DISCONTINUED | COMMUNITY
Start: 2018-11-26 | End: 2020-03-02

## 2020-03-02 NOTE — ASSESSMENT
[FreeTextEntry1] : Assessment: \par 86yo RH AAM, with vascular risk factors, SDH in 1/2018, s/p maxi holes, never seizure and negative EEGs, weaned off Keppra in 2/2018, persistent cognitive decline, now not independent. Recent R lacunar stroke at [facility], restarted on Keppra for ? reason. More recent admissions to [facility] for hypotension and bradycardia and again for syncope in 3/2019. Off Keppra, Zyprexa and Seroquel. Off Trazodone.\par \par Overall stable, and well managed. Has moments or more confusion.\par Insomnia is an issue. \par \par Impression: \par Dementia-Vascular/mixed pathology.\par Insomnia.\par \par Plan:\par -continue current regimen and try to add Mirtazapine for sleep\par -will call me in 2 weeks for progress. \par \par A thorough discussion was entertained with the patient/caregiver regarding the use of psychoactive medications, their possible benefits and AE profile, including the risk of cardiovascular complications.\par We discussed the benefits of being active, physically and mentally, and the need to to establish a routine in this respect.\par Driving abilities and firearms possession and use were discussed, in relation to progression of the cognitive decline, and the need to assess them periodically.\par Patient/caregiver understand and agree with the plan.

## 2020-03-02 NOTE — DATA REVIEWED
[de-identified] : 3/21/2108-EEG Summary / Classification:\par Abnormal EEG in the awake, drowsy states.\par -Mild to moderate generalized slowing.\par \par EEG Impression / Clinical Correlate:\par Abnormal EEG in the awake, drowsy states.\par 1. Mild to moderate nonspecific diffuse or multifocal cerebral dysfunction.\par 2. There were no epileptiform abnormalities recorded. \par  [de-identified] : Imaging reviewed on PACS.

## 2020-03-02 NOTE — PHYSICAL EXAM
[General Appearance - Alert] : alert [General Appearance - In No Acute Distress] : in no acute distress [Impaired Insight] : insight and judgment were intact [Affect] : the affect was normal [Person] : oriented to person [Registration Intact] : recent registration memory intact [Remote Intact] : remote memory intact [Concentration Intact] : normal concentrating ability [Naming Objects] : no difficulty naming common objects [Visual Intact] : visual attention was ~T not ~L decreased [Writing A Sentence] : no difficulty writing a sentence [Repeating Phrases] : no difficulty repeating a phrase [Fluency] : fluency intact [Comprehension] : comprehension intact [Reading] : reading intact [Past History] : adequate knowledge of personal past history [Vocabulary] : adequate range of vocabulary [Total Score ___ / 30] : the patient achieved a score of [unfilled] /30 [Date / Time ___ / 5] : date / time [unfilled] / 5 [Place ___ / 5] : place [unfilled] / 5 [Registration ___ / 3] : registration [unfilled] / 3 [Serial Sevens ___/5] : serial sevens [unfilled] / 5 [Naming 2 Objects ___ / 2] : naming two objects [unfilled] / 2 [Repeating a Sentence ___ / 1] : repeating a sentence [unfilled] / 1 [Writing a Sentence ___ / 1] : write sentence [unfilled] / 1 [3-stage Verbal Command ___ / 3] : three-stage verbal command [unfilled] / 3 [Written Command ___ / 1] : written command [unfilled] / 1 [Copy a Design ___ / 1] : copy a design [unfilled] / 1 [Cranial Nerves Oculomotor (III)] : extraocular motion intact [Recall ___ / 3] : recall [unfilled] / 3 [Cranial Nerves Optic (II)] : visual acuity intact bilaterally,  visual fields full to confrontation, pupils equal round and reactive to light [Cranial Nerves Trigeminal (V)] : facial sensation intact symmetrically [Cranial Nerves Facial (VII)] : face symmetrical [Cranial Nerves Vestibulocochlear (VIII)] : hearing was intact bilaterally [Cranial Nerves Glossopharyngeal (IX)] : tongue and palate midline [Cranial Nerves Accessory (XI - Cranial And Spinal)] : head turning and shoulder shrug symmetric [Cranial Nerves Hypoglossal (XII)] : there was no tongue deviation with protrusion [Motor Strength] : muscle strength was normal in all four extremities [Involuntary Movements] : no involuntary movements were seen [No Muscle Atrophy] : normal bulk in all four extremities [Motor Handedness Right-Handed] : the patient is right hand dominant [Sensation Tactile Decrease] : light touch was intact [Proprioception] : proprioception was intact [Sensation Pain / Temperature Decrease] : pain and temperature was intact [Sensation Vibration Decrease] : vibration was intact [Balance] : balance was intact [2+] : Patella left 2+ [1+] : Ankle jerk left 1+ [Sclera] : the sclera and conjunctiva were normal [PERRL With Normal Accommodation] : pupils were equal in size, round, reactive to light, with normal accommodation [Extraocular Movements] : extraocular movements were intact [Optic Disc Abnormality] : the optic disc were normal in size and color [No DANIELLE] : no internuclear ophthalmoplegia [Full Visual Field] : full visual field [No APD] : no afferent pupillary defect [Oropharynx] : the oropharynx was normal [Neck Appearance] : the appearance of the neck was normal [Outer Ear] : the ears and nose were normal in appearance [Thyroid Nodule] : there were no palpable thyroid nodules [Jugular Venous Distention Increased] : there was no jugular-venous distention [Neck Cervical Mass (___cm)] : no neck mass was observed [Thyroid Diffuse Enlargement] : the thyroid was not enlarged [Auscultation Breath Sounds / Voice Sounds] : lungs were clear to auscultation bilaterally [Heart Sounds Gallop] : no gallops [Murmurs] : no murmurs [Heart Sounds] : normal S1 and S2 [Heart Sounds Pericardial Friction Rub] : no pericardial rub [Arterial Pulses Carotid] : carotid pulses were normal with no bruits [Edema] : there was no peripheral edema [Full Pulse] : the pedal pulses are present [Bowel Sounds] : normal bowel sounds [Abdomen Soft] : soft [Abdomen Tenderness] : non-tender [No CVA Tenderness] : no ~M costovertebral angle tenderness [Abdomen Mass (___ Cm)] : no abdominal mass palpated [Abnormal Walk] : normal gait [Nail Clubbing] : no clubbing  or cyanosis of the fingernails [No Spinal Tenderness] : no spinal tenderness [Musculoskeletal - Swelling] : no joint swelling seen [Skin Color & Pigmentation] : normal skin color and pigmentation [Motor Tone] : muscle strength and tone were normal [] : no rash [Skin Turgor] : normal skin turgor [Place] : disoriented to place [Time] : disoriented to time [Short Term Intact] : short term memory impaired [Span Intact] : the attention span was decreased [Motor Strength Upper Extremities Bilaterally] : strength was normal in both upper extremities [Current Events] : inadequate knowledge of current events [Motor Strength Lower Extremities Bilaterally] : strength was normal in both lower extremities [Dysesthesia] : no dysesthesia [Romberg's Sign] : Romberg's sign was negtive [Past-pointing] : there was no past-pointing [Limited Balance] : balance was intact [Hyperesthesia] : no hyperesthesia [Dysdiadochokinesia Bilaterally] : not present [Tremor] : no tremor present [Coordination - Dysmetria Impaired Finger-to-Nose Bilateral] : not present [Coordination - Dysmetria Impaired Heel-to-Shin Bilateral] : not present [Plantar Reflex Right Only] : normal on the right [Plantar Reflex Left Only] : normal on the left [FreeTextEntry4] : Mental Status Exam \par Presidents: 1\par Alternating Pattern: poor planning.\par Spiral: poor planning, draws circles, excentric\par Clock: 1/3.\par Repetition: ok\par R/L discrimination on self and examiner: ok, difficulty with Xline determination\par Cross-line commands: difficulty with Xline command.\par Praxis:\par -Motor: ok, poor planning\par -Dynamic/Luria: forgets pattern, no perseveration\par -Ideomotor/Imitation: poor\par -Ideational/writing/closing-in: poor\par -Dressing: ok. [FreeTextEntry8] : Gait is better today, walks straight, no leaning, no shuffling.  [FreeTextEntry9] : ? x-adductors [FreeTextEntry5] : + glabella [FreeTextEntry1] : irregular HR

## 2020-03-02 NOTE — HISTORY OF PRESENT ILLNESS
[FreeTextEntry1] : HPI-Interval hx [id number]:\par Pt here with daughter.\par Overall ok, appetite stable, but sleep is still off.\par Did not take mirtazapine yet. \par No more falls.\par Monitoring BP, has been stable, but the few times it was below 100sbp he felt dizzy/lightheaded. \par Has 24/7 assistance. \par ADL still ok, but needs supervision.\par IADL poor.\par \par HPI-Interval hx [id number]\par Pt has progressed, more confused, barely recognizes his family members, tends to wander around a lot, but can find his way back home. Disoriented, thinks he is back home.\par Has been eating less, no loss of weight.\par Sleep: better at night, does not sleep during the day now.\par \par He has not tolerated Zyprexa, Seroquel, and now is off Trazodone as well, unclear why, possibly felt too drowsy.\par \par Physically he is fine.\par \par \par HPI-Interval hx [id number]:\par pt was in [facility] in early march, kenneth montoya our RPA, for low BP and a syncope.\par Possible Olanzapine contributing to the low BP.\par Now off Olanzapine, taking only Trazodone 50mg at night and 25mg in the am as needed. Midodrine increased to 10mg TID.\par Doing fine, sleeps well, appetite is good.\par \par HPI-Interval hx [id number]:\par Patient here with his daughter. He has been more agitated recently, also in relation to being in the hospital for hypotension.\par Recent admission to [facility], Seroquel changed to Zyprexa 2.5mg BID and Trazodone 50mg qhs for sleep.\par Still not sleeping well, not even napping during the day.\par On Midodrine 5mg TID. Sleeps with 2 pillows.\par Rest is stable, daughter has not noticed any HA, nor physical changes. \par \par HPI-Interval hx [id number]:\par Pt was recently in the hospital, with confusion, and MRI brain showed R IC lacunar stroke.\par No seizures.\par Also, he was given Ativel for the MRI, and became delirious, remitted.\par Unfortunately, he was dc with Keppra 750mg BID again, with no evidence of seizures, and now has mood swings and irritability, possibly from the Rx. \par Insomnia: tends to go to bed late, and tends to wake up multiple times a night for BPH.\par Appetite is ok.\par \par PMH:\par 86yo RH AAM, HTN, HLD, recent SDH in 1/2018 s/p a fall, s/p NSX, here for evaluation of cognitive changes s/p SDH.\par \par Until December 2017, he was perfectly normal, per family, living in [facility], monitored remotely by [facility] with cameras, completely independent.\par On 12/22/2017 he had an accidental fall ([facility]), and procured a SDH, which was addressed only a month later, when he was taken to [facility] and after taking aspirin a few days prior. in the meantime, he was taken to hospitals in [facility] and to [facility], where apparently CT head did not show any intracranial bleed. His clinical status was gradually getting worse, with more confusion, and inability to walk.\par \par After maxi holes, jan 2018, he improved, but remains confused, disoriented, and has memory lapses.\par He was put on Keppra for sx prevention, dc due to intense agitation.\par He was also prescribed Seroquel for agitation, but not tolerated it.\par EEG in 3/2018 showed slowing. Pt's daughter said Seroquel gave him a seizure.\par CTh showed progressive resolution of the SDH, till 8/21.\par Last CTH shows significant WM vascular changes and significant atrophy.\par MRI not done, pt was fighting, possible claustrophobia.\par \par Sleep: interrupted by urination, but goes back to sleep.\par \par Appetite: no issues, stable. \par \par ADL: full.\par IADL: fully dependent.\par CDR: 1.5\par \par Psychiatric symptoms: none.\par \par PMD and other physicians:\par Dr. Prince Ahuja MD\par Neurologist in [place], [place]\par Address: [address] [place] [address] [place] [address]\par Phone: [phone]\par \par of note, recent EKG from [facility] showed possible AFib.

## 2020-04-02 ENCOUNTER — RX RENEWAL (OUTPATIENT)
Age: 85
End: 2020-04-02

## 2020-08-03 ENCOUNTER — APPOINTMENT (OUTPATIENT)
Dept: NEUROLOGY | Facility: CLINIC | Age: 85
End: 2020-08-03

## 2020-09-10 ENCOUNTER — INPATIENT (INPATIENT)
Facility: HOSPITAL | Age: 85
LOS: 7 days | Discharge: HOME | DRG: 312 | End: 2020-09-18
Attending: INTERNAL MEDICINE | Admitting: INTERNAL MEDICINE
Payer: MEDICARE

## 2020-09-10 VITALS
OXYGEN SATURATION: 94 % | HEIGHT: 74 IN | WEIGHT: 184.09 LBS | HEART RATE: 79 BPM | RESPIRATION RATE: 18 BRPM | TEMPERATURE: 98 F | SYSTOLIC BLOOD PRESSURE: 111 MMHG | DIASTOLIC BLOOD PRESSURE: 66 MMHG

## 2020-09-10 DIAGNOSIS — D69.6 THROMBOCYTOPENIA, UNSPECIFIED: ICD-10-CM

## 2020-09-10 DIAGNOSIS — I95.1 ORTHOSTATIC HYPOTENSION: ICD-10-CM

## 2020-09-10 DIAGNOSIS — Z29.9 ENCOUNTER FOR PROPHYLACTIC MEASURES, UNSPECIFIED: ICD-10-CM

## 2020-09-10 DIAGNOSIS — R26.2 DIFFICULTY IN WALKING, NOT ELSEWHERE CLASSIFIED: ICD-10-CM

## 2020-09-10 DIAGNOSIS — I10 ESSENTIAL (PRIMARY) HYPERTENSION: ICD-10-CM

## 2020-09-10 DIAGNOSIS — Z98.890 OTHER SPECIFIED POSTPROCEDURAL STATES: Chronic | ICD-10-CM

## 2020-09-10 DIAGNOSIS — F03.91 UNSPECIFIED DEMENTIA WITH BEHAVIORAL DISTURBANCE: ICD-10-CM

## 2020-09-10 DIAGNOSIS — E87.5 HYPERKALEMIA: ICD-10-CM

## 2020-09-10 DIAGNOSIS — R55 SYNCOPE AND COLLAPSE: ICD-10-CM

## 2020-09-10 DIAGNOSIS — I48.91 UNSPECIFIED ATRIAL FIBRILLATION: ICD-10-CM

## 2020-09-10 DIAGNOSIS — N17.9 ACUTE KIDNEY FAILURE, UNSPECIFIED: ICD-10-CM

## 2020-09-10 LAB
ALBUMIN SERPL ELPH-MCNC: 3.5 G/DL — SIGNIFICANT CHANGE UP (ref 3.3–5)
ALP SERPL-CCNC: 46 U/L — SIGNIFICANT CHANGE UP (ref 40–120)
ALT FLD-CCNC: 24 U/L — SIGNIFICANT CHANGE UP (ref 10–45)
ANION GAP SERPL CALC-SCNC: 14 MMOL/L — SIGNIFICANT CHANGE UP (ref 5–17)
AST SERPL-CCNC: 67 U/L — HIGH (ref 10–40)
BASOPHILS # BLD AUTO: 0.03 K/UL — SIGNIFICANT CHANGE UP (ref 0–0.2)
BASOPHILS NFR BLD AUTO: 0.4 % — SIGNIFICANT CHANGE UP (ref 0–2)
BILIRUB SERPL-MCNC: 0.8 MG/DL — SIGNIFICANT CHANGE UP (ref 0.2–1.2)
BUN SERPL-MCNC: 26 MG/DL — HIGH (ref 7–23)
CALCIUM SERPL-MCNC: 9.4 MG/DL — SIGNIFICANT CHANGE UP (ref 8.4–10.5)
CHLORIDE SERPL-SCNC: 105 MMOL/L — SIGNIFICANT CHANGE UP (ref 96–108)
CK SERPL-CCNC: 423 U/L — HIGH (ref 30–200)
CO2 SERPL-SCNC: 22 MMOL/L — SIGNIFICANT CHANGE UP (ref 22–31)
CREAT SERPL-MCNC: 1.54 MG/DL — HIGH (ref 0.5–1.3)
EOSINOPHIL # BLD AUTO: 0.31 K/UL — SIGNIFICANT CHANGE UP (ref 0–0.5)
EOSINOPHIL NFR BLD AUTO: 3.6 % — SIGNIFICANT CHANGE UP (ref 0–6)
GLUCOSE SERPL-MCNC: 110 MG/DL — HIGH (ref 70–99)
HCT VFR BLD CALC: 43 % — SIGNIFICANT CHANGE UP (ref 39–50)
HGB BLD-MCNC: 14 G/DL — SIGNIFICANT CHANGE UP (ref 13–17)
IMM GRANULOCYTES NFR BLD AUTO: 0.2 % — SIGNIFICANT CHANGE UP (ref 0–1.5)
LYMPHOCYTES # BLD AUTO: 3.75 K/UL — HIGH (ref 1–3.3)
LYMPHOCYTES # BLD AUTO: 43.9 % — SIGNIFICANT CHANGE UP (ref 13–44)
MAGNESIUM SERPL-MCNC: 2.2 MG/DL — SIGNIFICANT CHANGE UP (ref 1.6–2.6)
MCHC RBC-ENTMCNC: 28.8 PG — SIGNIFICANT CHANGE UP (ref 27–34)
MCHC RBC-ENTMCNC: 32.6 GM/DL — SIGNIFICANT CHANGE UP (ref 32–36)
MCV RBC AUTO: 88.5 FL — SIGNIFICANT CHANGE UP (ref 80–100)
MONOCYTES # BLD AUTO: 1.3 K/UL — HIGH (ref 0–0.9)
MONOCYTES NFR BLD AUTO: 15.2 % — HIGH (ref 2–14)
NEUTROPHILS # BLD AUTO: 3.13 K/UL — SIGNIFICANT CHANGE UP (ref 1.8–7.4)
NEUTROPHILS NFR BLD AUTO: 36.7 % — LOW (ref 43–77)
NRBC # BLD: 0 /100 WBCS — SIGNIFICANT CHANGE UP (ref 0–0)
PHOSPHATE SERPL-MCNC: 3.5 MG/DL — SIGNIFICANT CHANGE UP (ref 2.5–4.5)
PLATELET # BLD AUTO: 102 K/UL — LOW (ref 150–400)
POTASSIUM SERPL-MCNC: 5.8 MMOL/L — HIGH (ref 3.5–5.3)
POTASSIUM SERPL-SCNC: 5.8 MMOL/L — HIGH (ref 3.5–5.3)
PROT SERPL-MCNC: 7.5 G/DL — SIGNIFICANT CHANGE UP (ref 6–8.3)
RBC # BLD: 4.86 M/UL — SIGNIFICANT CHANGE UP (ref 4.2–5.8)
RBC # FLD: 13.7 % — SIGNIFICANT CHANGE UP (ref 10.3–14.5)
SARS-COV-2 RNA SPEC QL NAA+PROBE: SIGNIFICANT CHANGE UP
SODIUM SERPL-SCNC: 141 MMOL/L — SIGNIFICANT CHANGE UP (ref 135–145)
TROPONIN T, HIGH SENSITIVITY RESULT: 61 NG/L — HIGH (ref 0–51)
VALPROATE SERPL-MCNC: 82 UG/ML — SIGNIFICANT CHANGE UP (ref 50–100)
WBC # BLD: 8.54 K/UL — SIGNIFICANT CHANGE UP (ref 3.8–10.5)
WBC # FLD AUTO: 8.54 K/UL — SIGNIFICANT CHANGE UP (ref 3.8–10.5)

## 2020-09-10 PROCEDURE — 99285 EMERGENCY DEPT VISIT HI MDM: CPT

## 2020-09-10 PROCEDURE — 99223 1ST HOSP IP/OBS HIGH 75: CPT

## 2020-09-10 PROCEDURE — 71045 X-RAY EXAM CHEST 1 VIEW: CPT | Mod: 26

## 2020-09-10 RX ORDER — MIDODRINE HYDROCHLORIDE 2.5 MG/1
10 TABLET ORAL THREE TIMES A DAY
Refills: 0 | Status: DISCONTINUED | OUTPATIENT
Start: 2020-09-11 | End: 2020-09-18

## 2020-09-10 RX ORDER — VALPROIC ACID (AS SODIUM SALT) 250 MG/5ML
250 SOLUTION, ORAL ORAL DAILY
Refills: 0 | Status: DISCONTINUED | OUTPATIENT
Start: 2020-09-10 | End: 2020-09-10

## 2020-09-10 RX ORDER — ATORVASTATIN CALCIUM 80 MG/1
1 TABLET, FILM COATED ORAL
Qty: 0 | Refills: 0 | DISCHARGE

## 2020-09-10 RX ORDER — DIVALPROEX SODIUM 500 MG/1
250 TABLET, DELAYED RELEASE ORAL DAILY
Refills: 0 | Status: DISCONTINUED | OUTPATIENT
Start: 2020-09-10 | End: 2020-09-15

## 2020-09-10 RX ORDER — METOPROLOL TARTRATE 50 MG
1 TABLET ORAL
Qty: 0 | Refills: 0 | DISCHARGE

## 2020-09-10 RX ORDER — MIRTAZAPINE 45 MG/1
1 TABLET, ORALLY DISINTEGRATING ORAL
Qty: 0 | Refills: 0 | DISCHARGE

## 2020-09-10 RX ORDER — AMLODIPINE BESYLATE 2.5 MG/1
1 TABLET ORAL
Qty: 0 | Refills: 0 | DISCHARGE

## 2020-09-10 RX ORDER — TAMSULOSIN HYDROCHLORIDE 0.4 MG/1
0.4 CAPSULE ORAL AT BEDTIME
Refills: 0 | Status: DISCONTINUED | OUTPATIENT
Start: 2020-09-10 | End: 2020-09-18

## 2020-09-10 RX ADMIN — TAMSULOSIN HYDROCHLORIDE 0.4 MILLIGRAM(S): 0.4 CAPSULE ORAL at 23:49

## 2020-09-10 NOTE — H&P ADULT - PROBLEM SELECTOR PLAN 1
- will need Dr. Edouard to call cardiologist Dr. Hale for consult  - ordered EKG.  - elevated troponin noted with outside hospital documenting NSTEMI. unclear what happened at OSH. will trend troponin. Per cardiology to determine need for appropriate inpatient cardiac workup given what is reported at outside hospital.  - history of patient passing out and collapsing upon standing most likely represents  orthostatic hypotension which the patient is known to have and for which he takes midodrine 10mg TID.  - Will need Dr. edouard to call the patient's neurologist for consult  in the morning for evaluation as well. CT head at outside hospital does not report acute neurologic pathology

## 2020-09-10 NOTE — ED PROVIDER NOTE - OBJECTIVE STATEMENT
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 Pt followed by cardiology Dr. Hale, who recommended if family uncomfortable w/ [place] come to [facility]. PMD Dr. Prince Rolle. Neuro Dr. Jonatan Rubio.

## 2020-09-10 NOTE — ED ADULT NURSE REASSESSMENT NOTE - NS ED NURSE REASSESS COMMENT FT1
Report taken from BETH Aguayo at 1900. Pt AAOx0, VS as documented. 1:1 maintained and staff member at bedside within arms reach. Pt calm and asleep until interacted with and pt becomes agitated. Bed locked in lowest position with appropriate side rails raised. Pt awaiting lab results at this time. Pt has no other questions or concerns at this time.

## 2020-09-10 NOTE — ED PROVIDER NOTE - CLINICAL SUMMARY MEDICAL DECISION MAKING FREE TEXT BOX
Dr. Nielsen Note: ongoing weakness with difficulty ambulating with orthostatic hypotension, labs, cardiologist requesting admission for cardiac/neuro workup

## 2020-09-10 NOTE — H&P ADULT - ATTENDING COMMENTS
Patient assigned to me by night hospitalist in charge for management and care for patient for this evening only. Care to be resumed by day hospitalist Dr. Ted Alejo at 08:00 in the morning and thereafter.     Adama Knapp MD  Nocturnist-Medicine Attending  Department of Hospital Medicine  pager: [phone] (available from 20:00 to 08:00)

## 2020-09-10 NOTE — H&P ADULT - NSHPPHYSICALEXAM_GEN_ALL_CORE
Vital Signs Last 24 Hrs  T(C): 37 (10 Sep 2020 20:00), Max: 37 (10 Sep 2020 20:00)  T(F): 98.6 (10 Sep 2020 20:00), Max: 98.6 (10 Sep 2020 20:00)  HR: 78 (10 Sep 2020 21:30) (76 - 81)  BP: 142/80 (10 Sep 2020 21:30) (111/66 - 142/80)  BP(mean): 99 (10 Sep 2020 21:30) (87 - 99)  RR: 18 (10 Sep 2020 21:30) (16 - 18)  SpO2: 94% (10 Sep 2020 21:30) (94% - 99%) on RA    GENERAL: NAD, well-developed  HEAD:  Atraumatic, Normocephalic  EYES: PERRL, conjunctiva and sclera clear  Mouth: dry oral mucosa, denture present  NECK: Supple, no appreciable masses  Lung: normal work of breathing, cta b/l  Chest: S1&S2+, rrr, no m/r/g appreciated  ABDOMEN: bs+, soft, nt, nd, no appreciable masses  : No lorenzana catheter, no CVA tenderness  EXTREMITIES:  radial pulse present b/l, PT present b/l, no pitting edema present  Neuro: lethargic but upon sternal rub awakens but is not oriented and mutters incoherently, retracts all to noxious stimuli, no flaccid paralysis in extremities appreciated  SKIN: warm and dry, no visible purulence in exposed areas, decubitus ulcers present

## 2020-09-10 NOTE — H&P ADULT - PROBLEM SELECTOR PLAN 6
there was concern for nehemiah at outside hospital. per chart review patient likely has ckd3. monitor CrCl to determine if nehemiah is present

## 2020-09-10 NOTE — H&P ADULT - HISTORY OF PRESENT ILLNESS
Due to patient's dementia, history is collected from his daughter Ms. Roxanne Carrillo via telephone    87M w/ dementia (A&Ox1), hx of traumatic ICH s/p craniotomy, orthostatic hypotension on midodrine, HTN, BPH presents to [facility] after leaving outside against medical advice for evaluation of syncope. Ms. Carrillo informs that the patient was having episodes of faiting/collapse upon standing which led her to bring him to the hospital. Per chart review form [facility], the patient was admitted for syncope evaluation and determined to have NSTEMI. There is limited paperwork from [facility] and therefore unclear as to the complete evaluation and management. The patient had head CT which does not report acute pathology, TTE was also completed and did not identify LV systolic dysfunction. The patient's daughter reports that a neurologist also saw the patient and it is unclear if the patient had seizures and the family was told an EEG was done but there were no results. The family became concerned when physicians at [facility] wanted to do a nuclear stress test which they did not agree with. They spoke with the patients cardiologist Dr. Hale who recommended evaluation at [facility] and the family had patient leave against medical advice and brought patient to [facility] for further evaluation. Of note patient was labeled to have atrial fibrillation as a medical problem at [facility] but does not have this charted previously.    In the ED. VS 98.2, 111/66, 7918 94%RA

## 2020-09-10 NOTE — H&P ADULT - PROBLEM SELECTOR PLAN 5
charted atrial fibrillation at outside hospital but it is unclear if patient has this condition  - ordered ekg  - cardiology consult

## 2020-09-10 NOTE — ED PROVIDER NOTE - PHYSICAL EXAMINATION
GEN: Pt w/ severe dementia A&Ox1(self).  PSYCH: Affect restricted.  ENT: Airway patent.  RESP: No chest wall tenderness, CTA b/l, no wheezes, rales, or rhonchi.   CARDIAC: RRR, clear distinct S1, S2.  ABD: Abdomen non-tender, non-distended.  VASC: Radial pulses 2+ b/l. No LE edema.  SKIN: No rashes or lesions.

## 2020-09-10 NOTE — H&P ADULT - PROBLEM SELECTOR PLAN 7
reported baseline by family is A&Ox1. Has been charted to have end-stage dementia with behavioral distrubance  - continue with valproic acid for now  - neurology consult in am as above  - npo overnight as it is unclear if patient has dysphagia per discussion with family. may require formal speech/swallow exam pending neurology examination

## 2020-09-10 NOTE — H&P ADULT - PROBLEM SELECTOR PLAN 2
- continue with home midodrine 10g TID  - hold antihypertensives and antinodal medications. Will need cardiology to determine appropriate medication given current evaluation for syncope  - orthostatic BP check in am  - fall precautions, ambulation only with assistance and walker

## 2020-09-10 NOTE — ED ADULT NURSE REASSESSMENT NOTE - NS ED NURSE REASSESS COMMENT FT1
Pt AAOx1, VS as documented. Pt changed and repositioned for comfort. IV's intact. Pt given warm blankets. 1:1 maintained and staff member at bedside within arms reach. Bed locked and in lowest position with appropriate side rails raised. Pt admitted and report called to [facility].

## 2020-09-10 NOTE — ED ADULT TRIAGE NOTE - CHIEF COMPLAINT QUOTE
syncopal seizure h/o nstemi  pt left [facility] elinor NUR from [facility] for cardiac hunt

## 2020-09-10 NOTE — H&P ADULT - ASSESSMENT
87M w/ dementia (A&Ox1), hx of traumatic ICH s/p craniotomy, orthostatic hypotension on midodrine, HTN, BPH presents to [facility] after leaving outside against medical advice for evaluation of syncope.

## 2020-09-10 NOTE — ED PROVIDER NOTE - ATTENDING CONTRIBUTION TO CARE
Pt with worsening weakness for past week, recently admitted at [place], concern for possible seizure, also mildly elevated troponin (in setting of elevated Cr) signed out AMA and here for further care.  Pt is unable to give history Pt with worsening weakness for past week, recently admitted at [place], concern for possible seizure, also mildly elevated troponin (in setting of elevated Cr) signed out AMA and here for further care.  Pt is unable to give history and obtained from old records and daughter.  Exam: pt disoriented, pulses intact, lifts both legs, answers some questions, chest clear, abdoment soft, nt.   Labs, ekg, call pt's cardiologist.    Spoke to Dr. Hale, recommends admission for further workup, see MDM.

## 2020-09-10 NOTE — ED ADULT NURSE NOTE - OBJECTIVE STATEMENT
87 year old male presents to the ED for lower extremity weakness and syncopal event.  Patient unable to provide history.  History obtained from daughter.  Patient has had lower extremity weakness for a while per family and on Friday was seen by cardiology as he has close follow up and weakness had become progressively worse and patient had syncopal episode.  Patient was at [facility] and being worked up there, but the family had concerns regarding his care and they contacted his cardiologist who recommended he come to [facility].  Patient unable to answer ROS questions.

## 2020-09-10 NOTE — H&P ADULT - NSICDXPASTMEDICALHX_GEN_ALL_CORE_FT
PAST MEDICAL HISTORY:  BPH (benign prostatic hyperplasia)     Dementia with behavioral disturbance     HLD (hyperlipidemia)     Orthostatic hypotension     Traumatic intracerebral hemorrhage

## 2020-09-10 NOTE — ED ADULT NURSE NOTE - INTERVENTIONS DEFINITIONS
Stretcher in lowest position, wheels locked, appropriate side rails in place/Non-slip footwear when patient is off stretcher/Physically safe environment: no spills, clutter or unnecessary equipment/Provide visual cue, wrist band, yellow gown, etc.

## 2020-09-10 NOTE — H&P ADULT - NSHPLABSRESULTS_GEN_ALL_CORE
Personally reviewed available labs, imaging and ekg  CBC Full  -  ( 10 Sep 2020 18:50 )  WBC Count : 8.54 K/uL  RBC Count : 4.86 M/uL  Hemoglobin : 14.0 g/dL  Hematocrit : 43.0 %  Platelet Count - Automated : 102 K/uL  Mean Cell Volume : 88.5 fl  Mean Cell Hemoglobin : 28.8 pg  Mean Cell Hemoglobin Concentration : 32.6 gm/dL  Auto Neutrophil # : 3.13 K/uL  Auto Lymphocyte # : 3.75 K/uL  Auto Monocyte # : 1.30 K/uL  Auto Eosinophil # : 0.31 K/uL  Auto Basophil # : 0.03 K/uL  Auto Neutrophil % : 36.7 %  Auto Lymphocyte % : 43.9 %  Auto Monocyte % : 15.2 %  Auto Eosinophil % : 3.6 %  Auto Basophil % : 0.4 %  09-10  141  |  105  |  26<H>  ----------------------------<  110<H>  5.8<H>   |  22  |  1.54<H>  Ca    9.4      10 Sep 2020 18:50  Phos  3.5     09-10  Mg     2.2     09-10  TPro  7.5  /  Alb  3.5  /  TBili  0.8  /  DBili  x   /  AST  67<H>  /  ALT  24  /  AlkPhos  46  09-10  Troponin T, High Sensitivity Result: 61:  (09.10.20 @ 18:50)  CKMB: 1.00 ng/mL (01.27.18 @ 11:35)  Creatine Kinase, Serum: 423: Moderate Hemolysis, results may be falsely elevated U/L (09.10.20 @ 18:50)  Imaging:  CXR ordered  EKG: ordered    Patient has discharge paperwork from [facility] which was extensively reviewed.  - reports no acute hemorrhage or CVA on head CT, TTE reports normal LV systolic function with inability to determine if there is wall motion abnormality

## 2020-09-11 ENCOUNTER — TRANSCRIPTION ENCOUNTER (OUTPATIENT)
Age: 85
End: 2020-09-11

## 2020-09-11 LAB
ANION GAP SERPL CALC-SCNC: 10 MMOL/L — SIGNIFICANT CHANGE UP (ref 5–17)
ANION GAP SERPL CALC-SCNC: 13 MMOL/L — SIGNIFICANT CHANGE UP (ref 5–17)
BUN SERPL-MCNC: 24 MG/DL — HIGH (ref 7–23)
BUN SERPL-MCNC: 25 MG/DL — HIGH (ref 7–23)
CALCIUM SERPL-MCNC: 9.4 MG/DL — SIGNIFICANT CHANGE UP (ref 8.4–10.5)
CALCIUM SERPL-MCNC: 9.7 MG/DL — SIGNIFICANT CHANGE UP (ref 8.4–10.5)
CHLORIDE SERPL-SCNC: 106 MMOL/L — SIGNIFICANT CHANGE UP (ref 96–108)
CHLORIDE SERPL-SCNC: 106 MMOL/L — SIGNIFICANT CHANGE UP (ref 96–108)
CK MB BLD-MCNC: 0.6 % — SIGNIFICANT CHANGE UP (ref 0–3.5)
CK MB CFR SERPL CALC: 1.7 NG/ML — SIGNIFICANT CHANGE UP (ref 0–6.7)
CK SERPL-CCNC: 283 U/L — HIGH (ref 30–200)
CO2 SERPL-SCNC: 24 MMOL/L — SIGNIFICANT CHANGE UP (ref 22–31)
CO2 SERPL-SCNC: 27 MMOL/L — SIGNIFICANT CHANGE UP (ref 22–31)
CREAT SERPL-MCNC: 1.31 MG/DL — HIGH (ref 0.5–1.3)
CREAT SERPL-MCNC: 1.37 MG/DL — HIGH (ref 0.5–1.3)
GLUCOSE SERPL-MCNC: 83 MG/DL — SIGNIFICANT CHANGE UP (ref 70–99)
GLUCOSE SERPL-MCNC: 89 MG/DL — SIGNIFICANT CHANGE UP (ref 70–99)
HCT VFR BLD CALC: 42.8 % — SIGNIFICANT CHANGE UP (ref 39–50)
HCT VFR BLD CALC: 43.3 % — SIGNIFICANT CHANGE UP (ref 39–50)
HGB BLD-MCNC: 14 G/DL — SIGNIFICANT CHANGE UP (ref 13–17)
HGB BLD-MCNC: 14.1 G/DL — SIGNIFICANT CHANGE UP (ref 13–17)
MAGNESIUM SERPL-MCNC: 2.1 MG/DL — SIGNIFICANT CHANGE UP (ref 1.6–2.6)
MCHC RBC-ENTMCNC: 28.5 PG — SIGNIFICANT CHANGE UP (ref 27–34)
MCHC RBC-ENTMCNC: 28.6 PG — SIGNIFICANT CHANGE UP (ref 27–34)
MCHC RBC-ENTMCNC: 32.6 GM/DL — SIGNIFICANT CHANGE UP (ref 32–36)
MCHC RBC-ENTMCNC: 32.7 GM/DL — SIGNIFICANT CHANGE UP (ref 32–36)
MCV RBC AUTO: 87.3 FL — SIGNIFICANT CHANGE UP (ref 80–100)
MCV RBC AUTO: 87.7 FL — SIGNIFICANT CHANGE UP (ref 80–100)
NRBC # BLD: 0 /100 WBCS — SIGNIFICANT CHANGE UP (ref 0–0)
NRBC # BLD: 0 /100 WBCS — SIGNIFICANT CHANGE UP (ref 0–0)
PHOSPHATE SERPL-MCNC: 3.3 MG/DL — SIGNIFICANT CHANGE UP (ref 2.5–4.5)
PLATELET # BLD AUTO: 102 K/UL — LOW (ref 150–400)
PLATELET # BLD AUTO: 70 K/UL — LOW (ref 150–400)
PLATELET # BLD AUTO: 94 K/UL — LOW (ref 150–400)
POTASSIUM SERPL-MCNC: 4.4 MMOL/L — SIGNIFICANT CHANGE UP (ref 3.5–5.3)
POTASSIUM SERPL-SCNC: 4.4 MMOL/L — SIGNIFICANT CHANGE UP (ref 3.5–5.3)
RBC # BLD: 4.9 M/UL — SIGNIFICANT CHANGE UP (ref 4.2–5.8)
RBC # BLD: 4.94 M/UL — SIGNIFICANT CHANGE UP (ref 4.2–5.8)
RBC # FLD: 13.6 % — SIGNIFICANT CHANGE UP (ref 10.3–14.5)
RBC # FLD: 13.6 % — SIGNIFICANT CHANGE UP (ref 10.3–14.5)
SARS-COV-2 IGG SERPL QL IA: POSITIVE
SARS-COV-2 IGM SERPL IA-ACNC: 7.57 INDEX — HIGH
SODIUM SERPL-SCNC: 143 MMOL/L — SIGNIFICANT CHANGE UP (ref 135–145)
SODIUM SERPL-SCNC: 143 MMOL/L — SIGNIFICANT CHANGE UP (ref 135–145)
TROPONIN T, HIGH SENSITIVITY RESULT: 48 NG/L — SIGNIFICANT CHANGE UP (ref 0–51)
TROPONIN T, HIGH SENSITIVITY RESULT: 60 NG/L — HIGH (ref 0–51)
TSH SERPL-MCNC: 1.89 UIU/ML — SIGNIFICANT CHANGE UP (ref 0.27–4.2)
WBC # BLD: 7.16 K/UL — SIGNIFICANT CHANGE UP (ref 3.8–10.5)
WBC # BLD: 7.83 K/UL — SIGNIFICANT CHANGE UP (ref 3.8–10.5)
WBC # FLD AUTO: 7.16 K/UL — SIGNIFICANT CHANGE UP (ref 3.8–10.5)
WBC # FLD AUTO: 7.83 K/UL — SIGNIFICANT CHANGE UP (ref 3.8–10.5)

## 2020-09-11 RX ORDER — SODIUM CHLORIDE 9 MG/ML
1000 INJECTION INTRAMUSCULAR; INTRAVENOUS; SUBCUTANEOUS
Refills: 0 | Status: DISCONTINUED | OUTPATIENT
Start: 2020-09-11 | End: 2020-09-15

## 2020-09-11 RX ORDER — HEPARIN SODIUM 5000 [USP'U]/ML
5000 INJECTION INTRAVENOUS; SUBCUTANEOUS EVERY 8 HOURS
Refills: 0 | Status: DISCONTINUED | OUTPATIENT
Start: 2020-09-11 | End: 2020-09-18

## 2020-09-11 RX ADMIN — TAMSULOSIN HYDROCHLORIDE 0.4 MILLIGRAM(S): 0.4 CAPSULE ORAL at 21:05

## 2020-09-11 RX ADMIN — DIVALPROEX SODIUM 250 MILLIGRAM(S): 500 TABLET, DELAYED RELEASE ORAL at 13:15

## 2020-09-11 RX ADMIN — HEPARIN SODIUM 5000 UNIT(S): 5000 INJECTION INTRAVENOUS; SUBCUTANEOUS at 21:05

## 2020-09-11 RX ADMIN — SODIUM CHLORIDE 50 MILLILITER(S): 9 INJECTION INTRAMUSCULAR; INTRAVENOUS; SUBCUTANEOUS at 10:02

## 2020-09-11 RX ADMIN — HEPARIN SODIUM 5000 UNIT(S): 5000 INJECTION INTRAVENOUS; SUBCUTANEOUS at 13:15

## 2020-09-11 RX ADMIN — HEPARIN SODIUM 5000 UNIT(S): 5000 INJECTION INTRAVENOUS; SUBCUTANEOUS at 05:32

## 2020-09-11 RX ADMIN — SODIUM CHLORIDE 50 MILLILITER(S): 9 INJECTION INTRAMUSCULAR; INTRAVENOUS; SUBCUTANEOUS at 21:05

## 2020-09-11 NOTE — CONSULT NOTE ADULT - SUBJECTIVE AND OBJECTIVE BOX
Patient is a 87y old  Male who presents with a chief complaint of 87M recently leaving outside hospital (AMA) by family brought in for evaluation of syncope (11 Sep 2020 14:08)      HPI:  Due to patient's dementia, history is collected from his daughter Ms. Roxanne Carrillo via telephone    87M w/ dementia (A&Ox1), hx of traumatic ICH s/p craniotomy, orthostatic hypotension on midodrine, HTN, BPH presents to [facility] after leaving outside against medical advice for evaluation of syncope. Ms. Carrillo informs that the patient was having episodes of faiting/collapse upon standing which led her to bring him to the hospital. Per chart review form [facility], the patient was admitted for syncope evaluation and determined to have NSTEMI. There is limited paperwork from [facility] and therefore unclear as to the complete evaluation and management. The patient had head CT which does not report acute pathology, TTE was also completed and did not identify LV systolic dysfunction. The patient's daughter reports that a neurologist also saw the patient and it is unclear if the patient had seizures and the family was told an EEG was done but there were no results. The family became concerned when physicians at [facility] wanted to do a nuclear stress test which they did not agree with. They spoke with the patients cardiologist Dr. Hale who recommended evaluation at [facility] and the family had patient leave against medical advice and brought patient to [facility] for further evaluation. Of note patient was labeled to have atrial fibrillation as a medical problem at [facility] but does not have this charted previously.    In the ED. 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 10 milliGRAM(s) Oral three times a day  sodium chloride 0.9%. 1000 milliLiter(s) (50 mL/Hr) IV Continuous <Continuous>  tamsulosin 0.4 milliGRAM(s) Oral at bedtime    MEDICATIONS  (PRN):      Allergies    No Known Allergies    Intolerances    ROS: denies pain, further ROS not obtainable due to mental status      Vital Signs Last 24 Hrs  T(C): 37 (11 Sep 2020 13:36), Max: 37 (10 Sep 2020 20:00)  T(F): 98.6 (11 Sep 2020 13:36), Max: 98.6 (10 Sep 2020 20:00)  HR: 81 (11 Sep 2020 13:36) (68 - 81)  BP: 165/83 (11 Sep 2020 13:36) (111/66 - 167/81)  BP(mean): 99 (10 Sep 2020 21:30) (87 - 99)  RR: 18 (11 Sep 2020 13:36) (16 - 18)  SpO2: 96% (11 Sep 2020 13:36) (93% - 99%)    PHYSICAL EXAM  General: adult in NAD  HEENT: clear oropharynx, anicteric sclera, pink conjunctiva  Neck: supple  CV: normal S1/S2 with no murmur rubs or gallops  Lungs: decreased BS, poor effort  Abdomen: soft non-tender non-distended, positive bowel sounds  Ext: no clubbing cyanosis or edema  Skin: no rashes and no petechiae  Lymph Nodes: No LAD in neck  Neuro: alert and oriented X 1, no focal deficits    LABS:                          14.1   7.83  )-----------( 102      ( 11 Sep 2020 08:52 )             43.3         Mean Cell Volume : 87.7 fl  Mean Cell Hemoglobin : 28.5 pg  Mean Cell Hemoglobin Concentration : 32.6 gm/dL  Auto Neutrophil # : x  Auto Lymphocyte # : x  Auto Monocyte # : x  Auto Eosinophil # : x  Auto Basophil # : x  Auto Neutrophil % : x  Auto Lymphocyte % : x  Auto Monocyte % : x  Auto Eosinophil % : x  Auto Basophil % : x      Serial CBC's  09-11 @ 08:52  Hct-43.3 / Hgb-14.1 / Plat-102 / RBC-4.94 / WBC-7.83  Serial CBC's  09-11 @ 06:04  Hct--- / Hgb--- / Plat-70 / RBC--- / WBC---  Serial CBC's  09-11 @ 06:01  Hct-42.8 / Hgb-14.0 / Plat-94 / RBC-4.90 / WBC-7.16  Serial CBC's  09-10 @ 18:50  Hct-43.0 / Hgb-14.0 / Plat-102 / RBC-4.86 / WBC-8.54      09-11    143  |  106  |  24<H>  ----------------------------<  83  4.4   |  27  |  1.31<H>    Ca    9.7      11 Sep 2020 08:52  Phos  3.3     09-11  Mg     2.1     09-11    TPro  7.5  /  Alb  3.5  /  TBili  0.8  /  DBili  x   /  AST  67<H>  /  ALT  24  /  AlkPhos  46  09-10                        Radiology:

## 2020-09-11 NOTE — PROGRESS NOTE ADULT - SUBJECTIVE AND OBJECTIVE BOX
CHIEF COMPLAINT:Patient is a 87y old  Male who presents with a chief complaint of 87M recently leaving outside hospital (AMA) by family brought in for evaluation of syncope (11 Sep 2020 06:31)    	        PAST MEDICAL & SURGICAL HISTORY:  Dementia with behavioral disturbance  Traumatic intracerebral hemorrhage  Orthostatic hypotension  BPH (benign prostatic hyperplasia)  HLD (hyperlipidemia)  Status post craniectomy          REVIEW OF SYSTEMS:  sleepy   arousable  RESPIRATORY: No cough, wheezing, chills or hemoptysis; No Shortness of Breath  CARDIOVASCULAR: No chest pain, palpitations  GASTROINTESTINAL: No abdominal or epigastric pain. No nausea, vomiting, or hematemesis;   NEUROLOGICAL: No headaches,    Medications:  MEDICATIONS  (STANDING):  diVALproex  milliGRAM(s) Oral daily  heparin   Injectable 5000 Unit(s) SubCutaneous every 8 hours  midodrine. 10 milliGRAM(s) Oral three times a day  tamsulosin 0.4 milliGRAM(s) Oral at bedtime    MEDICATIONS  (PRN):    	    PHYSICAL EXAM:  T(C): 36.6 (09-11-20 @ 05:04), Max: 37 (09-10-20 @ 20:00)  HR: 68 (09-11-20 @ 05:04) (68 - 81)  BP: 167/81 (09-11-20 @ 05:04) (111/66 - 167/81)  RR: 18 (09-11-20 @ 05:04) (16 - 18)  SpO2: 98% (09-11-20 @ 05:04) (93% - 99%)  Wt(kg): --  I&O's Summary    10 Sep 2020 07:01  -  11 Sep 2020 07:00  --------------------------------------------------------  IN: 0 mL / OUT: 0 mL / NET: 0 mL        	  HEENT:   Normal oral mucosa,   Lymphatic: No lymphadenopathy  Cardiovascular: reg irr  Respiratory: Lungs clear to auscultation	  Gastrointestinal:  Soft, Non-tender, + BS	  Skin: No rashes, No ecchymoses,  Neurologic: Non-focal/unable to fully assess  Extremities: dec rom     TELEMETRY: 	    ECG:  	  RADIOLOGY:  OTHER: 	  	  LABS:	 	    CARDIAC MARKERS:  CARDIAC MARKERS ( 11 Sep 2020 06:01 )  x     / x     / 283 U/L / x     / 1.7 ng/mL  CARDIAC MARKERS ( 10 Sep 2020 18:50 )  x     / x     / 423 U/L / x     / x                                    x      x     )-----------( 70       ( 11 Sep 2020 06:04 )             x        09-11    143  |  106  |  25<H>  ----------------------------<  89  4.4   |  24  |  1.37<H>    Ca    9.4      11 Sep 2020 06:01  Phos  3.3     09-11  Mg     2.1     09-11    TPro  7.5  /  Alb  3.5  /  TBili  0.8  /  DBili  x   /  AST  67<H>  /  ALT  24  /  AlkPhos  46  09-10    proBNP:   Lipid Profile:   HgA1c:   TSH:

## 2020-09-11 NOTE — CONSULT NOTE ADULT - ASSESSMENT
Assessment: 88 yo male seen and examined at bedside. A&Ox1 (to self), in no apparent distress. Assessment limited secondary to dementia. Patient unable to participate in exam. Per nursing staff, patient has been agitated and combative, spitting on staff and swinging at staff/grabbing staff. Patient not following commands or answering questions appropriately. Further information was obtained from daughter Roxanne Carrillo via telephone. Per daughter, the patient has been experiencing episodes of fainting/collapse. These episodes often happen after he eats breakfast and then stands up. He has been diagnosed with orthostatic hypotension in the past and takes Midodrine TID at home. The family checks his BP daily and also after any episode of collapse. Per the daughter his BP is typically "very low" following these episodes but she could not recall how low exactly. Per the daughter, this hospitalization was prompted when he had an episode of collapse after standing up followed by ~10 min LOC that was accompanied by some shaking movements in the extremities. She states the family thinks he has seizures but he has not been diagnosed with a seizure disorder. He is followed by Dr. Rubio and was last seen in clinic in 03/2020 for dementia and insomnia. At that time seizure workup (including all EEGs) was negative.    Impression: Syncopal episodes in setting of patient with known orthostatic hypotension. Some episodes associated with shaking movements in the extremities per family. Likely secondary to convulsive syncope in setting of orthostatic hypotension.    Recommendations:  [] No neurological workup needed as inpatient.  [] Can consider routine EEG as outpatient, however likely a low-yield study as patient has had multiple negative EEGs in the past.  [] Can follow up with Dr. Rubio at [address] [place] [address]. ([phone]).    To be discussed with attending neurologist Dr. Garcia. Assessment: 88 yo male seen and examined at bedside. A&Ox1 (to self), in no apparent distress. Assessment limited secondary to dementia. Patient unable to participate in exam. Per nursing staff, patient has been agitated and combative, spitting on staff and swinging at staff/grabbing staff. Patient not following commands or answering questions appropriately. Further information was obtained from daughter Roxanne Carrillo via telephone. Per daughter, the patient has been experiencing episodes of fainting/collapse. These episodes often happen after he eats breakfast and then stands up. He has been diagnosed with orthostatic hypotension in the past and takes Midodrine TID at home. The family checks his BP daily and also after any episode of collapse. Per the daughter his BP is typically "very low" following these episodes but she could not recall how low exactly. Per the daughter, this hospitalization was prompted when he had an episode of collapse after standing up followed by ~10 min LOC that was accompanied by some shaking movements in the extremities. She states the family thinks he has seizures but he has not been diagnosed with a seizure disorder. He is followed by Dr. Rubio and was last seen in clinic in 03/2020 for dementia and insomnia. At that time seizure workup (including all EEGs) was negative.    Impression: Syncopal episodes in setting of patient with known orthostatic hypotension. Some episodes associated with shaking movements in the extremities per family. Likely secondary to convulsive syncope in setting of orthostatic hypotension.    Recommendations:  [] No neurological workup needed as inpatient.  [] Can consider routine EEG as outpatient, however likely a low-yield study as patient has had multiple negative EEGs in the past.  [] Rest of care per primary team.  [] Can follow up with Dr. Rubio at [address] [place] [address]. ([phone]).    To be discussed with attending neurologist Dr. Garcia. Assessment: 86 yo male seen and examined at bedside. A&Ox1 (to self), in no apparent distress. Assessment limited secondary to dementia. Patient unable to participate in exam. Per nursing staff, patient has been agitated and combative, spitting on staff and swinging at staff/grabbing staff. Patient not following commands or answering questions appropriately. Further information was obtained from daughter Roxanne Carrillo via telephone. Per daughter, the patient has been experiencing episodes of fainting/collapse. These episodes often happen after he eats breakfast and then stands up. He has been diagnosed with orthostatic hypotension in the past and takes Midodrine TID at home. The family checks his BP daily and also after any episode of collapse. Per the daughter his BP is typically "very low" following these episodes but she could not recall how low exactly. Per the daughter, this hospitalization was prompted when he had an episode of collapse after standing up followed by ~10 min LOC that was accompanied by some shaking movements in the extremities. She states the family thinks he has seizures but he has not been diagnosed with a seizure disorder. He is followed by Dr. Rubio and was last seen in clinic in 03/2020 for dementia and insomnia. At that time seizure workup (including all EEGs) was negative.    Impression: Syncopal episodes in setting of patient with known orthostatic hypotension. Some episodes associated with shaking movements in the extremities per family. Likely secondary to convulsive syncope in setting of orthostatic hypotension. Seizure disorder less likely.    Recommendations:  [] No neurological workup needed as inpatient.  [] Can consider routine EEG as outpatient, however likely a low-yield study as patient has had multiple negative EEGs in the past.  [] Rest of care per primary team.  [] Can follow up with Dr. Rubio at [address] [place] [address]. ([phone]).    To be discussed with attending neurologist Dr. Garcia.

## 2020-09-11 NOTE — CONSULT NOTE ADULT - ASSESSMENT
Syncope   due to severe Orthostatic hypotension   cont midodrine  will consider adding clonidine   monitor orthostatic vitals  OOB to chair at all times  Compression stockings  neurology eval     HTN  supine HTN  will consider adding clonidine     Elevated trop  not consistent with acute MI   obtain echo

## 2020-09-11 NOTE — CONSULT NOTE ADULT - ATTENDING COMMENTS
Continue Depakote 250mg daily. Can consider as an outpatient potentially increasing to BID for seizure coverage, however, suspicion remains low and increasing AEDs would likely contribute to worsening mental status.    Orthostatic hypotension management per Cardiology/primary team.    Thank you for the consult. Neurology to sign off Please do not hesitate to contact the Neurology team with any further questions/concerns.

## 2020-09-11 NOTE — DISCHARGE NOTE NURSING/CASE MANAGEMENT/SOCIAL WORK - NSDCPEWEB_GEN_ALL_CORE
[place] website --- [email or web address] Tobacco [email or web address] website --- [email or web address]

## 2020-09-11 NOTE — CONSULT NOTE ADULT - SUBJECTIVE AND OBJECTIVE BOX
WES ZAMORANO  MRN-[id number]    Subjective: 88 yo male seen and examined at bedside. A&Ox1 (to self), in no apparent distress. Assessment limited secondary to dementia. Patient unable to participate in exam. Per nursing staff, patient has been agitated and combative, spitting on staff and swinging at staff/grabbing staff. Patient not following commands or answering questions appropriately. Further information was obtained from daughter Roxanne Carrillo via telephone. Per daughter, the patient has been experiencing episodes of fainting/collapse. These episodes often happen after he eats breakfast and then stands up. He has been diagnosed with orthostatic hypotension in the past and takes Midodrine TID at home. The family checks his BP daily and also after any episode of collapse. Per the daughter his BP is typically "very low" following these episodes but she could not recall how low exactly. Per the daughter, this hospitalization was prompted when he had an episode of collapse after standing up followed by ~10 min LOC that was precipitated by some shaking movements in the extremities. She states the family thinks he has seizures but he has not been diagnosed with a seizure disorder. He is followed by Dr. Rubio and was last seen in clinic in 03/2020 for dementia and insomnia. At that time seizure workup (including all EEGs) was negative. ROS could not be obtained due to patient's inability to participate with exam.    HPI: (per primary team)  Due to patient's dementia, history is collected from his daughter Ms. Roxanne Carrillo via telephone    87M w/ dementia (A&Ox1), hx of traumatic ICH s/p craniotomy, orthostatic hypotension on midodrine, HTN, BPH presents to [facility] after leaving outside against medical advice for evaluation of syncope. Ms. Carrillo informs that the patient was having episodes of fainting/collapse upon standing which led her to bring him to the hospital. Per chart review form [facility], the patient was admitted for syncope evaluation and determined to have NSTEMI. There is limited paperwork from [facility] and therefore unclear as to the complete evaluation and management. The patient had head CT which does not report acute pathology, TTE was also completed and did not identify LV systolic dysfunction. The patient's daughter reports that a neurologist also saw the patient and it is unclear if the patient had seizures and the family was told an EEG was done but there were no results. The family became concerned when physicians at [facility] wanted to do a nuclear stress test which they did not agree with. They spoke with the patients cardiologist Dr. Hale who recommended evaluation at [facility] and the family had patient leave against medical advice and brought patient to [facility] for further evaluation. Of note patient was labeled to have atrial fibrillation as a medical problem at [facility] but does not have this charted previously.    In the ED. VS 98.2, 111/66, 7918 94%RA (10 Sep 2020 22:46)      NIHSS:  MRS:     ROS: All negative except as mentioned in HPI    PAST MEDICAL & SURGICAL HISTORY:  Dementia with behavioral disturbance  Traumatic intracerebral hemorrhage  Orthostatic hypotension  BPH (benign prostatic hyperplasia)  HLD (hyperlipidemia)  Status post craniectomy    MEDICATIONS  (STANDING):  diVALproex  milliGRAM(s) Oral daily  heparin   Injectable 5000 Unit(s) SubCutaneous every 8 hours  midodrine. 10 milliGRAM(s) Oral three times a day  sodium chloride 0.9%. 1000 milliLiter(s) (50 mL/Hr) IV Continuous <Continuous>  tamsulosin 0.4 milliGRAM(s) Oral at bedtime    MEDICATIONS  (PRN):    Allergies    No Known Allergies    Intolerances        VITAL SIGNS:  T(F): 98.6  HR: 81  BP: 165/83  RR: 18  SpO2: 96%    Exam:   MS: Oriented x3. Fluent. Follows crossed commands.   CN: VFF. EOMI. V1-V3 intact. Face symmetric. T/u midline.   Motor: Full strength throughout.   Sensory: Intact to LT throughout   Reflexes: 2+ throughout. Babinski absent bilaterally.   Coordination: No dysmetria on FNF or ataxia on HTS bilaterally   Gait: Deferred    LABS:                          14.1   7.83  )-----------( 102      ( 11 Sep 2020 08:52 )             43.3     09-11    143  |  106  |  24<H>  ----------------------------<  83  4.4   |  27  |  1.31<H>    Ca    9.7      11 Sep 2020 08:52  Phos  3.3     09-11  Mg     2.1     09-11    TPro  7.5  /  Alb  3.5  /  TBili  0.8  /  DBili  x   /  AST  67<H>  /  ALT  24  /  AlkPhos  46  09-10        RADIOLOGY & ADDITIONAL STUDIES: WES ZAMORANO  MRN-[id number]    Subjective: 86 yo male seen and examined at bedside. A&Ox1 (to self), in no apparent distress. Assessment limited secondary to dementia. Patient unable to participate in exam. Per nursing staff, patient has been agitated and combative, spitting on staff and swinging at staff/grabbing staff. Patient not following commands or answering questions appropriately. Further information was obtained from daughter Roxanne Carrillo via telephone. Per daughter, the patient has been experiencing episodes of fainting/collapse. These episodes often happen after he eats breakfast and then stands up. He has been diagnosed with orthostatic hypotension in the past and takes Midodrine TID at home. The family checks his BP daily and also after any episode of collapse. Per the daughter his BP is typically "very low" following these episodes but she could not recall how low exactly. Per the daughter, this hospitalization was prompted when he had an episode of collapse after standing up followed by ~10 min LOC that was precipitated by some shaking movements in the extremities. She states the family thinks he has seizures but he has not been diagnosed with a seizure disorder. He is followed by Dr. Rubio and was last seen in clinic in 03/2020 for dementia and insomnia. At that time seizure workup (including all EEGs) was negative. ROS could not be obtained due to patient's inability to participate with exam.    HPI: (per primary team)  Due to patient's dementia, history is collected from his daughter Ms. Roxanne Carrillo via telephone    87M w/ dementia (A&Ox1), hx of traumatic ICH s/p craniotomy, orthostatic hypotension on midodrine, HTN, BPH presents to [facility] after leaving outside against medical advice for evaluation of syncope. Ms. Carrillo informs that the patient was having episodes of fainting/collapse upon standing which led her to bring him to the hospital. Per chart review form [facility], the patient was admitted for syncope evaluation and determined to have NSTEMI. There is limited paperwork from [facility] and therefore unclear as to the complete evaluation and management. The patient had head CT which does not report acute pathology, TTE was also completed and did not identify LV systolic dysfunction. The patient's daughter reports that a neurologist also saw the patient and it is unclear if the patient had seizures and the family was told an EEG was done but there were no results. The family became concerned when physicians at [facility] wanted to do a nuclear stress test which they did not agree with. They spoke with the patients cardiologist Dr. Hale who recommended evaluation at [facility] and the family had patient leave against medical advice and brought patient to [facility] for further evaluation. Of note patient was labeled to have atrial fibrillation as a medical problem at [facility] but does not have this charted previously.    In the ED. VS 98.2, 111/66, 7918 94%RA (10 Sep 2020 22:46)    ROS: All negative except as mentioned in HPI    PAST MEDICAL & SURGICAL HISTORY:  Dementia with behavioral disturbance  Traumatic intracerebral hemorrhage  Orthostatic hypotension  BPH (benign prostatic hyperplasia)  HLD (hyperlipidemia)  Status post craniectomy    MEDICATIONS  (STANDING):  diVALproex  milliGRAM(s) Oral daily  heparin   Injectable 5000 Unit(s) SubCutaneous every 8 hours  midodrine. 10 milliGRAM(s) Oral three times a day  sodium chloride 0.9%. 1000 milliLiter(s) (50 mL/Hr) IV Continuous <Continuous>  tamsulosin 0.4 milliGRAM(s) Oral at bedtime    Allergies    No Known Allergies    VITAL SIGNS:  T(F): 98.6  HR: 81  BP: 165/83  RR: 18  SpO2: 96%    Exam:   MS: Oriented x1 (to self only). Does not follow commands.  CN: VFF on blink to threat. EOMI. Face symmetric.  Motor: Moves all 4 limbs spontaneously.   Reflexes: 2+ throughout. Babinski absent bilaterally.   Coordination: Unable to assess.  Gait: Deferred.    LABS:                          14.1   7.83  )-----------( 102      ( 11 Sep 2020 08:52 )             43.3     09-11    143  |  106  |  24<H>  ----------------------------<  83  4.4   |  27  |  1.31<H>    Ca    9.7      11 Sep 2020 08:52  Phos  3.3     09-11  Mg     2.1     09-11    TPro  7.5  /  Alb  3.5  /  TBili  0.8  /  DBili  x   /  AST  67<H>  /  ALT  24  /  AlkPhos  46  09-10

## 2020-09-11 NOTE — DISCHARGE NOTE NURSING/CASE MANAGEMENT/SOCIAL WORK - PATIENT PORTAL LINK FT
You can access the FollowMyHealth Patient Portal offered by [facility] by registering at the following website: [email or web address]. By joining [employer]’s FollowMyHealth portal, you will also be able to view your health information using other applications (apps) compatible with our system.

## 2020-09-11 NOTE — CONSULT NOTE ADULT - ASSESSMENT
87M w/ dementia (A&Ox1), hx of traumatic ICH s/p craniotomy, orthostatic hypotension on midodrine, HTN, BPH presents to [facility] after leaving outside against medical advice for evaluation of syncope, noted to have thrombocytopenia    #thrombocytopenia  - daughter will bring in previous records, no known history of issue; review of [place] labs with intermittently low platelets over the years  - small clumps noted on blue top  - will review peripheral smear  - check B12/folate/tsh/ APLS  - Hg/bilirubin normal, no evidence of hemolysis  - no history of alcohol/liver disease; check Abdominal US to evaluate spleen/liver  - possibly due to medication/ depakote  - platelet count is stable, monitor on heparin sc    #orthostatic hypotension- on midodrine  - Cardiology following, Echo pending    #dementia, ? seizure  - on depakote, Neuro to see    discussed with daughter on phone  - on va  - neuro to evaluate 87M w/ dementia (A&Ox1), hx of traumatic ICH s/p craniotomy, orthostatic hypotension on midodrine, HTN, BPH presents to [facility] after leaving outside against medical advice for evaluation of syncope, noted to have thrombocytopenia    #thrombocytopenia- with clumps, actual count likely higher  - daughter will bring in previous records, no known history of issue; review of [place] labs with intermittently low platelets over the years  - small clumps noted on blue top  - reviewed peripheral smear- small clumps noted on smear and on blue top smear; no abnormal cells, no schistocyte  - check B12/folate/tsh/ APLS  - Hg/bilirubin normal, no evidence of hemolysis  - no history of alcohol/liver disease; check Abdominal US to evaluate spleen/liver  - possibly due to medication/ depakote  - platelet count is stable, monitor on heparin sc    #orthostatic hypotension- on midodrine  - Cardiology following, Echo pending    #dementia, ? seizure  - on depakote, Neuro to see    discussed with daughter on phone  - on va  - neuro to evaluate

## 2020-09-11 NOTE — PROGRESS NOTE ADULT - ASSESSMENT
87 y /o M w/ dementia hx of traumatic ICH s/p craniotomy, orthostatic hypotension on midodrine, HTN, BPH presents to [facility] after leaving outside against medical advice for evaluation of syncope.      Problem/Plan - 1:  ·  Problem: Syncope and collapse.  cards eval noted   orthostatic hypotension  c/w midodrine   neuro eval      Problem/Plan - 2:  ·  Problem: Orthostatic hypotension.  Plan: - continue with home midodrine 10g TID  - fall precautions, ambulation only with assistance and walker.      Problem/Plan - 3:  ·  Problem: Hyperkalemia. improved   Problem/Plan - 4:  ·  Problem: Thrombocytopenia.    - no signs of active hemorrhage  heme eval     Problem/Plan - 5:  ·  Problem: Atrial fibrillation, unspecified type  - cardiology f/u     Problem/Plan - 6:  Problem: Acute kidney injury.  mild fluids  f/u nehemiah     Problem/Plan - 7:  ·  Problem: Dementia with behavioral disturbance, unspecified dementia type  .  Plan: reported baseline by family is A&Ox1. Has been charted to have end-stage dementia with behavioral distrubance  - continue with valproic acid for now  - neurology consult        Problem/Plan - 8:  ·  Problem: Essential hypertension  .  Plan: hold antihypertensives given reported syncope.     dvt proph

## 2020-09-12 LAB
ANION GAP SERPL CALC-SCNC: 14 MMOL/L — SIGNIFICANT CHANGE UP (ref 5–17)
BUN SERPL-MCNC: 23 MG/DL — SIGNIFICANT CHANGE UP (ref 7–23)
CALCIUM SERPL-MCNC: 10 MG/DL — SIGNIFICANT CHANGE UP (ref 8.4–10.5)
CHLORIDE SERPL-SCNC: 104 MMOL/L — SIGNIFICANT CHANGE UP (ref 96–108)
CO2 SERPL-SCNC: 25 MMOL/L — SIGNIFICANT CHANGE UP (ref 22–31)
CREAT SERPL-MCNC: 1.24 MG/DL — SIGNIFICANT CHANGE UP (ref 0.5–1.3)
FOLATE SERPL-MCNC: 13.1 NG/ML — SIGNIFICANT CHANGE UP
GLUCOSE SERPL-MCNC: 87 MG/DL — SIGNIFICANT CHANGE UP (ref 70–99)
POTASSIUM SERPL-MCNC: 4.3 MMOL/L — SIGNIFICANT CHANGE UP (ref 3.5–5.3)
POTASSIUM SERPL-SCNC: 4.3 MMOL/L — SIGNIFICANT CHANGE UP (ref 3.5–5.3)
SODIUM SERPL-SCNC: 143 MMOL/L — SIGNIFICANT CHANGE UP (ref 135–145)
TSH SERPL-MCNC: 2.02 UIU/ML — SIGNIFICANT CHANGE UP (ref 0.27–4.2)
VIT B12 SERPL-MCNC: 1276 PG/ML — HIGH (ref 232–1245)

## 2020-09-12 PROCEDURE — 99221 1ST HOSP IP/OBS SF/LOW 40: CPT

## 2020-09-12 RX ADMIN — HEPARIN SODIUM 5000 UNIT(S): 5000 INJECTION INTRAVENOUS; SUBCUTANEOUS at 21:30

## 2020-09-12 RX ADMIN — MIDODRINE HYDROCHLORIDE 10 MILLIGRAM(S): 2.5 TABLET ORAL at 12:32

## 2020-09-12 RX ADMIN — HEPARIN SODIUM 5000 UNIT(S): 5000 INJECTION INTRAVENOUS; SUBCUTANEOUS at 05:15

## 2020-09-12 RX ADMIN — MIDODRINE HYDROCHLORIDE 10 MILLIGRAM(S): 2.5 TABLET ORAL at 17:10

## 2020-09-12 RX ADMIN — TAMSULOSIN HYDROCHLORIDE 0.4 MILLIGRAM(S): 0.4 CAPSULE ORAL at 21:30

## 2020-09-12 RX ADMIN — DIVALPROEX SODIUM 250 MILLIGRAM(S): 500 TABLET, DELAYED RELEASE ORAL at 12:31

## 2020-09-12 RX ADMIN — HEPARIN SODIUM 5000 UNIT(S): 5000 INJECTION INTRAVENOUS; SUBCUTANEOUS at 14:58

## 2020-09-12 NOTE — PHYSICAL THERAPY INITIAL EVALUATION ADULT - PERTINENT HX OF CURRENT PROBLEM, REHAB EVAL
87 y /o M w/ dementia (with behavioral disturbance) hx of traumatic ICH s/p craniotomy, orthostatic hypotension on midodrine, HTN, BPH presents to [facility] after leaving outside against medical advice for evaluation of syncope; cariology consulted.

## 2020-09-12 NOTE — PHYSICAL THERAPY INITIAL EVALUATION ADULT - PATIENT/FAMILY AGREES WITH PLAN
Subacute Rehab  but  pt confused; PT spoke with dtr via phone and dtr to consider if pt cannot get walking/yes

## 2020-09-12 NOTE — PROGRESS NOTE ADULT - SUBJECTIVE AND OBJECTIVE BOX
Subjective: Patient seen and examined. No new events except as noted.     SUBJECTIVE/ROS:    Due to altered mental status, subjective information were not able to be obtained from the patient. History was obtained, to the extent possible, from review of the chart and collateral sources of information.      MEDICATIONS:  MEDICATIONS  (STANDING):  diVALproex  milliGRAM(s) Oral daily  heparin   Injectable 5000 Unit(s) SubCutaneous every 8 hours  midodrine. 10 milliGRAM(s) Oral three times a day  sodium chloride 0.9%. 1000 milliLiter(s) (50 mL/Hr) IV Continuous <Continuous>  tamsulosin 0.4 milliGRAM(s) Oral at bedtime      PHYSICAL EXAM:  T(C): 36.7 (09-11-20 @ 20:51), Max: 37 (09-11-20 @ 13:36)  HR: 92 (09-11-20 @ 20:51) (78 - 92)  BP: 134/84 (09-11-20 @ 20:51) (134/84 - 165/83)  RR: 18 (09-11-20 @ 20:51) (18 - 18)  SpO2: 96% (09-11-20 @ 20:51) (96% - 96%)  Wt(kg): --  I&O's Summary    11 Sep 2020 07:01  -  12 Sep 2020 07:00  --------------------------------------------------------  IN: 1220 mL / OUT: 0 mL / NET: 1220 mL            JVP: Normal  Neck: supple  Lung: clear   CV: S1 S2 , Murmur:  Abd: soft  Ext: No edema  neuro: Awake / alert  Psych: flat affect  Skin: normal``    LABS/DATA:    CARDIAC MARKERS:  CARDIAC MARKERS ( 11 Sep 2020 06:01 )  x     / x     / 283 U/L / x     / 1.7 ng/mL  CARDIAC MARKERS ( 10 Sep 2020 18:50 )  x     / x     / 423 U/L / x     / x                                    14.1   7.83  )-----------( 102      ( 11 Sep 2020 08:52 )             43.3     09-12    143  |  104  |  23  ----------------------------<  87  4.3   |  25  |  1.24    Ca    10.0      12 Sep 2020 07:08  Phos  3.3     09-11  Mg     2.1     09-11    TPro  7.5  /  Alb  3.5  /  TBili  0.8  /  DBili  x   /  AST  67<H>  /  ALT  24  /  AlkPhos  46  09-10    proBNP:   Lipid Profile:   HgA1c:   TSH: Thyroid Stimulating Hormone, Serum: 2.02 uIU/mL (09-12 @ 10:52)  Thyroid Stimulating Hormone, Serum: 1.89 uIU/mL (09-11 @ 13:40)      TELE:  EKG:

## 2020-09-12 NOTE — SWALLOW BEDSIDE ASSESSMENT ADULT - ASR SWALLOW ASPIRATION MONITOR
change of breathing pattern/throat clearing/gurgly voice/upper respiratory infection/cough/fever/pneumonia/Monitor for s/s aspiration/laryngeal penetration. If noted:  D/C p.o. intake, provide non-oral nutrition/hydration/meds, and contact this service @ [phone]

## 2020-09-12 NOTE — SWALLOW BEDSIDE ASSESSMENT ADULT - COMMENTS
Impression: Syncopal episodes in setting of patient with known orthostatic hypotension. Some episodes associated with shaking movements in the extremities per family. Likely secondary to convulsive syncope in setting of orthostatic hypotension. Seizure disorder less likely. Heme/onc following for thrombocytopenia – may be medication related. CXR: clear lungs. Of note, pt had MBS in 01/2018 with recommendations for a mechanical soft diet and thin liquids.

## 2020-09-12 NOTE — SWALLOW BEDSIDE ASSESSMENT ADULT - SWALLOW EVAL: DIAGNOSIS
Patient admitted from OSH 2/2 syncope which may be related to orthostatic hypotension. Pt with baseline dementia. Patient presents with mildly prolonged mastication and mild oral residue post swallow requiring liquid wash to clear. No overt s/s of laryngeal penetration/aspiration noted across consistencies administered. +Cognitive linguistic deficits 2/2 dementia dx.

## 2020-09-12 NOTE — PHYSICAL THERAPY INITIAL EVALUATION ADULT - ADDITIONAL COMMENTS
(continuation of H&P) At [facility], pt determined to have NSTEMI but unclear as to the complete evaluation and management.Head CT: NO acute pathology, TTE was also completed and did not identify LV systolic dysfunction.Pt's daughter reports neurologist saw the pt and unclear if the patient had seizures; EEG was done but there were no results. The family became concerned when physicians at OSH wanted to do a nuclear stress test which they did not agree with. Pt'S cardiologist recommended eval at [facility]; family had pt leave AMA and brought patient to [facility]. Of note patient was labeled to have A fib as a medical problem at [facility] but does not have this charted previously. (continuation of H&P) At [facility], pt determined to have NSTEMI but unclear as to the complete evaluation and management.Head CT: NO acute pathology, TTE was also completed and did not identify LV systolic dysfunction.Pt's daughter reports neurologist saw the pt and unclear if the patient had seizures; EEG was done but there were no results. The family became concerned when physicians at OSH wanted to do a nuclear stress test which they did not agree with. Pt'S cardiologist recommended eval at [facility]; family had pt leave AMA and brought patient to [facility]. Of note patient was labeled to have A fib as a medical problem at [facility] but does not have this charted previously.  Pt lives with 24 hour x 7 d HHA and ambulated at times without device in home but occasionally used RW when "weak".

## 2020-09-12 NOTE — PROGRESS NOTE ADULT - SUBJECTIVE AND OBJECTIVE BOX
CHIEF COMPLAINT:Patient is a 87y old  Male who presents with a chief complaint of 87M recently leaving outside hospital (AMA) by family brought in for evaluation of syncope (11 Sep 2020 15:48)    	        PAST MEDICAL & SURGICAL HISTORY:  Dementia with behavioral disturbance    Traumatic intracerebral hemorrhage    Orthostatic hypotension    BPH (benign prostatic hyperplasia)    HLD (hyperlipidemia)    Status post craniectomy            REVIEW OF SYSTEMS:  awake  alert  confused  no cp or sob  no abd pain no chills      Medications:  MEDICATIONS  (STANDING):  diVALproex  milliGRAM(s) Oral daily  heparin   Injectable 5000 Unit(s) SubCutaneous every 8 hours  midodrine. 10 milliGRAM(s) Oral three times a day  sodium chloride 0.9%. 1000 milliLiter(s) (50 mL/Hr) IV Continuous <Continuous>  tamsulosin 0.4 milliGRAM(s) Oral at bedtime    MEDICATIONS  (PRN):    	    PHYSICAL EXAM:  T(C): 36.7 (09-11-20 @ 20:51), Max: 37 (09-11-20 @ 13:36)  HR: 92 (09-11-20 @ 20:51) (78 - 92)  BP: 134/84 (09-11-20 @ 20:51) (134/84 - 165/83)  RR: 18 (09-11-20 @ 20:51) (18 - 18)  SpO2: 96% (09-11-20 @ 20:51) (96% - 96%)  Wt(kg): --  I&O's Summary    11 Sep 2020 07:01  -  12 Sep 2020 07:00  --------------------------------------------------------  IN: 1220 mL / OUT: 0 mL / NET: 1220 mL        Appearance: Normal	  HEENT:   Normal oral mucosa, PERRL, EOMI	  Lymphatic: No lymphadenopathy  Cardiovascular: Normal S1 S2, No JVD, No murmurs,  Respiratory: Lungs clear to auscultation	  Gastrointestinal:  Soft, Non-tender, + BS	  Skin: No rashes, No ecchymoses, No cyanosis	Neurologic: Non-focal  Extremities: Normal range of motion, No clubbing, cyanosis or edema  Vascular: Peripheral pulses palpable 2+ bilaterally    TELEMETRY: 	    ECG:  	  RADIOLOGY:  OTHER: 	  	  LABS:	 	    CARDIAC MARKERS:  CARDIAC MARKERS ( 11 Sep 2020 06:01 )  x     / x     / 283 U/L / x     / 1.7 ng/mL  CARDIAC MARKERS ( 10 Sep 2020 18:50 )  x     / x     / 423 U/L / x     / x                                    14.1   7.83  )-----------( 102      ( 11 Sep 2020 08:52 )             43.3     09-12    143  |  104  |  23  ----------------------------<  87  4.3   |  25  |  1.24    Ca    10.0      12 Sep 2020 07:08  Phos  3.3     09-11  Mg     2.1     09-11    TPro  7.5  /  Alb  3.5  /  TBili  0.8  /  DBili  x   /  AST  67<H>  /  ALT  24  /  AlkPhos  46  09-10    proBNP:   Lipid Profile:   HgA1c:   TSH: Thyroid Stimulating Hormone, Serum: 1.89 uIU/mL (09-11 @ 13:40)

## 2020-09-12 NOTE — PHYSICAL THERAPY INITIAL EVALUATION ADULT - PLANNED THERAPY INTERVENTIONS, PT EVAL
bed mobility training/strengthening/transfer training/Stairs Goal: pT will go up/down 5 steps with + handrail and cga x1 in 4 weeks./gait training/balance training

## 2020-09-12 NOTE — PROGRESS NOTE ADULT - ASSESSMENT
87 y /o M w/ dementia hx of traumatic ICH s/p craniotomy, orthostatic hypotension on midodrine, HTN, BPH presents to [facility] after leaving outside against medical advice for evaluation of syncope.      Problem/Plan - 1:  ·  Problem: Syncope and collapse.  cards eval noted   orthostatic hypotension  c/w midodrine   neuro eval noted     Problem/Plan - 2:  ·  Problem: Orthostatic hypotension.  Plan: - continue with home midodrine 10g TID  - fall precautions, ambulation only with assistance and walker.      Problem/Plan - 3:  ·  Problem: Hyperkalemia. improved   Problem/Plan - 4:  ·  Problem: Thrombocytopenia.    - no signs of active hemorrhage  heme eval     Problem/Plan - 5:  ·  Problem: Atrial fibrillation, unspecified type  - cardiology f/u     Problem/Plan - 6:  Problem: Acute kidney injury.improved  mild fluids prn     Problem/Plan - 7:  ·  Problem: Dementia with behavioral disturbance, unspecified dementia type  .  Plan: reported baseline by family is A&Ox1. 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  Plan: hold antihypertensives given reported syncope.     low plts  heme f/u noted    dvt proph

## 2020-09-12 NOTE — SWALLOW BEDSIDE ASSESSMENT ADULT - SLP PERTINENT HISTORY OF CURRENT PROBLEM
87M w/ dementia (A&Ox1), hx of traumatic ICH s/p craniotomy, orthostatic hypotension on midodrine, HTN, BPH presents to [facility] after leaving outside hospital against medical advice for evaluation of syncope. +elevated troponin noted with outside hospital documenting NSTEMI. unclear what happened at OSH. will trend troponin. history of patient passing out and collapsing upon standing most likely represents  orthostatic hypotension which the patient is known to have and for which he takes midodrine 10mg TID. reported baseline by family is A&Ox1. Has been charted to have end-stage dementia with behavioral disturbance. npo overnight as it is unclear if patient has dysphagia per discussion with family. may require formal speech/swallow exam pending neurology examination. Neuro consulted and reported: He is followed by Dr. Rubio and was last seen in clinic in 03/2020 for dementia and insomnia. At that time seizure workup (including all EEGs) was negative.

## 2020-09-12 NOTE — SWALLOW BEDSIDE ASSESSMENT ADULT - SLP GENERAL OBSERVATIONS
Patient encountered awake and alert, upright in bed, on RA. A&Ox1; +tangential speech and cognitive impairment c/w dementia dx. Inconsistently followed simple commands. Pt fed breakfast tray by SLP - able to somewhat self feed, but required max assist with feeding.

## 2020-09-12 NOTE — PROGRESS NOTE ADULT - SUBJECTIVE AND OBJECTIVE BOX
Chief Complaint: fu    History of Present Illness: pt laying in bed comfortably; confused, ROS not obtainable      MEDICATIONS  (STANDING):  diVALproex  milliGRAM(s) Oral daily  heparin   Injectable 5000 Unit(s) SubCutaneous every 8 hours  midodrine. 10 milliGRAM(s) Oral three times a day  sodium chloride 0.9%. 1000 milliLiter(s) (50 mL/Hr) IV Continuous <Continuous>  tamsulosin 0.4 milliGRAM(s) Oral at bedtime    MEDICATIONS  (PRN):      Allergies    No Known Allergies    Intolerances        Vital Signs Last 24 Hrs  T(C): 36.9 (12 Sep 2020 13:55), Max: 36.9 (12 Sep 2020 13:55)  T(F): 98.4 (12 Sep 2020 13:55), Max: 98.4 (12 Sep 2020 13:55)  HR: 82 (12 Sep 2020 13:55) (64 - 92)  BP: 128/79 (12 Sep 2020 13:55) (128/79 - 156/92)  BP(mean): --  RR: 18 (12 Sep 2020 13:55) (17 - 18)  SpO2: 95% (12 Sep 2020 13:55) (94% - 96%)    PHYSICAL EXAM  General: adult in NAD  HEENT: clear oropharynx, anicteric sclera, pink conjunctiva  Neck: supple  CV: normal S1/S2   Lungs: decreased BS, poor effort  Abdomen: soft non-tender non-distended, positive bowel sounds  Ext: no clubbing cyanosis or edema  Skin: no rashes and no petechiae  Lymph Nodes: No LAD in neck  Neuro: alert and oriented X 3, no focal deficits    LABS:                          14.1   7.83  )-----------( 102      ( 11 Sep 2020 08:52 )             43.3         Mean Cell Volume : 87.7 fl  Mean Cell Hemoglobin : 28.5 pg  Mean Cell Hemoglobin Concentration : 32.6 gm/dL  Auto Neutrophil # : x  Auto Lymphocyte # : x  Auto Monocyte # : x  Auto Eosinophil # : x  Auto Basophil # : x  Auto Neutrophil % : x  Auto Lymphocyte % : x  Auto Monocyte % : x  Auto Eosinophil % : x  Auto Basophil % : x      Serial CBC's  09-11 @ 08:52  Hct-43.3 / Hgb-14.1 / Plat-102 / RBC-4.94 / WBC-7.83  Serial CBC's  09-11 @ 06:04  Hct--- / Hgb--- / Plat-70 / RBC--- / WBC---  Serial CBC's  09-11 @ 06:01  Hct-42.8 / Hgb-14.0 / Plat-94 / RBC-4.90 / WBC-7.16  Serial CBC's  09-10 @ 18:50  Hct-43.0 / Hgb-14.0 / Plat-102 / RBC-4.86 / WBC-8.54      09-12    143  |  104  |  23  ----------------------------<  87  4.3   |  25  |  1.24    Ca    10.0      12 Sep 2020 07:08  Phos  3.3     09-11  Mg     2.1     09-11    TPro  7.5  /  Alb  3.5  /  TBili  0.8  /  DBili  x   /  AST  67<H>  /  ALT  24  /  AlkPhos  46  09-10          Vitamin B12, Serum: 1276 pg/mL (09-12 @ 10:52)  Folate, Serum: 13.1 ng/mL (09-12 @ 10:52)              Radiology:

## 2020-09-12 NOTE — PROGRESS NOTE ADULT - ASSESSMENT
87M w/ dementia (A&Ox1), hx of traumatic ICH s/p craniotomy, orthostatic hypotension on midodrine, HTN, BPH presents to [facility] after leaving outside against medical advice for evaluation of syncope, noted to have thrombocytopenia    #thrombocytopenia- with clumps, actual count likely higher  - previous CBC as outpatient 8/2/2020 with platelet 74K; review of [place] labs with intermittently low platelets over the years  - small clumps noted on blue top  - reviewed peripheral smear- small clumps noted on smear and on blue top smear; no abnormal cells, no schistocyte  - B12/folate normal, TSH normal; APLS pending  - Hg/bilirubin normal, no evidence of hemolysis  - no history of alcohol/liver disease; Abdominal US pending to evaluate spleen/liver  - possibly due to medication/ depakote  - platelet count is stable, monitor on heparin sc; repeat CBC in AM    #orthostatic hypotension- on midodrine  - Cardiology following, Echo pending    #dementia, ? seizure  - on depakote, Neuro to see    discussed with daughter on phone 9/11

## 2020-09-12 NOTE — SWALLOW BEDSIDE ASSESSMENT ADULT - SWALLOW EVAL: RECOMMENDED FEEDING/EATING TECHNIQUES
check mouth frequently for oral residue/pocketing/maintain upright posture during/after eating for 30 mins/alternate food with liquid/allow for swallow between intakes/small sips/bites

## 2020-09-13 DIAGNOSIS — B19.20 UNSPECIFIED VIRAL HEPATITIS C WITHOUT HEPATIC COMA: ICD-10-CM

## 2020-09-13 LAB
ANION GAP SERPL CALC-SCNC: 14 MMOL/L — SIGNIFICANT CHANGE UP (ref 5–17)
BUN SERPL-MCNC: 19 MG/DL — SIGNIFICANT CHANGE UP (ref 7–23)
CALCIUM SERPL-MCNC: 9.7 MG/DL — SIGNIFICANT CHANGE UP (ref 8.4–10.5)
CHLORIDE SERPL-SCNC: 106 MMOL/L — SIGNIFICANT CHANGE UP (ref 96–108)
CO2 SERPL-SCNC: 21 MMOL/L — LOW (ref 22–31)
CREAT SERPL-MCNC: 1.17 MG/DL — SIGNIFICANT CHANGE UP (ref 0.5–1.3)
GLUCOSE SERPL-MCNC: 112 MG/DL — HIGH (ref 70–99)
HAV IGM SER-ACNC: SIGNIFICANT CHANGE UP
HCT VFR BLD CALC: 41.6 % — SIGNIFICANT CHANGE UP (ref 39–50)
HGB BLD-MCNC: 13.8 G/DL — SIGNIFICANT CHANGE UP (ref 13–17)
MCHC RBC-ENTMCNC: 28.6 PG — SIGNIFICANT CHANGE UP (ref 27–34)
MCHC RBC-ENTMCNC: 33.2 GM/DL — SIGNIFICANT CHANGE UP (ref 32–36)
MCV RBC AUTO: 86.3 FL — SIGNIFICANT CHANGE UP (ref 80–100)
NRBC # BLD: 0 /100 WBCS — SIGNIFICANT CHANGE UP (ref 0–0)
PLATELET # BLD AUTO: 112 K/UL — LOW (ref 150–400)
POTASSIUM SERPL-MCNC: 3.9 MMOL/L — SIGNIFICANT CHANGE UP (ref 3.5–5.3)
POTASSIUM SERPL-SCNC: 3.9 MMOL/L — SIGNIFICANT CHANGE UP (ref 3.5–5.3)
RBC # BLD: 4.82 M/UL — SIGNIFICANT CHANGE UP (ref 4.2–5.8)
RBC # FLD: 13.3 % — SIGNIFICANT CHANGE UP (ref 10.3–14.5)
SODIUM SERPL-SCNC: 141 MMOL/L — SIGNIFICANT CHANGE UP (ref 135–145)
WBC # BLD: 7.3 K/UL — SIGNIFICANT CHANGE UP (ref 3.8–10.5)
WBC # FLD AUTO: 7.3 K/UL — SIGNIFICANT CHANGE UP (ref 3.8–10.5)

## 2020-09-13 RX ADMIN — TAMSULOSIN HYDROCHLORIDE 0.4 MILLIGRAM(S): 0.4 CAPSULE ORAL at 22:21

## 2020-09-13 RX ADMIN — DIVALPROEX SODIUM 250 MILLIGRAM(S): 500 TABLET, DELAYED RELEASE ORAL at 12:54

## 2020-09-13 RX ADMIN — HEPARIN SODIUM 5000 UNIT(S): 5000 INJECTION INTRAVENOUS; SUBCUTANEOUS at 15:18

## 2020-09-13 RX ADMIN — HEPARIN SODIUM 5000 UNIT(S): 5000 INJECTION INTRAVENOUS; SUBCUTANEOUS at 22:20

## 2020-09-13 RX ADMIN — HEPARIN SODIUM 5000 UNIT(S): 5000 INJECTION INTRAVENOUS; SUBCUTANEOUS at 06:00

## 2020-09-13 NOTE — CONSULT NOTE ADULT - REASON FOR ADMISSION
87M recently leaving outside hospital (AMA) by family brought in for evaluation of syncope

## 2020-09-13 NOTE — PROGRESS NOTE ADULT - SUBJECTIVE AND OBJECTIVE BOX
Subjective: Patient seen and examined. No new events except as noted.     SUBJECTIVE/ROS:  resting       MEDICATIONS:  MEDICATIONS  (STANDING):  diVALproex  milliGRAM(s) Oral daily  heparin   Injectable 5000 Unit(s) SubCutaneous every 8 hours  midodrine. 10 milliGRAM(s) Oral three times a day  sodium chloride 0.9%. 1000 milliLiter(s) (50 mL/Hr) IV Continuous <Continuous>  tamsulosin 0.4 milliGRAM(s) Oral at bedtime      PHYSICAL EXAM:  T(C): 36.3 (09-13-20 @ 04:36), Max: 36.9 (09-12-20 @ 13:55)  HR: 85 (09-13-20 @ 04:36) (80 - 85)  BP: 159/80 (09-13-20 @ 04:36) (128/79 - 159/80)  RR: 18 (09-13-20 @ 04:36) (18 - 18)  SpO2: 95% (09-13-20 @ 04:36) (94% - 95%)  Wt(kg): --  I&O's Summary    12 Sep 2020 07:01  -  13 Sep 2020 07:00  --------------------------------------------------------  IN: 720 mL / OUT: 0 mL / NET: 720 mL            JVP: Normal  Neck: supple  Lung: clear   CV: S1 S2 , Murmur:  Abd: soft  Ext: No edema  neuro: Awake / alert  Psych: flat affect  Skin: normal``    LABS/DATA:    CARDIAC MARKERS:  CARDIAC MARKERS ( 11 Sep 2020 06:01 )  x     / x     / 283 U/L / x     / 1.7 ng/mL  CARDIAC MARKERS ( 10 Sep 2020 18:50 )  x     / x     / 423 U/L / x     / x                                    14.1   7.83  )-----------( 102      ( 11 Sep 2020 08:52 )             43.3     09-12    143  |  104  |  23  ----------------------------<  87  4.3   |  25  |  1.24    Ca    10.0      12 Sep 2020 07:08      proBNP:   Lipid Profile:   HgA1c:   TSH: Thyroid Stimulating Hormone, Serum: 2.02 uIU/mL (09-12 @ 10:52)      TELE:  EKG:

## 2020-09-13 NOTE — PROGRESS NOTE ADULT - SUBJECTIVE AND OBJECTIVE BOX
Chief Complaint:  fu    History of Present Illness:   pt more agitated today; speaking but not answering questions appropriately; does deny pain    ROS not obtainable due to MS; no overt bleeding per RN, tolerating diet      MEDICATIONS  (STANDING):  diVALproex  milliGRAM(s) Oral daily  heparin   Injectable 5000 Unit(s) SubCutaneous every 8 hours  midodrine. 10 milliGRAM(s) Oral three times a day  sodium chloride 0.9%. 1000 milliLiter(s) (50 mL/Hr) IV Continuous <Continuous>  tamsulosin 0.4 milliGRAM(s) Oral at bedtime    MEDICATIONS  (PRN):      Allergies    No Known Allergies    Intolerances        Vital Signs Last 24 Hrs  T(C): 36.5 (13 Sep 2020 11:36), Max: 36.9 (12 Sep 2020 20:06)  T(F): 97.7 (13 Sep 2020 11:36), Max: 98.4 (12 Sep 2020 20:06)  HR: 83 (13 Sep 2020 11:36) (80 - 85)  BP: 159/80 (13 Sep 2020 04:36) (154/94 - 159/80)  BP(mean): --  RR: 18 (13 Sep 2020 11:36) (18 - 18)  SpO2: 94% (13 Sep 2020 11:36) (94% - 95%)    PHYSICAL EXAM  General: adult in NAD  HEENT: clear oropharynx, anicteric sclera, pink conjunctiva  Neck: supple  CV: normal S1/S2   Lungs: clear to auscultation, no wheezes, no rales  Abdomen: soft non-tender non-distended, positive bowel sounds  Ext: no clubbing cyanosis or edema  Skin: no rashes and no petechiae  Neuro: alert and oriented X person    LABS:                          13.8   7.30  )-----------( 112      ( 13 Sep 2020 10:18 )             41.6         Mean Cell Volume : 86.3 fl  Mean Cell Hemoglobin : 28.6 pg  Mean Cell Hemoglobin Concentration : 33.2 gm/dL  Auto Neutrophil # : x  Auto Lymphocyte # : x  Auto Monocyte # : x  Auto Eosinophil # : x  Auto Basophil # : x  Auto Neutrophil % : x  Auto Lymphocyte % : x  Auto Monocyte % : x  Auto Eosinophil % : x  Auto Basophil % : x      Serial CBC's  09-13 @ 10:18  Hct-41.6 / Hgb-13.8 / Plat-112 / RBC-4.82 / WBC-7.30  Serial CBC's  09-11 @ 08:52  Hct-43.3 / Hgb-14.1 / Plat-102 / RBC-4.94 / WBC-7.83  Serial CBC's  09-11 @ 06:04  Hct--- / Hgb--- / Plat-70 / RBC--- / WBC---  Serial CBC's  09-11 @ 06:01  Hct-42.8 / Hgb-14.0 / Plat-94 / RBC-4.90 / WBC-7.16  Serial CBC's  09-10 @ 18:50  Hct-43.0 / Hgb-14.0 / Plat-102 / RBC-4.86 / WBC-8.54      09-13    141  |  106  |  19  ----------------------------<  112<H>  3.9   |  21<L>  |  1.17    Ca    9.7      13 Sep 2020 10:18            Vitamin B12, Serum: 1276 pg/mL (09-12 @ 10:52)  Folate, Serum: 13.1 ng/mL (09-12 @ 10:52)          pHepatitis C Virus S/CO Ratio: 12.26 S/CO   Hepatitis B Core IgM Antibody: Nonreact   Hepatitis B Surface Antigen: Nonreact   Hepatitis A IgM Antibody: Nonreact     Radiology:

## 2020-09-13 NOTE — PROGRESS NOTE ADULT - ASSESSMENT
87M w/ dementia (A&Ox1), hx of traumatic ICH s/p craniotomy, orthostatic hypotension on midodrine, HTN, BPH presents to [facility] after leaving outside against medical advice for evaluation of syncope, noted to have thrombocytopenia    #thrombocytopenia- with clumps, actual count likely higher  - previous CBC as outpatient 8/2/2020 with platelet 74K; review of [facility] labs with intermittently low platelets over the years  - small clumps noted on blue top  - reviewed peripheral smear- small clumps noted on smear and on blue top smear; no abnormal cells, no schistocyte  - B12/folate normal, TSH normal; APLS pending  - Hg/bilirubin normal, no evidence of hemolysis  - no known history of alcohol/liver disease/hepatitis per daughter; +Hepatitis C Ab positive; Abdominal US pending to evaluate spleen/liver; GI eval noted, hep c pcr pending  - possibly due to medication/ depakote  - platelet count trending higher    #orthostatic hypotension- on midodrine  - Cardiology following, Echo pending    #dementia, ? seizure  - on depakote, Neuro eval noted    discussed with daughter on phone 9/11

## 2020-09-13 NOTE — CONSULT NOTE ADULT - PROBLEM SELECTOR RECOMMENDATION 9
- hepatitis S/CO ratio elevated  - check Hep C PCR (pending)  - check abdominal ultrasound to evaluate for HCC  - further recommendations pending above   - will discuss further with GI attending  - d/w medicine NP

## 2020-09-13 NOTE — CONSULT NOTE ADULT - SUBJECTIVE AND OBJECTIVE BOX
Chief Complaint:  Patient is a 87y old  Male who presents with a chief complaint of 87M recently leaving outside hospital (AMA) by family brought in for evaluation of syncope (13 Sep 2020 07:24)      HPI: 87M w/ dementia (A&Ox1), hx of traumatic ICH s/p craniotomy, orthostatic hypotension on midodrine, HTN, BPH presents to [facility] after leaving outside against medical advice for evaluation of syncope. Ms. Carrillo informs that the patient was having episodes of faiting/collapse upon standing which led her to bring him to the hospital. Per chart review form [facility], the patient was admitted for syncope evaluation and determined to have NSTEMI. There is limited paperwork from [facility] and therefore unclear as to the complete evaluation and management. The patient had head CT which does not report acute pathology, TTE was also completed and did not identify LV systolic dysfunction. The patient's daughter reports that a neurologist also saw the patient and it is unclear if the patient had seizures and the family was told an EEG was done but there were no results. The family became concerned when physicians at [facility] wanted to do a nuclear stress test which they did not agree with. They spoke with the patients cardiologist Dr. Hale who recommended evaluation at [facility] and the family had patient leave against medical advice and brought patient to [facility] for further evaluation. Of note patient was labeled to have atrial fibrillation as a medical problem at [facility] but does not have this charted previously.    Allergies:  No Known Allergies      Medications:  diVALproex  milliGRAM(s) Oral daily  heparin   Injectable 5000 Unit(s) SubCutaneous every 8 hours  midodrine. 10 milliGRAM(s) Oral three times a day  sodium chloride 0.9%. 1000 milliLiter(s) IV Continuous <Continuous>  tamsulosin 0.4 milliGRAM(s) Oral at bedtime      PMHX/PSHX:  Dementia with behavioral disturbance    Traumatic intracerebral hemorrhage    Orthostatic hypotension    BPH (benign prostatic hyperplasia)    HLD (hyperlipidemia)    Hyperlipidemia, unspecified hyperlipidemia type    Benign prostatic hyperplasia with urinary frequency    HTN (hypertension)    Status post craniectomy    No significant past surgical history        Family history:  No pertinent family history in first degree relatives    No pertinent family history in first degree relatives        Social History:     ROS: unable to obtain          PHYSICAL EXAM:   Vital Signs:  Vital Signs Last 24 Hrs  T(C): 36.5 (13 Sep 2020 11:36), Max: 36.9 (12 Sep 2020 13:55)  T(F): 97.7 (13 Sep 2020 11:36), Max: 98.4 (12 Sep 2020 13:55)  HR: 83 (13 Sep 2020 11:36) (80 - 85)  BP: 159/80 (13 Sep 2020 04:36) (128/79 - 159/80)  BP(mean): --  RR: 18 (13 Sep 2020 11:36) (18 - 18)  SpO2: 94% (13 Sep 2020 11:36) (94% - 95%)  Daily     Daily     GENERAL:  no distress  HEENT:  NC/AT  ABDOMEN:  Soft, non-tender, non-distended, normoactive bowel sounds  EXTEREMITIES:  no cyanosis,clubbing or edema  SKIN:  No rash/erythema/ecchymoses/petechiae/wounds/abscess/warm/dry  NEURO:  awake, confused     LABS:                        13.8   7.30  )-----------( 112      ( 13 Sep 2020 10:18 )             41.6     09-13    141  |  106  |  19  ----------------------------<  112<H>  3.9   |  21<L>  |  1.17    Ca    9.7      13 Sep 2020 10:18                Imaging:

## 2020-09-13 NOTE — PROGRESS NOTE ADULT - ASSESSMENT
Syncope   due to severe Orthostatic hypotension   cont midodrine  will consider adding clonidine if BP goes up a lot   monitor orthostatic vitals every 8 hours   OOB to chair at all times  Compression stockingscho  neurology eval noted ho    HTN  supine HTN  will consider adding clonidine     Elevated trop  not consistent with acute MI   obtain echo

## 2020-09-14 LAB
ANION GAP SERPL CALC-SCNC: 14 MMOL/L — SIGNIFICANT CHANGE UP (ref 5–17)
B2 GLYCOPROT1 AB SER QL: POSITIVE
BUN SERPL-MCNC: 25 MG/DL — HIGH (ref 7–23)
CALCIUM SERPL-MCNC: 9.8 MG/DL — SIGNIFICANT CHANGE UP (ref 8.4–10.5)
CHLORIDE SERPL-SCNC: 105 MMOL/L — SIGNIFICANT CHANGE UP (ref 96–108)
CO2 SERPL-SCNC: 24 MMOL/L — SIGNIFICANT CHANGE UP (ref 22–31)
CREAT SERPL-MCNC: 1.63 MG/DL — HIGH (ref 0.5–1.3)
GLUCOSE SERPL-MCNC: 98 MG/DL — SIGNIFICANT CHANGE UP (ref 70–99)
HCT VFR BLD CALC: 44.3 % — SIGNIFICANT CHANGE UP (ref 39–50)
HGB BLD-MCNC: 14.5 G/DL — SIGNIFICANT CHANGE UP (ref 13–17)
MCHC RBC-ENTMCNC: 28.7 PG — SIGNIFICANT CHANGE UP (ref 27–34)
MCHC RBC-ENTMCNC: 32.7 GM/DL — SIGNIFICANT CHANGE UP (ref 32–36)
MCV RBC AUTO: 87.7 FL — SIGNIFICANT CHANGE UP (ref 80–100)
NRBC # BLD: 0 /100 WBCS — SIGNIFICANT CHANGE UP (ref 0–0)
PLATELET # BLD AUTO: 120 K/UL — LOW (ref 150–400)
POTASSIUM SERPL-MCNC: 3.9 MMOL/L — SIGNIFICANT CHANGE UP (ref 3.5–5.3)
POTASSIUM SERPL-SCNC: 3.9 MMOL/L — SIGNIFICANT CHANGE UP (ref 3.5–5.3)
RBC # BLD: 5.05 M/UL — SIGNIFICANT CHANGE UP (ref 4.2–5.8)
RBC # FLD: 13.5 % — SIGNIFICANT CHANGE UP (ref 10.3–14.5)
SODIUM SERPL-SCNC: 143 MMOL/L — SIGNIFICANT CHANGE UP (ref 135–145)
WBC # BLD: 7.8 K/UL — SIGNIFICANT CHANGE UP (ref 3.8–10.5)
WBC # FLD AUTO: 7.8 K/UL — SIGNIFICANT CHANGE UP (ref 3.8–10.5)

## 2020-09-14 PROCEDURE — 70450 CT HEAD/BRAIN W/O DYE: CPT | Mod: 26

## 2020-09-14 PROCEDURE — 76700 US EXAM ABDOM COMPLETE: CPT | Mod: 26

## 2020-09-14 RX ORDER — DIVALPROEX SODIUM 500 MG/1
1 TABLET, DELAYED RELEASE ORAL
Qty: 0 | Refills: 0 | DISCHARGE

## 2020-09-14 RX ORDER — ATORVASTATIN CALCIUM 80 MG/1
1 TABLET, FILM COATED ORAL
Qty: 0 | Refills: 0 | DISCHARGE

## 2020-09-14 RX ORDER — TAMSULOSIN HYDROCHLORIDE 0.4 MG/1
1 CAPSULE ORAL
Qty: 0 | Refills: 0 | DISCHARGE

## 2020-09-14 RX ORDER — SODIUM CHLORIDE 9 MG/ML
1000 INJECTION INTRAMUSCULAR; INTRAVENOUS; SUBCUTANEOUS
Refills: 0 | Status: DISCONTINUED | OUTPATIENT
Start: 2020-09-14 | End: 2020-09-15

## 2020-09-14 RX ADMIN — SODIUM CHLORIDE 75 MILLILITER(S): 9 INJECTION INTRAMUSCULAR; INTRAVENOUS; SUBCUTANEOUS at 17:00

## 2020-09-14 RX ADMIN — DIVALPROEX SODIUM 250 MILLIGRAM(S): 500 TABLET, DELAYED RELEASE ORAL at 12:28

## 2020-09-14 RX ADMIN — MIDODRINE HYDROCHLORIDE 10 MILLIGRAM(S): 2.5 TABLET ORAL at 05:09

## 2020-09-14 RX ADMIN — HEPARIN SODIUM 5000 UNIT(S): 5000 INJECTION INTRAVENOUS; SUBCUTANEOUS at 13:48

## 2020-09-14 RX ADMIN — MIDODRINE HYDROCHLORIDE 10 MILLIGRAM(S): 2.5 TABLET ORAL at 12:27

## 2020-09-14 RX ADMIN — MIDODRINE HYDROCHLORIDE 10 MILLIGRAM(S): 2.5 TABLET ORAL at 21:04

## 2020-09-14 RX ADMIN — TAMSULOSIN HYDROCHLORIDE 0.4 MILLIGRAM(S): 0.4 CAPSULE ORAL at 21:04

## 2020-09-14 RX ADMIN — HEPARIN SODIUM 5000 UNIT(S): 5000 INJECTION INTRAVENOUS; SUBCUTANEOUS at 05:09

## 2020-09-14 RX ADMIN — HEPARIN SODIUM 5000 UNIT(S): 5000 INJECTION INTRAVENOUS; SUBCUTANEOUS at 21:04

## 2020-09-14 RX ADMIN — SODIUM CHLORIDE 75 MILLILITER(S): 9 INJECTION INTRAMUSCULAR; INTRAVENOUS; SUBCUTANEOUS at 21:03

## 2020-09-14 NOTE — PROGRESS NOTE ADULT - PROBLEM SELECTOR PLAN 1
- hep C reactive  - U/S abdomen without evidence of hepatic abnormalities   - Hep C PCR pending  - will discuss further with GI attending  - d/w medicine NP

## 2020-09-14 NOTE — PROGRESS NOTE ADULT - PROBLEM SELECTOR PLAN 1
- Hep C reactive  - Hep C PCR pending  - U/S abdomen without evidence of hepatic abnormalities - Hep C reactive  - Hep C PCR pending  - U/S abdomen without evidence of hepatic abnormalities  - further recommendations pending PCR results  - d/w medicine NP

## 2020-09-14 NOTE — PROGRESS NOTE ADULT - ASSESSMENT
Syncope   due to severe Orthostatic hypotension   cont midodrine  will consider adding clonidine if BP goes up a lot   monitor orthostatic vitals every 8 hours   OOB to chair at all times  Compression stockingscho  neurology eval noted ho    HTN  supine HTN  will consider adding clonidine     Elevated trop  not consistent with acute MI   obtain echo     Hep C  fu with GI eval

## 2020-09-14 NOTE — PROGRESS NOTE ADULT - SUBJECTIVE AND OBJECTIVE BOX
INTERVAL HPI/OVERNIGHT EVENTS:    patient seen and examined  no acute overnight events  U/S abdomen noted     MEDICATIONS  (STANDING):  diVALproex  milliGRAM(s) Oral daily  heparin   Injectable 5000 Unit(s) SubCutaneous every 8 hours  midodrine. 10 milliGRAM(s) Oral three times a day  sodium chloride 0.9%. 1000 milliLiter(s) (50 mL/Hr) IV Continuous <Continuous>  tamsulosin 0.4 milliGRAM(s) Oral at bedtime    MEDICATIONS  (PRN):      Allergies    No Known Allergies    Intolerances        Review of Systems: unable to obtain             Vital Signs Last 24 Hrs  T(C): 36.7 (14 Sep 2020 12:09), Max: 36.7 (14 Sep 2020 12:09)  T(F): 98.1 (14 Sep 2020 12:09), Max: 98.1 (14 Sep 2020 12:09)  HR: 84 (14 Sep 2020 12:09) (80 - 102)  BP: 118/82 (14 Sep 2020 12:09) (111/68 - 156/81)  BP(mean): --  RR: 18 (14 Sep 2020 12:09) (18 - 18)  SpO2: 95% (14 Sep 2020 12:09) (95% - 96%)    PHYSICAL EXAM:    Constitutional: NAD  HEENT: EOMI, throat clear  Neck: No LAD, supple  Gastrointestinal: BS+, soft, NT/ND  Extremities: No peripheral edema  Neurological: awake, responsive, confused     LABS:                        14.5   7.80  )-----------( 120      ( 14 Sep 2020 06:34 )             44.3     09-14    143  |  105  |  25<H>  ----------------------------<  98  3.9   |  24  |  1.63<H>    Ca    9.8      14 Sep 2020 06:34            RADIOLOGY & ADDITIONAL TESTS:  < from: US Abdomen Complete (US Abdomen Complete .) (09.14.20 @ 09:08) >    EXAM:  US ABDOMEN COMPLETE                            PROCEDURE DATE:  09/14/2020            INTERPRETATION:  CLINICAL INFORMATION: Thrombocytopenia, AMS and possible CKD.    COMPARISON: Ultrasound abdomen from 10/31/2018 and CT abdomen from 2/6/2019    TECHNIQUE: Sonography of the abdomen.    FINDINGS:    Limited evaluation due to bowel gas and patient's mental status.    Liver: 13.2 cm. Within normal limits.  Bile ducts: Normal caliber. Common bile duct measures 5.6 mm.  Gallbladder: Within normal limits. Gallbladder wall measures 0.19 cm.  Pancreas: Not visualized due to bowel gas.  Spleen: 7.38 cm. Within normal limits.  Right kidney: 10.18 cm. No hydronephrosis.  Left kidney: 9.28 cm.  No hydronephrosis.  Ascites: None.  Aorta and IVC:IVC is slightly collapsed.    IMPRESSION:  Limited evaluation due to bowel gas and patient's mental status.  Visualized areas are within normal limit.                ANGELO BRYAN M.D., RESIDENT RADIOLOGIST  This document has been electronically signed.  ARABELLA GUEVARA M.D., ATTENDING RADIOLOGIST  This document has been electronically signed. Sep 14 2020  9:19AM    < end of copied text >

## 2020-09-14 NOTE — PROGRESS NOTE ADULT - SUBJECTIVE AND OBJECTIVE BOX
Subjective: Patient seen and examined. No new events except as noted.     SUBJECTIVE/ROS:        MEDICATIONS:  MEDICATIONS  (STANDING):  diVALproex  milliGRAM(s) Oral daily  heparin   Injectable 5000 Unit(s) SubCutaneous every 8 hours  midodrine. 10 milliGRAM(s) Oral three times a day  sodium chloride 0.9%. 1000 milliLiter(s) (50 mL/Hr) IV Continuous <Continuous>  tamsulosin 0.4 milliGRAM(s) Oral at bedtime      PHYSICAL EXAM:  T(C): 36.4 (09-14-20 @ 04:19), Max: 36.5 (09-13-20 @ 11:36)  HR: 86 (09-14-20 @ 04:19) (80 - 96)  BP: 123/77 (09-14-20 @ 04:19) (111/68 - 156/81)  RR: 18 (09-14-20 @ 04:19) (18 - 18)  SpO2: 96% (09-14-20 @ 04:19) (94% - 96%)  Wt(kg): --  I&O's Summary    13 Sep 2020 07:01  -  14 Sep 2020 07:00  --------------------------------------------------------  IN: 600 mL / OUT: 0 mL / NET: 600 mL            JVP: Normal  Neck: supple  Lung: clear   CV: S1 S2 , Murmur:  Abd: soft  Ext: No edema  neuro: Awake  Psych: flat affect  Skin: normal``    LABS/DATA:    CARDIAC MARKERS:                                14.5   7.80  )-----------( 120      ( 14 Sep 2020 06:34 )             44.3     09-14    143  |  105  |  25<H>  ----------------------------<  98  3.9   |  24  |  1.63<H>    Ca    9.8      14 Sep 2020 06:34      proBNP:   Lipid Profile:   HgA1c:   TSH:     TELE:  EKG:

## 2020-09-14 NOTE — PROGRESS NOTE ADULT - ASSESSMENT
87M w/ dementia (A&Ox1), hx of traumatic ICH s/p craniotomy, orthostatic hypotension on midodrine, HTN, BPH presents to [facility] after leaving outside against medical advice for evaluation of syncope, noted to have thrombocytopenia    #thrombocytopenia- with clumps, actual count likely higher  - previous CBC as outpatient 8/2/2020 with platelet 74K; review of [place] labs with intermittently low platelets over the years  - small clumps noted on blue top  - reviewed peripheral smear- small clumps noted on smear and on blue top smear; no abnormal cells, no schistocyte  - B12/folate normal, TSH normal; APLS - anticardiolipin Ab pending, beta 2 glycoprotein Ab positive- will need to be repeated as outpatient  - Hg/bilirubin normal, no evidence of hemolysis  - no known history of alcohol/liver disease/hepatitis per daughter; +Hepatitis C Ab positive; Abdominal US- limited study but liver and spleen appear normal;  - GI eval noted, hep c pcr pending  - possibly due to medication/ depakote  - platelet count with continued trend higher- discussed with daughter bedside, will need to monitor as outpatient    #orthostatic hypotension- on midodrine  - Cardiology following    #dementia, ? seizure  - on depakote, Neuro eval noted    discussed with daughter at bedside today

## 2020-09-14 NOTE — PROGRESS NOTE ADULT - ASSESSMENT
87 y /o M w/ dementia hx of traumatic ICH s/p craniotomy, orthostatic hypotension on midodrine, HTN, BPH presents to [facility] after leaving outside against medical advice for evaluation of syncope.      Problem/Plan - 1:  ·  Problem: Syncope and collapse.  cards eval noted   orthostatic hypotension  c/w midodrine   neuro eval noted     Problem/Plan - 2:  ·  Problem: Orthostatic hypotension.  Plan: - continue with home midodrine 10g TID  - fall precautions, ambulation only with assistance and walker.      Problem/Plan - 3:  ·  Problem: Hyperkalemia. improved   Problem/Plan - 4:  ·  Problem: Thrombocytopenia.    - no signs of active hemorrhage  heme eval noted     Problem/Plan - 5:  ·  Problem: Atrial fibrillation, unspecified type  - cardiology f/u noted     Problem/Plan - 6:  Problem: Acute kidney injury.  fluids  f/u labs in am      Problem/Plan - 7:  ·  Problem: Dementia with behavioral disturbance, unspecified dementia type  .  Plan: reported baseline by family is A&Ox1. 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  Plan: hold antihypertensives given reported syncope.     low plts  heme f/u noted    hep c reactive  f/u pcr   gi f/u     dvt proph

## 2020-09-14 NOTE — PROGRESS NOTE ADULT - SUBJECTIVE AND OBJECTIVE BOX
Chief Complaint: fu    History of Present Illness: pt seated in chair, daughter was at bedside; pt confused, speaking incoherently;  no reported bleeding    ROS not obtainable      MEDICATIONS  (STANDING):  diVALproex  milliGRAM(s) Oral daily  heparin   Injectable 5000 Unit(s) SubCutaneous every 8 hours  midodrine. 10 milliGRAM(s) Oral three times a day  sodium chloride 0.9%. 1000 milliLiter(s) (50 mL/Hr) IV Continuous <Continuous>  sodium chloride 0.9%. 1000 milliLiter(s) (75 mL/Hr) IV Continuous <Continuous>  tamsulosin 0.4 milliGRAM(s) Oral at bedtime    MEDICATIONS  (PRN):      Allergies    No Known Allergies    Intolerances        Vital Signs Last 24 Hrs  T(C): 36.7 (14 Sep 2020 12:09), Max: 36.7 (14 Sep 2020 12:09)  T(F): 98.1 (14 Sep 2020 12:09), Max: 98.1 (14 Sep 2020 12:09)  HR: 84 (14 Sep 2020 12:09) (80 - 102)  BP: 118/82 (14 Sep 2020 12:09) (111/68 - 156/81)  BP(mean): --  RR: 18 (14 Sep 2020 12:09) (18 - 18)  SpO2: 95% (14 Sep 2020 12:09) (95% - 96%)    PHYSICAL EXAM  General: adult in NAD  HEENT: clear oropharynx, anicteric sclera, pink conjunctiva  Neck: supple  CV: normal S1/S2   Lungs: decreased BS, poor effort  Abdomen: soft non-tender non-distended, positive bowel sounds  Ext: no clubbing cyanosis or edema  Skin: no rashes and no petechiae  Lymph Nodes: No LAD in neck  Neuro: confused    LABS:                          14.5   7.80  )-----------( 120      ( 14 Sep 2020 06:34 )             44.3         Mean Cell Volume : 87.7 fl  Mean Cell Hemoglobin : 28.7 pg  Mean Cell Hemoglobin Concentration : 32.7 gm/dL  Auto Neutrophil # : x  Auto Lymphocyte # : x  Auto Monocyte # : x  Auto Eosinophil # : x  Auto Basophil # : x  Auto Neutrophil % : x  Auto Lymphocyte % : x  Auto Monocyte % : x  Auto Eosinophil % : x  Auto Basophil % : x      Serial CBC's  09-14 @ 06:34  Hct-44.3 / Hgb-14.5 / Plat-120 / RBC-5.05 / WBC-7.80  Serial CBC's  09-13 @ 10:18  Hct-41.6 / Hgb-13.8 / Plat-112 / RBC-4.82 / WBC-7.30  Serial CBC's  09-11 @ 08:52  Hct-43.3 / Hgb-14.1 / Plat-102 / RBC-4.94 / WBC-7.83  Serial CBC's  09-11 @ 06:04  Hct--- / Hgb--- / Plat-70 / RBC--- / WBC---  Serial CBC's  09-11 @ 06:01  Hct-42.8 / Hgb-14.0 / Plat-94 / RBC-4.90 / WBC-7.16  Serial CBC's  09-10 @ 18:50  Hct-43.0 / Hgb-14.0 / Plat-102 / RBC-4.86 / WBC-8.54      09-14    143  |  105  |  25<H>  ----------------------------<  98  3.9   |  24  |  1.63<H>    Ca    9.8      14 Sep 2020 06:34            Vitamin B12, Serum: 1276 pg/mL (09-12 @ 10:52)  Folate, Serum: 13.1 ng/mL (09-12 @ 10:52)              Radiology:      < from: US Abdomen Complete (US Abdomen Complete .) (09.14.20 @ 09:08) >  Liver: 13.2 cm. Within normal limits.  Bile ducts: Normal caliber. Common bile duct measures 5.6 mm.  Gallbladder: Within normal limits. Gallbladder wall measures 0.19 cm.  Pancreas: Not visualized due to bowel gas.  Spleen: 7.38 cm. Within normal limits.  Right kidney: 10.18 cm. No hydronephrosis.  Left kidney: 9.28 cm.  No hydronephrosis.  Ascites: None.  Aorta and IVC:IVC is slightly collapsed.    IMPRESSION:  Limited evaluation due to bowel gas and patient's mental status.  Visualized areas are within normal limit.    < end of copied text >

## 2020-09-14 NOTE — PROGRESS NOTE ADULT - SUBJECTIVE AND OBJECTIVE BOX
CHIEF COMPLAINT:Patient is a 87y old  Male who presents with a chief complaint of 87M recently leaving outside hospital (AMA) by family brought in for evaluation of syncope (14 Sep 2020 12:48)    	        PAST MEDICAL & SURGICAL HISTORY:  Dementia with behavioral disturbance    Traumatic intracerebral hemorrhage    Orthostatic hypotension    BPH (benign prostatic hyperplasia)    HLD (hyperlipidemia)    Status post craniectomy            REVIEW OF SYSTEMS:  sitting in bed  no cp or sob  no vomiting  no headaches  calm      Medications:  MEDICATIONS  (STANDING):  diVALproex  milliGRAM(s) Oral daily  heparin   Injectable 5000 Unit(s) SubCutaneous every 8 hours  midodrine. 10 milliGRAM(s) Oral three times a day  sodium chloride 0.9%. 1000 milliLiter(s) (50 mL/Hr) IV Continuous <Continuous>  sodium chloride 0.9%. 1000 milliLiter(s) (75 mL/Hr) IV Continuous <Continuous>  tamsulosin 0.4 milliGRAM(s) Oral at bedtime    MEDICATIONS  (PRN):    	    PHYSICAL EXAM:  T(C): 36.7 (09-14-20 @ 12:09), Max: 36.7 (09-14-20 @ 12:09)  HR: 84 (09-14-20 @ 12:09) (80 - 102)  BP: 118/82 (09-14-20 @ 12:09) (111/68 - 156/81)  RR: 18 (09-14-20 @ 12:09) (18 - 18)  SpO2: 95% (09-14-20 @ 12:09) (95% - 96%)  Wt(kg): --  I&O's Summary    13 Sep 2020 07:01  -  14 Sep 2020 07:00  --------------------------------------------------------  IN: 600 mL / OUT: 0 mL / NET: 600 mL    14 Sep 2020 07:01  -  14 Sep 2020 15:55  --------------------------------------------------------  IN: 240 mL / OUT: 0 mL / NET: 240 mL        Appearance: Normal	  HEENT:   Normal oral mucosa, PERRL, EOMI	  Lymphatic: No lymphadenopathy  Cardiovascular: Normal S1 S2, No JVD, No murmurs, No edema  Respiratory: Lungs clear to auscultation	  Gastrointestinal:  Soft, Non-tender, + BS	  Skin: No rashes, No ecchymoses, No cyanosis	  Neurologic: Non-focal  Extremities: dec rom    TELEMETRY: 	    ECG:  	  RADIOLOGY:  OTHER: 	  	  LABS:	 	    CARDIAC MARKERS:                                14.5   7.80  )-----------( 120      ( 14 Sep 2020 06:34 )             44.3     09-14    143  |  105  |  25<H>  ----------------------------<  98  3.9   |  24  |  1.63<H>    Ca    9.8      14 Sep 2020 06:34      proBNP:   Lipid Profile:   HgA1c:   TSH:

## 2020-09-15 LAB
ANION GAP SERPL CALC-SCNC: 12 MMOL/L — SIGNIFICANT CHANGE UP (ref 5–17)
B2 GLYCOPROT1 IGA SER QL: 15.3 SAU — SIGNIFICANT CHANGE UP
B2 GLYCOPROT1 IGG SER-ACNC: <5 SGU — SIGNIFICANT CHANGE UP
B2 GLYCOPROT1 IGM SER-ACNC: 10 SMU — SIGNIFICANT CHANGE UP
BUN SERPL-MCNC: 23 MG/DL — SIGNIFICANT CHANGE UP (ref 7–23)
CALCIUM SERPL-MCNC: 9.5 MG/DL — SIGNIFICANT CHANGE UP (ref 8.4–10.5)
CARDIOLIPIN AB SER-ACNC: NEGATIVE — SIGNIFICANT CHANGE UP
CHLORIDE SERPL-SCNC: 108 MMOL/L — SIGNIFICANT CHANGE UP (ref 96–108)
CO2 SERPL-SCNC: 22 MMOL/L — SIGNIFICANT CHANGE UP (ref 22–31)
CREAT SERPL-MCNC: 1.41 MG/DL — HIGH (ref 0.5–1.3)
GLUCOSE SERPL-MCNC: 111 MG/DL — HIGH (ref 70–99)
POTASSIUM SERPL-MCNC: 4.1 MMOL/L — SIGNIFICANT CHANGE UP (ref 3.5–5.3)
POTASSIUM SERPL-SCNC: 4.1 MMOL/L — SIGNIFICANT CHANGE UP (ref 3.5–5.3)
SODIUM SERPL-SCNC: 142 MMOL/L — SIGNIFICANT CHANGE UP (ref 135–145)

## 2020-09-15 RX ORDER — HALOPERIDOL DECANOATE 100 MG/ML
0.5 INJECTION INTRAMUSCULAR ONCE
Refills: 0 | Status: COMPLETED | OUTPATIENT
Start: 2020-09-15 | End: 2020-09-15

## 2020-09-15 RX ORDER — SODIUM CHLORIDE 9 MG/ML
1000 INJECTION INTRAMUSCULAR; INTRAVENOUS; SUBCUTANEOUS
Refills: 0 | Status: DISCONTINUED | OUTPATIENT
Start: 2020-09-15 | End: 2020-09-15

## 2020-09-15 RX ORDER — VALPROIC ACID (AS SODIUM SALT) 250 MG/5ML
250 SOLUTION, ORAL ORAL
Refills: 0 | Status: DISCONTINUED | OUTPATIENT
Start: 2020-09-15 | End: 2020-09-18

## 2020-09-15 RX ADMIN — SODIUM CHLORIDE 75 MILLILITER(S): 9 INJECTION INTRAMUSCULAR; INTRAVENOUS; SUBCUTANEOUS at 12:18

## 2020-09-15 RX ADMIN — MIDODRINE HYDROCHLORIDE 10 MILLIGRAM(S): 2.5 TABLET ORAL at 06:08

## 2020-09-15 RX ADMIN — Medication 250 MILLIGRAM(S): at 17:29

## 2020-09-15 RX ADMIN — HEPARIN SODIUM 5000 UNIT(S): 5000 INJECTION INTRAVENOUS; SUBCUTANEOUS at 14:47

## 2020-09-15 RX ADMIN — HALOPERIDOL DECANOATE 0.5 MILLIGRAM(S): 100 INJECTION INTRAMUSCULAR at 22:10

## 2020-09-15 RX ADMIN — HEPARIN SODIUM 5000 UNIT(S): 5000 INJECTION INTRAVENOUS; SUBCUTANEOUS at 06:08

## 2020-09-15 NOTE — PROGRESS NOTE ADULT - ASSESSMENT
Syncope   due to severe Orthostatic hypotension   cont midodrine    HTN  as above     Elevated trop  not consistent with acute MI   obtain echo     Hep C  fu with GI eval

## 2020-09-15 NOTE — PROGRESS NOTE ADULT - SUBJECTIVE AND OBJECTIVE BOX
INTERVAL HPI/OVERNIGHT EVENTS:    patient seen and examined  no acute overnight events      MEDICATIONS  (STANDING):  diVALproex  milliGRAM(s) Oral daily  heparin   Injectable 5000 Unit(s) SubCutaneous every 8 hours  midodrine. 10 milliGRAM(s) Oral three times a day  sodium chloride 0.9%. 1000 milliLiter(s) (50 mL/Hr) IV Continuous <Continuous>  tamsulosin 0.4 milliGRAM(s) Oral at bedtime    MEDICATIONS  (PRN):      Allergies    No Known Allergies    Intolerances        Review of Systems: unable to obtain             Vital Signs Last 24 Hrs  T(C): 36.7 (14 Sep 2020 12:09), Max: 36.7 (14 Sep 2020 12:09)  T(F): 98.1 (14 Sep 2020 12:09), Max: 98.1 (14 Sep 2020 12:09)  HR: 84 (14 Sep 2020 12:09) (80 - 102)  BP: 118/82 (14 Sep 2020 12:09) (111/68 - 156/81)  BP(mean): --  RR: 18 (14 Sep 2020 12:09) (18 - 18)  SpO2: 95% (14 Sep 2020 12:09) (95% - 96%)    PHYSICAL EXAM:    Constitutional: NAD  HEENT: EOMI, throat clear  Neck: No LAD, supple  Gastrointestinal: BS+, soft, NT/ND  Extremities: No peripheral edema  Neurological: awake, responsive, confused     LABS:                        14.5   7.80  )-----------( 120      ( 14 Sep 2020 06:34 )             44.3     09-14    143  |  105  |  25<H>  ----------------------------<  98  3.9   |  24  |  1.63<H>    Ca    9.8      14 Sep 2020 06:34            RADIOLOGY & ADDITIONAL TESTS:  < from: US Abdomen Complete (US Abdomen Complete .) (09.14.20 @ 09:08) >    EXAM:  US ABDOMEN COMPLETE                            PROCEDURE DATE:  09/14/2020            INTERPRETATION:  CLINICAL INFORMATION: Thrombocytopenia, AMS and possible CKD.    COMPARISON: Ultrasound abdomen from 10/31/2018 and CT abdomen from 2/6/2019    TECHNIQUE: Sonography of the abdomen.    FINDINGS:    Limited evaluation due to bowel gas and patient's mental status.    Liver: 13.2 cm. Within normal limits.  Bile ducts: Normal caliber. Common bile duct measures 5.6 mm.  Gallbladder: Within normal limits. Gallbladder wall measures 0.19 cm.  Pancreas: Not visualized due to bowel gas.  Spleen: 7.38 cm. Within normal limits.  Right kidney: 10.18 cm. No hydronephrosis.  Left kidney: 9.28 cm.  No hydronephrosis.  Ascites: None.  Aorta and IVC:IVC is slightly collapsed.    IMPRESSION:  Limited evaluation due to bowel gas and patient's mental status.  Visualized areas are within normal limit.                ANGELO BRYAN M.D., RESIDENT RADIOLOGIST  This document has been electronically signed.  ARABELLA GUEVARA M.D., ATTENDING RADIOLOGIST  This document has been electronically signed. Sep 14 2020  9:19AM    < end of copied text >

## 2020-09-15 NOTE — PROGRESS NOTE ADULT - ASSESSMENT
87M w/ dementia (A&Ox1), hx of traumatic ICH s/p craniotomy, orthostatic hypotension on midodrine, HTN, BPH presents to [facility] after leaving outside against medical advice for evaluation of syncope, noted to have thrombocytopenia    #thrombocytopenia- with clumps, actual count likely higher  - previous CBC as outpatient 8/2/2020 with platelet 74K; review of [place] labs with intermittently low platelets over the years  - small clumps noted on blue top  - reviewed peripheral smear- small clumps noted on smear and on blue top smear; no abnormal cells, no schistocyte  - B12/folate normal, TSH normal; APLS - anticardiolipin Ab pending, beta 2 glycoprotein Ab positive, breakdown of Ab is pending  - Hg/bilirubin normal, no evidence of hemolysis  - no known history of alcohol/liver disease/hepatitis per daughter; +Hepatitis C Ab positive; Abdominal US- limited study but liver and spleen appear normal;  - GI eval noted, hep c pcr pending  - possibly due to medication/ depakote  - platelet count with continued trend higher- discussed with daughter bedside, will need to monitor as outpatient    #orthostatic hypotension- on midodrine  - Cardiology following    #dementia, ? seizure  - on depakote, Neuro eval noted    DC planning per Medicine  Platelets will need to be monitored periodically as outpatient- d/w daughter 9/14; beta 2 glycoprotein Ab also pending    d/w NP

## 2020-09-15 NOTE — PROGRESS NOTE ADULT - PROBLEM SELECTOR PLAN 1
- Hep C reactive  - Hep C PCR pending  - U/S abdomen without evidence of hepatic abnormalities  - further recommendations pending above   - will review with GI attending

## 2020-09-15 NOTE — PROGRESS NOTE ADULT - ASSESSMENT
87 y /o M w/ dementia hx of traumatic ICH s/p craniotomy, orthostatic hypotension on midodrine, HTN, BPH presents to [facility] after leaving outside against medical advice for evaluation of syncope.      Problem/Plan - 1:  ·  Problem: Syncope and collapse.  cards eval noted   orthostatic hypotension  c/w midodrine   neuro eval noted/ct head no acute events     Problem/Plan - 2:  ·  Problem: Orthostatic hypotension.  Plan: - continue with home midodrine 10g TID  - fall precautions, ambulation only with assistance and walker.      Problem/Plan - 3:  ·  Problem: Hyperkalemia. improved   Problem/Plan - 4:  ·  Problem: Thrombocytopenia.    - no signs of active hemorrhage  heme eval noted     Problem/Plan - 5:  ·  Problem: Atrial fibrillation, unspecified type  - cardiology f/u noted     Problem/Plan - 6:  Problem: Acute kidney injury.  fluids prn  f/u labs this am improved     Problem/Plan - 7:  ·  Problem: Dementia with behavioral disturbance, unspecified dementia type  .  Plan: reported baseline by family is A&Ox1. 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  Plan: hold antihypertensives given reported syncope.     low plts  heme f/u noted    hep c reactive  f/u pcr   gi f/u     dvt proph

## 2020-09-15 NOTE — PROGRESS NOTE ADULT - SUBJECTIVE AND OBJECTIVE BOX
Chief Complaint: fu    History of Present Illness:  resting comfortably in bed, less agitated today, denies pain, further ROS not obtainable with confusion; no pain or bleeding per RN      MEDICATIONS  (STANDING):  heparin   Injectable 5000 Unit(s) SubCutaneous every 8 hours  midodrine. 10 milliGRAM(s) Oral three times a day  sodium chloride 0.9%. 1000 milliLiter(s) (50 mL/Hr) IV Continuous <Continuous>  sodium chloride 0.9%. 1000 milliLiter(s) (75 mL/Hr) IV Continuous <Continuous>  tamsulosin 0.4 milliGRAM(s) Oral at bedtime  valproic  acid Syrup 250 milliGRAM(s) Oral two times a day    MEDICATIONS  (PRN):      Allergies    No Known Allergies    Intolerances        Vital Signs Last 24 Hrs  T(C): 36.6 (15 Sep 2020 13:16), Max: 36.6 (14 Sep 2020 20:17)  T(F): 97.9 (15 Sep 2020 13:16), Max: 97.9 (15 Sep 2020 03:49)  HR: 76 (15 Sep 2020 13:16) (76 - 90)  BP: 142/79 (15 Sep 2020 13:16) (111/58 - 144/69)  BP(mean): --  RR: 18 (15 Sep 2020 13:16) (18 - 18)  SpO2: 98% (15 Sep 2020 13:16) (95% - 98%)    PHYSICAL EXAM  General: adult in NAD  HEENT: clear oropharynx, anicteric sclera, pink conjunctiva  Neck: supple  CV: normal S1/S2   Lungs: decreased BS, poor effort  Abdomen: soft non-tender non-distended,  positive bowel sounds  Ext: no clubbing cyanosis or edema  Skin: no rashes and no petechiae  Lymph Nodes: No LAD in axillae, groin, neck  Neuro: alert and oriented X 3, no focal deficits    LABS:                          14.5   7.80  )-----------( 120      ( 14 Sep 2020 06:34 )             44.3         Mean Cell Volume : 87.7 fl  Mean Cell Hemoglobin : 28.7 pg  Mean Cell Hemoglobin Concentration : 32.7 gm/dL  Auto Neutrophil # : x  Auto Lymphocyte # : x  Auto Monocyte # : x  Auto Eosinophil # : x  Auto Basophil # : x  Auto Neutrophil % : x  Auto Lymphocyte % : x  Auto Monocyte % : x  Auto Eosinophil % : x  Auto Basophil % : x      Serial CBC's  09-14 @ 06:34  Hct-44.3 / Hgb-14.5 / Plat-120 / RBC-5.05 / WBC-7.80  Serial CBC's  09-13 @ 10:18  Hct-41.6 / Hgb-13.8 / Plat-112 / RBC-4.82 / WBC-7.30      09-15    142  |  108  |  23  ----------------------------<  111<H>  4.1   |  22  |  1.41<H>    Ca    9.5      15 Sep 2020 05:45            Vitamin B12, Serum: 1276 pg/mL (09-12 @ 10:52)  Folate, Serum: 13.1 ng/mL (09-12 @ 10:52)              Radiology:    e

## 2020-09-15 NOTE — PROGRESS NOTE ADULT - SUBJECTIVE AND OBJECTIVE BOX
Subjective: Patient seen and examined. No new events except as noted.     SUBJECTIVE/ROS:  ROS limited       MEDICATIONS:  MEDICATIONS  (STANDING):  diVALproex  milliGRAM(s) Oral daily  heparin   Injectable 5000 Unit(s) SubCutaneous every 8 hours  midodrine. 10 milliGRAM(s) Oral three times a day  sodium chloride 0.9%. 1000 milliLiter(s) (50 mL/Hr) IV Continuous <Continuous>  sodium chloride 0.9%. 1000 milliLiter(s) (75 mL/Hr) IV Continuous <Continuous>  tamsulosin 0.4 milliGRAM(s) Oral at bedtime      PHYSICAL EXAM:  T(C): 36.6 (09-15-20 @ 03:49), Max: 36.7 (09-14-20 @ 12:09)  HR: 89 (09-15-20 @ 03:49) (84 - 102)  BP: 128/69 (09-15-20 @ 03:49) (111/58 - 144/69)  RR: 18 (09-15-20 @ 03:49) (18 - 18)  SpO2: 96% (09-15-20 @ 03:49) (95% - 96%)  Wt(kg): --  I&O's Summary    14 Sep 2020 07:01  -  15 Sep 2020 07:00  --------------------------------------------------------  IN: 600 mL / OUT: 0 mL / NET: 600 mL            JVP: Normal  Neck: supple  Lung: clear   CV: S1 S2 , Murmur:  Abd: soft  Ext: No edema  neuro: Awake / alert  Psych: flat affect  Skin: normal``    LABS/DATA:    CARDIAC MARKERS:                                14.5   7.80  )-----------( 120      ( 14 Sep 2020 06:34 )             44.3     09-15    142  |  108  |  23  ----------------------------<  111<H>  4.1   |  22  |  1.41<H>    Ca    9.5      15 Sep 2020 05:45      proBNP:   Lipid Profile:   HgA1c:   TSH:     TELE:  EKG:

## 2020-09-15 NOTE — PROGRESS NOTE ADULT - SUBJECTIVE AND OBJECTIVE BOX
CHIEF COMPLAINT:Patient is a 87y old  Male who presents with a chief complaint of 87M recently leaving outside hospital ([facility]) by family brought in for evaluation of syncope (15 Sep 2020 14:29)    	        PAST MEDICAL & SURGICAL HISTORY:  Dementia with behavioral disturbance    Traumatic intracerebral hemorrhage    Orthostatic hypotension    BPH (benign prostatic hyperplasia)    HLD (hyperlipidemia)    Status post craniectomy            no new complaints  pleasantly confused  no cp or sob  no n/v  no abd pain    Medications:  MEDICATIONS  (STANDING):  heparin   Injectable 5000 Unit(s) SubCutaneous every 8 hours  midodrine. 10 milliGRAM(s) Oral three times a day  sodium chloride 0.9%. 1000 milliLiter(s) (50 mL/Hr) IV Continuous <Continuous>  sodium chloride 0.9%. 1000 milliLiter(s) (75 mL/Hr) IV Continuous <Continuous>  tamsulosin 0.4 milliGRAM(s) Oral at bedtime  valproic  acid Syrup 250 milliGRAM(s) Oral two times a day    MEDICATIONS  (PRN):    	    PHYSICAL EXAM:  T(C): 36.6 (09-15-20 @ 13:16), Max: 36.6 (09-14-20 @ 20:17)  HR: 76 (09-15-20 @ 13:16) (76 - 90)  BP: 142/79 (09-15-20 @ 13:16) (111/58 - 144/69)  RR: 18 (09-15-20 @ 13:16) (18 - 18)  SpO2: 98% (09-15-20 @ 13:16) (95% - 98%)  Wt(kg): --  I&O's Summary    14 Sep 2020 07:01  -  15 Sep 2020 07:00  --------------------------------------------------------  IN: 600 mL / OUT: 0 mL / NET: 600 mL        Appearance: Normal	  HEENT:   Normal oral mucosa, PERRL, EOMI	  Lymphatic: No lymphadenopathy  Cardiovascular: Normal S1 S2, No JVD, No murmurs, No edema  Respiratory: Lungs clear to auscultation	  Gastrointestinal:  Soft, Non-tender, + BS	  Skin: No rashes, No ecchymoses, No cyanosis	  Neurologic: Non-focal  Extremities: Normal range of motion, No clubbing, cyanosis or edema  Vascular: Peripheral pulses palpable 2+ bilaterally    TELEMETRY: 	    ECG:  	  RADIOLOGY:  OTHER: 	  	  LABS:	 	    CARDIAC MARKERS:                                14.5   7.80  )-----------( 120      ( 14 Sep 2020 06:34 )             44.3     09-15    142  |  108  |  23  ----------------------------<  111<H>  4.1   |  22  |  1.41<H>    Ca    9.5      15 Sep 2020 05:45      proBNP:   Lipid Profile:   HgA1c:   TSH:

## 2020-09-15 NOTE — CHART NOTE - NSCHARTNOTEFT_GEN_A_CORE
Notified about Critical results of hep C virus reactive. Awaiting for hepC PCR results. GI is following.     Corinne Flores NP, #[id number]

## 2020-09-16 LAB
ANION GAP SERPL CALC-SCNC: 14 MMOL/L — SIGNIFICANT CHANGE UP (ref 5–17)
BUN SERPL-MCNC: 19 MG/DL — SIGNIFICANT CHANGE UP (ref 7–23)
CALCIUM SERPL-MCNC: 10.1 MG/DL — SIGNIFICANT CHANGE UP (ref 8.4–10.5)
CHLORIDE SERPL-SCNC: 107 MMOL/L — SIGNIFICANT CHANGE UP (ref 96–108)
CO2 SERPL-SCNC: 24 MMOL/L — SIGNIFICANT CHANGE UP (ref 22–31)
CREAT SERPL-MCNC: 1.3 MG/DL — SIGNIFICANT CHANGE UP (ref 0.5–1.3)
GLUCOSE SERPL-MCNC: 85 MG/DL — SIGNIFICANT CHANGE UP (ref 70–99)
POTASSIUM SERPL-MCNC: 4.4 MMOL/L — SIGNIFICANT CHANGE UP (ref 3.5–5.3)
POTASSIUM SERPL-SCNC: 4.4 MMOL/L — SIGNIFICANT CHANGE UP (ref 3.5–5.3)
SODIUM SERPL-SCNC: 145 MMOL/L — SIGNIFICANT CHANGE UP (ref 135–145)

## 2020-09-16 PROCEDURE — 99223 1ST HOSP IP/OBS HIGH 75: CPT

## 2020-09-16 PROCEDURE — 93306 TTE W/DOPPLER COMPLETE: CPT | Mod: 26

## 2020-09-16 PROCEDURE — 93010 ELECTROCARDIOGRAM REPORT: CPT

## 2020-09-16 RX ORDER — HALOPERIDOL DECANOATE 100 MG/ML
0.5 INJECTION INTRAMUSCULAR EVERY 8 HOURS
Refills: 0 | Status: DISCONTINUED | OUTPATIENT
Start: 2020-09-16 | End: 2020-09-18

## 2020-09-16 RX ADMIN — HEPARIN SODIUM 5000 UNIT(S): 5000 INJECTION INTRAVENOUS; SUBCUTANEOUS at 21:34

## 2020-09-16 RX ADMIN — Medication 250 MILLIGRAM(S): at 18:36

## 2020-09-16 RX ADMIN — HEPARIN SODIUM 5000 UNIT(S): 5000 INJECTION INTRAVENOUS; SUBCUTANEOUS at 05:26

## 2020-09-16 RX ADMIN — HEPARIN SODIUM 5000 UNIT(S): 5000 INJECTION INTRAVENOUS; SUBCUTANEOUS at 15:40

## 2020-09-16 RX ADMIN — Medication 250 MILLIGRAM(S): at 05:26

## 2020-09-16 RX ADMIN — TAMSULOSIN HYDROCHLORIDE 0.4 MILLIGRAM(S): 0.4 CAPSULE ORAL at 21:34

## 2020-09-16 NOTE — BEHAVIORAL HEALTH ASSESSMENT NOTE - NSBHCHARTREVIEWINVESTIGATE_PSY_A_CORE FT
Ventricular Rate 79 BPM    Atrial Rate 79 BPM    P-R Interval 216 ms    QRS Duration 82 ms    Q-T Interval 382 ms    QTC Calculation(Bezet) 438 ms    P Axis 55 degrees    R Axis 54 degrees    T Axis 52 degrees    Diagnosis Line Sinus rhythm with sinus arrhythmia with 1st degree A-V block  Nonspecific ST abnormality  Abnormal ECG

## 2020-09-16 NOTE — BEHAVIORAL HEALTH ASSESSMENT NOTE - NSBHCHARTREVIEWLAB_PSY_A_CORE FT
09-16    145  |  107  |  19  ----------------------------<  85  4.4   |  24  |  1.30    Ca    10.1      16 Sep 2020 06:01

## 2020-09-16 NOTE — BEHAVIORAL HEALTH ASSESSMENT NOTE - SUMMARY
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 Psychiatry called for management of meds for agitation and confusion.      Patient Aox1, recall 0/3, attention poor, limited historian states he " got  here" when question where he is. Pt poor historian, advanced dementia. hx obtained by dtr.    Per daughter, seroquel appeared to work in terms of behavioral control for patient in past but was stopped because of concerns about orthostatic hypotension. States he is typically AOx1-2, at baseline, was functioning well before his TBI in 2018. Pt is able to ambulate without assistance, and perform ADLs with assistance from HHA at baseline. As per daughter, " patient has had an acute decrease in cognition since being hospitalized". Pt's daughter was notified of the use of Haldol in the temporary treatment of acute clouding of sensorium and she is accepting of such treatment. Pt's daughter states Pt has negative substance use history, negative SI and HI in the past or present, negative manic and psychotic symptoms/episodes.

## 2020-09-16 NOTE — DIETITIAN INITIAL EVALUATION ADULT. - ENERGY NEEDS
Pertinent information as per chart: Pt 88 y/o M with PMH: dementia, traumatic ICH S/P craniotomy, orthostatic hypotension on midodrine, HTN, BPH, presents after leaving outside against medical advice for evaluation of syncope, found with NICK, hyperkalemia - improved, hepatitis C; S/P swallow evaluation (09/12) recommending to continue soft diet.

## 2020-09-16 NOTE — PROGRESS NOTE ADULT - SUBJECTIVE AND OBJECTIVE BOX
INTERVAL HPI/OVERNIGHT EVENTS:    patient seen and examined  overnight events noted       MEDICATIONS  (STANDING):  diVALproex  milliGRAM(s) Oral daily  heparin   Injectable 5000 Unit(s) SubCutaneous every 8 hours  midodrine. 10 milliGRAM(s) Oral three times a day  sodium chloride 0.9%. 1000 milliLiter(s) (50 mL/Hr) IV Continuous <Continuous>  tamsulosin 0.4 milliGRAM(s) Oral at bedtime    MEDICATIONS  (PRN):      Allergies    No Known Allergies    Intolerances        Review of Systems: unable to obtain             Vital Signs Last 24 Hrs  T(C): 36.7 (14 Sep 2020 12:09), Max: 36.7 (14 Sep 2020 12:09)  T(F): 98.1 (14 Sep 2020 12:09), Max: 98.1 (14 Sep 2020 12:09)  HR: 84 (14 Sep 2020 12:09) (80 - 102)  BP: 118/82 (14 Sep 2020 12:09) (111/68 - 156/81)  BP(mean): --  RR: 18 (14 Sep 2020 12:09) (18 - 18)  SpO2: 95% (14 Sep 2020 12:09) (95% - 96%)    PHYSICAL EXAM:    Constitutional: NAD  HEENT: EOMI, throat clear  Neck: No LAD, supple  Gastrointestinal: BS+, soft, NT/ND  Extremities: No peripheral edema  Neurological: awake, responsive, confused     LABS:                        14.5   7.80  )-----------( 120      ( 14 Sep 2020 06:34 )             44.3     09-14    143  |  105  |  25<H>  ----------------------------<  98  3.9   |  24  |  1.63<H>    Ca    9.8      14 Sep 2020 06:34            RADIOLOGY & ADDITIONAL TESTS:  < from: US Abdomen Complete (US Abdomen Complete .) (09.14.20 @ 09:08) >    EXAM:  US ABDOMEN COMPLETE                            PROCEDURE DATE:  09/14/2020            INTERPRETATION:  CLINICAL INFORMATION: Thrombocytopenia, AMS and possible CKD.    COMPARISON: Ultrasound abdomen from 10/31/2018 and CT abdomen from 2/6/2019    TECHNIQUE: Sonography of the abdomen.    FINDINGS:    Limited evaluation due to bowel gas and patient's mental status.    Liver: 13.2 cm. Within normal limits.  Bile ducts: Normal caliber. Common bile duct measures 5.6 mm.  Gallbladder: Within normal limits. Gallbladder wall measures 0.19 cm.  Pancreas: Not visualized due to bowel gas.  Spleen: 7.38 cm. Within normal limits.  Right kidney: 10.18 cm. No hydronephrosis.  Left kidney: 9.28 cm.  No hydronephrosis.  Ascites: None.  Aorta and IVC:IVC is slightly collapsed.    IMPRESSION:  Limited evaluation due to bowel gas and patient's mental status.  Visualized areas are within normal limit.                ANGELO BRYAN M.D., RESIDENT RADIOLOGIST  This document has been electronically signed.  ARABELLA GUEVARA M.D., ATTENDING RADIOLOGIST  This document has been electronically signed. Sep 14 2020  9:19AM    < end of copied text >

## 2020-09-16 NOTE — DIETITIAN INITIAL EVALUATION ADULT. - CONTINUE CURRENT NUTRITION CARE PLAN
yes/1. Recommend continue soft diet. Defer diet/fluid consistencies to medical team/SLP recommendations. Will continue to monitor and adjust as needed. 2. Recommend Ensure Enlive 2x daily (700 dorene and 40 gm protein) to optimize kcal and protein intake. Spoke to NP Angela Freed - pending verification ordered.

## 2020-09-16 NOTE — BEHAVIORAL HEALTH ASSESSMENT NOTE - NS ED BHA SUICIDALITY PRESENT CURRENT INTENT DETAILS COLLATERAL FT
Pt is normally A&Ox2 at baseline and able to perform ADL's with assistance from HHA and pt daughter.

## 2020-09-16 NOTE — PROGRESS NOTE ADULT - SUBJECTIVE AND OBJECTIVE BOX
Patient is a 87y old  Male who presents with a chief complaint of 87M recently leaving outside hospital ([facility]) by family brought in for evaluation of syncope (16 Sep 2020 12:19)    Coverage for Dr. Ted Alejo   SUBJECTIVE / OVERNIGHT EVENTS: Comfortable   Review of Systems  chest pain no  palpitations no  sob no  nausea no  headache no    MEDICATIONS  (STANDING):  heparin   Injectable 5000 Unit(s) SubCutaneous every 8 hours  midodrine. 10 milliGRAM(s) Oral three times a day  tamsulosin 0.4 milliGRAM(s) Oral at bedtime  valproic  acid Syrup 250 milliGRAM(s) Oral two times a day    MEDICATIONS  (PRN):  haloperidol    Injectable 0.5 milliGRAM(s) IV Push every 8 hours PRN Agitation      Vital Signs Last 24 Hrs  T(C): 36.6 (16 Sep 2020 14:58), Max: 36.6 (16 Sep 2020 04:14)  T(F): 97.8 (16 Sep 2020 14:58), Max: 97.9 (16 Sep 2020 04:14)  HR: 86 (16 Sep 2020 17:26) (66 - 89)  BP: 137/78 (16 Sep 2020 17:26) (137/78 - 163/78)  BP(mean): --  RR: 18 (16 Sep 2020 14:58) (18 - 18)  SpO2: 97% (16 Sep 2020 14:58) (97% - 98%)    PHYSICAL EXAM:  GENERAL: NAD, well-developed  HEAD:  Atraumatic, Normocephalic  EYES: EOMI, PERRLA, conjunctiva and sclera clear  NECK: Supple, No JVD  CHEST/LUNG: Clear to auscultation bilaterally; No wheeze  HEART: Regular rate and rhythm; No murmurs, rubs, or gallops  ABDOMEN: Soft, Nontender, Nondistended; Bowel sounds present  EXTREMITIES:  2+ Peripheral Pulses, No clubbing, cyanosis, or edema  PSYCH: confused  NEUROLOGY: non-focal  SKIN: No rashes or lesions    LABS:    09-16    145  |  107  |  19  ----------------------------<  85  4.4   |  24  |  1.30    Ca    10.1      16 Sep 2020 06:01                  RADIOLOGY & ADDITIONAL TESTS:    Imaging Personally Reviewed:    Consultant(s) Notes Reviewed:      Care Discussed with Consultants/Other Providers:

## 2020-09-16 NOTE — CHART NOTE - NSCHARTNOTEFT_GEN_A_CORE
Due to patients deconditioning and generalized weakness secondary to advanced dementia and TBI patient will require a Alex Lift to transfer from bed to chair and vice versa. This is necessary to achieve daily transport tasks as patient is unable to self propel. Patient and family are in agreement with Alex lift use at home and assistance to be provided if needed.     JOSEPH Freed PA-C   Dept of Medicine   [id number]

## 2020-09-16 NOTE — DIETITIAN INITIAL EVALUATION ADULT. - ADD RECOMMEND
1. Will continue to monitor PO intake, weight, labs, skin, GI status, diet. 2. Encourage PO intake and obtain food preferences (obtained from daughter at this time) - reviewed menu order procedures; made aware RD remains available.

## 2020-09-16 NOTE — PROGRESS NOTE ADULT - SUBJECTIVE AND OBJECTIVE BOX
Subjective: Patient seen and examined. No new events except as noted.     SUBJECTIVE/ROS:  Due to altered mental status, subjective information were not able to be obtained from the patient. History was obtained, to the extent possible, from review of the chart and collateral sources of information.       MEDICATIONS:  MEDICATIONS  (STANDING):  heparin   Injectable 5000 Unit(s) SubCutaneous every 8 hours  midodrine. 10 milliGRAM(s) Oral three times a day  tamsulosin 0.4 milliGRAM(s) Oral at bedtime  valproic  acid Syrup 250 milliGRAM(s) Oral two times a day      PHYSICAL EXAM:  T(C): 36.6 (09-16-20 @ 04:14), Max: 37.2 (09-15-20 @ 17:27)  HR: 68 (09-16-20 @ 06:34) (66 - 80)  BP: 144/72 (09-16-20 @ 06:34) (130/72 - 163/78)  RR: 18 (09-16-20 @ 04:14) (16 - 18)  SpO2: 98% (09-16-20 @ 04:14) (98% - 98%)  Wt(kg): --  I&O's Summary    15 Sep 2020 07:01  -  16 Sep 2020 07:00  --------------------------------------------------------  IN: 540 mL / OUT: 0 mL / NET: 540 mL            JVP: Normal  Neck: supple  Lung: clear   CV: S1 S2 , Murmur:  Abd: soft  Ext: No edema  neuro: Awake / alert  Psych: flat affect  Skin: normal``    LABS/DATA:    CARDIAC MARKERS:            09-16    145  |  107  |  19  ----------------------------<  85  4.4   |  24  |  1.30    Ca    10.1      16 Sep 2020 06:01      proBNP:   Lipid Profile:   HgA1c:   TSH:     TELE:  EKG:

## 2020-09-16 NOTE — BEHAVIORAL HEALTH ASSESSMENT NOTE - HPI (INCLUDE ILLNESS QUALITY, SEVERITY, DURATION, TIMING, CONTEXT, MODIFYING FACTORS, ASSOCIATED SIGNS AND SYMPTOMS)
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 Psychiatry called for management of meds for agitation and confusion.      Patient Aox1, recall 0/3, attention poor, limited historian states he " got  here" when question where he is. Pt poor historian, advanced dementia. hx obtained by dtr.    Per daughter, seroquel appeared to work in terms of behavioral control for patient in past but was stopped because of concerns about orthostatic hypotension. States he is typically AOx1-2, at baseline, was functioning well before his TBI in 2018. Pt is able to ambulate without assistance, and perform ADLs with assistance from HHA at baseline. As per daughter, " patient has had an acute decrease in cognition since being hospitalized". Pt's daughter was notified of the use of Haldol in the temporary treatment of acute clouding of sensorium and she is accepting of such treatment. Pt's daughter states Pt has negative substance use history, negative SI and HI in the past or present, negative manic and psychotic symptoms/episodes.

## 2020-09-16 NOTE — DIETITIAN INITIAL EVALUATION ADULT. - PHYSICAL APPEARANCE
well nourished/other (specify)/No visual signs of muscle/fat loss noted Ht: 74 inches Wt: 184 pounds BMI: 23.6 kg/m2 IBW: 190 (+/-10%) 96.8 %IBW  No noted edema as per flow sheets.   Skin: no noted pressure injuries as per documentation.

## 2020-09-16 NOTE — CHART NOTE - NSCHARTNOTEFT_GEN_A_CORE
Medicine PA Episodic Note      Notified by RN that patient is agitated and combative. Patient seen and assessed by me. Patient restless, fighting with nurses and PCA, pulled out IV line. Patient A&Ox1.     Vital Signs Last 24 Hrs  T(C): 37.2 (15 Sep 2020 17:27), Max: 37.2 (15 Sep 2020 17:27)  T(F): 98.9 (15 Sep 2020 17:27), Max: 98.9 (15 Sep 2020 17:27)  HR: 80 (15 Sep 2020 17:27) (76 - 89)  BP: 130/72 (15 Sep 2020 17:27) (128/69 - 142/79)  BP(mean): --  RR: 16 (15 Sep 2020 17:27) (16 - 18)  SpO2: 98% (15 Sep 2020 17:27) (96% - 98%)      Labs:                          14.5   7.80  )-----------( 120      ( 14 Sep 2020 06:34 )             44.3     09-15    142  |  108  |  23  ----------------------------<  111<H>  4.1   |  22  |  1.41<H>    Ca    9.5      15 Sep 2020 05:45      Radiology:    < from: CT Head No Cont (09.14.20 @ 22:20) >      IMPRESSION:  Progressive atrophic changes compared with the prior. Microvascular ischemic change      < end of copied text >        Physical Exam:  General: WN/WD NAD  Neurology: A&Ox3, nonfocal, JOHNSTON x 4  Head:  Normocephalic, atraumatic  Respiratory: CTA B/L  CV: RRR, S1S2, no murmur  Abdominal: Soft, NT, ND no palpable mass  MSK: No edema, + peripheral pulses, FROM all 4 extremity    Assessment & Plan:  HPI:  Due to patient's dementia, history is collected from his daughter Ms. Roxanne Carrillo via telephone    87M w/ dementia (A&Ox1), hx of traumatic ICH s/p craniotomy, orthostatic hypotension on midodrine, HTN, BPH presents to [facility] after leaving outside against medical advice for evaluation of syncope. Ms. Carrillo informs that the patient was having episodes of faiting/collapse upon standing which led her to bring him to the hospital. Per chart review form [facility], the patient was admitted for syncope evaluation and determined to have NSTEMI.     Now with agitation.     Plan:    Agitation  - Haldol 0.5 IM STAT  - Will obtain EKG  - Will continue to monitor   - Psych c/s. paged. awaiting callback.  - d/w Dr. Alejo    Endorse to AM team.     Lisa SALEH  Dept of Medicine  [id number] Medicine PA Episodic Note      Notified by RN that patient is agitated and combative. Patient seen and assessed by me. Patient restless, fighting with nurses and PCA, pulled out IV line. Patient A&Ox1.     Vital Signs Last 24 Hrs  T(C): 37.2 (15 Sep 2020 17:27), Max: 37.2 (15 Sep 2020 17:27)  T(F): 98.9 (15 Sep 2020 17:27), Max: 98.9 (15 Sep 2020 17:27)  HR: 80 (15 Sep 2020 17:27) (76 - 89)  BP: 130/72 (15 Sep 2020 17:27) (128/69 - 142/79)  BP(mean): --  RR: 16 (15 Sep 2020 17:27) (16 - 18)  SpO2: 98% (15 Sep 2020 17:27) (96% - 98%)      Labs:                          14.5   7.80  )-----------( 120      ( 14 Sep 2020 06:34 )             44.3     09-15    142  |  108  |  23  ----------------------------<  111<H>  4.1   |  22  |  1.41<H>    Ca    9.5      15 Sep 2020 05:45      Radiology:    < from: CT Head No Cont (09.14.20 @ 22:20) >      IMPRESSION:  Progressive atrophic changes compared with the prior. Microvascular ischemic change      < end of copied text >        Physical Exam:  General: WN/WD NAD  Neurology: A&Ox3, nonfocal, JOHNSTON x 4  Head:  Normocephalic, atraumatic  Respiratory: CTA B/L  CV: RRR, S1S2, no murmur  Abdominal: Soft, NT, ND no palpable mass  MSK: No edema, + peripheral pulses, FROM all 4 extremity    Assessment & Plan:  HPI:  Due to patient's dementia, history is collected from his daughter Ms. Roxanne Carrillo via telephone    87M w/ dementia (A&Ox1), hx of traumatic ICH s/p craniotomy, orthostatic hypotension on midodrine, HTN, BPH presents to [facility] after leaving outside against medical advice for evaluation of syncope. Ms. Carrillo informs that the patient was having episodes of faiting/collapse upon standing which led her to bring him to the hospital. Per chart review form [facility], the patient was admitted for syncope evaluation and determined to have NSTEMI.     Now with agitation.     Plan:    Agitation  - Haldol 0.5 IM STAT, pt now visibly calmer and sleeping  - EKG in chart from Oct 2019 shows NSR with .   - EKG ordered.   - Will continue to monitor   - Psych c/s. paged. awaiting callback.  - d/w Dr. Alejo    Endorse to AM team.     Lisa SALEH  Dept of Medicine  [id number]

## 2020-09-16 NOTE — PROGRESS NOTE ADULT - ASSESSMENT
Syncope   due to severe Orthostatic hypotension   cont midodrine    HTN  as above     Elevated trop  not consistent with acute MI   obtain echo     Hep C  fu with GI eval     monitor ekg qtc with haldol

## 2020-09-16 NOTE — PROGRESS NOTE ADULT - ASSESSMENT
87 y /o M w/ dementia hx of traumatic ICH s/p craniotomy, orthostatic hypotension on midodrine, HTN, BPH presents to [facility] after leaving outside against medical advice for evaluation of syncope.      Problem/Plan - 1:  ·  Problem: Syncope and collapse.  cards eval noted   orthostatic hypotension  c/w midodrine   neuro eval noted/ct head no acute events     Problem/Plan - 2:  ·  Problem: Orthostatic hypotension.  Plan: - continue with home midodrine 10g TID  - fall precautions, ambulation only with assistance and walker.      Problem/Plan - 3:  ·  Problem: Hyperkalemia. improved   Problem/Plan - 4:  ·  Problem: Thrombocytopenia.    - no signs of active hemorrhage  heme eval noted     Problem/Plan - 5:  ·  Problem: Atrial fibrillation, unspecified type  - cardiology f/u noted     Problem/Plan - 6:  Problem: Acute kidney injury.  fluids prn     Problem/Plan - 7:  ·  Problem: Dementia with behavioral disturbance, unspecified dementia type  .  Plan: reported baseline by family is A&Ox1. 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  Plan: hold antihypertensives given reported syncope.     low plts  heme f/u noted    hep c reactive  f/u pcr   gi f/u     dvt proph     labs in am    Medically stable    Wan Edwards MD pager [phone]   \

## 2020-09-16 NOTE — DIETITIAN INITIAL EVALUATION ADULT. - OTHER INFO
Daughter reports pt with good appetite and PO intake at home. Confirms pt with NKFA. Reports pt not following any type of diet or restriction at home but consumes soft food such as mashed potatoes and fish. Reports pt taking Multivitamin, Vitamin C, Vitamin D, and Vitamin E PTA; states pt drinking Ensure Original 1xday at night if pt requests to eat something.     RN new to pt, reports pt with fair appetite and PO intake. Noted fluctuating PO intake between % as per flow sheets. RN denies reports of pt with nausea, vomiting, diarrhea, or constipation, last BM 2 days ago (09/14). RN reports pt taking longer to chew hard foods; denies pt with difficulty swallowing. Daughter refused changes in soft diet texture. Offered nutritional supplement - daughter agreed to Ensure Enlive.     Daughter denies pt with weight changes PTA, reports UBW maybe 170 - 180 pounds. Weight as per previous RD note (01/25/2018) 141.5 pounds -?accuracy. Weight as per flow sheets (09/10) 184 pounds - will continue to monitor (pt sitting in chair at this time).     Reviewed menu order procedures in hospital and obtained food preferences. Daughter denies having questions/concerns about diet and nutrition; made aware RD remains available.

## 2020-09-16 NOTE — BEHAVIORAL HEALTH ASSESSMENT NOTE - NSBHCONSULTRECOMMENDOTHER_PSY_A_CORE FT
1) continue VPA as ordered for mood stability, consider giving PO if pt is able to take PO  2) f/u LFTs and VPA level please  3) consider adding low dose haldol 0.5 mg IV/IM Q8hr PRN for acute agitation, f/u QTc < 500  4) family called, aware of plan above

## 2020-09-17 ENCOUNTER — TRANSCRIPTION ENCOUNTER (OUTPATIENT)
Age: 85
End: 2020-09-17

## 2020-09-17 LAB
ANION GAP SERPL CALC-SCNC: 13 MMOL/L — SIGNIFICANT CHANGE UP (ref 5–17)
BUN SERPL-MCNC: 19 MG/DL — SIGNIFICANT CHANGE UP (ref 7–23)
CALCIUM SERPL-MCNC: 9.7 MG/DL — SIGNIFICANT CHANGE UP (ref 8.4–10.5)
CHLORIDE SERPL-SCNC: 109 MMOL/L — HIGH (ref 96–108)
CO2 SERPL-SCNC: 25 MMOL/L — SIGNIFICANT CHANGE UP (ref 22–31)
CREAT SERPL-MCNC: 1.34 MG/DL — HIGH (ref 0.5–1.3)
GLUCOSE SERPL-MCNC: 106 MG/DL — HIGH (ref 70–99)
HCT VFR BLD CALC: 40.8 % — SIGNIFICANT CHANGE UP (ref 39–50)
HGB BLD-MCNC: 13.3 G/DL — SIGNIFICANT CHANGE UP (ref 13–17)
MCHC RBC-ENTMCNC: 28.7 PG — SIGNIFICANT CHANGE UP (ref 27–34)
MCHC RBC-ENTMCNC: 32.6 GM/DL — SIGNIFICANT CHANGE UP (ref 32–36)
MCV RBC AUTO: 87.9 FL — SIGNIFICANT CHANGE UP (ref 80–100)
NRBC # BLD: 0 /100 WBCS — SIGNIFICANT CHANGE UP (ref 0–0)
PLATELET # BLD AUTO: 135 K/UL — LOW (ref 150–400)
POTASSIUM SERPL-MCNC: 3.9 MMOL/L — SIGNIFICANT CHANGE UP (ref 3.5–5.3)
POTASSIUM SERPL-SCNC: 3.9 MMOL/L — SIGNIFICANT CHANGE UP (ref 3.5–5.3)
RBC # BLD: 4.64 M/UL — SIGNIFICANT CHANGE UP (ref 4.2–5.8)
RBC # FLD: 13.6 % — SIGNIFICANT CHANGE UP (ref 10.3–14.5)
SODIUM SERPL-SCNC: 147 MMOL/L — HIGH (ref 135–145)
VALPROATE SERPL-MCNC: 31 UG/ML — LOW (ref 50–100)
WBC # BLD: 7.59 K/UL — SIGNIFICANT CHANGE UP (ref 3.8–10.5)
WBC # FLD AUTO: 7.59 K/UL — SIGNIFICANT CHANGE UP (ref 3.8–10.5)

## 2020-09-17 RX ADMIN — Medication 250 MILLIGRAM(S): at 17:06

## 2020-09-17 RX ADMIN — HEPARIN SODIUM 5000 UNIT(S): 5000 INJECTION INTRAVENOUS; SUBCUTANEOUS at 05:28

## 2020-09-17 RX ADMIN — HEPARIN SODIUM 5000 UNIT(S): 5000 INJECTION INTRAVENOUS; SUBCUTANEOUS at 21:40

## 2020-09-17 RX ADMIN — Medication 250 MILLIGRAM(S): at 05:27

## 2020-09-17 RX ADMIN — TAMSULOSIN HYDROCHLORIDE 0.4 MILLIGRAM(S): 0.4 CAPSULE ORAL at 21:39

## 2020-09-17 RX ADMIN — HEPARIN SODIUM 5000 UNIT(S): 5000 INJECTION INTRAVENOUS; SUBCUTANEOUS at 13:35

## 2020-09-17 NOTE — PROGRESS NOTE ADULT - SUBJECTIVE AND OBJECTIVE BOX
Subjective: Patient seen and examined. No new events except as noted.     SUBJECTIVE/ROS:        MEDICATIONS:  MEDICATIONS  (STANDING):  heparin   Injectable 5000 Unit(s) SubCutaneous every 8 hours  midodrine. 10 milliGRAM(s) Oral three times a day  tamsulosin 0.4 milliGRAM(s) Oral at bedtime  valproic  acid Syrup 250 milliGRAM(s) Oral two times a day      PHYSICAL EXAM:  T(C): 36.5 (09-17-20 @ 04:12), Max: 36.6 (09-16-20 @ 14:58)  HR: 79 (09-17-20 @ 04:12) (75 - 89)  BP: 140/76 (09-17-20 @ 04:12) (137/78 - 144/75)  RR: 18 (09-17-20 @ 04:12) (18 - 18)  SpO2: 96% (09-17-20 @ 04:12) (96% - 97%)  Wt(kg): --  I&O's Summary    16 Sep 2020 07:01  -  17 Sep 2020 07:00  --------------------------------------------------------  IN: 800 mL / OUT: 400 mL / NET: 400 mL            JVP: Normal  Neck: supple  Lung: clear   CV: S1 S2 , Murmur:  Abd: soft  Ext: No edema  neuro: Awake   Psych: flat affect  Skin: normal``    LABS/DATA:    CARDIAC MARKERS:                                13.3   7.59  )-----------( 135      ( 17 Sep 2020 06:38 )             40.8     09-17    147<H>  |  109<H>  |  19  ----------------------------<  106<H>  3.9   |  25  |  1.34<H>    Ca    9.7      17 Sep 2020 06:37      proBNP:   Lipid Profile:   HgA1c:   TSH:     TELE:  EKG:      < from: Transthoracic Echocardiogram (09.16.20 @ 13:04) >  Conclusions:  Technically very difficult study. Patient would not  complete the examination.  1. Endocardium not well visualized; from limiited views,  grossly hyperdynamic left ventricular systolic function.  2. The right heart is not well visualized.  3. No pericardial effusion seen.  *** No previous Echo exam.  ------------------------------------------------------------------------  Confirmed on  9/16/2020 - 15:30:55 by Era Simmons M.D.  ------------------------------------------------------------------------    < end of copied text >

## 2020-09-17 NOTE — PROGRESS NOTE ADULT - SUBJECTIVE AND OBJECTIVE BOX
INTERVAL HPI/OVERNIGHT EVENTS:    patient seen and examined  overnight events noted       MEDICATIONS  (STANDING):  diVALproex  milliGRAM(s) Oral daily  heparin   Injectable 5000 Unit(s) SubCutaneous every 8 hours  midodrine. 10 milliGRAM(s) Oral three times a day  sodium chloride 0.9%. 1000 milliLiter(s) (50 mL/Hr) IV Continuous <Continuous>  tamsulosin 0.4 milliGRAM(s) Oral at bedtime    MEDICATIONS  (PRN):      Allergies    No Known Allergies    Intolerances        Review of Systems: unable to obtain             Vital Signs Last 24 Hrs  T(C): 36.7 (14 Sep 2020 12:09), Max: 36.7 (14 Sep 2020 12:09)  T(F): 98.1 (14 Sep 2020 12:09), Max: 98.1 (14 Sep 2020 12:09)  HR: 84 (14 Sep 2020 12:09) (80 - 102)  BP: 118/82 (14 Sep 2020 12:09) (111/68 - 156/81)  BP(mean): --  RR: 18 (14 Sep 2020 12:09) (18 - 18)  SpO2: 95% (14 Sep 2020 12:09) (95% - 96%)    PHYSICAL EXAM:    Constitutional: NAD  HEENT: EOMI, throat clear  Neck: No LAD, supple  Gastrointestinal: BS+, soft, NT/ND  Extremities: No peripheral edema  Neurological: awake, responsive, confused     LABS:                        14.5   7.80  )-----------( 120      ( 14 Sep 2020 06:34 )             44.3     09-14    143  |  105  |  25<H>  ----------------------------<  98  3.9   |  24  |  1.63<H>    Ca    9.8      14 Sep 2020 06:34            RADIOLOGY & ADDITIONAL TESTS:  < from: US Abdomen Complete (US Abdomen Complete .) (09.14.20 @ 09:08) >    EXAM:  US ABDOMEN COMPLETE                            PROCEDURE DATE:  09/14/2020            INTERPRETATION:  CLINICAL INFORMATION: Thrombocytopenia, AMS and possible CKD.    COMPARISON: Ultrasound abdomen from 10/31/2018 and CT abdomen from 2/6/2019    TECHNIQUE: Sonography of the abdomen.    FINDINGS:    Limited evaluation due to bowel gas and patient's mental status.    Liver: 13.2 cm. Within normal limits.  Bile ducts: Normal caliber. Common bile duct measures 5.6 mm.  Gallbladder: Within normal limits. Gallbladder wall measures 0.19 cm.  Pancreas: Not visualized due to bowel gas.  Spleen: 7.38 cm. Within normal limits.  Right kidney: 10.18 cm. No hydronephrosis.  Left kidney: 9.28 cm.  No hydronephrosis.  Ascites: None.  Aorta and IVC:IVC is slightly collapsed.    IMPRESSION:  Limited evaluation due to bowel gas and patient's mental status.  Visualized areas are within normal limit.                ANGELO BRYAN M.D., RESIDENT RADIOLOGIST  This document has been electronically signed.  ARABELLA UGEVARA M.D., ATTENDING RADIOLOGIST  This document has been electronically signed. Sep 14 2020  9:19AM    < end of copied text >

## 2020-09-17 NOTE — PROGRESS NOTE ADULT - PROBLEM/PLAN-1
[de-identified] : 74 year old female presents today s/p left 5th Zone I MT fx 5/27/19. She has been complaint with [employer]. She tripped over a rug and inverted her ankle. Denies pain, numbness or tingling. She is not pain medication. 
DISPLAY PLAN FREE TEXT

## 2020-09-17 NOTE — PROGRESS NOTE ADULT - SUBJECTIVE AND OBJECTIVE BOX
Patient is a 87y old  Male who presents with a chief complaint of 87M recently leaving outside hospital (AMA) by family brought in for evaluation of syncope (17 Sep 2020 13:27)      SUBJECTIVE / OVERNIGHT EVENTS: agitated, taking off clothing   Review of Systems  unobtainable     MEDICATIONS  (STANDING):  heparin   Injectable 5000 Unit(s) SubCutaneous every 8 hours  midodrine. 10 milliGRAM(s) Oral three times a day  tamsulosin 0.4 milliGRAM(s) Oral at bedtime  valproic  acid Syrup 250 milliGRAM(s) Oral two times a day    MEDICATIONS  (PRN):  haloperidol    Injectable 0.5 milliGRAM(s) IV Push every 8 hours PRN Agitation      Vital Signs Last 24 Hrs  T(C): 36.3 (17 Sep 2020 13:24), Max: 36.6 (16 Sep 2020 20:37)  T(F): 97.4 (17 Sep 2020 13:24), Max: 97.8 (16 Sep 2020 20:37)  HR: 82 (17 Sep 2020 13:24) (65 - 86)  BP: 164/77 (17 Sep 2020 13:24) (137/78 - 164/77)  BP(mean): --  RR: 18 (17 Sep 2020 13:24) (18 - 18)  SpO2: 96% (17 Sep 2020 13:24) (95% - 96%)    PHYSICAL EXAM:  GENERAL: NAD  HEAD:  Atraumatic, Normocephalic  EYES: EOMI, PERRLA, conjunctiva and sclera clear  NECK: Supple, No JVD  CHEST/LUNG: Clear to auscultation bilaterally; No wheeze  HEART: Regular rate and rhythm; No murmurs, rubs, or gallops  ABDOMEN: Soft, Nontender, Nondistended; Bowel sounds present  EXTREMITIES:  2+ Peripheral Pulses, No clubbing, cyanosis, or edema  PSYCH: confused  NEUROLOGY: non-focal  SKIN: No rashes or lesions    LABS:                        13.3   7.59  )-----------( 135      ( 17 Sep 2020 06:38 )             40.8     09-17    147<H>  |  109<H>  |  19  ----------------------------<  106<H>  3.9   |  25  |  1.34<H>    Ca    9.7      17 Sep 2020 06:37                  RADIOLOGY & ADDITIONAL TESTS:    Imaging Personally Reviewed:    Consultant(s) Notes Reviewed:      Care Discussed with Consultants/Other Providers:

## 2020-09-17 NOTE — PROGRESS NOTE ADULT - ASSESSMENT
87M w/ dementia (A&Ox1), hx of traumatic ICH s/p craniotomy, orthostatic hypotension on midodrine, HTN, BPH presents to [facility] after leaving outside against medical advice for evaluation of syncope, noted to have thrombocytopenia    #thrombocytopenia- with clumps, actual count likely higher  - previous CBC as outpatient 8/2/2020 with platelet 74K; review of [place] labs with intermittently low platelets over the years  - small clumps noted on blue top  - reviewed peripheral smear- small clumps noted on smear and on blue top smear; no abnormal cells, no schistocyte  - B12/folate normal, TSH normal; APLS - anticardiolipin Ab negative, beta 2 glycoprotein Ab initial screen was positive, but breakdown of Ab is negative  - Hg/bilirubin normal, no evidence of hemolysis  - no known history of alcohol/liver disease/hepatitis per daughter; +Hepatitis C Ab positive; Abdominal US- limited study but liver and spleen appear normal;  - GI eval noted, hep c pcr pending  - possibly due to medication/ depakote  - platelet count with continued trend higher - monitor CBC periodically    #orthostatic hypotension- on midodrine  - Cardiology following    #dementia, ? seizure  - on depakote, Neuro eval noted    DC planning per Medicine

## 2020-09-17 NOTE — DISCHARGE NOTE PROVIDER - CARE PROVIDERS DIRECT ADDRESSES
,DirectAddress_Unknown,[email or web address] ,DirectAddress_Unknown,[email or web address],DirectAddress_Unknown

## 2020-09-17 NOTE — PROGRESS NOTE ADULT - ASSESSMENT
Syncope   due to severe Orthostatic hypotension   cont midodrine    HTN  as above     Elevated trop  not consistent with acute MI   normal LV function     Hep C  fu with GI eval

## 2020-09-17 NOTE — PROGRESS NOTE ADULT - PROBLEM SELECTOR PLAN 1
- Hep C reactive  - Hep C PCR pending  - U/S abdomen without evidence of hepatic abnormalities  - no gi objection to dc

## 2020-09-17 NOTE — DISCHARGE NOTE PROVIDER - HOSPITAL COURSE
88 y/o M with PMHx of Dementia (AA0 x1), TBI/ICH s/p craniotomy, orthostatic hypotension  on Midodrine, BPH, ? seizures on Depakote presents to ED after leaving AMA at [facility] for evaluation of syncope. Per chart review form [facility], the patient was admitted for syncope evaluation and determined to have NSTEMI. There is limited paperwork from [facility] and therefore unclear as to the complete evaluation and management. The patient had head CT which does not report acute pathology, TTE was also completed and did not identify LV systolic dysfunction.  Per pts daughter, a neurologist saw the patient and was told an EEG was done but no results. The family became concerned when physicians at [facility] wanted to do a nuclear stress test which they did not agree with. They spoke with the patients cardiologist Dr. Hale who recommended evaluation at [facility] and the family had patient leave against medical advice and brought patient to [facility] for further evaluation. Of note patient was labeled to have atrial fibrillation as a medical problem at [facility] but does not have this charted previously.    In the ED. VS 98.2, 111/66, 7918 94%RA.   CTH negative showed Progressive atrophic changes compared with the prior. Microvascular ischemic change  Pt evaluated by neurology, Per daughter, the patient has been experiencing episodes of fainting/collapse. These episodes often happen after he eats breakfast and then stands up. He has been diagnosed with orthostatic hypotension in the past and takes Midodrine TID at home. The family checks his BP daily and also after any episode of collapse. Per the daughter his BP is typically "very low" following these episodes but she could not recall how low exactly. Per the daughter, this hospitalization was prompted when he had an episode of collapse after standing up followed by ~10 min LOC that was precipitated by some shaking movements in the extremities. She states the family thinks he has seizures but he has not been diagnosed with a seizure disorder. He is followed by Dr. Rubio and was last seen in clinic in 03/2020 for dementia and insomnia. At that time seizure workup (including all EEGs) was negative.   No EEG or other neurological workup warranted here as patient has had multiple negative EEGs.   Cardiology consulted and likely Syncopal episodes in setting of patient with known orthostatic hypotension. Some episodes associated with shaking movements in the extremities per family. Likely secondary to convulsive syncope in setting of orthostatic hypotension. Seizure disorder less likely.  Upon admission Hst 61 -->60 -->48. Unlikely MI. Echo showed hyperdynamic LV function.   Will continue with Midodrine upon discharge.   PT recommends [facility], daughter would like to take pt home with resumption of HHA.   Pt medically stable for discharge home with home care.    86 y/o M with PMHx of Dementia (AA0 x1), TBI/ICH s/p craniotomy, orthostatic hypotension on Midodrine, BPH, ? seizures on Depakote presents to ED after leaving AMA at [facility] for evaluation of syncope. Per chart review form [facility], the patient was admitted for syncope evaluation and determined to have NSTEMI. There is limited paperwork from [facility] and therefore unclear as to the complete evaluation and management. The patient had head CT which does not report acute pathology, TTE was also completed and did not identify LV systolic dysfunction.  Per pts daughter, a neurologist saw the patient and was told an EEG was done but no results. The family became concerned when physicians at [facility] wanted to do a nuclear stress test which they did not agree with. They spoke with the patients cardiologist Dr. Hale who recommended evaluation at [facility] and the family had patient leave against medical advice and brought patient to [facility] for further evaluation. Of note patient was labeled to have atrial fibrillation as a medical problem at [facility] but does not have this charted previously.    Pt seen by Neuroroly: CTH negative showed Progressive atrophic changes compared with the prior. Microvascular ischemic change. No neurological workup needed as inpatient. Can consider routine EEG as outpatient, however likely a low-yield study as patient has had multiple negative EEGs in the past. Can follow up with Dr. Rubio at [address] [place] [address]. ([phone]).  Continue Depakote, can consider as an outpatient potentially increasing to BID for seizure coverage, however, suspicion remains low and increasing AEDs would likely contribute to worsening mental status.  Orthostatic hypotension continues to be managed per Cardiology.   Pt seen by cardiology: likely Syncopal episodes in setting of patient with known orthostatic hypotension. Some episodes associated with shaking movements in the extremities per family. Likely secondary to convulsive syncope in setting of orthostatic hypotension. Seizure disorder less likely. Echo showed hyperdynamic LV function. Will continue with Midodrine upon discharge.   Seen by GI: Hep C reactive, Hep C PCR pending, U/S abdomen without evidence of hepatic abnormalities. Follow up Hep C PCR as OP with GI.     PT recommends [facility], daughter would like to take pt home with resumption of HHA.   Pt medically stable for discharge home with home care by Dr. Alejo to follow up with PCP, Neurology and cardiologist as OP.    88 y/o M with PMHx of Dementia (AA0 x1), TBI/ICH s/p craniotomy, orthostatic hypotension on Midodrine, BPH, ? seizures on Depakote presents to ED after leaving AMA at [facility] for evaluation of syncope. Per chart review form [facility], the patient was admitted for syncope evaluation and determined to have NSTEMI. There is limited paperwork from [place] and therefore unclear as to the complete evaluation and management. The patient had head CT which does not report acute pathology, TTE was also completed and did not identify LV systolic dysfunction.  Per pts daughter, a neurologist saw the patient and was told an EEG was done but no results. The family became concerned when physicians at [facility] wanted to do a nuclear stress test which they did not agree with. They spoke with the patients cardiologist Dr. Hale who recommended evaluation at [facility] and the family had patient leave against medical advice and brought patient to [facility] for further evaluation. Of note patient was labeled to have atrial fibrillation as a medical problem at [facility] but does not have this charted previously.    Pt seen by Neuroroly: CTH negative showed Progressive atrophic changes compared with the prior. Microvascular ischemic change. No neurological workup needed as inpatient. Can consider routine EEG as outpatient, however likely a low-yield study as patient has had multiple negative EEGs in the past. Can follow up with Dr. Rubio at [address] [place] [address]. ([phone]).  Continue Depakote, can consider as an outpatient potentially increasing to BID for seizure coverage, however, suspicion remains low and increasing AEDs would likely contribute to worsening mental status.  Orthostatic hypotension continues to be managed per Cardiology.   Pt seen by cardiology: likely Syncopal episodes in setting of patient with known orthostatic hypotension. Some episodes associated with shaking movements in the extremities per family. Likely secondary to convulsive syncope in setting of orthostatic hypotension. Seizure disorder less likely. Echo showed hyperdynamic LV function. Will continue with Midodrine upon discharge.   Seen by GI: Hep C reactive, Hep C PCR pending, U/S abdomen without evidence of hepatic abnormalities. Follow up Hep C PCR as OP with GI.     PT recommends [facility], daughter would like to take pt home with resumption of HHA.   Pt medically stable for discharge home with home care by Dr. Abrudescu to follow up with PCP, Neurology and cardiologist as OP.

## 2020-09-17 NOTE — DISCHARGE NOTE PROVIDER - NSDCMRMEDTOKEN_GEN_ALL_CORE_FT
atorvastatin 40 mg oral tablet: 1 tab(s) orally once a day  divalproex sodium 250 mg oral delayed release tablet: 1 tab(s) orally 2 times a day  midodrine 10 mg oral tablet: 1 tab(s) orally 3 times a day  tamsulosin 0.4 mg oral capsule: 1 cap(s) orally once a day   atorvastatin 40 mg oral tablet: 1 tab(s) orally once a day  divalproex sodium 250 mg oral delayed release tablet: 1 tab(s) orally 2 times a day  midodrine 10 mg oral tablet: 1 tab(s) orally 3 times a day  HOLD for SBP &gt;130 or HR &lt;60  tamsulosin 0.4 mg oral capsule: 1 cap(s) orally once a day

## 2020-09-17 NOTE — DISCHARGE NOTE PROVIDER - NSDCCPCAREPLAN_GEN_ALL_CORE_FT
PRINCIPAL DISCHARGE DIAGNOSIS  Diagnosis: Syncope and collapse  Assessment and Plan of Treatment: Likely secondary to orthostatic hypotension with episodes of convulsive syncope and not seizure disorder. Continue taking Midodrine 10mg TID as ordered. Follow up with cardiologist Dr. Hale within 2 weeks of discharge. Follow up with neurologist as outpatient for ongoing management.      SECONDARY DISCHARGE DIAGNOSES  Diagnosis: Orthostatic hypotension  Assessment and Plan of Treatment: Continue taking Midodrine as ordered. Please follow up with Dr. Hale within 2 weeks of discharge.     PRINCIPAL DISCHARGE DIAGNOSIS  Diagnosis: Syncope and collapse  Assessment and Plan of Treatment: Likely secondary to orthostatic hypotension with episodes of convulsive syncope and not seizure disorder. Continue taking Midodrine 10mg TID as ordered. Follow up with cardiologist Dr. Hale within 2 weeks of discharge. Follow up with neurologist as outpatient for ongoing management.  Seen by neurology: continue with Depakote, follow up as OP.      SECONDARY DISCHARGE DIAGNOSES  Diagnosis: Hepatitis C  Assessment and Plan of Treatment: Seen by GI: Hep C reactive, Hep C PCR pending, U/S abdomen without evidence of hepatic abnormalities. Follow up Hep C PCR as OP with GI.    Diagnosis: Orthostatic hypotension  Assessment and Plan of Treatment: Continue taking Midodrine as ordered. Please follow up with Dr. Hale within 2 weeks of discharge.

## 2020-09-17 NOTE — DISCHARGE NOTE PROVIDER - NSDCCAREPROVSEEN_GEN_ALL_CORE_FT
Melo, Ted MORIN  [facility] Medicine, Advance PracticeTeam Melo, Ted MORIN  [facility] Medicine, Advance PracticeTeam  Wan Edwards

## 2020-09-17 NOTE — DISCHARGE NOTE PROVIDER - PROVIDER TOKENS
PROVIDER:[TOKEN:[[id number]],FOLLOWUP:[2 weeks],ESTABLISHEDPATIENT:[T]],PROVIDER:[TOKEN:[[id number]],FOLLOWUP:[2 weeks],ESTABLISHEDPATIENT:[T]] PROVIDER:[TOKEN:[[id number]],FOLLOWUP:[2 weeks],ESTABLISHEDPATIENT:[T]],PROVIDER:[TOKEN:[[id number]],FOLLOWUP:[2 weeks],ESTABLISHEDPATIENT:[T]],PROVIDER:[TOKEN:[[id number]]]

## 2020-09-17 NOTE — PROGRESS NOTE ADULT - SUBJECTIVE AND OBJECTIVE BOX
Chief Complaint:    History of Present Illness:  resting comfortably in bed, less agitated today, but very confused unable to provide history, no pain or bleeding per RN    MEDICATIONS  (STANDING):  heparin   Injectable 5000 Unit(s) SubCutaneous every 8 hours  midodrine. 10 milliGRAM(s) Oral three times a day  tamsulosin 0.4 milliGRAM(s) Oral at bedtime  valproic  acid Syrup 250 milliGRAM(s) Oral two times a day    MEDICATIONS  (PRN):  haloperidol    Injectable 0.5 milliGRAM(s) IV Push every 8 hours PRN Agitation      Allergies    No Known Allergies    Intolerances        Vital Signs Last 24 Hrs  T(C): 36.5 (17 Sep 2020 04:12), Max: 36.6 (16 Sep 2020 14:58)  T(F): 97.7 (17 Sep 2020 04:12), Max: 97.8 (16 Sep 2020 14:58)  HR: 65 (17 Sep 2020 11:50) (65 - 89)  BP: 151/90 (17 Sep 2020 11:50) (137/78 - 151/90)  BP(mean): --  RR: 18 (17 Sep 2020 11:50) (18 - 18)  SpO2: 95% (17 Sep 2020 11:50) (95% - 97%)    PHYSICAL EXAM  General: adult in NAD  HEENT: clear oropharynx, anicteric sclera, pink conjunctiva  Neck: supple  CV: normal S1/S2   Lungs: decreased BS, poor effort  Abdomen: soft non-tender non-distended,  positive bowel sounds  Ext: no clubbing cyanosis or edema  Skin: no rashes and no petechiae  Lymph Nodes: No LAD in neck  Neuro: confused, no focal deficits    LABS:                          13.3   7.59  )-----------( 135      ( 17 Sep 2020 06:38 )             40.8         Mean Cell Volume : 87.9 fl  Mean Cell Hemoglobin : 28.7 pg  Mean Cell Hemoglobin Concentration : 32.6 gm/dL  Auto Neutrophil # : x  Auto Lymphocyte # : x  Auto Monocyte # : x  Auto Eosinophil # : x  Auto Basophil # : x  Auto Neutrophil % : x  Auto Lymphocyte % : x  Auto Monocyte % : x  Auto Eosinophil % : x  Auto Basophil % : x      Serial CBC's  09-17 @ 06:38  Hct-40.8 / Hgb-13.3 / Plat-135 / RBC-4.64 / WBC-7.59  Serial CBC's  09-14 @ 06:34  Hct-44.3 / Hgb-14.5 / Plat-120 / RBC-5.05 / WBC-7.80      09-17    147<H>  |  109<H>  |  19  ----------------------------<  106<H>  3.9   |  25  |  1.34<H>    Ca    9.7      17 Sep 2020 06:37            Vitamin B12, Serum: 1276 pg/mL (09-12 @ 10:52)  Folate, Serum: 13.1 ng/mL (09-12 @ 10:52)

## 2020-09-17 NOTE — PROGRESS NOTE ADULT - ASSESSMENT
87 y /o M w/ dementia hx of traumatic ICH s/p craniotomy, orthostatic hypotension on midodrine, HTN, BPH presents to [facility] after leaving outside against medical advice for evaluation of syncope.      Problem/Plan - 1:  ·  Problem: Syncope and collapse.  cards eval noted   orthostatic hypotension  c/w midodrine   neuro eval noted/ct head no acute events     Problem/Plan - 2:  ·  Problem: Orthostatic hypotension.  Plan: - continue with home midodrine 10g TID  - fall precautions, ambulation only with assistance and walker.      Problem/Plan - 3:  ·  Problem: Hyperkalemia. improved   Problem/Plan - 4:  ·  Problem: Thrombocytopenia.    - no signs of active hemorrhage  heme eval noted     Problem/Plan - 5:  ·  Problem: Atrial fibrillation, unspecified type  - cardiology f/u noted     Problem/Plan - 6:  Problem: Acute kidney injury.  fluids prn     Problem/Plan - 7:  ·  Problem: Dementia with behavioral disturbance, unspecified dementia type  .  Plan: reported baseline by family is A&Ox1. 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  Plan: hold antihypertensives given reported syncope.     low plts  heme f/u noted    hep c reactive  f/u pcr   gi f/u     dvt proph     labs in am    Medically stable    DCP home in progress    Wan Edwards MD pager [phone]   \

## 2020-09-17 NOTE — DISCHARGE NOTE PROVIDER - CARE PROVIDER_API CALL
Harshad Hale  CARDIOVASCULAR DISEASE  [address], [address]  [place] [address]  Phone: [phone]  Fax: [phone]  Established Patient  Follow Up Time: 2 weeks    Jonatan Rubio  NEUROLOGY  [address], [address]  [place] [address]  Phone: [phone]  Fax: [phone]  Established Patient  Follow Up Time: 2 weeks   Harshad Hale  CARDIOVASCULAR DISEASE  [address], [address]  [place] [address]  Phone: [phone]  Fax: [phone]  Established Patient  Follow Up Time: 2 weeks    Jonatan Rubio  NEUROLOGY  [address], [facility] [address]  [place] [address]  Phone: [phone]  Fax: [phone]  Established Patient  Follow Up Time: 2 weeks    Dewey Benjamin  GASTROENTEROLOGY  [address] [place] [address]  Phone: [phone]  Fax: [phone]  Follow Up Time:

## 2020-09-18 VITALS
SYSTOLIC BLOOD PRESSURE: 146 MMHG | DIASTOLIC BLOOD PRESSURE: 86 MMHG | HEART RATE: 72 BPM | RESPIRATION RATE: 18 BRPM | TEMPERATURE: 98 F | OXYGEN SATURATION: 96 %

## 2020-09-18 PROCEDURE — 82746 ASSAY OF FOLIC ACID SERUM: CPT

## 2020-09-18 PROCEDURE — 93005 ELECTROCARDIOGRAM TRACING: CPT

## 2020-09-18 PROCEDURE — 80074 ACUTE HEPATITIS PANEL: CPT

## 2020-09-18 PROCEDURE — 82553 CREATINE MB FRACTION: CPT

## 2020-09-18 PROCEDURE — 84100 ASSAY OF PHOSPHORUS: CPT

## 2020-09-18 PROCEDURE — 86147 CARDIOLIPIN ANTIBODY EA IG: CPT

## 2020-09-18 PROCEDURE — 93306 TTE W/DOPPLER COMPLETE: CPT

## 2020-09-18 PROCEDURE — 97530 THERAPEUTIC ACTIVITIES: CPT

## 2020-09-18 PROCEDURE — 76700 US EXAM ABDOM COMPLETE: CPT

## 2020-09-18 PROCEDURE — 97116 GAIT TRAINING THERAPY: CPT

## 2020-09-18 PROCEDURE — 85025 COMPLETE CBC W/AUTO DIFF WBC: CPT

## 2020-09-18 PROCEDURE — 80164 ASSAY DIPROPYLACETIC ACD TOT: CPT

## 2020-09-18 PROCEDURE — 97162 PT EVAL MOD COMPLEX 30 MIN: CPT

## 2020-09-18 PROCEDURE — 99285 EMERGENCY DEPT VISIT HI MDM: CPT | Mod: 25

## 2020-09-18 PROCEDURE — 70450 CT HEAD/BRAIN W/O DYE: CPT

## 2020-09-18 PROCEDURE — 85027 COMPLETE CBC AUTOMATED: CPT

## 2020-09-18 PROCEDURE — 86146 BETA-2 GLYCOPROTEIN ANTIBODY: CPT

## 2020-09-18 PROCEDURE — 85049 AUTOMATED PLATELET COUNT: CPT

## 2020-09-18 PROCEDURE — 84484 ASSAY OF TROPONIN QUANT: CPT

## 2020-09-18 PROCEDURE — 82607 VITAMIN B-12: CPT

## 2020-09-18 PROCEDURE — 82550 ASSAY OF CK (CPK): CPT

## 2020-09-18 PROCEDURE — 83735 ASSAY OF MAGNESIUM: CPT

## 2020-09-18 PROCEDURE — 80053 COMPREHEN METABOLIC PANEL: CPT

## 2020-09-18 PROCEDURE — 86769 SARS-COV-2 COVID-19 ANTIBODY: CPT

## 2020-09-18 PROCEDURE — 80048 BASIC METABOLIC PNL TOTAL CA: CPT

## 2020-09-18 PROCEDURE — 87521 HEPATITIS C PROBE&RVRS TRNSC: CPT

## 2020-09-18 PROCEDURE — 97110 THERAPEUTIC EXERCISES: CPT

## 2020-09-18 PROCEDURE — 84443 ASSAY THYROID STIM HORMONE: CPT

## 2020-09-18 PROCEDURE — 71045 X-RAY EXAM CHEST 1 VIEW: CPT

## 2020-09-18 PROCEDURE — 84132 ASSAY OF SERUM POTASSIUM: CPT

## 2020-09-18 PROCEDURE — 92610 EVALUATE SWALLOWING FUNCTION: CPT

## 2020-09-18 PROCEDURE — U0003: CPT

## 2020-09-18 RX ORDER — MIDODRINE HYDROCHLORIDE 2.5 MG/1
1 TABLET ORAL
Qty: 0 | Refills: 0 | DISCHARGE

## 2020-09-18 RX ADMIN — HEPARIN SODIUM 5000 UNIT(S): 5000 INJECTION INTRAVENOUS; SUBCUTANEOUS at 05:32

## 2020-09-18 RX ADMIN — Medication 250 MILLIGRAM(S): at 05:32

## 2020-09-18 NOTE — PROGRESS NOTE ADULT - ASSESSMENT
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      Problem/Plan - 1:  ·  Problem: Syncope and collapse.  cards eval noted   orthostatic hypotension  c/w midodrine   neuro eval noted/ct head no acute events     Problem/Plan - 2:  ·  Problem: Orthostatic hypotension.  Plan: - continue with home midodrine 10g TID  - fall precautions, ambulation only with assistance and walker.      Problem/Plan - 3:  ·  Problem: Hyperkalemia. improved   Problem/Plan - 4:  ·  Problem: Thrombocytopenia.    - no signs of active hemorrhage  heme eval noted     Problem/Plan - 5:  ·  Problem: Atrial fibrillation, unspecified type  - cardiology f/u noted     Problem/Plan - 6:  Problem: Acute kidney injury.  fluids prn     Problem/Plan - 7:  ·  Problem: Dementia with behavioral disturbance, unspecified dementia type  .  Plan: reported baseline by family is A&Ox1. 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  Plan: hold antihypertensives given reported syncope.     low plts  heme f/u noted    hep c reactive  f/u pcr   gi f/u     dvt proph     labs in am    Medically stable    DCP home in progress    Wan Edwards MD pager [phone]   \

## 2020-09-18 NOTE — PROGRESS NOTE ADULT - REASON FOR ADMISSION
87M recently leaving outside hospital (AMA) by family brought in for evaluation of syncope

## 2020-09-18 NOTE — PROGRESS NOTE ADULT - SUBJECTIVE AND OBJECTIVE BOX
Subjective: Patient seen and examined. No new events except as noted.     SUBJECTIVE/ROS:  NAD      MEDICATIONS:  MEDICATIONS  (STANDING):  heparin   Injectable 5000 Unit(s) SubCutaneous every 8 hours  midodrine. 10 milliGRAM(s) Oral three times a day  tamsulosin 0.4 milliGRAM(s) Oral at bedtime  valproic  acid Syrup 250 milliGRAM(s) Oral two times a day      PHYSICAL EXAM:  T(C): 36.4 (09-18-20 @ 04:20), Max: 36.6 (09-18-20 @ 00:24)  HR: 72 (09-18-20 @ 05:31) (59 - 82)  BP: 148/82 (09-18-20 @ 05:31) (148/82 - 169/77)  RR: 18 (09-18-20 @ 04:20) (17 - 18)  SpO2: 95% (09-18-20 @ 04:20) (95% - 96%)  Wt(kg): --  I&O's Summary    17 Sep 2020 07:01  -  18 Sep 2020 07:00  --------------------------------------------------------  IN: 530 mL / OUT: 0 mL / NET: 530 mL            JVP: Normal  Neck: supple  Lung: clear   CV: S1 S2 , Murmur:  Abd: soft  Ext: No edema  neuro: Awake   Psych: flat affect  Skin: normal``    LABS/DATA:    CARDIAC MARKERS:                                13.3   7.59  )-----------( 135      ( 17 Sep 2020 06:38 )             40.8     09-17    147<H>  |  109<H>  |  19  ----------------------------<  106<H>  3.9   |  25  |  1.34<H>    Ca    9.7      17 Sep 2020 06:37      proBNP:   Lipid Profile:   HgA1c:   TSH:     TELE:  EKG:

## 2020-09-18 NOTE — PROGRESS NOTE ADULT - SUBJECTIVE AND OBJECTIVE BOX
Patient is a 87y old  Male who presents with a chief complaint of 87M recently leaving outside hospital (AMA) by family brought in for evaluation of syncope (18 Sep 2020 08:46)    Coverage for Dr. Ted Alejo   SUBJECTIVE / OVERNIGHT EVENTS: Comfortable   Review of Systems  unobtainable     MEDICATIONS  (STANDING):  heparin   Injectable 5000 Unit(s) SubCutaneous every 8 hours  midodrine. 10 milliGRAM(s) Oral three times a day  tamsulosin 0.4 milliGRAM(s) Oral at bedtime  valproic  acid Syrup 250 milliGRAM(s) Oral two times a day    MEDICATIONS  (PRN):  haloperidol    Injectable 0.5 milliGRAM(s) IV Push every 8 hours PRN Agitation      Vital Signs Last 24 Hrs  T(C): 36.7 (18 Sep 2020 11:00), Max: 36.7 (18 Sep 2020 11:00)  T(F): 98 (18 Sep 2020 11:00), Max: 98 (18 Sep 2020 11:00)  HR: 72 (18 Sep 2020 11:00) (59 - 72)  BP: 146/86 (18 Sep 2020 11:00) (146/86 - 169/77)  BP(mean): --  RR: 18 (18 Sep 2020 11:00) (17 - 18)  SpO2: 96% (18 Sep 2020 11:00) (95% - 96%)    PHYSICAL EXAM:  GENERAL: NAD, well-developed  HEAD:  Atraumatic, Normocephalic  EYES: EOMI, PERRLA, conjunctiva and sclera clear  NECK: Supple, No JVD  CHEST/LUNG: Clear to auscultation bilaterally; No wheeze  HEART: Regular rate and rhythm; No murmurs, rubs, or gallops  ABDOMEN: Soft, Nontender, Nondistended; Bowel sounds present  EXTREMITIES:  2+ Peripheral Pulses, No clubbing, cyanosis, or edema  PSYCH: confused  NEUROLOGY: non-focal  SKIN: No rashes or lesions    LABS:                        13.3   7.59  )-----------( 135      ( 17 Sep 2020 06:38 )             40.8     09-17    147<H>  |  109<H>  |  19  ----------------------------<  106<H>  3.9   |  25  |  1.34<H>    Ca    9.7      17 Sep 2020 06:37                  RADIOLOGY & ADDITIONAL TESTS:    Imaging Personally Reviewed:    Consultant(s) Notes Reviewed:      Care Discussed with Consultants/Other Providers:

## 2020-09-21 ENCOUNTER — APPOINTMENT (OUTPATIENT)
Dept: NEUROLOGY | Facility: CLINIC | Age: 85
End: 2020-09-21

## 2020-09-25 LAB — HCV RNA FLD QL NAA+PROBE: SIGNIFICANT CHANGE UP

## 2020-09-25 RX ORDER — RISPERIDONE 1 MG/ML
1 SOLUTION ORAL TWICE DAILY
Qty: 30 | Refills: 0 | Status: DISCONTINUED | COMMUNITY
Start: 2019-03-04 | End: 2020-09-25

## 2020-11-24 ENCOUNTER — APPOINTMENT (OUTPATIENT)
Dept: NEUROLOGY | Facility: CLINIC | Age: 85
End: 2020-11-24
Payer: MEDICARE

## 2020-11-24 VITALS — TEMPERATURE: 97.7 F

## 2020-11-24 VITALS — SYSTOLIC BLOOD PRESSURE: 136 MMHG | HEART RATE: 85 BPM | DIASTOLIC BLOOD PRESSURE: 77 MMHG

## 2020-11-24 DIAGNOSIS — R53.81 OTHER MALAISE: ICD-10-CM

## 2020-11-24 DIAGNOSIS — R26.89 OTHER ABNORMALITIES OF GAIT AND MOBILITY: ICD-10-CM

## 2020-11-24 PROCEDURE — 99214 OFFICE O/P EST MOD 30 MIN: CPT

## 2020-11-24 RX ORDER — GLUCOSAMINE/MSM/CHONDROIT SULF 500-166.6
10 TABLET ORAL
Qty: 30 | Refills: 2 | Status: ACTIVE | COMMUNITY
Start: 2018-08-28

## 2020-11-24 RX ORDER — OLANZAPINE 5 MG/1
5 TABLET, FILM COATED ORAL TWICE DAILY
Qty: 60 | Refills: 0 | Status: ACTIVE | COMMUNITY
Start: 2020-10-21 | End: 1900-01-01

## 2020-11-24 NOTE — ASSESSMENT
[FreeTextEntry1] : Assessment: \par 86yo RH AAM, with vascular risk factors, SDH in 1/2018, s/p maxi holes, never seizure and negative EEGs, on low dose VPA.\par Cognitive and motor progression, with some parkinsonism and agitation. Possible AE from Rx.\par Deconditioned lately. \par \par Impression: \par Dementia-Vascular/mixed pathology.\par Insomnia\par Parkinsonism, deconditioning. \par \par Plan:\par -Increase Mirtazapine to 15mg\par -limit Risperidone\par -Home PT.\par \par A thorough discussion was entertained with the patient/caregiver regarding the use of psychoactive medications, their possible benefits and AE profile, including the risk of cardiovascular complications.\par We discussed the benefits of being active, physically and mentally, and the need to to establish a routine in this respect.\par Driving abilities and firearms possession and use were discussed, in relation to progression of the cognitive decline, and the need to assess them periodically.\par Patient/caregiver understand and agree with the plan.

## 2020-11-24 NOTE — DATA REVIEWED
[de-identified] : 3/21/2108-EEG Summary / Classification:\par Abnormal EEG in the awake, drowsy states.\par -Mild to moderate generalized slowing.\par \par EEG Impression / Clinical Correlate:\par Abnormal EEG in the awake, drowsy states.\par 1. Mild to moderate nonspecific diffuse or multifocal cerebral dysfunction.\par 2. There were no epileptiform abnormalities recorded. \par  [de-identified] : Imaging reviewed on PACS.

## 2020-11-24 NOTE — HISTORY OF PRESENT ILLNESS
[FreeTextEntry1] : HPI-Interval hx [id number]:\par Pt here with daughter.\par Pt tends to sleep on and off all day, and at times he does not sleep at night, causing weakness the next day and so forth.\par Appetite ok, but needs to be fed. \par Gait has been limited recently, more related to deconditioning, and possible AE form Olanzapine and Risperidone.\par Pt takes Risperidone only a few times PRN, not daily.\par He has been better with olanzapine, not aggressive, but still gets agitated at times. \par \par HPI-Interval hx [id number]:\par Pt here with daughter.\par Overall ok, appetite stable, but sleep is still off.\par Did not take mirtazapine yet. \par No more falls.\par Monitoring BP, has been stable, but the few times it was below 100sbp he felt dizzy/lightheaded. \par Has 24/7 assistance. \par ADL still ok, but needs supervision.\par IADL poor.\par \par HPI-Interval hx [id number]\par Pt has progressed, more confused, barely recognizes his family members, tends to wander around a lot, but can find his way back home. Disoriented, thinks he is back home.\par Has been eating less, no loss of weight.\par Sleep: better at night, does not sleep during the day now.\par \par He has not tolerated Zyprexa, Seroquel, and now is off Trazodone as well, unclear why, possibly felt too drowsy.\par \par Physically he is fine.\par \par \par HPI-Interval hx [id number]:\par pt was in [facility] in early march, kenneth montoya our RPA, for low BP and a syncope.\par Possible Olanzapine contributing to the low BP.\par Now off Olanzapine, taking only Trazodone 50mg at night and 25mg in the am as needed. Midodrine increased to 10mg TID.\par Doing fine, sleeps well, appetite is good.\par \par HPI-Interval hx [id number]:\par Patient here with his daughter. He has been more agitated recently, also in relation to being in the hospital for hypotension.\par Recent admission to [facility], Seroquel changed to Zyprexa 2.5mg BID and Trazodone 50mg qhs for sleep.\par Still not sleeping well, not even napping during the day.\par On Midodrine 5mg TID. Sleeps with 2 pillows.\par Rest is stable, daughter has not noticed any HA, nor physical changes. 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 \par Insomnia: tends to go to bed late, and tends to wake up multiple times a night for BPH.\par Appetite is ok.\par \par PMH:\par 84yo RH AAM, HTN, HLD, recent SDH in 1/2018 s/p a fall, s/p NSX, here for evaluation of cognitive changes s/p SDH.\par \par Until December 2017, he was perfectly normal, per family, living in [facility], monitored remotely by [employer] with cameras, completely independent.\par On 12/22/2017 he had an accidental fall ([facility]), and procured a SDH, which was addressed only a month later, when he was taken to [facility] and after taking aspirin a few days prior. in the meantime, he was taken to hospitals in [facility] and to [facility], where apparently CT head did not show any intracranial bleed. His clinical status was gradually getting worse, with more confusion, and inability to walk.\par \par After maxi holes, jan 2018, he improved, but remains confused, disoriented, and has memory lapses.\par He was put on Keppra for sx prevention, dc due to intense agitation.\par He was also prescribed Seroquel for agitation, but not tolerated it.\par EEG in 3/2018 showed slowing. Pt's daughter said Seroquel gave him a seizure.\par CTh showed progressive resolution of the SDH, till 8/21.\par Last CTH shows significant WM vascular changes and significant atrophy.\par MRI not done, pt was fighting, possible claustrophobia.\par \par Sleep: interrupted by urination, but goes back to sleep.\par \par Appetite: no issues, stable. \par \par ADL: full.\par IADL: fully dependent.\par CDR: 1.5\par \par Psychiatric symptoms: none.\par \par PMD and other physicians:\par Dr. Prince Ahuja MD\par Neurologist in [place], [place]\par Address: [address] [place] [address]\par Phone: [phone]\par \par of note, recent EKG from [facility] showed possible AFib.

## 2020-11-24 NOTE — PHYSICAL EXAM
[General Appearance - Alert] : alert [General Appearance - In No Acute Distress] : in no acute distress [Impaired Insight] : insight and judgment were intact [Affect] : the affect was normal [Person] : oriented to person [Remote Intact] : remote memory intact [Registration Intact] : recent registration memory intact [Concentration Intact] : normal concentrating ability [Visual Intact] : visual attention was ~T not ~L decreased [Naming Objects] : no difficulty naming common objects [Repeating Phrases] : no difficulty repeating a phrase [Writing A Sentence] : no difficulty writing a sentence [Fluency] : fluency intact [Comprehension] : comprehension intact [Reading] : reading intact [Past History] : adequate knowledge of personal past history [Vocabulary] : adequate range of vocabulary [Total Score ___ / 30] : the patient achieved a score of [unfilled] /30 [Date / Time ___ / 5] : date / time [unfilled] / 5 [Place ___ / 5] : place [unfilled] / 5 [Registration ___ / 3] : registration [unfilled] / 3 [Serial Sevens ___/5] : serial sevens [unfilled] / 5 [Naming 2 Objects ___ / 2] : naming two objects [unfilled] / 2 [Repeating a Sentence ___ / 1] : repeating a sentence [unfilled] / 1 [Writing a Sentence ___ / 1] : write sentence [unfilled] / 1 [3-stage Verbal Command ___ / 3] : three-stage verbal command [unfilled] / 3 [Written Command ___ / 1] : written command [unfilled] / 1 [Copy a Design ___ / 1] : copy a design [unfilled] / 1 [Recall ___ / 3] : recall [unfilled] / 3 [Cranial Nerves Optic (II)] : visual acuity intact bilaterally,  visual fields full to confrontation, pupils equal round and reactive to light [Cranial Nerves Oculomotor (III)] : extraocular motion intact [Cranial Nerves Trigeminal (V)] : facial sensation intact symmetrically [Cranial Nerves Facial (VII)] : face symmetrical [Cranial Nerves Vestibulocochlear (VIII)] : hearing was intact bilaterally [Cranial Nerves Glossopharyngeal (IX)] : tongue and palate midline [Cranial Nerves Accessory (XI - Cranial And Spinal)] : head turning and shoulder shrug symmetric [Cranial Nerves Hypoglossal (XII)] : there was no tongue deviation with protrusion [Motor Strength] : muscle strength was normal in all four extremities [Involuntary Movements] : no involuntary movements were seen [No Muscle Atrophy] : normal bulk in all four extremities [Motor Handedness Right-Handed] : the patient is right hand dominant [Sensation Tactile Decrease] : light touch was intact [Sensation Pain / Temperature Decrease] : pain and temperature was intact [Sensation Vibration Decrease] : vibration was intact [Proprioception] : proprioception was intact [Balance] : balance was intact [2+] : Patella left 2+ [1+] : Ankle jerk left 1+ [Sclera] : the sclera and conjunctiva were normal [PERRL With Normal Accommodation] : pupils were equal in size, round, reactive to light, with normal accommodation [Extraocular Movements] : extraocular movements were intact [Optic Disc Abnormality] : the optic disc were normal in size and color [No APD] : no afferent pupillary defect [No DANIELLE] : no internuclear ophthalmoplegia [Full Visual Field] : full visual field [Outer Ear] : the ears and nose were normal in appearance [Oropharynx] : the oropharynx was normal [Neck Appearance] : the appearance of the neck was normal [Neck Cervical Mass (___cm)] : no neck mass was observed [Jugular Venous Distention Increased] : there was no jugular-venous distention [Thyroid Diffuse Enlargement] : the thyroid was not enlarged [Thyroid Nodule] : there were no palpable thyroid nodules [Auscultation Breath Sounds / Voice Sounds] : lungs were clear to auscultation bilaterally [Heart Sounds] : normal S1 and S2 [Heart Sounds Gallop] : no gallops [Murmurs] : no murmurs [Heart Sounds Pericardial Friction Rub] : no pericardial rub [Arterial Pulses Carotid] : carotid pulses were normal with no bruits [Full Pulse] : the pedal pulses are present [Edema] : there was no peripheral edema [Bowel Sounds] : normal bowel sounds [Abdomen Soft] : soft [Abdomen Tenderness] : non-tender [Abdomen Mass (___ Cm)] : no abdominal mass palpated [No CVA Tenderness] : no ~M costovertebral angle tenderness [No Spinal Tenderness] : no spinal tenderness [Abnormal Walk] : normal gait [Nail Clubbing] : no clubbing  or cyanosis of the fingernails [Musculoskeletal - Swelling] : no joint swelling seen [Motor Tone] : muscle strength and tone were normal [Skin Color & Pigmentation] : normal skin color and pigmentation [Skin Turgor] : normal skin turgor [] : no rash [Place] : disoriented to place [Time] : disoriented to time [Short Term Intact] : short term memory impaired [Span Intact] : the attention span was decreased [Current Events] : inadequate knowledge of current events [Motor Strength Upper Extremities Bilaterally] : strength was normal in both upper extremities [Motor Strength Lower Extremities Bilaterally] : strength was normal in both lower extremities [Romberg's Sign] : Romberg's sign was negtive [Dysesthesia] : no dysesthesia [Hyperesthesia] : no hyperesthesia [Limited Balance] : balance was intact [Past-pointing] : there was no past-pointing [Tremor] : no tremor present [Dysdiadochokinesia Bilaterally] : not present [Coordination - Dysmetria Impaired Finger-to-Nose Bilateral] : not present [Coordination - Dysmetria Impaired Heel-to-Shin Bilateral] : not present [Plantar Reflex Right Only] : normal on the right [Plantar Reflex Left Only] : normal on the left [FreeTextEntry4] : Mental Status Exam \par Presidents: 1\par Alternating Pattern: poor planning.\par Spiral: poor planning, draws circles, excentric\par Clock: 1/3.\par Repetition: ok\par R/L discrimination on self and examiner: ok, difficulty with Xline determination\par Cross-line commands: difficulty with Xline command.\par Praxis:\par -Motor: ok, poor planning\par -Dynamic/Luria: forgets pattern, no perseveration\par -Ideomotor/Imitation: poor\par -Ideational/writing/closing-in: poor\par -Dressing: ok. [FreeTextEntry5] : + glabella [FreeTextEntry6] : Diffuse rigidity in UE and LE, no cogwheel. [FreeTextEntry8] : Walks ok with one support hand, mild shuffle, but can march ok. [FreeTextEntry9] : ? x-adductors [FreeTextEntry1] : irregular HR

## 2020-12-20 ENCOUNTER — RX RENEWAL (OUTPATIENT)
Age: 85
End: 2020-12-20

## 2021-01-11 ENCOUNTER — APPOINTMENT (OUTPATIENT)
Dept: NEUROLOGY | Facility: CLINIC | Age: 86
End: 2021-01-11
Payer: MEDICARE

## 2021-01-11 DIAGNOSIS — F02.80 ALZHEIMER'S DISEASE, UNSPECIFIED: ICD-10-CM

## 2021-01-11 DIAGNOSIS — R45.1 RESTLESSNESS AND AGITATION: ICD-10-CM

## 2021-01-11 DIAGNOSIS — G47.00 INSOMNIA, UNSPECIFIED: ICD-10-CM

## 2021-01-11 DIAGNOSIS — R25.9 UNSPECIFIED ABNORMAL INVOLUNTARY MOVEMENTS: ICD-10-CM

## 2021-01-11 DIAGNOSIS — F01.50 ALZHEIMER'S DISEASE, UNSPECIFIED: ICD-10-CM

## 2021-01-11 DIAGNOSIS — R56.9 UNSPECIFIED CONVULSIONS: ICD-10-CM

## 2021-01-11 DIAGNOSIS — G30.9 ALZHEIMER'S DISEASE, UNSPECIFIED: ICD-10-CM

## 2021-01-11 PROCEDURE — 99214 OFFICE O/P EST MOD 30 MIN: CPT | Mod: 95

## 2021-01-11 RX ORDER — AMLODIPINE BESYLATE 2.5 MG/1
2.5 TABLET ORAL DAILY
Refills: 0 | Status: DISCONTINUED | COMMUNITY
Start: 2018-08-28 | End: 2021-01-11

## 2021-01-11 RX ORDER — ATORVASTATIN CALCIUM 80 MG/1
80 TABLET, FILM COATED ORAL
Qty: 30 | Refills: 6 | Status: DISCONTINUED | COMMUNITY
End: 2021-01-11

## 2021-01-11 RX ORDER — VALPROIC ACID 250 MG/1
250 CAPSULE, LIQUID FILLED ORAL
Qty: 90 | Refills: 3 | Status: DISCONTINUED | COMMUNITY
Start: 2019-11-06 | End: 2021-01-11

## 2021-01-11 RX ORDER — AMLODIPINE BESYLATE 10 MG/1
10 TABLET ORAL
Qty: 30 | Refills: 0 | Status: ACTIVE | COMMUNITY
Start: 2020-09-10

## 2021-01-11 RX ORDER — DIVALPROEX SODIUM 250 MG/1
250 TABLET, DELAYED RELEASE ORAL
Qty: 180 | Refills: 0 | Status: DISCONTINUED | COMMUNITY
Start: 2020-09-10 | End: 2021-01-11

## 2021-01-11 RX ORDER — ENALAPRIL MALEATE 10 MG/1
10 TABLET ORAL
Qty: 30 | Refills: 0 | Status: ACTIVE | COMMUNITY
Start: 2020-09-10

## 2021-01-11 RX ORDER — MIRTAZAPINE 15 MG/1
15 TABLET, FILM COATED ORAL
Qty: 30 | Refills: 2 | Status: DISCONTINUED | COMMUNITY
Start: 2020-01-15 | End: 2021-01-11

## 2021-01-11 RX ORDER — ATORVASTATIN CALCIUM 40 MG/1
40 TABLET, FILM COATED ORAL
Qty: 90 | Refills: 0 | Status: ACTIVE | COMMUNITY
Start: 2020-09-21

## 2021-01-11 RX ORDER — CIPROFLOXACIN HYDROCHLORIDE 500 MG/1
500 TABLET, FILM COATED ORAL
Qty: 20 | Refills: 0 | Status: ACTIVE | COMMUNITY
Start: 2020-10-14

## 2021-01-11 RX ORDER — METOPROLOL SUCCINATE 25 MG/1
25 TABLET, EXTENDED RELEASE ORAL
Qty: 30 | Refills: 0 | Status: ACTIVE | COMMUNITY
Start: 2020-09-10

## 2021-01-11 RX ORDER — RISPERIDONE 1 MG/ML
1 SOLUTION ORAL DAILY
Qty: 15 | Refills: 1 | Status: DISCONTINUED | COMMUNITY
Start: 2020-10-21 | End: 2021-01-11

## 2021-01-11 NOTE — ASSESSMENT
[FreeTextEntry1] : Assessment: \par 89yo RH AAM, with vascular risk factors, SDH in 1/2018, s/p maxi holes, never seizure and negative EEGs, on low dose VPA.\par Cognitive and motor progression, with some parkinsonism and agitation. Possible AE from Rx.\par Deconditioned lately. \par \par Exam today very limited, byt poor connection on TEB.\par Pt seems stable, but tremor in R hand seems to be worsening.\par \par Impression: \par -Dementia-Vascular/mixed pathology.\par -Insomnia\par -Parkinsonism, deconditioning. \par \par Plan:\par -continue PT\par -keep a log of Olanzapine and Midodrine\par -will try to see in person in 2 months. \par -will consider L-DOPA.\par \par A thorough discussion was entertained with the patient/caregiver regarding the use of psychoactive medications, their possible benefits and AE profile, including the risk of cardiovascular complications.\par We discussed the benefits of being active, physically and mentally, and the need to to establish a routine in this respect.\par Driving abilities and firearms possession and use were discussed, in relation to progression of the cognitive decline, and the need to assess them periodically.\par Patient/caregiver understand and agree with the plan.

## 2021-01-11 NOTE — DATA REVIEWED
[de-identified] : 3/21/2108-EEG Summary / Classification:\par Abnormal EEG in the awake, drowsy states.\par -Mild to moderate generalized slowing.\par \par EEG Impression / Clinical Correlate:\par Abnormal EEG in the awake, drowsy states.\par 1. Mild to moderate nonspecific diffuse or multifocal cerebral dysfunction.\par 2. There were no epileptiform abnormalities recorded. \par  [de-identified] : Imaging reviewed on PACS.

## 2021-01-11 NOTE — HISTORY OF PRESENT ILLNESS
[Home] : at home, [unfilled] , at the time of the visit. [Medical Office: ([facility])___] : at the medical office located in  [Family Member] : family member [FreeTextEntry1] : HPI-Interval hx [id number]\par Pt seen via TEB, after connection issues which took c.a. 15 minutes to solve.\par Pt's daughter states she was expecting the TEB for 11am, not 11.30, contrary to our database.\par Also, she was expecting a call this am (NOS?).\par AW-TP not working for the patient, will use Doximity.\par \par Risperidone stopped.\par Using PRN Olanzapine only, max 5mg daily. \par No savanna agitation. \par \par Sleeping well with Mirtazapine and melatonin.\par \par BP has been ok, not too low, uses Midodrine PRN, 3-4 days a week.\par \par Rest is stable.\par \par HPI-Interval hx [id number]:\par Pt here with daughter.\par Pt tends to sleep on and off all day, and at times he does not sleep at night, causing weakness the next day and so forth.\par Appetite ok, but needs to be fed. \par Gait has been limited recently, more related to deconditioning, and possible AE form Olanzapine and Risperidone.\par Pt takes Risperidone only a few times PRN, not daily.\par He has been better with olanzapine, not aggressive, but still gets agitated at times. \par \par HPI-Interval hx [id number]:\par Pt here with daughter.\par Overall ok, appetite stable, but sleep is still off.\par Did not take mirtazapine yet. \par No more falls.\par Monitoring BP, has been stable, but the few times it was below 100sbp he felt dizzy/lightheaded. \par Has 24/7 assistance. \par ADL still ok, but needs supervision.\par IADL poor.\par \par HPI-Interval hx [id number]\par Pt has progressed, more confused, barely recognizes his family members, tends to wander around a lot, but can find his way back home. Disoriented, thinks he is back home.\par Has been eating less, no loss of weight.\par Sleep: better at night, does not sleep during the day now.\par \par He has not tolerated Zyprexa, Seroquel, and now is off Trazodone as well, unclear why, possibly felt too drowsy.\par \par Physically he is fine.\par \par \par HPI-Interval hx [id number]:\par pt was in [facility] in early march, kenneth montoya our RPA, for low BP and a syncope.\par Possible Olanzapine contributing to the low BP.\par Now off Olanzapine, taking only Trazodone 50mg at night and 25mg in the am as needed. Midodrine increased to 10mg TID.\par Doing fine, sleeps well, appetite is good.\par \par HPI-Interval hx [id number]:\par Patient here with his daughter. He has been more agitated recently, also in relation to being in the hospital for hypotension.\par Recent admission to [facility], Seroquel changed to Zyprexa 2.5mg BID and Trazodone 50mg qhs for sleep.\par Still not sleeping well, not even napping during the day.\par On Midodrine 5mg TID. Sleeps with 2 pillows.\par Rest is stable, daughter has not noticed any HA, nor physical changes. 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 \par Insomnia: tends to go to bed late, and tends to wake up multiple times a night for BPH.\par Appetite is ok.\par \par PMH:\par 84yo RH AAM, HTN, HLD, recent SDH in 1/2018 s/p a fall, s/p NSX, here for evaluation of cognitive changes s/p SDH.\par \par Until December 2017, he was perfectly normal, per family, living in [facility], monitored remotely by max with cameras, completely independent.\par On 12/22/2017 he had an accidental fall ([facility]), and procured a SDH, which was addressed only a month later, when he was taken to [facility] and after taking aspirin a few days prior. in the meantime, he was taken to hospitals in [facility] and to [facility], where apparently CT head did not show any intracranial bleed. His clinical status was gradually getting worse, with more confusion, and inability to walk.\par \par After maxi holes, jan 2018, he improved, but remains confused, disoriented, and has memory lapses.\par He was put on Keppra for sx prevention, dc due to intense agitation.\par He was also prescribed Seroquel for agitation, but not tolerated it.\par EEG in 3/2018 showed slowing. Pt's daughter said Seroquel gave him a seizure.\par CTh showed progressive resolution of the SDH, till 8/21.\par Last CTH shows significant WM vascular changes and significant atrophy.\par MRI not done, pt was fighting, possible claustrophobia.\par \par Sleep: interrupted by urination, but goes back to sleep.\par \par Appetite: no issues, stable. \par \par ADL: full.\par IADL: fully dependent.\par CDR: 1.5\par \par Psychiatric symptoms: none.\par \par PMD and other physicians:\par Dr. Prince Ahuja MD\par Neurologist in [place], [place]\par Address: [address] [place] [address]\par Phone: [phone]\par \par of note, recent EKG from [place] showed possible AFib.

## 2021-01-11 NOTE — PHYSICAL EXAM
[General Appearance - Alert] : alert [General Appearance - In No Acute Distress] : in no acute distress [Impaired Insight] : insight and judgment were intact [Affect] : the affect was normal [Person] : oriented to person [Remote Intact] : remote memory intact [Registration Intact] : recent registration memory intact [Concentration Intact] : normal concentrating ability [Visual Intact] : visual attention was ~T not ~L decreased [Naming Objects] : no difficulty naming common objects [Repeating Phrases] : no difficulty repeating a phrase [Comprehension] : comprehension intact [Past History] : adequate knowledge of personal past history [Vocabulary] : adequate range of vocabulary [Total Score ___ / 30] : the patient achieved a score of [unfilled] /30 [Date / Time ___ / 5] : date / time [unfilled] / 5 [Place ___ / 5] : place [unfilled] / 5 [Registration ___ / 3] : registration [unfilled] / 3 [Serial Sevens ___/5] : serial sevens [unfilled] / 5 [Naming 2 Objects ___ / 2] : naming two objects [unfilled] / 2 [Repeating a Sentence ___ / 1] : repeating a sentence [unfilled] / 1 [Writing a Sentence ___ / 1] : write sentence [unfilled] / 1 [3-stage Verbal Command ___ / 3] : three-stage verbal command [unfilled] / 3 [Written Command ___ / 1] : written command [unfilled] / 1 [Copy a Design ___ / 1] : copy a design [unfilled] / 1 [Recall ___ / 3] : recall [unfilled] / 3 [Cranial Nerves Optic (II)] : visual acuity intact bilaterally,  visual fields full to confrontation, pupils equal round and reactive to light [Cranial Nerves Oculomotor (III)] : extraocular motion intact [Cranial Nerves Facial (VII)] : face symmetrical [Cranial Nerves Vestibulocochlear (VIII)] : hearing was intact bilaterally [Cranial Nerves Accessory (XI - Cranial And Spinal)] : head turning and shoulder shrug symmetric [Cranial Nerves Hypoglossal (XII)] : there was no tongue deviation with protrusion [Motor Strength] : muscle strength was normal in all four extremities [No Muscle Atrophy] : normal bulk in all four extremities [Motor Handedness Right-Handed] : the patient is right hand dominant [Sensation Tactile Decrease] : light touch was intact [Sensation Pain / Temperature Decrease] : pain and temperature was intact [Balance] : balance was intact [Sclera] : the sclera and conjunctiva were normal [Extraocular Movements] : extraocular movements were intact [Full Visual Field] : full visual field [Outer Ear] : the ears and nose were normal in appearance [Neck Appearance] : the appearance of the neck was normal [Edema] : there was no peripheral edema [Skin Color & Pigmentation] : normal skin color and pigmentation [] : no rash [Place] : disoriented to place [Time] : disoriented to time [Short Term Intact] : short term memory impaired [Span Intact] : the attention span was decreased [Writing A Sentence] : difficulty writing a sentence [Fluency] : fluency not intact [Reading] : difficulty reading [Current Events] : inadequate knowledge of current events [Motor Strength Upper Extremities Bilaterally] : strength was normal in both upper extremities [Motor Strength Lower Extremities Bilaterally] : strength was normal in both lower extremities [Romberg's Sign] : Romberg's sign was negtive [Dysesthesia] : no dysesthesia [Hyperesthesia] : no hyperesthesia [Limited Balance] : balance was intact [Past-pointing] : there was no past-pointing [Tremor] : no tremor present [Coordination - Dysmetria Impaired Finger-to-Nose Bilateral] : not present [Dysdiadochokinesia Bilaterally] : not present [Coordination - Dysmetria Impaired Heel-to-Shin Bilateral] : not present [Plantar Reflex Right Only] : normal on the right [Plantar Reflex Left Only] : normal on the left [FreeTextEntry4] : Mental Status Exam limited by TEB setting\par Presidents: 1\par Repetition: ok\par R/L discrimination on self and examiner: ok, difficulty with Xline determination\par Cross-line commands: difficulty with Xline command.\par Praxis:\par -Motor: ok, poor planning\par -Dynamic/Luria: forgets pattern, no perseveration\par -Ideomotor/Imitation: poor\par -Ideational/writing/closing-in: poor\par -Dressing: ok. [FreeTextEntry6] : R hand shaking as he stands and walks. [FreeTextEntry8] : Walks ok with one support hand, mild shuffle, but can march ok.

## 2021-03-25 NOTE — ED BEHAVIORAL HEALTH ASSESSMENT NOTE - NS ED BHA MED ROS NEUROLOGICAL
----- Message from ROMEL Lemus sent at 3/25/2021  6:32 AM CDT -----  Call patient tell labs normal cholesterol much better continue current medication follow-up as scheduled   Yes

## 2021-06-08 ENCOUNTER — RX RENEWAL (OUTPATIENT)
Age: 86
End: 2021-06-08

## 2021-06-08 RX ORDER — MIRTAZAPINE 15 MG/1
15 TABLET, FILM COATED ORAL
Qty: 90 | Refills: 3 | Status: ACTIVE | COMMUNITY
Start: 2020-09-29 | End: 1900-01-01

## 2021-10-04 ENCOUNTER — RX RENEWAL (OUTPATIENT)
Age: 86
End: 2021-10-04

## 2021-10-04 RX ORDER — DIVALPROEX SODIUM 250 MG/1
250 TABLET, EXTENDED RELEASE ORAL TWICE DAILY
Qty: 180 | Refills: 3 | Status: ACTIVE | COMMUNITY
Start: 2020-09-21 | End: 1900-01-01

## 2022-07-12 NOTE — ED BEHAVIORAL HEALTH ASSESSMENT NOTE - NS ED BHA MED ROS GASTROINTESTINAL
----- Message from Laurel Kamara MA sent at 7/12/2022  8:52 AM CDT -----  Regarding: Apt with Dr. Gaurang Keller,   Dr. Cheung saw this pt this morning.   He is reaching out to Dr Merlos with his findings.  Can you please reach out to her and make her an apt to see Dr. Merlos in about 2 weeks.   I told her someone from your office will contact her.      Thank you,   Iván      No complaints

## 2023-02-10 NOTE — PROGRESS NOTE ADULT - SUBJECTIVE AND OBJECTIVE BOX
POST ANESTHESIA EVALUATION    85y Male POSTOP DAY 1 S/P     MENTAL STATUS: Patient participation [  ] Awake     [  ] Arousable     [  ] Sedated   [X] Not responsive per staff    AIRWAY PATENCY: [X  ] Satisfactory  [  ] Other:     Vital Signs Last 24 Hrs  T(C): 35.8 (23 Jan 2018 08:00), Max: 36.6 (22 Jan 2018 20:00)  T(F): 96.4 (23 Jan 2018 08:00), Max: 97.8 (22 Jan 2018 20:00)  HR: 59 (23 Jan 2018 10:00) (57 - 130)  BP: 130/67 (23 Jan 2018 10:00) (104/67 - 174/67)  BP(mean): 82 (23 Jan 2018 10:00) (61 - 93)  RR: 11 (23 Jan 2018 10:00) (10 - 23)  SpO2: 100% (23 Jan 2018 10:00) (98% - 100%)  I&O's Summary    22 Jan 2018 07:01  -  23 Jan 2018 07:00  --------------------------------------------------------  IN: 1591.2 mL / OUT: 1790 mL / NET: -198.8 mL    23 Jan 2018 07:01  -  23 Jan 2018 11:18  --------------------------------------------------------  IN: 236.5 mL / OUT: 185 mL / NET: 51.5 mL          NAUSEA/ VOMITTING:  [ X ] NONE  [  ] CONTROLLED [  ] OTHER     PAIN: [ X ] CONTROLLED WITH CURRENT REGIMEN  [  ] OTHER    [ X ] NO APPARENT ANESTHESIA COMPLICATIONS      Comments: [Follow-Up: _____] : a [unfilled] follow-up visit

## 2023-04-03 NOTE — DISCHARGE NOTE ADULT - HAS THE PATIENT RECEIVED THE INFLUENZA VACCINE THIS SEASON?
PT had venaseal 2/9/23 - PT states bumps on leg and is worried about possible blood clot. PT would like to be seen today. Please advise.    no...

## 2023-07-06 NOTE — SWALLOW BEDSIDE ASSESSMENT ADULT - ASR SWALLOW RECOMMEND DIAG
discussed with SICU team/VFSS/MBS High Dose Vitamin A Counseling: Side effects reviewed, pt to contact office should one occur.

## 2023-08-22 NOTE — PATIENT PROFILE ADULT - NSPROEDAREADYLEARN_GEN_A_NUR
Detail Level: Generalized
Sunscreen Recommendations: Use a mineral sunscreen daily to sun exposed areas. Reapply every 80 minutes or after sweating or swimming.
Detail Level: Detailed
Detail Level: Zone
acuteness of illness

## 2023-10-05 NOTE — H&P ADULT - NSHPATTENDINGPLANDISCUSS_GEN_ALL_CORE
Called facility to f/u on request - Unable to reach staff    DAVID faxed 3x to [facility]:[phone] & [facility]: [phone] for labs.    
DAVID  faxed 2x to [facility]:[phone] & Clinic: [phone] for labs.    
DAVID uploaded and faxed to [facility]:[phone] & Clinic: [phone] for labs.    
I called  [facility] 1x to request pap smear; I lvm with medical records with what's being requested and phone/fax number. I will f/u in 1 week if no record received.    
No records received after calling and faxing facility.     
PA; patient at bedside

## 2024-05-08 NOTE — ED PROVIDER NOTE - DISPOSITION TYPE
FUTURE VISIT INFORMATION      SURGERY INFORMATION:  Date: 24  Location: uu or  Surgeon:  Mitali Nicole MD   Anesthesia Type:  general  Procedure: CORONARY ARTERY BYPASS GRAFT AND ANY ASSOCIATED PROCEDURES POSSIBLE LEFT RADIAL ARTERY HARVEST possible AORTIC VALVE REPLACEMENT     RECORDS REQUESTED FROM:       Primary Care Provider: ryan    Pertinent Medical History: cad, severe aortic stenosis    Most recent EKG+ Tracin24    Most recent ECHO: 4/10/24     ADMIT

## 2024-10-13 NOTE — BEHAVIORAL HEALTH ASSESSMENT NOTE - NSBHCHARTREVIEWVS_PSY_A_CORE FT
ICU Vital Signs Last 24 Hrs  T(C): 36.4 (05 Feb 2019 17:57), Max: 37.4 (05 Feb 2019 13:34)  T(F): 97.6 (05 Feb 2019 17:57), Max: 99.4 (05 Feb 2019 13:34)  HR: 92 (05 Feb 2019 17:57) (78 - 92)  BP: 164/72 (05 Feb 2019 17:57) (124/70 - 165/88)  BP(mean): --  ABP: --  ABP(mean): --  RR: 16 (05 Feb 2019 17:57) (16 - 18)  SpO2: 100% (05 Feb 2019 17:57) (95% - 100%)
0

## 2025-02-27 NOTE — END OF VISIT
Left a voicemail to call clinic back to schedule procedure    Received Ellie Curry NP referral   Dr Bernal recommends C7-T1 ILESI   [>50% of Time Spent on Counseling and Coordination of Care for  ___] : Greater than 50% of the encounter time was spent on counseling and coordination of care for [unfilled] [Time Spent: ___ minutes] : I have spent [unfilled] minutes of face to face time with the patient

## 2025-05-09 NOTE — BEHAVIORAL HEALTH ASSESSMENT NOTE - PERSONAL COLLATERAL NAME
Lvm for patient   Roxanne Petersen (daughter) [phone]; Barbara Escoto (daughter) by the bedside Roxanne Petersen (daughter) [phone]; Barbara Escoto [phone](daughter) by the bedside

## 2025-05-15 NOTE — ED ADULT TRIAGE NOTE - PRO INTERPRETER NEED 2
care plan section of the patient's EMR.  Document patient education in the patient education section of the patient's EMR.  X. Outcome Criteria:  Relief of wheezes and obstruction  Improved cough and sputum color and consistency  Improved chest x-ray  Improved arterial oxygen tension and or SaO2  Improved Peak Flow on asthmatic patients        XI. Related Policy/Protocols:  Respiratory Patient Care Protocols  Bronchial Hygiene Therapy  Oxygen Protocol  [facility] Administration of Metered Dose Inhalers via a Common Canister  [facility] Clinical Resources   [facility] Aerosol Generating Procedures  Reference:  L - Respiratory Care Department Policy, Procedure and Protocol Guideline Manual, 1995, John.  L -  Therapist Driven Respiratory Care Protocols - A Practitioner's Guide for Criteria-Based Respiratory Care by Del Raymond M.D., and JOCELIN Navarrete RN, RRT.  L - The rationale for therapist-driven protocols: an update. Respiratory Care 1998; 43:719-723.  N -[facility] Clinical Practice Guidelines.        
English